# Patient Record
Sex: FEMALE | Race: WHITE | NOT HISPANIC OR LATINO | Employment: OTHER | URBAN - METROPOLITAN AREA
[De-identification: names, ages, dates, MRNs, and addresses within clinical notes are randomized per-mention and may not be internally consistent; named-entity substitution may affect disease eponyms.]

---

## 2017-01-23 ENCOUNTER — OFFICE VISIT (OUTPATIENT)
Dept: URGENT CARE | Facility: CLINIC | Age: 62
End: 2017-01-23
Payer: COMMERCIAL

## 2017-01-23 VITALS
HEART RATE: 72 BPM | OXYGEN SATURATION: 92 % | WEIGHT: 293 LBS | BODY MASS INDEX: 48.82 KG/M2 | TEMPERATURE: 98.1 F | DIASTOLIC BLOOD PRESSURE: 92 MMHG | SYSTOLIC BLOOD PRESSURE: 154 MMHG | HEIGHT: 65 IN | RESPIRATION RATE: 18 BRPM

## 2017-01-23 DIAGNOSIS — J01.40 ACUTE NON-RECURRENT PANSINUSITIS: ICD-10-CM

## 2017-01-23 DIAGNOSIS — M54.50 LUMBOSACRAL PAIN: Primary | ICD-10-CM

## 2017-01-23 LAB
APPEARANCE UR: CLEAR
BILIRUB UR STRIP-MCNC: NORMAL MG/DL
COLOR UR AUTO: YELLOW
GLUCOSE UR STRIP.AUTO-MCNC: NORMAL MG/DL
KETONES UR STRIP.AUTO-MCNC: NORMAL MG/DL
LEUKOCYTE ESTERASE UR QL STRIP.AUTO: NORMAL
NITRITE UR QL STRIP.AUTO: NORMAL
PH UR STRIP.AUTO: 6.5 [PH] (ref 5–8)
PROT UR QL STRIP: NORMAL MG/DL
RBC UR QL AUTO: NORMAL
SP GR UR STRIP.AUTO: 1
UROBILINOGEN UR STRIP-MCNC: NORMAL MG/DL

## 2017-01-23 PROCEDURE — 99204 OFFICE O/P NEW MOD 45 MIN: CPT | Performed by: PHYSICIAN ASSISTANT

## 2017-01-23 PROCEDURE — 81002 URINALYSIS NONAUTO W/O SCOPE: CPT | Performed by: PHYSICIAN ASSISTANT

## 2017-01-23 RX ORDER — CYCLOBENZAPRINE HCL 5 MG
5-10 TABLET ORAL 3 TIMES DAILY PRN
Qty: 30 TAB | Refills: 0 | Status: SHIPPED | OUTPATIENT
Start: 2017-01-23 | End: 2017-03-03

## 2017-01-23 RX ORDER — AZITHROMYCIN 250 MG/1
TABLET, FILM COATED ORAL
Qty: 6 TAB | Refills: 0 | Status: SHIPPED | OUTPATIENT
Start: 2017-01-23 | End: 2017-01-28

## 2017-01-23 RX ORDER — TRAMADOL HYDROCHLORIDE 50 MG/1
50-100 TABLET ORAL EVERY 4 HOURS PRN
Qty: 30 TAB | Refills: 0 | Status: SHIPPED | OUTPATIENT
Start: 2017-01-23 | End: 2017-02-02

## 2017-01-23 NOTE — MR AVS SNAPSHOT
"        Alfonso Posada   2017 12:45 PM   Office Visit   MRN: [id number]    Department:  [facility]   Dept Phone:  [phone]    Description:  Female : 1955   Provider:  Adin Werner PA-C           Allergies as of 2017     Allergen Noted Reactions    Pcn [Penicillins] 2017       Hives on face     Sulfa Drugs 2017       \"some sulfa drugs- leg itchiness\"       You were diagnosed with     Lumbosacral pain   [767763]  -  Primary     Acute non-recurrent pansinusitis   [[id number]]         Vital Signs     Blood Pressure Pulse Temperature Respirations Height Weight    154/92 mmHg 72 36.7 °C (98.1 °F) 18 1.651 m (5' 5\") 135.626 kg (299 lb)    Body Mass Index Oxygen Saturation                49.76 kg/m2 92%          Basic Information     Date Of Birth Sex Race Ethnicity Preferred Language    1955 Female White Non- English      Health Maintenance     Patient has no pending health maintenance at this time      Results     POCT Urinalysis      Component Value Standard Range & Units    POC Color YELLOW Negative    POC Appearance CLEAR Negative    POC Leukocyte Esterase NEG Negative    POC Nitrites NEG Negative    POC Urobiligen NEG Negative (0.2) mg/dL    POC Protein NEG Negative mg/dL    POC Urine PH 6.5 5.0 - 8.0    POC Blood NEG Negative    POC Specific Gravity 1.005 <1.005 - >1.030    POC Ketones NEG Negative mg/dL    POC Biliruben NEG Negative mg/dL    POC Glucose NEG Negative mg/dL                        Current Immunizations     No immunizations on file.      Below and/or attached are the medications your provider expects you to take. Review all of your home medications and newly ordered medications with your provider and/or pharmacist. Follow medication instructions as directed by your provider and/or pharmacist. Please keep your medication list with you and share with your provider. Update the information when medications are discontinued, doses are changed, or " new medications (including over-the-counter products) are added; and carry medication information at all times in the event of emergency situations     Allergies:  PCN - (reactions not documented)     SULFA DRUGS - (reactions not documented)               Medications  Valid as of: January 23, 2017 -  1:31 PM    Generic Name Brand Name Tablet Size Instructions for use    ALPRAZolam   Take  by mouth.        Azithromycin (Tab) ZITHROMAX 250 MG Z-pack: use as directed        Cyclobenzaprine HCl (Tab) FLEXERIL 5 MG Take 1-2 Tabs by mouth 3 times a day as needed.        Levothyroxine Sodium   Take  by mouth.        Losartan Potassium-HCTZ   Take  by mouth.        Metoprolol Tartrate   Take  by mouth.        Montelukast Sodium   Take  by mouth.        TraMADol HCl (Tab) ULTRAM 50 MG Take 1-2 Tabs by mouth every four hours as needed for Moderate Pain or Severe Pain for up to 10 days.        .                 Medicines prescribed today were sent to:     None      Medication refill instructions:       If your prescription bottle indicates you have medication refills left, it is not necessary to call your provider’s office. Please contact your pharmacy and they will refill your medication.    If your prescription bottle indicates you do not have any refills left, you may request refills at any time through one of the following ways: The online [employer] system (except Urgent Care), by calling your provider’s office, or by asking your pharmacy to contact your provider’s office with a refill request. Medication refills are processed only during regular business hours and may not be available until the next business day. Your provider may request additional information or to have a follow-up visit with you prior to refilling your medication.   *Please Note: Medication refills are assigned a new Rx number when refilled electronically. Your pharmacy may indicate that no refills were authorized even though a new prescription for the  same medication is available at the pharmacy. Please request the medicine by name with the pharmacy before contacting your provider for a refill.        Instructions    Lumbosacral Strain  Lumbosacral strain is a strain of any of the parts that make up your lumbosacral vertebrae. Your lumbosacral vertebrae are the bones that make up the lower third of your backbone. Your lumbosacral vertebrae are held together by muscles and tough, fibrous tissue (ligaments).   CAUSES   A sudden blow to your back can cause lumbosacral strain. Also, anything that causes an excessive stretch of the muscles in the low back can cause this strain. This is typically seen when people exert themselves strenuously, fall, lift heavy objects, bend, or crouch repeatedly.  RISK FACTORS  · Physically demanding work.  · Participation in pushing or pulling sports or sports that require a sudden twist of the back (tennis, golf, baseball).  · Weight lifting.  · Excessive lower back curvature.  · Forward-tilted pelvis.  · Weak back or abdominal muscles or both.  · Tight hamstrings.  SIGNS AND SYMPTOMS   Lumbosacral strain may cause pain in the area of your injury or pain that moves (radiates) down your leg.   DIAGNOSIS  Your health care provider can often diagnose lumbosacral strain through a physical exam. In some cases, you may need tests such as X-ray exams.   TREATMENT   Treatment for your lower back injury depends on many factors that your clinician will have to evaluate. However, most treatment will include the use of anti-inflammatory medicines.  HOME CARE INSTRUCTIONS   · Avoid hard physical activities (tennis, racquetball, waterskiing) if you are not in proper physical condition for it. This may aggravate or create problems.  · If you have a back problem, avoid sports requiring sudden body movements. Swimming and walking are generally safer activities.  · Maintain good posture.  · Maintain a healthy weight.  · For acute conditions, you may  put ice on the injured area.  ¨ Put ice in a plastic bag.  ¨ Place a towel between your skin and the bag.  ¨ Leave the ice on for 20 minutes, 2-3 times a day.  · When the low back starts healing, stretching and strengthening exercises may be recommended.  SEEK MEDICAL CARE IF:  · Your back pain is getting worse.  · You experience severe back pain not relieved with medicines.  SEEK IMMEDIATE MEDICAL CARE IF:   · You have numbness, tingling, weakness, or problems with the use of your arms or legs.  · There is a change in bowel or bladder control.  · You have increasing pain in any area of the body, including your belly (abdomen).  · You notice shortness of breath, dizziness, or feel faint.  · You feel sick to your stomach (nauseous), are throwing up (vomiting), or become sweaty.  · You notice discoloration of your toes or legs, or your feet get very cold.  MAKE SURE YOU:   · Understand these instructions.  · Will watch your condition.  · Will get help right away if you are not doing well or get worse.     This information is not intended to replace advice given to you by your health care provider. Make sure you discuss any questions you have with your health care provider.     Document Released: 09/27/2006 Document Revised: 01/08/2016 Document Reviewed: 08/06/2014  [employer] Interactive Patient Education ©2016 [employer]            Ku Access Code: [email or web address]  Expires: 2/22/2017  1:10 PM    Ku  A secure, online tool to manage your health information     [employer] Ku® is a secure, online tool that connects you to your personalized health information from the privacy of your home -- day or night - making it very easy for you to manage your healthcare. Once the activation process is completed, you can even access your medical information using the Ku bo, which is available for free in the Apple Bo store or Google Play store.     Ku provides the following levels of access (as shown  below):   My Chart Features   [facility] Doctor [facility]  Urgent  [facility]  Primary Care  Doctor   Email your healthcare team securely and privately 24/7 X X X    Manage appointments: schedule your next appointment; view details of past/upcoming appointments X      Request prescription refills. X      View recent personal medical records, including lab and immunizations X X X X   View health record, including health history, allergies, medications X X X X   Read reports about your outpatient visits, procedures, consult and ER notes X X X X   See your discharge summary, which is a recap of your hospital and/or ER visit that includes your diagnosis, lab results, and care plan. X X       How to register for RESPACE:  1. Go to  [email or web address].  2. Click on the Sign Up Now box, which takes you to the New Member Sign Up page. You will need to provide the following information:  a. Enter your RESPACE Access Code exactly as it appears at the top of this page. (You will not need to use this code after you’ve completed the sign-up process. If you do not sign up before the expiration date, you must request a new code.)   b. Enter your date of birth.   c. Enter your home email address.   d. Click Submit, and follow the next screen’s instructions.  3. Create a RESPACE ID. This will be your RESPACE login ID and cannot be changed, so think of one that is secure and easy to remember.  4. Create a RESPACE password. You can change your password at any time.  5. Enter your Password Reset Question and Answer. This can be used at a later time if you forget your password.   6. Enter your e-mail address. This allows you to receive e-mail notifications when new information is available in RESPACE.  7. Click Sign Up. You can now view your health information.    For assistance activating your RESPACE account, call [phone]

## 2017-01-23 NOTE — PROGRESS NOTES
Subjective:      PT is a 61 y.o. female who presents with uri and back pain          HPI  Pt states she recently moved from [place], 2 days ago and notes long hours in the car and lifting. Pt concerned for UTI or back pain. PT presents to  clinic today complaining of sore throat, fevers, chills, watery eyes, pressure in ears, cough, fatigue, runny nose. PT denies CP, SOB, NVD, abdominal pain, joint pain. PT states these symptoms began around 2 days ago and that the pt's family has been sick on and off for the last week. PT states the pain is a 7/10 with lumbar pain, aching in nature and worse at night.  Pt has not taken any medications for this condition. The pt's medication list, problem list, and allergies have been evaluated and reviewed during today's visit.      PMH:  Negative per pt.      PSH:  Negative per pt.      Fam Hx:  Father with hx of HTN      Soc HX:  Social History     Social History   • Marital Status:      Spouse Name: N/A   • Number of Children: N/A   • Years of Education: N/A     Occupational History   • Not on file.     Social History Main Topics   • Smoking status: Not on file   • Smokeless tobacco: Not on file   • Alcohol Use: Not on file   • Drug Use: Not on file   • Sexual Activity: Not on file     Other Topics Concern   • Not on file     Social History Narrative   • No narrative on file         Medications:    Current outpatient prescriptions:   •  METOPROLOL TARTRATE PO, Take  by mouth., Disp: , Rfl:   •  ALPRAZolam (XANAX PO), Take  by mouth., Disp: , Rfl:   •  LOSARTAN POTASSIUM-HCTZ PO, Take  by mouth., Disp: , Rfl:   •  Montelukast Sodium (SINGULAIR PO), Take  by mouth., Disp: , Rfl:   •  LEVOTHYROXINE SODIUM PO, Take  by mouth., Disp: , Rfl:   •  tramadol (ULTRAM) 50 MG Tab, Take 1-2 Tabs by mouth every four hours as needed for Moderate Pain or Severe Pain for up to 10 days., Disp: 30 Tab, Rfl: 0  •  cyclobenzaprine (FLEXERIL) 5 MG tablet, Take 1-2 Tabs by mouth 3 times a  "day as needed., Disp: 30 Tab, Rfl: 0  •  azithromycin (ZITHROMAX) 250 MG Tab, Z-pack: use as directed, Disp: 6 Tab, Rfl: 0      Allergies:  Pcn and Sulfa drugs      ROS  Constitutional: Positive for chills and malaise/fatigue.   HENT: Positive for congestion and sore throat. Negative for ear pain.    Eyes: Negative for blurred vision, double vision and photophobia.   Respiratory: Positive for cough and sputum production. Negative for hemoptysis, shortness of breath and wheezing.    Cardiovascular: Negative for chest pain and palpitations.   Gastrointestinal: Negative for nausea, vomiting, abdominal pain, diarrhea and constipation.   Genitourinary: Negative for dysuria and flank pain.   Musculoskeletal: POS for lumbar pain and myalgias.   Skin: Negative for itching and rash.   Neurological: Positive for headaches. Negative for dizziness and tingling.   Endo/Heme/Allergies: Does not bruise/bleed easily.   Psychiatric/Behavioral: Negative for depression. The patient is not nervous/anxious.             Objective:     /92 mmHg  Pulse 72  Temp(Src) 36.7 °C (98.1 °F)  Resp 18  Ht 1.651 m (5' 5\")  Wt 135.626 kg (299 lb)  BMI 49.76 kg/m2  SpO2 92%     Physical Exam   Musculoskeletal:        Lumbar back: She exhibits decreased range of motion, tenderness, pain and spasm. She exhibits no bony tenderness, no swelling, no edema, no deformity, no laceration and normal pulse.        Back:           Constitutional: PT is oriented to person, place, and time. PT appears well-developed and well-nourished. No distress.   HENT:   Head: Normocephalic and atraumatic.   Right Ear: Hearing, tympanic membrane, external ear and ear canal normal.   Left Ear: Hearing, tympanic membrane, external ear and ear canal normal.   Nose: Mucosal edema, rhinorrhea and sinus tenderness present. Right sinus exhibits frontal sinus tenderness. Left sinus exhibits frontal sinus tenderness.   Mouth/Throat: Uvula is midline. Mucous membranes are " pale. Posterior oropharyngeal edema and posterior oropharyngeal erythema present. No oropharyngeal exudate.   Eyes: Conjunctivae normal and EOM are normal. Pupils are equal, round, and reactive to light.   Neck: Normal range of motion. Neck supple. No thyromegaly present.   Cardiovascular: Normal rate, regular rhythm, normal heart sounds and intact distal pulses.  Exam reveals no gallop and no friction rub.    No murmur heard.  Pulmonary/Chest: Effort normal and breath sounds normal. No respiratory distress. PT has no wheezes. PT has no rales. PT exhibits no tenderness.   Abdominal: Soft. Bowel sounds are normal. PT exhibits no distension and no mass. There is no tenderness. There is no rebound and no guarding.   Lymphadenopathy:     PT has no cervical adenopathy.   Neurological: PT is alert and oriented to person, place, and time. PT displays normal reflexes. No cranial nerve deficit. PT exhibits normal muscle tone. Coordination normal.   Skin: Skin is warm and dry. No rash noted. No erythema.   Psychiatric: PT has a normal mood and affect. PT behavior is normal. Judgment and thought content normal.        Assessment/Plan:     1. Lumbosacral pain    - POCT Urinalysis-->WNL  - tramadol (ULTRAM) 50 MG Tab; Take 1-2 Tabs by mouth every four hours as needed for Moderate Pain or Severe Pain for up to 10 days.  Dispense: 30 Tab; Refill: 0  - cyclobenzaprine (FLEXERIL) 5 MG tablet; Take 1-2 Tabs by mouth 3 times a day as needed.  Dispense: 30 Tab; Refill: 0    2. Acute non-recurrent pansinusitis    - azithromycin (ZITHROMAX) 250 MG Tab; Z-pack: use as directed  Dispense: 6 Tab; Refill: 0      [facility] Aware web site evaluation: I have obtained and reviewed patient utilization report from [facility] pharmacy database prior to writing prescription for controlled substance II, III or IV per [place] bill . Based on the report and my clinical assessment the prescription is medically necessary.   NSAIDs for pain 1-5,  Ultram for pain 6-10 or to help get to sleep.  Rest, fluids encouraged.  OTC decongestant for congestion/cough  AVS with medical info given.  Pt was in full understanding and agreement with the plan.  Follow-up as needed if symptoms worsen or fail to improve.

## 2017-01-23 NOTE — PATIENT INSTRUCTIONS
Lumbosacral Strain  Lumbosacral strain is a strain of any of the parts that make up your lumbosacral vertebrae. Your lumbosacral vertebrae are the bones that make up the lower third of your backbone. Your lumbosacral vertebrae are held together by muscles and tough, fibrous tissue (ligaments).   CAUSES   A sudden blow to your back can cause lumbosacral strain. Also, anything that causes an excessive stretch of the muscles in the low back can cause this strain. This is typically seen when people exert themselves strenuously, fall, lift heavy objects, bend, or crouch repeatedly.  RISK FACTORS  · Physically demanding work.  · Participation in pushing or pulling sports or sports that require a sudden twist of the back (tennis, golf, baseball).  · Weight lifting.  · Excessive lower back curvature.  · Forward-tilted pelvis.  · Weak back or abdominal muscles or both.  · Tight hamstrings.  SIGNS AND SYMPTOMS   Lumbosacral strain may cause pain in the area of your injury or pain that moves (radiates) down your leg.   DIAGNOSIS  Your health care provider can often diagnose lumbosacral strain through a physical exam. In some cases, you may need tests such as X-ray exams.   TREATMENT   Treatment for your lower back injury depends on many factors that your clinician will have to evaluate. However, most treatment will include the use of anti-inflammatory medicines.  HOME CARE INSTRUCTIONS   · Avoid hard physical activities (tennis, racquetball, waterskiing) if you are not in proper physical condition for it. This may aggravate or create problems.  · If you have a back problem, avoid sports requiring sudden body movements. Swimming and walking are generally safer activities.  · Maintain good posture.  · Maintain a healthy weight.  · For acute conditions, you may put ice on the injured area.  ¨ Put ice in a plastic bag.  ¨ Place a towel between your skin and the bag.  ¨ Leave the ice on for 20 minutes, 2-3 times a day.  · When the  low back starts healing, stretching and strengthening exercises may be recommended.  SEEK MEDICAL CARE IF:  · Your back pain is getting worse.  · You experience severe back pain not relieved with medicines.  SEEK IMMEDIATE MEDICAL CARE IF:   · You have numbness, tingling, weakness, or problems with the use of your arms or legs.  · There is a change in bowel or bladder control.  · You have increasing pain in any area of the body, including your belly (abdomen).  · You notice shortness of breath, dizziness, or feel faint.  · You feel sick to your stomach (nauseous), are throwing up (vomiting), or become sweaty.  · You notice discoloration of your toes or legs, or your feet get very cold.  MAKE SURE YOU:   · Understand these instructions.  · Will watch your condition.  · Will get help right away if you are not doing well or get worse.     This information is not intended to replace advice given to you by your health care provider. Make sure you discuss any questions you have with your health care provider.     Document Released: 09/27/2006 Document Revised: 01/08/2016 Document Reviewed: 08/06/2014  [employer] Interactive Patient Education ©2016 [employer]

## 2017-02-02 ENCOUNTER — OFFICE VISIT (OUTPATIENT)
Dept: URGENT CARE | Facility: CLINIC | Age: 62
End: 2017-02-02
Payer: COMMERCIAL

## 2017-02-02 VITALS
OXYGEN SATURATION: 95 % | TEMPERATURE: 98.2 F | HEART RATE: 80 BPM | DIASTOLIC BLOOD PRESSURE: 72 MMHG | RESPIRATION RATE: 16 BRPM | SYSTOLIC BLOOD PRESSURE: 132 MMHG

## 2017-02-02 DIAGNOSIS — M54.50 ACUTE BILATERAL LOW BACK PAIN WITHOUT SCIATICA: ICD-10-CM

## 2017-02-02 PROCEDURE — 99214 OFFICE O/P EST MOD 30 MIN: CPT | Performed by: NURSE PRACTITIONER

## 2017-02-02 RX ORDER — KETOROLAC TROMETHAMINE 30 MG/ML
60 INJECTION, SOLUTION INTRAMUSCULAR; INTRAVENOUS ONCE
Status: COMPLETED | OUTPATIENT
Start: 2017-02-02 | End: 2017-02-02

## 2017-02-02 RX ORDER — DEXAMETHASONE SODIUM PHOSPHATE 10 MG/ML
10 INJECTION INTRAMUSCULAR; INTRAVENOUS ONCE
Status: COMPLETED | OUTPATIENT
Start: 2017-02-02 | End: 2017-02-02

## 2017-02-02 RX ORDER — OXYCODONE AND ACETAMINOPHEN 10; 325 MG/1; MG/1
1-2 TABLET ORAL EVERY 4 HOURS PRN
COMMUNITY
End: 2017-02-05

## 2017-02-02 RX ADMIN — DEXAMETHASONE SODIUM PHOSPHATE 10 MG: 10 INJECTION INTRAMUSCULAR; INTRAVENOUS at 14:28

## 2017-02-02 RX ADMIN — KETOROLAC TROMETHAMINE 60 MG: 30 INJECTION, SOLUTION INTRAMUSCULAR; INTRAVENOUS at 14:33

## 2017-02-02 ASSESSMENT — ENCOUNTER SYMPTOMS
NECK PAIN: 0
FALLS: 0
SHORTNESS OF BREATH: 0
ABDOMINAL PAIN: 0
BACK PAIN: 1
DIZZINESS: 0
CONSTIPATION: 0
NAUSEA: 0
WEAKNESS: 0
ORTHOPNEA: 0
DIARRHEA: 0
HEADACHES: 0
COUGH: 0
FLANK PAIN: 0
VOMITING: 0
CHILLS: 0
MYALGIAS: 1
PALPITATIONS: 0
FEVER: 0

## 2017-02-02 NOTE — MR AVS SNAPSHOT
"        Alfonso Posada   2017 1:45 PM   Office Visit   MRN: [id number]    Department:  [facility]   Dept Phone:  [phone]    Description:  Female : 1955   Provider:  RON Chery           Reason for Visit     Back Pain Right side lower back/ middle X 1 week       Allergies as of 2017     Allergen Noted Reactions    Pcn [Penicillins] 2017       Hives on face     Sulfa Drugs 2017       \"some sulfa drugs- leg itchiness\"       You were diagnosed with     Acute bilateral low back pain without sciatica   [[id number]]         Vital Signs     Blood Pressure Pulse Temperature Respirations Oxygen Saturation       132/72 mmHg 80 36.8 °C (98.2 °F) 16 95%       Basic Information     Date Of Birth Sex Race Ethnicity Preferred Language    1955 Female White Non- English      Your appointments     2017  1:00 PM   New Patient with Sim Brownlee PA-C   [facility]    [address]  [address]  [place] [id number] [phone]           Please bring Photo ID, Insurance Cards, All Medication Bottles and copies of any legal documents (such as Living Will, Power of ) If speaking a language besides English please bring an adult . Please arrive 30 minutes prior for check in and registration. You will be receiving a confirmation call a few days before your appointment from our automated call confirmation system.              Health Maintenance        Date Due Completion Dates    IMM DTaP/Tdap/Td Vaccine (1 - Tdap) 1974 ---    PAP SMEAR 1976 ---    MAMMOGRAM 1995 ---    COLONOSCOPY 2005 ---    IMM ZOSTER VACCINE 2015 ---    IMM INFLUENZA (1) 2016 ---            Current Immunizations     No immunizations on file.      Below and/or attached are the medications your provider expects you to take. Review all of your home medications and newly ordered medications with " your provider and/or pharmacist. Follow medication instructions as directed by your provider and/or pharmacist. Please keep your medication list with you and share with your provider. Update the information when medications are discontinued, doses are changed, or new medications (including over-the-counter products) are added; and carry medication information at all times in the event of emergency situations     Allergies:  PCN - (reactions not documented)     SULFA DRUGS - (reactions not documented)               Medications  Valid as of: February 02, 2017 -  2:55 PM    Generic Name Brand Name Tablet Size Instructions for use    ALPRAZolam   Take  by mouth.        Cyclobenzaprine HCl (Tab) FLEXERIL 5 MG Take 1-2 Tabs by mouth 3 times a day as needed.        Levothyroxine Sodium   Take  by mouth.        Losartan Potassium-HCTZ   Take  by mouth.        Metoprolol Tartrate   Take  by mouth.        Montelukast Sodium   Take  by mouth.        Oxycodone-Acetaminophen (Tab) PERCOCET-10  MG Take 1-2 Tabs by mouth every four hours as needed for Severe Pain.        TraMADol HCl (Tab) ULTRAM 50 MG Take 1-2 Tabs by mouth every four hours as needed for Moderate Pain or Severe Pain for up to 10 days.        .                 Medicines prescribed today were sent to:     [facility] [address] [place] [id number] [facility] [place] [id number] [facility] [address]    Phone: [phone] Fax: [phone]    Open 24 Hours?: No      Medication refill instructions:       If your prescription bottle indicates you have medication refills left, it is not necessary to call your provider’s office. Please contact your pharmacy and they will refill your medication.    If your prescription bottle indicates you do not have any refills left, you may request refills at any time through one of the following ways: The online [employer] system (except Urgent Care), by calling your provider’s office,  or by asking your pharmacy to contact your provider’s office with a refill request. Medication refills are processed only during regular business hours and may not be available until the next business day. Your provider may request additional information or to have a follow-up visit with you prior to refilling your medication.   *Please Note: Medication refills are assigned a new Rx number when refilled electronically. Your pharmacy may indicate that no refills were authorized even though a new prescription for the same medication is available at the pharmacy. Please request the medicine by name with the pharmacy before contacting your provider for a refill.           [employer] Access Code: [email or web address]  Expires: 2/22/2017  1:10 PM    [employer]  A secure, online tool to manage your health information     [employer]® is a secure, online tool that connects you to your personalized health information from the privacy of your home -- day or night - making it very easy for you to manage your healthcare. Once the activation process is completed, you can even access your medical information using the [employer] bo, which is available for free in the Apple Bo store or Google Play store.     [employer] provides the following levels of access (as shown below):   My Chart Features   [facility] Doctor [facility]  Urgent  Care [facility]  Primary Care  Doctor   Email your healthcare team securely and privately 24/7 X X X    Manage appointments: schedule your next appointment; view details of past/upcoming appointments X      Request prescription refills. X      View recent personal medical records, including lab and immunizations X X X X   View health record, including health history, allergies, medications X X X X   Read reports about your outpatient visits, procedures, consult and ER notes X X X X   See your discharge summary, which is a recap of your hospital and/or ER visit that includes  your diagnosis, lab results, and care plan. X X       How to register for [email or web address]:  1. Go to  [email or web address].  2. Click on the Sign Up Now box, which takes you to the New Member Sign Up page. You will need to provide the following information:  a. Enter your [employer] Access Code exactly as it appears at the top of this page. (You will not need to use this code after you’ve completed the sign-up process. If you do not sign up before the expiration date, you must request a new code.)   b. Enter your date of birth.   c. Enter your home email address.   d. Click Submit, and follow the next screen’s instructions.  3. Create a [employer] ID. This will be your [employer] login ID and cannot be changed, so think of one that is secure and easy to remember.  4. Create a [employer] password. You can change your password at any time.  5. Enter your Password Reset Question and Answer. This can be used at a later time if you forget your password.   6. Enter your e-mail address. This allows you to receive e-mail notifications when new information is available in [employer].  7. Click Sign Up. You can now view your health information.    For assistance activating your [employer] account, call [phone]

## 2017-02-02 NOTE — PROGRESS NOTES
"Subjective:      Alfonso Posada is a 61 y.o. female who presents with Back Pain            Back Pain  Pertinent negatives include no abdominal pain, chest pain, dysuria, fever, headaches or weakness.   Alfonso is a 61 year old female who is here for back pain x 10 days. Was seen here 1 week ago for same thing. Prescribed tramadol and flexeril. Out of tramadol but still has flexeril but states \"not working\". Has gone to the chiropractor 2 days ago and will go again today. Has appointment with new PCP tomorrow. Moved here from [place] a week ago. Denies problems with urination but states unable to bend over to \"wipe myself\" after going to the bathroom and difficulty with putting on her bra.     PMH:  has no past medical history on file.  MEDS:   Current outpatient prescriptions:   •  oxycodone-acetaminophen (PERCOCET-10)  MG Tab, Take 1-2 Tabs by mouth every four hours as needed for Severe Pain., Disp: , Rfl:   •  METOPROLOL TARTRATE PO, Take  by mouth., Disp: , Rfl:   •  LOSARTAN POTASSIUM-HCTZ PO, Take  by mouth., Disp: , Rfl:   •  Montelukast Sodium (SINGULAIR PO), Take  by mouth., Disp: , Rfl:   •  LEVOTHYROXINE SODIUM PO, Take  by mouth., Disp: , Rfl:   •  cyclobenzaprine (FLEXERIL) 5 MG tablet, Take 1-2 Tabs by mouth 3 times a day as needed., Disp: 30 Tab, Rfl: 0  •  ALPRAZolam (XANAX PO), Take  by mouth., Disp: , Rfl:   •  tramadol (ULTRAM) 50 MG Tab, Take 1-2 Tabs by mouth every four hours as needed for Moderate Pain or Severe Pain for up to 10 days., Disp: 30 Tab, Rfl: 0  ALLERGIES:   Allergies   Allergen Reactions   • Pcn [Penicillins]      Hives on face    • Sulfa Drugs      \"some sulfa drugs- leg itchiness\"      SURGHX: History reviewed. No pertinent past surgical history.  SOCHX:    FH: Family history was reviewed, no pertinent findings to report      Review of Systems   Constitutional: Negative for fever, chills and malaise/fatigue.   Respiratory: Negative for cough and shortness of breath.  "   Cardiovascular: Negative for chest pain, palpitations and orthopnea.   Gastrointestinal: Negative for nausea, vomiting, abdominal pain, diarrhea and constipation.   Genitourinary: Negative for dysuria, urgency, frequency, hematuria and flank pain.   Musculoskeletal: Positive for myalgias and back pain. Negative for falls and neck pain.   Neurological: Negative for dizziness, weakness and headaches.          Objective:     /72 mmHg  Pulse 80  Temp(Src) 36.8 °C (98.2 °F)  Resp 16  SpO2 95%     Physical Exam   Constitutional: She is oriented to person, place, and time. She appears well-developed and well-nourished. No distress.   HENT:   Head: Normocephalic.   Eyes: Conjunctivae and EOM are normal. Pupils are equal, round, and reactive to light.   Neck: Normal range of motion. Neck supple.   Cardiovascular: Normal rate.    Pulmonary/Chest: Effort normal.   Musculoskeletal:        Lumbar back: She exhibits decreased range of motion, tenderness, pain and spasm. She exhibits no bony tenderness, no swelling, no edema and no deformity.   Neurological: She is alert and oriented to person, place, and time.   Skin: Skin is warm and dry. She is not diaphoretic.   Vitals reviewed.  Morbid Obesity            Assessment/Plan:     1. Acute bilateral low back pain without sciatica    - dexamethasone (DECADRON) injection (check route below) 10 mg; 1 mL by Intramuscular route Once.  - ketorolac (TORADOL) injection 60 mg; 2 mL by Intramuscular route Once.    Take Ibuprofen prn for discomfort  May use cool compresses for swelling and warm compresses for muscle stiffness   May perform muscle stretches as tolerated after warm compresses to maintain mobility, avoid abdominal crunches  May continue Flexeril prn when at home only   May apply topical analgesics prn  Perform proper body mechanics with lifting, twisting, bending and reaching. Ask for assistance with heay objects  Monitor for bowel/urination problems,  numbness/tingling in lower extremities, decreased ROM with ambulation difficulty- need re-evaluation

## 2017-02-03 ENCOUNTER — OFFICE VISIT (OUTPATIENT)
Dept: MEDICAL GROUP | Facility: MEDICAL CENTER | Age: 62
End: 2017-02-03
Payer: COMMERCIAL

## 2017-02-03 ENCOUNTER — HOSPITAL ENCOUNTER (OUTPATIENT)
Dept: LAB | Facility: MEDICAL CENTER | Age: 62
End: 2017-02-03
Attending: PHYSICIAN ASSISTANT
Payer: COMMERCIAL

## 2017-02-03 VITALS
SYSTOLIC BLOOD PRESSURE: 126 MMHG | DIASTOLIC BLOOD PRESSURE: 74 MMHG | BODY MASS INDEX: 48.82 KG/M2 | HEIGHT: 65 IN | HEART RATE: 74 BPM | TEMPERATURE: 98.4 F | RESPIRATION RATE: 16 BRPM | OXYGEN SATURATION: 95 % | WEIGHT: 293 LBS

## 2017-02-03 DIAGNOSIS — I10 ESSENTIAL HYPERTENSION: ICD-10-CM

## 2017-02-03 DIAGNOSIS — E89.0 POSTOPERATIVE HYPOTHYROIDISM: ICD-10-CM

## 2017-02-03 DIAGNOSIS — Z86.19 HISTORY OF SHINGLES: ICD-10-CM

## 2017-02-03 DIAGNOSIS — J30.9 ALLERGIC RHINITIS, UNSPECIFIED ALLERGIC RHINITIS TRIGGER, UNSPECIFIED RHINITIS SEASONALITY: ICD-10-CM

## 2017-02-03 DIAGNOSIS — M25.562 BILATERAL CHRONIC KNEE PAIN: ICD-10-CM

## 2017-02-03 DIAGNOSIS — E66.01 MORBID OBESITY WITH BMI OF 45.0-49.9, ADULT (HCC): ICD-10-CM

## 2017-02-03 DIAGNOSIS — F17.200 TOBACCO DEPENDENCE: ICD-10-CM

## 2017-02-03 DIAGNOSIS — F41.9 ANXIETY: ICD-10-CM

## 2017-02-03 DIAGNOSIS — M25.561 BILATERAL CHRONIC KNEE PAIN: ICD-10-CM

## 2017-02-03 DIAGNOSIS — Z76.89 ENCOUNTER TO ESTABLISH CARE: ICD-10-CM

## 2017-02-03 DIAGNOSIS — Z00.00 PREVENTATIVE HEALTH CARE: ICD-10-CM

## 2017-02-03 DIAGNOSIS — G89.29 BILATERAL CHRONIC KNEE PAIN: ICD-10-CM

## 2017-02-03 DIAGNOSIS — M19.90 ARTHRITIS: ICD-10-CM

## 2017-02-03 LAB
ERYTHROCYTE [SEDIMENTATION RATE] IN BLOOD BY WESTERGREN METHOD: 10 MM/HOUR (ref 0–30)
RHEUMATOID FACT SERPL-ACNC: <10 IU/ML (ref 0–14)
T4 FREE SERPL-MCNC: 1.01 NG/DL (ref 0.58–1.64)
TSH SERPL DL<=0.005 MIU/L-ACNC: 0.14 UIU/ML (ref 0.35–5.5)

## 2017-02-03 PROCEDURE — 86431 RHEUMATOID FACTOR QUANT: CPT

## 2017-02-03 PROCEDURE — 84443 ASSAY THYROID STIM HORMONE: CPT

## 2017-02-03 PROCEDURE — 99214 OFFICE O/P EST MOD 30 MIN: CPT | Performed by: PHYSICIAN ASSISTANT

## 2017-02-03 PROCEDURE — 84439 ASSAY OF FREE THYROXINE: CPT

## 2017-02-03 PROCEDURE — 36415 COLL VENOUS BLD VENIPUNCTURE: CPT

## 2017-02-03 PROCEDURE — 85652 RBC SED RATE AUTOMATED: CPT

## 2017-02-03 RX ORDER — OXYCODONE AND ACETAMINOPHEN 10; 325 MG/1; MG/1
1-2 TABLET ORAL EVERY 8 HOURS PRN
Qty: 90 TAB | Refills: 0 | Status: SHIPPED | OUTPATIENT
Start: 2017-02-03 | End: 2017-03-03

## 2017-02-03 RX ORDER — NAPROXEN 500 MG/1
500 TABLET ORAL 2 TIMES DAILY WITH MEALS
Qty: 60 TAB | Refills: 1 | Status: SHIPPED | OUTPATIENT
Start: 2017-02-03 | End: 2017-04-03 | Stop reason: SDUPTHER

## 2017-02-03 ASSESSMENT — PATIENT HEALTH QUESTIONNAIRE - PHQ9: CLINICAL INTERPRETATION OF PHQ2 SCORE: 0

## 2017-02-03 NOTE — ASSESSMENT & PLAN NOTE
This is a 61-year-old female complains of a history of hypothyroidism. She had total a thyroidectomy but the right side of parathyroid was left intact. Since that time she's battled to keep her hypothyroid in check. She's been on several different dosages of levothyroxine. Last time it was stopped completely. At one point it was advised that she have an ultrasound of her thyroid. She recently moved to [place] from [place]. She is accompanied by her  today.

## 2017-02-03 NOTE — ASSESSMENT & PLAN NOTE
History of hypertension. Currently on metoprolol 50 mg and losartan at 25 mg. Blood pressure is well controlled.

## 2017-02-03 NOTE — ASSESSMENT & PLAN NOTE
Last year at the end of the year was diagnosed with shingles around the left hip. The rash has improved but there is still residual neuropathy. She complains of a painless numbness. Was told to wait 3 years to get her shingles vaccination.

## 2017-02-03 NOTE — ASSESSMENT & PLAN NOTE
History of anxiety. Is currently on sertraline and takes Xanax as needed. Symptoms are controlled. Denies any homicidal or suicidal ideations.

## 2017-02-03 NOTE — ASSESSMENT & PLAN NOTE
Has a history of allergic rhinitis. Only medication that is affected is Singulair which she will take as needed.

## 2017-02-03 NOTE — PROGRESS NOTES
Subjective:   Alfonso Posada is a 61 y.o. female here today to establish care and discuss several chronic conditions.    Preventative health care  Hx AUB. Dx with precancerous cells of uterus.  Had uterus removal.      Postoperative hypothyroidism  This is a 61-year-old female complains of a history of hypothyroidism. She had total a thyroidectomy but the right side of parathyroid was left intact. Since that time she's battled to keep her hypothyroid in check. She's been on several different dosages of levothyroxine. Last time it was stopped completely. At one point it was advised that she have an ultrasound of her thyroid. She recently moved to [place] from [place]. She is accompanied by her  today.        Bilateral chronic knee pain  Complains of bilateral chronic pain. She walks with a cane. She has been prescribed in the past nonsteroidals and opioids. She states both the knees are bone-on-bone. Is requesting a referral to see an orthopedic surgeon.    History of shingles  Last year at the end of the year was diagnosed with shingles around the left hip. The rash has improved but there is still residual neuropathy. She complains of a painless numbness. Was told to wait 3 years to get her shingles vaccination.    Anxiety  History of anxiety. Is currently on sertraline and takes Xanax as needed. Symptoms are controlled. Denies any homicidal or suicidal ideations.    Arthritis  History of arthritis. Was referred to rheumatology because she was told that she had the beginnings of rheumatoid arthritis. She has the paperwork that she was referred to rheumatology.    Allergic rhinitis  Has a history of allergic rhinitis. Only medication that is affected is Singulair which she will take as needed.    Essential hypertension  History of hypertension. Currently on metoprolol 50 mg and losartan at 25 mg. Blood pressure is well controlled.       Current medicines (including changes today)  Current Outpatient  "Prescriptions   Medication Sig Dispense Refill   • naproxen (NAPROSYN) 500 MG Tab Take 1 Tab by mouth 2 times a day, with meals. 60 Tab 1   • oxycodone-acetaminophen (PERCOCET-10)  MG Tab Take 1-2 Tabs by mouth every 8 hours as needed for Severe Pain. 90 Tab 0   • oxycodone-acetaminophen (PERCOCET-10)  MG Tab Take 1-2 Tabs by mouth every four hours as needed for Severe Pain.     • METOPROLOL TARTRATE PO Take 50 mg by mouth.     • ALPRAZolam (XANAX PO) Take  by mouth.     • LOSARTAN POTASSIUM-HCTZ PO Take 25 mg by mouth.     • Montelukast Sodium (SINGULAIR PO) Take  by mouth.     • LEVOTHYROXINE SODIUM PO Take  by mouth.     • cyclobenzaprine (FLEXERIL) 5 MG tablet Take 1-2 Tabs by mouth 3 times a day as needed. 30 Tab 0     No current facility-administered medications for this visit.     She  has no past medical history on file.    ROS   No chest pain, no shortness of breath, no abdominal pain and all other systems were reviewed and are negative.       Objective:     Blood pressure 126/74, pulse 74, temperature 36.9 °C (98.4 °F), resp. rate 16, height 1.651 m (5' 5\"), weight 135.626 kg (299 lb), SpO2 95 %. Body mass index is 49.76 kg/(m^2).   Physical Exam:  Constitutional: Alert, no distress.  Skin: Warm, dry, good turgor, no rashes in visible areas.  Eye: Equal, round and reactive, conjunctiva clear, lids normal.  ENMT: Lips without lesions, good dentition, oropharynx clear.  Neck: Trachea midline, no masses.   Lymph: No cervical or supraclavicular lymphadenopathy  Respiratory: Unlabored respiratory effort, lungs clear to auscultation, no wheezes, no ronchi.  Cardiovascular: Normal S1, S2, no murmur, no edema.  Abdomen: Soft, non-tender, no masses.  Psych: Alert and oriented x3, normal affect and mood.        Assessment and Plan:   The following treatment plan was discussed    1. Postoperative hypothyroidism  Unknown status. Ordered thyroid panel.  - THYROID PANEL WITH TSH    2. Anxiety  Chronic " condition stable. Continue sertraline as directed. Will not renew Xanax given her combination with opioids.    3. History of shingles  Resolved with lesions but has some postherpetic paresthesia. Advised to wait until symptoms completely resolved then we'll discuss giving her a prescription for Zostavax.    4. Bilateral chronic knee pain  Chronic condition. Prescribed naproxen and oxycodone as directed. Referred to orthopedics.  - naproxen (NAPROSYN) 500 MG Tab; Take 1 Tab by mouth 2 times a day, with meals.  Dispense: 60 Tab; Refill: 1  - REFERRAL TO ORTHOPEDICS    5. Arthritis  Per patient and previous medical provider records. We'll order rheumatoid arthritis factor and sedimentation rate.  - RHEUMATOID ARTHRITIS FACTOR; Future  - WESTERGREN SED RATE; Future    6. Allergic rhinitis, unspecified allergic rhinitis trigger, unspecified rhinitis seasonality  Chronic condition. Continue similar as directed. Advised order to help should take it daily.    7. Essential hypertension  Chronic condition and stable. Continue blood pressure medication as directed.    8. Morbid obesity with BMI of 45.0-49.9, adult (CMS-HCC)  In the future will advise referral to weight management program.  - Patient identified as having weight management issue.  Appropriate orders and counseling given.    9. Preventative health care  Mammogram.  - MA-SCREEN MAMMO W/CAD-BILAT    10. Encounter to establish care    11. Tobacco dependence  In future will refer to tobacco cessation program if patient agrees.      Followup: No Follow-up on file.    Please note that this dictation was created using voice recognition software. I have made every reasonable attempt to correct obvious errors, but I expect that there are errors of grammar and possibly content that I did not discover before finalizing the note.

## 2017-02-03 NOTE — ASSESSMENT & PLAN NOTE
History of arthritis. Was referred to rheumatology because she was told that she had the beginnings of rheumatoid arthritis. She has the paperwork that she was referred to rheumatology.

## 2017-02-03 NOTE — ASSESSMENT & PLAN NOTE
Complains of bilateral chronic pain. She walks with a cane. She has been prescribed in the past nonsteroidals and opioids. She states both the knees are bone-on-bone. Is requesting a referral to see an orthopedic surgeon.

## 2017-02-05 ENCOUNTER — APPOINTMENT (OUTPATIENT)
Dept: RADIOLOGY | Facility: MEDICAL CENTER | Age: 62
End: 2017-02-05
Attending: EMERGENCY MEDICINE
Payer: COMMERCIAL

## 2017-02-05 ENCOUNTER — HOSPITAL ENCOUNTER (EMERGENCY)
Facility: MEDICAL CENTER | Age: 62
End: 2017-02-05
Attending: EMERGENCY MEDICINE
Payer: COMMERCIAL

## 2017-02-05 VITALS
OXYGEN SATURATION: 91 % | RESPIRATION RATE: 14 BRPM | DIASTOLIC BLOOD PRESSURE: 68 MMHG | HEART RATE: 78 BPM | WEIGHT: 293 LBS | TEMPERATURE: 97.5 F | HEIGHT: 65 IN | BODY MASS INDEX: 48.82 KG/M2 | SYSTOLIC BLOOD PRESSURE: 156 MMHG

## 2017-02-05 DIAGNOSIS — E89.0 POSTOPERATIVE HYPOTHYROIDISM: ICD-10-CM

## 2017-02-05 DIAGNOSIS — E87.6 HYPOKALEMIA: ICD-10-CM

## 2017-02-05 DIAGNOSIS — R79.89 LOW TSH LEVEL: ICD-10-CM

## 2017-02-05 DIAGNOSIS — R00.0 SINUS TACHYCARDIA: ICD-10-CM

## 2017-02-05 DIAGNOSIS — R06.09 DYSPNEA ON EXERTION: ICD-10-CM

## 2017-02-05 LAB
ALBUMIN SERPL BCP-MCNC: 4.2 G/DL (ref 3.2–4.9)
ALBUMIN/GLOB SERPL: 1.3 G/DL
ALP SERPL-CCNC: 106 U/L (ref 30–99)
ALT SERPL-CCNC: 22 U/L (ref 2–50)
ANION GAP SERPL CALC-SCNC: 12 MMOL/L (ref 0–11.9)
AST SERPL-CCNC: 22 U/L (ref 12–45)
BASOPHILS # BLD AUTO: 0.7 % (ref 0–1.8)
BASOPHILS # BLD: 0.09 K/UL (ref 0–0.12)
BILIRUB SERPL-MCNC: 0.7 MG/DL (ref 0.1–1.5)
BUN SERPL-MCNC: 13 MG/DL (ref 8–22)
CALCIUM SERPL-MCNC: 8.7 MG/DL (ref 8.4–10.2)
CHLORIDE SERPL-SCNC: 106 MMOL/L (ref 96–112)
CO2 SERPL-SCNC: 21 MMOL/L (ref 20–33)
CREAT SERPL-MCNC: 0.72 MG/DL (ref 0.5–1.4)
EKG IMPRESSION: NORMAL
EOSINOPHIL # BLD AUTO: 0.43 K/UL (ref 0–0.51)
EOSINOPHIL NFR BLD: 3.5 % (ref 0–6.9)
ERYTHROCYTE [DISTWIDTH] IN BLOOD BY AUTOMATED COUNT: 49 FL (ref 35.9–50)
GFR SERPL CREATININE-BSD FRML MDRD: >60 ML/MIN/1.73 M 2
GLOBULIN SER CALC-MCNC: 3.3 G/DL (ref 1.9–3.5)
GLUCOSE SERPL-MCNC: 101 MG/DL (ref 65–99)
HCT VFR BLD AUTO: 45.7 % (ref 37–47)
HGB BLD-MCNC: 15.2 G/DL (ref 12–16)
IMM GRANULOCYTES # BLD AUTO: 0.04 K/UL (ref 0–0.11)
IMM GRANULOCYTES NFR BLD AUTO: 0.3 % (ref 0–0.9)
LYMPHOCYTES # BLD AUTO: 3.44 K/UL (ref 1–4.8)
LYMPHOCYTES NFR BLD: 27.8 % (ref 22–41)
MCH RBC QN AUTO: 29.8 PG (ref 27–33)
MCHC RBC AUTO-ENTMCNC: 33.3 G/DL (ref 33.6–35)
MCV RBC AUTO: 89.6 FL (ref 81.4–97.8)
MONOCYTES # BLD AUTO: 0.86 K/UL (ref 0–0.85)
MONOCYTES NFR BLD AUTO: 7 % (ref 0–13.4)
NEUTROPHILS # BLD AUTO: 7.51 K/UL (ref 2–7.15)
NEUTROPHILS NFR BLD: 60.7 % (ref 44–72)
NRBC # BLD AUTO: 0 K/UL
NRBC BLD AUTO-RTO: 0 /100 WBC
PLATELET # BLD AUTO: 294 K/UL (ref 164–446)
PMV BLD AUTO: 10.2 FL (ref 9–12.9)
POTASSIUM SERPL-SCNC: 3.4 MMOL/L (ref 3.6–5.5)
PROT SERPL-MCNC: 7.5 G/DL (ref 6–8.2)
RBC # BLD AUTO: 5.1 M/UL (ref 4.2–5.4)
SODIUM SERPL-SCNC: 139 MMOL/L (ref 135–145)
TROPONIN I SERPL-MCNC: 0.02 NG/ML (ref 0–0.04)
WBC # BLD AUTO: 12.4 K/UL (ref 4.8–10.8)

## 2017-02-05 PROCEDURE — 93005 ELECTROCARDIOGRAM TRACING: CPT

## 2017-02-05 PROCEDURE — 99283 EMERGENCY DEPT VISIT LOW MDM: CPT

## 2017-02-05 PROCEDURE — 84484 ASSAY OF TROPONIN QUANT: CPT

## 2017-02-05 PROCEDURE — 36415 COLL VENOUS BLD VENIPUNCTURE: CPT

## 2017-02-05 PROCEDURE — 85025 COMPLETE CBC W/AUTO DIFF WBC: CPT

## 2017-02-05 PROCEDURE — 71020 DX-CHEST-2 VIEWS: CPT

## 2017-02-05 PROCEDURE — 80053 COMPREHEN METABOLIC PANEL: CPT

## 2017-02-05 RX ORDER — AZITHROMYCIN 250 MG/1
250-500 TABLET, FILM COATED ORAL DAILY
Status: SHIPPED | COMMUNITY
Start: 2017-01-23 | End: 2017-03-03

## 2017-02-05 RX ORDER — INDOMETHACIN 25 MG/1
25 CAPSULE ORAL 3 TIMES DAILY
Status: SHIPPED | COMMUNITY
End: 2017-03-03

## 2017-02-05 RX ORDER — POTASSIUM CHLORIDE 600 MG/1
8 TABLET, FILM COATED, EXTENDED RELEASE ORAL DAILY
Status: SHIPPED | COMMUNITY
End: 2017-03-03 | Stop reason: SDUPTHER

## 2017-02-05 RX ORDER — ALPRAZOLAM 0.5 MG/1
0.5 TABLET ORAL 2 TIMES DAILY PRN
Status: SHIPPED | COMMUNITY
End: 2017-03-03 | Stop reason: SDUPTHER

## 2017-02-05 RX ORDER — SERTRALINE HYDROCHLORIDE 100 MG/1
100 TABLET, FILM COATED ORAL DAILY
Status: SHIPPED | COMMUNITY
End: 2017-03-03 | Stop reason: SDUPTHER

## 2017-02-05 RX ORDER — LOSARTAN POTASSIUM 25 MG/1
25 TABLET ORAL DAILY
Status: SHIPPED | COMMUNITY
End: 2017-03-03 | Stop reason: SDUPTHER

## 2017-02-05 RX ORDER — METOPROLOL TARTRATE 50 MG/1
50 TABLET, FILM COATED ORAL 2 TIMES DAILY
Status: SHIPPED | COMMUNITY
End: 2017-03-03 | Stop reason: SDUPTHER

## 2017-02-05 RX ORDER — MONTELUKAST SODIUM 10 MG/1
10 TABLET ORAL DAILY
Status: SHIPPED | COMMUNITY
End: 2017-03-03 | Stop reason: SDUPTHER

## 2017-02-05 RX ORDER — GABAPENTIN 300 MG/1
600 CAPSULE ORAL 2 TIMES DAILY
Status: SHIPPED | COMMUNITY
End: 2017-03-03

## 2017-02-05 RX ORDER — FUROSEMIDE 40 MG/1
40 TABLET ORAL DAILY
Status: SHIPPED | COMMUNITY
End: 2017-03-03 | Stop reason: SDUPTHER

## 2017-02-05 RX ORDER — ONDANSETRON 4 MG/1
4 TABLET, ORALLY DISINTEGRATING ORAL EVERY 6 HOURS PRN
Status: SHIPPED | COMMUNITY
End: 2017-03-03

## 2017-02-05 RX ORDER — OMEPRAZOLE 40 MG/1
40 CAPSULE, DELAYED RELEASE ORAL DAILY
Status: SHIPPED | COMMUNITY
End: 2017-03-03 | Stop reason: SDUPTHER

## 2017-02-05 RX ORDER — ATORVASTATIN CALCIUM 40 MG/1
40 TABLET, FILM COATED ORAL NIGHTLY
Status: SHIPPED | COMMUNITY
End: 2017-05-04 | Stop reason: SDUPTHER

## 2017-02-05 ASSESSMENT — PAIN SCALES - GENERAL: PAINLEVEL_OUTOF10: 0

## 2017-02-05 NOTE — ED NOTES
"Complains of exacerbation of chronic exertional dyspnea.  Reports a Hx of hypothyroidism.  Pt has \"recently moved\" to our region from [place] and lacks a PCP.   "

## 2017-02-05 NOTE — ED AVS SNAPSHOT
2/5/2017          Alfonso Posada  [address]  [place] [address]    Dear Alfonso:    [facility] wants to ensure your discharge home is safe and you or your loved ones have had all your questions answered regarding your care after you leave the hospital.    You may receive a telephone call within two days of your discharge.  This call is to make certain you understand your discharge instructions as well as ensure we provided you with the best care possible during your stay with us.     The call will only last approximately 3-5 minutes and will be done by a nurse.    Once again, we want to ensure your discharge home is safe and that you have a clear understanding of any next steps in your care.  If you have any questions or concerns, please do not hesitate to contact us, we are here for you.  Thank you for choosing [facility] for your healthcare needs.    Sincerely,    Aristeo Huang    [facility]

## 2017-02-05 NOTE — ED NOTES
"Patient reports hx of SVT, reports \"on and off fluttering sensation in chest\", denies chest pain  "

## 2017-02-05 NOTE — ED PROVIDER NOTES
ED Provider Note    CHIEF COMPLAINT  Chief Complaint   Patient presents with   • Shortness of Breath       HPI  Alfonso Posada is a 61 y.o. female who presents to the emergency department after developing rapid heart rate at approximately 145 bpm. Patient notes that since arriving in the department the rate has decreased. Patient's concern and that is a side effect of her difficulty with managing her hypothyroidism. Patient recently decreased from 0.7 5.50. Establish primary care here in the renal area and was told to stop her thyroid. Patient underwent recent thyroid testing. Patient notes that she has a lot of adjustments as she recently [place] [facility] [place]. Concerned about possible affective the environment. Patient also notes that she recently had a stress steroid epidural shot. Does not see a local endocrinologist. Patient notes that while in [place] in February had a complete cardiology workup for her recurrent tachycardia and was told her heart was normal. Patient's been told she had SVT in the past.    Patient notes that she drank a lot of tea and diet Coke prior to the episode. Also notes that she forgot to take her metropolol. This has been taken. She also missed taking potassium recently.    She describes no Tenderness or history of PE. Does not recent weight gain that she believes is possibly secondary to hypothyroidism. Patient notes that another reason she may have had an increased heart rate today she missed her dose of metropolol.    REVIEW OF SYSTEMS  See HPI for further details. All other systems are negative.     PAST MEDICAL HISTORY  Past Medical History   Diagnosis Date   • Asthma        FAMILY HISTORY  No significant family history    SOCIAL HISTORY  Social History     Social History   • Marital Status:      Spouse Name: N/A   • Number of Children: N/A   • Years of Education: N/A     Social History Main Topics   • Smoking status: Light Tobacco Smoker   •  "Smokeless tobacco: Never Used   • Alcohol Use: No   • Drug Use: No   • Sexual Activity:     Partners: Male     Other Topics Concern   • None     Social History Narrative       SURGICAL HISTORY  Past Surgical History   Procedure Laterality Date   • Thyroidectomy total  2005       CURRENT MEDICATIONS  Home Medications     Reviewed by Jos Ibarra (Pharmacy Tech) on 02/05/17 at 1337  Med List Status: Not Addressed    Medication Last Dose Status    ALPRAZolam (XANAX PO) 2/3/2017 Active    cyclobenzaprine (FLEXERIL) 5 MG tablet 2/3/2017 Active    LEVOTHYROXINE SODIUM PO 2/3/2017 Active    LOSARTAN POTASSIUM-HCTZ PO 2/3/2017 Active    METOPROLOL TARTRATE PO 2/3/2017 Active    Montelukast Sodium (SINGULAIR PO) 2/3/2017 Active    naproxen (NAPROSYN) 500 MG Tab  Active    oxycodone-acetaminophen (PERCOCET-10)  MG Tab  Active                 ALLERGIES  Allergies   Allergen Reactions   • Pcn [Penicillins]      Hives on face    • Sulfa Drugs      \"some sulfa drugs- leg itchiness\"        PHYSICAL EXAM  VITAL SIGNS: Pulse 150  Temp(Src) 36.4 °C (97.5 °F)  Resp 20  Ht 1.651 m (5' 5\")  Wt 149 kg (328 lb 7.8 oz)  BMI 54.66 kg/m2  SpO2 94%    Constitutional: Well developed, Well nourished, No acute distress, Non-toxic appearance. Markedly unconditioned. Difficulty with movement about the stretcher  HENT: Normocephalic, Atraumatic, Bilateral external ears normal, Oropharynx moist, no evidence of dehydration, No oral exudates, Nose normal.   Eyes: PERRLA, EOMI, Conjunctiva normal, No discharge.   Neck: Normal range of motion, No tenderness, Supple, No stridor. No masses. No evidence of meningitis or meningismus.   Lymphatic: No lymphadenopathy noted.   Cardiovascular: Normal heart rate, Normal rhythm, No murmurs, No rubs, No gallops.   Thorax & Lungs: Normal breath sounds, No respiratory distress, No wheezing or rhonchi, No chest tenderness.   Abdomen: Bowel sounds normal, Soft, No tenderness, No masses, No " pulsatile masses. No guarding or rebound. No evidence of peritoneal findings.  Skin: Warm, Dry, No erythema, No rash. No exanthem.   Back: No tenderness.   Extremities: Intact distal pulses, No edema, No tenderness, No cyanosis, No clubbing. No clinical evidence of DVT  Musculoskeletal: Good range of motion in all major joints. No major deformities noted.   Neurologic: Alert & oriented x 3, Normal motor function, No focal deficits noted.   Psychiatric: Affect normal, mood normal.                                                     Urologic: No CVA tenderness no evidence of pyelonephritis.                         EKG  EKG Interpretation    Interpreted by me    Rhythm: sinus tachycardia  Rate: 138  Axis: normal  Ectopy: none  Conduction: normal  ST Segments: no acute change  T Waves: no acute change  Q Waves: none    Clinical Impression: no acute changes and normal EKG    RADIOLOGY/PROCEDURES  DX-CHEST-2 VIEWS   Final Result      No acute cardiopulmonary abnormality identified.            COURSE & MEDICAL DECISION MAKING  Pertinent Labs & Imaging studies reviewed. (See chart for details)  Patient's heart rate has decreased. Patient initial showed sinus tachycardia. I reviewed the laboratory recently showed a low TSH normal T4.    Results for orders placed or performed during the hospital encounter of 02/05/17   CBC WITH DIFFERENTIAL   Result Value Ref Range    WBC 12.4 (H) 4.8 - 10.8 K/uL    RBC 5.10 4.20 - 5.40 M/uL    Hemoglobin 15.2 12.0 - 16.0 g/dL    Hematocrit 45.7 37.0 - 47.0 %    MCV 89.6 81.4 - 97.8 fL    MCH 29.8 27.0 - 33.0 pg    MCHC 33.3 (L) 33.6 - 35.0 g/dL    RDW 49.0 35.9 - 50.0 fL    Platelet Count 294 164 - 446 K/uL    MPV 10.2 9.0 - 12.9 fL    Neutrophils-Polys 60.70 44.00 - 72.00 %    Lymphocytes 27.80 22.00 - 41.00 %    Monocytes 7.00 0.00 - 13.40 %    Eosinophils 3.50 0.00 - 6.90 %    Basophils 0.70 0.00 - 1.80 %    Immature Granulocytes 0.30 0.00 - 0.90 %    Nucleated RBC 0.00 /100 WBC     Neutrophils (Absolute) 7.51 (H) 2.00 - 7.15 K/uL    Lymphs (Absolute) 3.44 1.00 - 4.80 K/uL    Monos (Absolute) 0.86 (H) 0.00 - 0.85 K/uL    Eos (Absolute) 0.43 0.00 - 0.51 K/uL    Baso (Absolute) 0.09 0.00 - 0.12 K/uL    Immature Granulocytes (abs) 0.04 0.00 - 0.11 K/uL    NRBC (Absolute) 0.00 K/uL   TROPONIN   Result Value Ref Range    Troponin I 0.02 0.00 - 0.04 ng/mL   EKG (NOW)   Result Value Ref Range    Report       [facility] Emergency Dept.    Test Date:  2017  Pt Name:    CHUY CLAUDIO                Department: [facility]  MRN:        [id number]                      Room:  Gender:     F                            Technician: CRISTOFER  :        [id number]                   Requested By:ER TRIAGE PROTOCOL  Order #:    [id number]                    Reading MD:    Measurements  Intervals                                Axis  Rate:       141                          P:          0  FL:         88                           QRS:        -52  QRSD:       88                           T:          73  QT:         324  QTc:        496    Interpretive Statements  SINUS TACHYCARDIA  LEFT ANTERIOR FASCICULAR BLOCK  BORDERLINE ST DEPRESSION, ANTEROLATERAL LEADS  BORDERLINE PROLONGED QT INTERVAL  No previous ECG available for comparison        At time of discharge patient's heart rate is 78 bpm. Discussed possibility of tachycardia secondary to missing her metropolol and increasing caffeine consumption earlier. Patient discharged with advice on endocrinology referral. Patient's return if return of symptoms.      FINAL IMPRESSION  1. Sinus tachycardia    2. Postoperative hypothyroidism    3. Dyspnea on exertion    4. Chronic dyspnea  5. Obesity   6. Hyperkalemia  7. Missed dosage of metropolol      Electronically signed by: Alejandro Rosado, 2017 2:13 PM

## 2017-02-05 NOTE — ED NOTES
Med rec complete per patient and [facility] [phone]  Allergies reviewed    Patient complete a Zpak in January for Sinus infection    Patient stopped taking her Thyroid medication but decided she needed to take a 1/2 tablet this morning (Levothyroxine 50 mcg)  Doctor just stopped her taking Indomethacin and put her on Naproxen

## 2017-02-05 NOTE — DISCHARGE INSTRUCTIONS
Hypokalemia  Hypokalemia means a low potassium level in the blood. Symptoms may include muscle weakness and cramping, fatigue, abdominal pain, vomiting, constipation, or irregularities of the heartbeat. Sometimes hypokalemia is discovered by your caregiver if you are taking certain medicines for high blood pressure or kidney disease.   Potassium is an electrolyte that helps regulate the amount of fluid in the body. It also stimulates muscle contraction and maintains a stable acid-base balance. If potassium levels go too low or too high, your health may be in danger. You are at risk for developing shock, heart, and lung problems. Hypokalemia can occur if you have excessive diarrhea, vomiting, or sweating. Potassium can be lost through your kidneys in the urine. Certain common medicines can also cause potassium loss, especially water pills (diuretics). The same is possible with cortisone medications or certain types of antibiotics. Low potassium can be dangerous if you are taking certain heart medicines. In diabetes, your potassium may fall after you take insulin, especially if your diabetes had been out of control for a while. In rare cases, potassium may be low because you are not getting enough in your diet.   In adults, a potassium level below 3.5 mEq/L is usually considered low.  Hypokalemia can be treated with potassium supplements taken by mouth and a diet that is high in potassium. Foods with high potassium content are:  · Peas, lentils, lima beans, nuts, and dried fruit.   · Whole grain and bran cereals and breads.   · Fresh fruit and vegetables. Examples include:   · Bananas.   · Cantaloupe.   · Grapefruit.   · Oranges.   · Tomatoes.   · Honeydew melons.   · Potatoes.   · Peaches.   · Orange and tomato juices.   · Meats.   See your caregiver as instructed for a follow-up blood test to be sure your potassium is back to normal.  SEEK MEDICAL CARE IF:   · You have nausea, vomiting, constipation, or abdominal  pain.   · You have palpitations or irregular heartbeats, chest pain or shortness of breath.   · You have muscle cramps or weakness or fatigue.   · You have lethargy.   SEEK IMMEDIATE MEDICAL CARE IF:   · You have paralysis.   · You have confusion or other mental status changes.   Document Released: 12/18/2006 Document Revised: 03/11/2013 Document Reviewed: 04/13/2011  [employer]Care® Patient Information ©2013 [employer].  Hypothyroidism  Hypothyroidism is a disorder of the thyroid. The thyroid is a large gland that is located in the lower front of the neck. The thyroid releases hormones that control how the body works. With hypothyroidism, the thyroid does not make enough of these hormones.  CAUSES  Causes of hypothyroidism may include:  · Viral infections.  · Pregnancy.  · Your own defense system (immune system) attacking your thyroid.  · Certain medicines.  · Birth defects.  · Past radiation treatments to your head or neck.  · Past treatment with radioactive iodine.  · Past surgical removal of part or all of your thyroid.  · Problems with the gland that is located in the center of your brain (pituitary).  SIGNS AND SYMPTOMS  Signs and symptoms of hypothyroidism may include:  · Feeling as though you have no energy (lethargy).  · Inability to tolerate cold.  · Weight gain that is not explained by a change in diet or exercise habits.  · Dry skin.  · Coarse hair.  · Menstrual irregularity.  · Slowing of thought processes.  · Constipation.  · Sadness or depression.  DIAGNOSIS   Your health care provider may diagnose hypothyroidism with blood tests and ultrasound tests.  TREATMENT  Hypothyroidism is treated with medicine that replaces the hormones that your body does not make. After you begin treatment, it may take several weeks for symptoms to go away.  HOME CARE INSTRUCTIONS   · Take medicines only as directed by your health care provider.  · If you start taking any new medicines, tell your health care provider.  · Keep  all follow-up visits as directed by your health care provider. This is important. As your condition improves, your dosage needs may change. You will need to have blood tests regularly so that your health care provider can watch your condition.  SEEK MEDICAL CARE IF:  · Your symptoms do not get better with treatment.  · You are taking thyroid replacement medicine and:  · You sweat excessively.  · You have tremors.  · You feel anxious.  · You lose weight rapidly.  · You cannot tolerate heat.  · You have emotional swings.  · You have diarrhea.  · You feel weak.  SEEK IMMEDIATE MEDICAL CARE IF:   · You develop chest pain.  · You develop an irregular heartbeat.  · You develop a rapid heartbeat.     This information is not intended to replace advice given to you by your health care provider. Make sure you discuss any questions you have with your health care provider.     Document Released: 12/18/2006 Document Revised: 01/08/2016 Document Reviewed: 05/05/2015  [employer] Interactive Patient Education ©2016 [employer]    Shortness of Breath  Shortness of breath means you have trouble breathing. It could also mean that you have a medical problem. You should get immediate medical care for shortness of breath.  CAUSES   · Not enough oxygen in the air such as with high altitudes or a smoke-filled room.  · Certain lung diseases, infections, or problems.  · Heart disease or conditions, such as angina or heart failure.  · Low red blood cells (anemia).  · Poor physical fitness, which can cause shortness of breath when you exercise.  · Chest or back injuries or stiffness.  · Being overweight.  · Smoking.  · Anxiety, which can make you feel like you are not getting enough air.  DIAGNOSIS   Serious medical problems can often be found during your physical exam. Tests may also be done to determine why you are having shortness of breath. Tests may include:  · Chest X-rays.  · Lung function tests.  · Blood tests.  · An electrocardiogram  (ECG).  · An ambulatory electrocardiogram. An ambulatory ECG records your heartbeat patterns over a 24-hour period.  · Exercise testing.  · A transthoracic echocardiogram (TTE). During echocardiography, sound waves are used to evaluate how blood flows through your heart.  · A transesophageal echocardiogram (MONIQUE).  · Imaging scans.  Your health care provider may not be able to find a cause for your shortness of breath after your exam. In this case, it is important to have a follow-up exam with your health care provider as directed.   TREATMENT   Treatment for shortness of breath depends on the cause of your symptoms and can vary greatly.  HOME CARE INSTRUCTIONS   · Do not smoke. Smoking is a common cause of shortness of breath. If you smoke, ask for help to quit.  · Avoid being around chemicals or things that may bother your breathing, such as paint fumes and dust.  · Rest as needed. Slowly resume your usual activities.  · If medicines were prescribed, take them as directed for the full length of time directed. This includes oxygen and any inhaled medicines.  · Keep all follow-up appointments as directed by your health care provider.  SEEK MEDICAL CARE IF:   · Your condition does not improve in the time expected.  · You have a hard time doing your normal activities even with rest.  · You have any new symptoms.  SEEK IMMEDIATE MEDICAL CARE IF:   · Your shortness of breath gets worse.  · You feel light-headed, faint, or develop a cough not controlled with medicines.  · You start coughing up blood.  · You have pain with breathing.  · You have chest pain or pain in your arms, shoulders, or abdomen.  · You have a fever.  · You are unable to walk up stairs or exercise the way you normally do.  MAKE SURE YOU:  · Understand these instructions.  · Will watch your condition.  · Will get help right away if you are not doing well or get worse.     This information is not intended to replace advice given to you by your health  care provider. Make sure you discuss any questions you have with your health care provider.     Document Released: 09/12/2002 Document Revised: 12/23/2014 Document Reviewed: 03/04/2013  [employer] Interactive Patient Education ©2016 [employer]    Nonspecific Tachycardia  Tachycardia is a faster than normal heartbeat (more than 100 beats per minute). In adults, the heart normally beats between 60 and 100 times a minute. A fast heartbeat may be a normal response to exercise or stress. It does not necessarily mean that something is wrong. However, sometimes when your heart beats too fast it may not be able to pump enough blood to the rest of your body. This can result in chest pain, shortness of breath, dizziness, and even fainting. Nonspecific tachycardia means that the specific cause or pattern of your tachycardia is unknown.  CAUSES   Tachycardia may be harmless or it may be due to a more serious underlying cause. Possible causes of tachycardia include:  · Exercise or exertion.  · Fever.  · Pain or injury.  · Infection.  · Loss of body fluids (dehydration).  · Overactive thyroid.  · Lack of red blood cells (anemia).  · Anxiety and stress.  · Alcohol.  · Caffeine.  · Tobacco products.  · Diet pills.  · Illegal drugs.  · Heart disease.  SYMPTOMS  · Rapid or irregular heartbeat (palpitations).  · Suddenly feeling your heart beating (cardiac awareness).  · Dizziness.  · Tiredness (fatigue).  · Shortness of breath.  · Chest pain.  · Nausea.  · Fainting.  DIAGNOSIS   Your caregiver will perform a physical exam and take your medical history. In some cases, a heart specialist (cardiologist) may be consulted. Your caregiver may also order:  · Blood tests.  · Electrocardiography. This test records the electrical activity of your heart.  · A heart monitoring test.  TREATMENT   Treatment will depend on the likely cause of your tachycardia. The goal is to treat the underlying cause of your tachycardia. Treatment methods may  include:  · Replacement of fluids or blood through an intravenous (IV) tube for moderate to severe dehydration or anemia.  · New medicines or changes in your current medicines.  · Diet and lifestyle changes.  · Treatment for certain infections.  · Stress relief or relaxation methods.  HOME CARE INSTRUCTIONS   · Rest.  · Drink enough fluids to keep your urine clear or pale yellow.  · Do not smoke.  · Avoid:  ¨ Caffeine.  ¨ Tobacco.  ¨ Alcohol.  ¨ Chocolate.  ¨ Stimulants such as over-the-counter diet pills or pills that help you stay awake.  ¨ Situations that cause anxiety or stress.  ¨ Illegal drugs such as marijuana, phencyclidine (PCP), and cocaine.  · Only take medicine as directed by your caregiver.  · Keep all follow-up appointments as directed by your caregiver.  SEEK IMMEDIATE MEDICAL CARE IF:   · You have pain in your chest, upper arms, jaw, or neck.  · You become weak, dizzy, or feel faint.  · You have palpitations that will not go away.  · You vomit, have diarrhea, or pass blood in your stool.  · Your skin is cool, pale, and wet.  · You have a fever that will not go away with rest, fluids, and medicine.  MAKE SURE YOU:   · Understand these instructions.  · Will watch your condition.  · Will get help right away if you are not doing well or get worse.     This information is not intended to replace advice given to you by your health care provider. Make sure you discuss any questions you have with your health care provider.     Document Released: 01/25/2006 Document Revised: 03/11/2013 Document Reviewed: 11/27/2012  [employer] Interactive Patient Education ©2016 [employer]

## 2017-02-05 NOTE — ED AVS SNAPSHOT
[facility] Access Code: [email or web address]  Expires: 2/22/2017  1:10 PM    [facility]  A secure, online tool to manage your health information     [employer]’s [facility]® is a secure, online tool that connects you to your personalized health information from the privacy of your home -- day or night - making it very easy for you to manage your healthcare. Once the activation process is completed, you can even access your medical information using the [facility] bo, which is available for free in the Apple Bo store or Google Play store.     [facility] provides the following levels of access (as shown below):   My Chart Features   [facility] Primary Care Doctor [facility]  Specialists [facility]  Urgent  Care Non-[facility]  Primary Care  Doctor   Email your healthcare team securely and privately 24/7 X X X X   Manage appointments: schedule your next appointment; view details of past/upcoming appointments X      Request prescription refills. X      View recent personal medical records, including lab and immunizations X X X X   View health record, including health history, allergies, medications X X X X   Read reports about your outpatient visits, procedures, consult and ER notes X X X X   See your discharge summary, which is a recap of your hospital and/or ER visit that includes your diagnosis, lab results, and care plan. X X       How to register for [facility]:  1. Go to  [email or web address].  2. Click on the Sign Up Now box, which takes you to the New Member Sign Up page. You will need to provide the following information:  a. Enter your [facility] Access Code exactly as it appears at the top of this page. (You will not need to use this code after you’ve completed the sign-up process. If you do not sign up before the expiration date, you must request a new code.)   b. Enter your date of birth.   c. Enter your home email address.   d. Click Submit, and follow the next screen’s instructions.  3. Create a [facility] ID. This will be your [facility]  login ID and cannot be changed, so think of one that is secure and easy to remember.  4. Create a [employer] password. You can change your password at any time.  5. Enter your Password Reset Question and Answer. This can be used at a later time if you forget your password.   6. Enter your e-mail address. This allows you to receive e-mail notifications when new information is available in [employer].  7. Click Sign Up. You can now view your health information.    For assistance activating your [employer] account, call [phone]

## 2017-02-05 NOTE — ED AVS SNAPSHOT
Home Care Instructions                                                                                                                Alfonso Posada   MRN: [id number]    Department:  [facility], Emergency Dept   Date of Visit:  2/5/2017            [facility], Emergency Dept    [address]    [place] [address]    Phone:  [phone]      You were seen by     Alejandro Rosado M.D.      Your Diagnosis Was     Sinus tachycardia     R00.0       Medication Information     Review all of your home medications and newly ordered medications with your primary doctor and/or pharmacist as soon as possible. Follow medication instructions as directed by your doctor and/or pharmacist.     Please keep your complete medication list with you and share with your physician. Update the information when medications are discontinued, doses are changed, or new medications (including over-the-counter products) are added; and carry medication information at all times in the event of emergency situations.               Medication List      ASK your doctor about these medications        Instructions    alprazolam 0.5 MG Tabs   Commonly known as:  XANAX    Take 0.5 mg by mouth 2 times a day as needed for Anxiety.   Dose:  0.5 mg       atorvastatin 40 MG Tabs   Commonly known as:  LIPITOR    Take 40 mg by mouth every evening.   Dose:  40 mg       azithromycin 250 MG Tabs   Commonly known as:  ZITHROMAX    Take 250-500 mg by mouth every day. 5 day course for sinus infection   Dose:  250-500 mg       cyclobenzaprine 5 MG tablet   Commonly known as:  FLEXERIL    Take 1-2 Tabs by mouth 3 times a day as needed.   Dose:  5-10 mg       furosemide 40 MG Tabs   Commonly known as:  LASIX    Take 40 mg by mouth every day.   Dose:  40 mg       gabapentin 300 MG Caps   Commonly known as:  NEURONTIN    Take 600 mg by mouth 2 Times a Day.   Dose:  600 mg       indomethacin 25 MG Caps   Commonly  known as:  INDOCIN    Take 25 mg by mouth 3 times a day.   Dose:  25 mg       losartan 25 MG Tabs   Commonly known as:  COZAAR    Take 25 mg by mouth every day.   Dose:  25 mg       metoprolol 50 MG Tabs   Commonly known as:  LOPRESSOR    Take 50 mg by mouth 2 times a day.   Dose:  50 mg       montelukast 10 MG Tabs   Commonly known as:  SINGULAIR    Take 10 mg by mouth every day.   Dose:  10 mg       naproxen 500 MG Tabs   Commonly known as:  NAPROSYN    Take 1 Tab by mouth 2 times a day, with meals.   Dose:  500 mg       omeprazole 40 MG delayed-release capsule   Commonly known as:  PRILOSEC    Take 40 mg by mouth every day.   Dose:  40 mg       oxycodone-acetaminophen  MG Tabs   Commonly known as:  PERCOCET-10    Take 1-2 Tabs by mouth every 8 hours as needed for Severe Pain.   Dose:  1-2 Tab       potassium chloride 8 MEQ tablet   Commonly known as:  KLOR-CON    Take 8 mEq by mouth every day.   Dose:  8 mEq       sertraline 100 MG Tabs   Commonly known as:  ZOLOFT    Take 100 mg by mouth every day.   Dose:  100 mg       ZOFRAN ODT 4 MG Tbdp   Generic drug:  ondansetron    Take 4 mg by mouth every 6 hours as needed for Nausea/Vomiting.   Dose:  4 mg               Procedures and tests performed during your visit     CBC WITH DIFFERENTIAL    COMP METABOLIC PANEL    DX-CHEST-2 VIEWS    EKG (NOW)    ESTIMATED GFR    OLD EKG    TROPONIN        Discharge Instructions       Hypokalemia  Hypokalemia means a low potassium level in the blood. Symptoms may include muscle weakness and cramping, fatigue, abdominal pain, vomiting, constipation, or irregularities of the heartbeat. Sometimes hypokalemia is discovered by your caregiver if you are taking certain medicines for high blood pressure or kidney disease.   Potassium is an electrolyte that helps regulate the amount of fluid in the body. It also stimulates muscle contraction and maintains a stable acid-base balance. If potassium levels go too low or too high, your  health may be in danger. You are at risk for developing shock, heart, and lung problems. Hypokalemia can occur if you have excessive diarrhea, vomiting, or sweating. Potassium can be lost through your kidneys in the urine. Certain common medicines can also cause potassium loss, especially water pills (diuretics). The same is possible with cortisone medications or certain types of antibiotics. Low potassium can be dangerous if you are taking certain heart medicines. In diabetes, your potassium may fall after you take insulin, especially if your diabetes had been out of control for a while. In rare cases, potassium may be low because you are not getting enough in your diet.   In adults, a potassium level below 3.5 mEq/L is usually considered low.  Hypokalemia can be treated with potassium supplements taken by mouth and a diet that is high in potassium. Foods with high potassium content are:  · Peas, lentils, lima beans, nuts, and dried fruit.   · Whole grain and bran cereals and breads.   · Fresh fruit and vegetables. Examples include:   · Bananas.   · Cantaloupe.   · Grapefruit.   · Oranges.   · Tomatoes.   · Honeydew melons.   · Potatoes.   · Peaches.   · Orange and tomato juices.   · Meats.   See your caregiver as instructed for a follow-up blood test to be sure your potassium is back to normal.  SEEK MEDICAL CARE IF:   · You have nausea, vomiting, constipation, or abdominal pain.   · You have palpitations or irregular heartbeats, chest pain or shortness of breath.   · You have muscle cramps or weakness or fatigue.   · You have lethargy.   SEEK IMMEDIATE MEDICAL CARE IF:   · You have paralysis.   · You have confusion or other mental status changes.   Document Released: 12/18/2006 Document Revised: 03/11/2013 Document Reviewed: 04/13/2011  [employer]® Patient Information ©2013 [employer].  Hypothyroidism  Hypothyroidism is a disorder of the thyroid. The thyroid is a large gland that is located in the lower front of  the neck. The thyroid releases hormones that control how the body works. With hypothyroidism, the thyroid does not make enough of these hormones.  CAUSES  Causes of hypothyroidism may include:  · Viral infections.  · Pregnancy.  · Your own defense system (immune system) attacking your thyroid.  · Certain medicines.  · Birth defects.  · Past radiation treatments to your head or neck.  · Past treatment with radioactive iodine.  · Past surgical removal of part or all of your thyroid.  · Problems with the gland that is located in the center of your brain (pituitary).  SIGNS AND SYMPTOMS  Signs and symptoms of hypothyroidism may include:  · Feeling as though you have no energy (lethargy).  · Inability to tolerate cold.  · Weight gain that is not explained by a change in diet or exercise habits.  · Dry skin.  · Coarse hair.  · Menstrual irregularity.  · Slowing of thought processes.  · Constipation.  · Sadness or depression.  DIAGNOSIS   Your health care provider may diagnose hypothyroidism with blood tests and ultrasound tests.  TREATMENT  Hypothyroidism is treated with medicine that replaces the hormones that your body does not make. After you begin treatment, it may take several weeks for symptoms to go away.  HOME CARE INSTRUCTIONS   · Take medicines only as directed by your health care provider.  · If you start taking any new medicines, tell your health care provider.  · Keep all follow-up visits as directed by your health care provider. This is important. As your condition improves, your dosage needs may change. You will need to have blood tests regularly so that your health care provider can watch your condition.  SEEK MEDICAL CARE IF:  · Your symptoms do not get better with treatment.  · You are taking thyroid replacement medicine and:  · You sweat excessively.  · You have tremors.  · You feel anxious.  · You lose weight rapidly.  · You cannot tolerate heat.  · You have emotional swings.  · You have  diarrhea.  · You feel weak.  SEEK IMMEDIATE MEDICAL CARE IF:   · You develop chest pain.  · You develop an irregular heartbeat.  · You develop a rapid heartbeat.     This information is not intended to replace advice given to you by your health care provider. Make sure you discuss any questions you have with your health care provider.     Document Released: 12/18/2006 Document Revised: 01/08/2016 Document Reviewed: 05/05/2015  [employer] Interactive Patient Education ©2016 [employer].    Shortness of Breath  Shortness of breath means you have trouble breathing. It could also mean that you have a medical problem. You should get immediate medical care for shortness of breath.  CAUSES   · Not enough oxygen in the air such as with high altitudes or a smoke-filled room.  · Certain lung diseases, infections, or problems.  · Heart disease or conditions, such as angina or heart failure.  · Low red blood cells (anemia).  · Poor physical fitness, which can cause shortness of breath when you exercise.  · Chest or back injuries or stiffness.  · Being overweight.  · Smoking.  · Anxiety, which can make you feel like you are not getting enough air.  DIAGNOSIS   Serious medical problems can often be found during your physical exam. Tests may also be done to determine why you are having shortness of breath. Tests may include:  · Chest X-rays.  · Lung function tests.  · Blood tests.  · An electrocardiogram (ECG).  · An ambulatory electrocardiogram. An ambulatory ECG records your heartbeat patterns over a 24-hour period.  · Exercise testing.  · A transthoracic echocardiogram (TTE). During echocardiography, sound waves are used to evaluate how blood flows through your heart.  · A transesophageal echocardiogram (MONIQUE).  · Imaging scans.  Your health care provider may not be able to find a cause for your shortness of breath after your exam. In this case, it is important to have a follow-up exam with your health care provider as directed.    TREATMENT   Treatment for shortness of breath depends on the cause of your symptoms and can vary greatly.  HOME CARE INSTRUCTIONS   · Do not smoke. Smoking is a common cause of shortness of breath. If you smoke, ask for help to quit.  · Avoid being around chemicals or things that may bother your breathing, such as paint fumes and dust.  · Rest as needed. Slowly resume your usual activities.  · If medicines were prescribed, take them as directed for the full length of time directed. This includes oxygen and any inhaled medicines.  · Keep all follow-up appointments as directed by your health care provider.  SEEK MEDICAL CARE IF:   · Your condition does not improve in the time expected.  · You have a hard time doing your normal activities even with rest.  · You have any new symptoms.  SEEK IMMEDIATE MEDICAL CARE IF:   · Your shortness of breath gets worse.  · You feel light-headed, faint, or develop a cough not controlled with medicines.  · You start coughing up blood.  · You have pain with breathing.  · You have chest pain or pain in your arms, shoulders, or abdomen.  · You have a fever.  · You are unable to walk up stairs or exercise the way you normally do.  MAKE SURE YOU:  · Understand these instructions.  · Will watch your condition.  · Will get help right away if you are not doing well or get worse.     This information is not intended to replace advice given to you by your health care provider. Make sure you discuss any questions you have with your health care provider.     Document Released: 09/12/2002 Document Revised: 12/23/2014 Document Reviewed: 03/04/2013  [employer] Interactive Patient Education ©2016 [employer]    Nonspecific Tachycardia  Tachycardia is a faster than normal heartbeat (more than 100 beats per minute). In adults, the heart normally beats between 60 and 100 times a minute. A fast heartbeat may be a normal response to exercise or stress. It does not necessarily mean that something is  wrong. However, sometimes when your heart beats too fast it may not be able to pump enough blood to the rest of your body. This can result in chest pain, shortness of breath, dizziness, and even fainting. Nonspecific tachycardia means that the specific cause or pattern of your tachycardia is unknown.  CAUSES   Tachycardia may be harmless or it may be due to a more serious underlying cause. Possible causes of tachycardia include:  · Exercise or exertion.  · Fever.  · Pain or injury.  · Infection.  · Loss of body fluids (dehydration).  · Overactive thyroid.  · Lack of red blood cells (anemia).  · Anxiety and stress.  · Alcohol.  · Caffeine.  · Tobacco products.  · Diet pills.  · Illegal drugs.  · Heart disease.  SYMPTOMS  · Rapid or irregular heartbeat (palpitations).  · Suddenly feeling your heart beating (cardiac awareness).  · Dizziness.  · Tiredness (fatigue).  · Shortness of breath.  · Chest pain.  · Nausea.  · Fainting.  DIAGNOSIS   Your caregiver will perform a physical exam and take your medical history. In some cases, a heart specialist (cardiologist) may be consulted. Your caregiver may also order:  · Blood tests.  · Electrocardiography. This test records the electrical activity of your heart.  · A heart monitoring test.  TREATMENT   Treatment will depend on the likely cause of your tachycardia. The goal is to treat the underlying cause of your tachycardia. Treatment methods may include:  · Replacement of fluids or blood through an intravenous (IV) tube for moderate to severe dehydration or anemia.  · New medicines or changes in your current medicines.  · Diet and lifestyle changes.  · Treatment for certain infections.  · Stress relief or relaxation methods.  HOME CARE INSTRUCTIONS   · Rest.  · Drink enough fluids to keep your urine clear or pale yellow.  · Do not smoke.  · Avoid:  ¨ Caffeine.  ¨ Tobacco.  ¨ Alcohol.  ¨ Chocolate.  ¨ Stimulants such as over-the-counter diet pills or pills that help you stay  awake.  ¨ Situations that cause anxiety or stress.  ¨ Illegal drugs such as marijuana, phencyclidine (PCP), and cocaine.  · Only take medicine as directed by your caregiver.  · Keep all follow-up appointments as directed by your caregiver.  SEEK IMMEDIATE MEDICAL CARE IF:   · You have pain in your chest, upper arms, jaw, or neck.  · You become weak, dizzy, or feel faint.  · You have palpitations that will not go away.  · You vomit, have diarrhea, or pass blood in your stool.  · Your skin is cool, pale, and wet.  · You have a fever that will not go away with rest, fluids, and medicine.  MAKE SURE YOU:   · Understand these instructions.  · Will watch your condition.  · Will get help right away if you are not doing well or get worse.     This information is not intended to replace advice given to you by your health care provider. Make sure you discuss any questions you have with your health care provider.     Document Released: 01/25/2006 Document Revised: 03/11/2013 Document Reviewed: 11/27/2012  [employer] Interactive Patient Education ©2016 [employer]            Patient Information     Patient Information    Following emergency treatment: all patient requiring follow-up care must return either to a private physician or a clinic if your condition worsens before you are able to obtain further medical attention, please return to the emergency room.     Billing Information    At [facility], we work to make the billing process streamlined for our patients.  Our Representatives are here to answer any questions you may have regarding your hospital bill.  If you have insurance coverage and have supplied your insurance information to us, we will submit a claim to your insurer on your behalf.  Should you have any questions regarding your bill, we can be reached online or by phone as follows:  Online: You are able pay your bills online or live chat with our representatives about any billing questions you may have. We are  here to help Monday - Friday from 8:00am to 7:30pm and 9:00am - 12:00pm on Saturdays.  Please visit [email or web address]  for more information.   Phone:  [phone] or [phone]    Please note that your emergency physician, surgeon, pathologist, radiologist, anesthesiologist, and other specialists are not employed by [facility] and will therefore bill separately for their services.  Please contact them directly for any questions concerning their bills at the numbers below:     Emergency Physician Services:  [phone]  [facility]:  [phone]  [facility]:  [phone]  [facility]:  [phone]    1. Your final bill may vary from the amount quoted upon discharge if all procedures are not complete at that time, or if your doctor has additional procedures of which we are not aware. You will receive an additional bill if you return to the Emergency Department at [facility] for suture removal regardless of the facility of which the sutures were placed.     2. Please arrange for settlement of this account at the emergency registration.    3. All self-pay accounts are due in full at the time of treatment.  If you are unable to meet this obligation then payment is expected within 4-5 days.     4. If you have had radiology studies (CT, X-ray, Ultrasound, MRI), you have received a preliminary result during your emergency department visit. Please contact the radiology department [phone] to receive a copy of your final result. Please discuss the Final result with your primary physician or with the follow up physician provided.     Crisis Hotline:  [place] Crisis Hotline:  3-023-NKHONJO or 1-762.247.1871  [place] Crisis Hotline:    1-200.656.3209 or 303-733-4901         ED Discharge Follow Up Questions    1. In order to provide you with very good care, we would like to follow up with a phone call in the next few days.  May we  have your permission to contact you?     YES /  NO    2. What is the best phone number to call you? (       )_____-__________    3. What is the best time to call you?      Morning  /  Afternoon  /  Evening                   Patient Signature:  ____________________________________________________________    Date:  ____________________________________________________________

## 2017-02-06 ENCOUNTER — HOSPITAL ENCOUNTER (OUTPATIENT)
Dept: LAB | Facility: MEDICAL CENTER | Age: 62
End: 2017-02-06
Attending: PHYSICIAN ASSISTANT
Payer: COMMERCIAL

## 2017-02-06 ENCOUNTER — TELEPHONE (OUTPATIENT)
Dept: MEDICAL GROUP | Facility: MEDICAL CENTER | Age: 62
End: 2017-02-06

## 2017-02-06 DIAGNOSIS — R79.89 LOW TSH LEVEL: ICD-10-CM

## 2017-02-06 LAB — T3 SERPL-MCNC: 92.9 NG/DL (ref 60–181)

## 2017-02-06 PROCEDURE — 36415 COLL VENOUS BLD VENIPUNCTURE: CPT

## 2017-02-06 PROCEDURE — 84480 ASSAY TRIIODOTHYRONINE (T3): CPT

## 2017-02-06 NOTE — TELEPHONE ENCOUNTER
Phone Number Called: [phone] (home)     Message: Notified patient of lab results including not having rheumatoid arthritis. Patient will go to the lab for the T3.    Left Message for patient to call back: no

## 2017-02-06 NOTE — TELEPHONE ENCOUNTER
----- Message from Sim Brownlee PA-C sent at 2/5/2017  5:11 PM PST -----  Please call Alfonso.  Let her know her labs do not show hypothyroidism.  TSH was low so I added a T3 level.  She will need to return to the lab for T3 level to evaluate for hyperthyroidism.  Thank you.    Sim

## 2017-02-07 ENCOUNTER — TELEPHONE (OUTPATIENT)
Dept: MEDICAL GROUP | Age: 62
End: 2017-02-07

## 2017-02-07 DIAGNOSIS — E05.90 SUBCLINICAL HYPERTHYROIDISM: ICD-10-CM

## 2017-02-07 NOTE — TELEPHONE ENCOUNTER
----- Message from Sim Brownlee PA-C sent at 2/7/2017  8:23 AM PST -----  Please contact patient. Notify her that T3 is normal. Because of her low TSH I'm going to refer her to endocrinology.    Sim

## 2017-02-07 NOTE — TELEPHONE ENCOUNTER
Phone Number Called: [phone] (home)     Message: Notified patient of the results and the referral. Patient stated understanding.    Left Message for patient to call back: no

## 2017-02-27 ENCOUNTER — OFFICE VISIT (OUTPATIENT)
Dept: ENDOCRINOLOGY | Facility: MEDICAL CENTER | Age: 62
End: 2017-02-27
Payer: COMMERCIAL

## 2017-02-27 VITALS
BODY MASS INDEX: 48.82 KG/M2 | HEART RATE: 82 BPM | WEIGHT: 293 LBS | DIASTOLIC BLOOD PRESSURE: 72 MMHG | OXYGEN SATURATION: 94 % | HEIGHT: 65 IN | SYSTOLIC BLOOD PRESSURE: 124 MMHG

## 2017-02-27 DIAGNOSIS — E89.0 POSTSURGICAL HYPOTHYROIDISM: ICD-10-CM

## 2017-02-27 PROCEDURE — 99204 OFFICE O/P NEW MOD 45 MIN: CPT | Performed by: INTERNAL MEDICINE

## 2017-02-27 RX ORDER — LEVOTHYROXINE SODIUM 0.03 MG/1
25 TABLET ORAL
Qty: 30 TAB | Refills: 6 | Status: SHIPPED | OUTPATIENT
Start: 2017-02-27 | End: 2017-10-08 | Stop reason: SDUPTHER

## 2017-02-27 RX ORDER — LEVOTHYROXINE SODIUM 0.07 MG/1
75 TABLET ORAL
COMMUNITY
End: 2017-02-27

## 2017-02-27 NOTE — PROGRESS NOTES
New Patient Consult Note  Primary care physician: Sim Brownlee PA-C    Reason for consult: Postsurgical hypothyroidism    HPI:  Alfonso Posada is a 61 y.o. old patient who comes in today for evaluation of postsurgical hypothyroidism. She underwent total thyroidectomy 30 years ago, for multiple thyroid nodules, according to patient pathology was benign. She was started on thyroid hormone replacement at that time. She remains on thyroid hormone replacement ever since then. She did not have any issues with hyperthyroidism up until a year ago. In March 2016 she was found to have undetectable TSH and elevated free T4, at that time she was on 125 µg levothyroxine daily. Dose of levothyroxine was lowered to 100 µg daily, and then progressively over the next 9 months the dose was lowered several times (due to persistently suppressed TSH), 288 µg daily, then 75 µg daily and 50 µg daily, and then she was off levothyroxine for 2 weeks but then her TSH became quite elevated. She had labs in February 2017 when TSH was 0.1, and the proceedings 6 weeks she was taking levothyroxine 25 µg daily. She did have an ultrasound about a year ago, which I do not have a report at this time, but according to patient and there were some nodules that were seen on that ultrasound (she is unclear if these nodules were in the thyroid or not). In the past year she has had several episodes of tachycardia, she was started on metoprolol for this. Also has anxiety. No weight loss.    ROS:  Constitutional: Positive for weight gain  Cardiac: Positive for palpitations  Resp: No shortness of breath  All other systems were reviewed and were negative.    Past Medical History:  Patient Active Problem List    Diagnosis Date Noted   • Morbid obesity with BMI of 45.0-49.9, adult (HCC) 02/03/2017   • Postoperative hypothyroidism 02/03/2017   • Preventative health care 02/03/2017   • Anxiety 02/03/2017   • History of shingles 02/03/2017   • Bilateral  chronic knee pain 02/03/2017   • Arthritis 02/03/2017   • Allergic rhinitis 02/03/2017   • Essential hypertension 02/03/2017   • Tobacco dependence 02/03/2017       Past Surgical History:  Past Surgical History   Procedure Laterality Date   • Thyroidectomy total  2005       Allergies:  Pcn and Sulfa drugs    Social History:  Social History     Social History   • Marital Status:      Spouse Name: N/A   • Number of Children: N/A   • Years of Education: N/A     Occupational History   • Not on file.     Social History Main Topics   • Smoking status: Light Tobacco Smoker   • Smokeless tobacco: Never Used   • Alcohol Use: No   • Drug Use: No   • Sexual Activity:     Partners: Male     Other Topics Concern   • Not on file     Social History Narrative       Family History:  History reviewed. No pertinent family history. No family history of thyroid cancer.    Medications:    Current outpatient prescriptions:   •  levothyroxine (SYNTHROID) 25 MCG Tab, Take 1 Tab by mouth Every morning on an empty stomach., Disp: 30 Tab, Rfl: 6  •  montelukast (SINGULAIR) 10 MG Tab, Take 10 mg by mouth every day., Disp: , Rfl:   •  potassium chloride (KLOR-CON) 8 MEQ tablet, Take 8 mEq by mouth every day., Disp: , Rfl:   •  furosemide (LASIX) 40 MG Tab, Take 40 mg by mouth every day., Disp: , Rfl:   •  atorvastatin (LIPITOR) 40 MG Tab, Take 40 mg by mouth every evening., Disp: , Rfl:   •  sertraline (ZOLOFT) 100 MG Tab, Take 100 mg by mouth every day., Disp: , Rfl:   •  omeprazole (PRILOSEC) 40 MG delayed-release capsule, Take 40 mg by mouth every day., Disp: , Rfl:   •  losartan (COZAAR) 25 MG Tab, Take 25 mg by mouth every day., Disp: , Rfl:   •  gabapentin (NEURONTIN) 300 MG Cap, Take 600 mg by mouth 2 Times a Day., Disp: , Rfl:   •  metoprolol (LOPRESSOR) 50 MG Tab, Take 50 mg by mouth 2 times a day., Disp: , Rfl:   •  alprazolam (XANAX) 0.5 MG Tab, Take 0.5 mg by mouth 2 times a day as needed for Anxiety., Disp: , Rfl:   •   "naproxen (NAPROSYN) 500 MG Tab, Take 1 Tab by mouth 2 times a day, with meals., Disp: 60 Tab, Rfl: 1  •  oxycodone-acetaminophen (PERCOCET-10)  MG Tab, Take 1-2 Tabs by mouth every 8 hours as needed for Severe Pain., Disp: 90 Tab, Rfl: 0  •  ondansetron (ZOFRAN ODT) 4 MG TABLET DISPERSIBLE, Take 4 mg by mouth every 6 hours as needed for Nausea/Vomiting., Disp: , Rfl:   •  indomethacin (INDOCIN) 25 MG Cap, Take 25 mg by mouth 3 times a day., Disp: , Rfl:   •  azithromycin (ZITHROMAX) 250 MG Tab, Take 250-500 mg by mouth every day. 5 day course for sinus infection, Disp: , Rfl:   •  cyclobenzaprine (FLEXERIL) 5 MG tablet, Take 1-2 Tabs by mouth 3 times a day as needed., Disp: 30 Tab, Rfl: 0    Labs:  Results for CHUY CLAUDIO (MRN [id number]) as of 2/27/2017 07:40   Ref. Range 2/3/2017 14:38 2/6/2017 13:49   TSH Latest Ref Range: 0.350-5.500 uIU/mL 0.140 (L)    Free T-4 Latest Ref Range: 0.58-1.64 ng/dL 1.01    T3 Latest Ref Range: 60.0-181.0 ng/dL  92.9     Physical Examination:  Vital signs: /72 mmHg  Pulse 82  Ht 1.651 m (5' 5\")  Wt 149.868 kg (330 lb 6.4 oz)  BMI 54.98 kg/m2  SpO2 94%  General: No apparent distress, cooperative  Eyes: No scleral icterus or discharge  ENMT: Normal on external inspection of nose, lips  Neck: Thyroid tissue palpable on the right side  Resp: Normal effort, clear to auscultation bilaterally   CVS: Regular rate and rhythm, S1 S2 normal, no murmur   Extremities: No edema  Neuro: Alert and oriented  Skin: No rash  Psych: Normal mood and affect    Assessment and Plan:    1. Postsurgical hypothyroidism  · She reports having total thyroidectomy 30 years ago, however I can feel some thyroid tissue on the right side; and she has been hypothyroid for almost entire 2016 on less than weight base dose of levothyroxine, indicating endogenous production of thyroid hormone; she probably has a hot nodule in the right thyroid as the reason for hyperthyroidism; when she discontinued " levothyroxine 4-5 months ago she became hypothyroid indicating that this hot nodule is not making more thyroid hormone than her requirement, so she will need a small dose of levothyroxine to achieve normal thyroid hormone levels  · She was slightly hyperthyroid on levothyroxine 25 mcg daily on labs earlier this month, so we will lower dose slightly to 25 mcg 6 days a week  · Labs every 6 weeks for TSH and free T4  · We will obtain thyroid ultrasound as well  - FREE THYROXINE; Standing  - TSH; Standing  - US-SOFT TISSUES OF HEAD - NECK; Future  - levothyroxine (SYNTHROID) 25 MCG Tab; Take 1 Tab by mouth Every morning on an empty stomach.  Dispense: 30 Tab; Refill: 6    Return in about 6 months (around 8/27/2017).    Thank you for allowing me to participate in the care of this patient.    Marilia Kang M.D.  02/27/2017    CC:   Sim Brownlee PA-C    This note was created using voice recognition software (Dragon). The accuracy of the dictation is limited by the abilities of the software. I have reviewed the note prior to signing, however some errors in grammar and context are still possible. If you have any questions related to this note please do not hesitate to contact our office.

## 2017-03-03 ENCOUNTER — OFFICE VISIT (OUTPATIENT)
Dept: MEDICAL GROUP | Facility: MEDICAL CENTER | Age: 62
End: 2017-03-03
Payer: COMMERCIAL

## 2017-03-03 VITALS
HEART RATE: 70 BPM | HEIGHT: 65 IN | WEIGHT: 293 LBS | SYSTOLIC BLOOD PRESSURE: 124 MMHG | DIASTOLIC BLOOD PRESSURE: 76 MMHG | OXYGEN SATURATION: 96 % | RESPIRATION RATE: 16 BRPM | TEMPERATURE: 98.5 F | BODY MASS INDEX: 48.82 KG/M2

## 2017-03-03 DIAGNOSIS — M25.561 BILATERAL CHRONIC KNEE PAIN: ICD-10-CM

## 2017-03-03 DIAGNOSIS — E66.01 MORBID OBESITY WITH BMI OF 45.0-49.9, ADULT (HCC): ICD-10-CM

## 2017-03-03 DIAGNOSIS — J30.9 ALLERGIC RHINITIS, UNSPECIFIED ALLERGIC RHINITIS TRIGGER, UNSPECIFIED RHINITIS SEASONALITY: ICD-10-CM

## 2017-03-03 DIAGNOSIS — F41.9 ANXIETY: ICD-10-CM

## 2017-03-03 DIAGNOSIS — I10 ESSENTIAL HYPERTENSION: ICD-10-CM

## 2017-03-03 DIAGNOSIS — G89.29 BILATERAL CHRONIC KNEE PAIN: ICD-10-CM

## 2017-03-03 DIAGNOSIS — E89.0 POSTOPERATIVE HYPOTHYROIDISM: ICD-10-CM

## 2017-03-03 DIAGNOSIS — M25.562 BILATERAL CHRONIC KNEE PAIN: ICD-10-CM

## 2017-03-03 DIAGNOSIS — K21.9 GASTROESOPHAGEAL REFLUX DISEASE WITHOUT ESOPHAGITIS: ICD-10-CM

## 2017-03-03 DIAGNOSIS — Z00.00 PREVENTATIVE HEALTH CARE: ICD-10-CM

## 2017-03-03 PROCEDURE — 99214 OFFICE O/P EST MOD 30 MIN: CPT | Performed by: PHYSICIAN ASSISTANT

## 2017-03-03 RX ORDER — METOPROLOL TARTRATE 50 MG/1
50 TABLET, FILM COATED ORAL 2 TIMES DAILY
Qty: 180 TAB | Refills: 1 | Status: SHIPPED | OUTPATIENT
Start: 2017-03-03 | End: 2017-08-17 | Stop reason: SDUPTHER

## 2017-03-03 RX ORDER — POTASSIUM CHLORIDE 600 MG/1
8 TABLET, FILM COATED, EXTENDED RELEASE ORAL DAILY
Qty: 90 TAB | Refills: 1 | Status: SHIPPED | OUTPATIENT
Start: 2017-03-03 | End: 2017-09-01 | Stop reason: SDUPTHER

## 2017-03-03 RX ORDER — TRAMADOL HYDROCHLORIDE 50 MG/1
50 TABLET ORAL EVERY 8 HOURS PRN
Qty: 90 TAB | Refills: 0 | Status: SHIPPED | OUTPATIENT
Start: 2017-03-03 | End: 2017-04-03 | Stop reason: SDUPTHER

## 2017-03-03 RX ORDER — OMEPRAZOLE 40 MG/1
40 CAPSULE, DELAYED RELEASE ORAL DAILY
Qty: 90 CAP | Refills: 1 | Status: SHIPPED | OUTPATIENT
Start: 2017-03-03 | End: 2017-10-05 | Stop reason: SDUPTHER

## 2017-03-03 RX ORDER — ALPRAZOLAM 0.5 MG/1
0.5 TABLET ORAL 2 TIMES DAILY PRN
Qty: 60 TAB | Refills: 0 | Status: SHIPPED | OUTPATIENT
Start: 2017-03-03 | End: 2017-05-04 | Stop reason: SDUPTHER

## 2017-03-03 RX ORDER — FUROSEMIDE 40 MG/1
40 TABLET ORAL DAILY
Qty: 90 TAB | Refills: 1 | Status: SHIPPED | OUTPATIENT
Start: 2017-03-03 | End: 2017-08-17 | Stop reason: SDUPTHER

## 2017-03-03 RX ORDER — SERTRALINE HYDROCHLORIDE 100 MG/1
100 TABLET, FILM COATED ORAL DAILY
Qty: 90 TAB | Refills: 1 | Status: SHIPPED | OUTPATIENT
Start: 2017-03-03 | End: 2017-08-25

## 2017-03-03 RX ORDER — LOSARTAN POTASSIUM 25 MG/1
25 TABLET ORAL DAILY
Qty: 90 TAB | Refills: 1 | Status: SHIPPED | OUTPATIENT
Start: 2017-03-03 | End: 2017-07-21 | Stop reason: SDUPTHER

## 2017-03-03 RX ORDER — MONTELUKAST SODIUM 10 MG/1
10 TABLET ORAL DAILY
Qty: 90 TAB | Refills: 1 | Status: SHIPPED | OUTPATIENT
Start: 2017-03-03 | End: 2017-10-08 | Stop reason: SDUPTHER

## 2017-03-03 ASSESSMENT — PAIN SCALES - GENERAL: PAINLEVEL: 5=MODERATE PAIN

## 2017-03-03 NOTE — ASSESSMENT & PLAN NOTE
This is a 61-year-old female returns today to discuss several chronic conditions. She was seen recently by endocrinology. She was prescribed Synthroid 25 µg. She has an ultrasound perform and other labs to do. She was pleased with her office visit with Dr. Pankaj Osuna.

## 2017-03-03 NOTE — ASSESSMENT & PLAN NOTE
Chronic history of hypertension. Requests refill of metoprolol and losartan. Also requesting Lasix and potassium chloride. Endocrinology believes that her fluid retention is secondary to her thyroid.

## 2017-03-03 NOTE — ASSESSMENT & PLAN NOTE
She recently saw [facility] and likes the PA of Dr. Cotton. She has a follow-up appointment with orthopedics in a month and a half. Is trying to lose weight for surgery. States x-rays were performed. Requesting refill of pain management medication. Knowing the risks associated with taking opioids with benzodiazepine she is willing to take tramadol instead.

## 2017-03-03 NOTE — ASSESSMENT & PLAN NOTE
Chronic history of anxiety which is well controlled. Requesting refill of sertraline 100 mg. She also is requesting a refill of Xanax. Takes it intermittently. Her last refill was done in October in [place].

## 2017-03-03 NOTE — PROGRESS NOTES
Subjective:   Alfonso Posada is a 61 y.o. female here today for chronic knee pain and anxiety.    Postoperative hypothyroidism  This is a 61-year-old female returns today to discuss several chronic conditions. She was seen recently by endocrinology. She was prescribed Synthroid 25 µg. She has an ultrasound perform and other labs to do. She was pleased with her office visit with Dr. Pankaj Osuna.    Morbid obesity with BMI of 45.0-49.9, adult ([facility])  She is starting Metafast soon. She wants to notify me. She is losing weight in order to have knee surgery performed.    Bilateral chronic knee pain  She recently saw [facility] and likes the PA of Dr. Cotton. She has a follow-up appointment with orthopedics in a month and a half. Is trying to lose weight for surgery. States x-rays were performed. Requesting refill of pain management medication. Knowing the risks associated with taking opioids with benzodiazepine she is willing to take tramadol instead.    Anxiety  Chronic history of anxiety which is well controlled. Requesting refill of sertraline 100 mg. She also is requesting a refill of Xanax. Takes it intermittently. Her last refill was done in October in [place].    Allergic rhinitis  Chronic history of allergies. Takes Singulair daily with good results. Requesting a refill.    Essential hypertension  Chronic history of hypertension. Requests refill of metoprolol and losartan. Also requesting Lasix and potassium chloride. Endocrinology believes that her fluid retention is secondary to her thyroid.    Gastroesophageal reflux disease without esophagitis  Chronic history of GERD symptoms. Takes omeprazole daily. Symptoms are controlled.    [facility] health care  Has not had a mammogram in the past year.       Current medicines (including changes today)  Current Outpatient Prescriptions   Medication Sig Dispense Refill   • alprazolam (XANAX) 0.5 MG Tab Take 1 Tab by mouth 2 times a day as needed for  "Anxiety for up to 30 days. 60 Tab 0   • tramadol (ULTRAM) 50 MG Tab Take 1 Tab by mouth every 8 hours as needed. 90 Tab 0   • sertraline (ZOLOFT) 100 MG Tab Take 1 Tab by mouth every day. 90 Tab 1   • potassium chloride (KLOR-CON) 8 MEQ tablet Take 1 Tab by mouth every day. 90 Tab 1   • omeprazole (PRILOSEC) 40 MG delayed-release capsule Take 1 Cap by mouth every day. 90 Cap 1   • metoprolol (LOPRESSOR) 50 MG Tab Take 1 Tab by mouth 2 times a day. 180 Tab 1   • furosemide (LASIX) 40 MG Tab Take 1 Tab by mouth every day. 90 Tab 1   • montelukast (SINGULAIR) 10 MG Tab Take 1 Tab by mouth every day. 90 Tab 1   • losartan (COZAAR) 25 MG Tab Take 1 Tab by mouth every day. 90 Tab 1   • levothyroxine (SYNTHROID) 25 MCG Tab Take 1 Tab by mouth Every morning on an empty stomach. 30 Tab 6   • atorvastatin (LIPITOR) 40 MG Tab Take 40 mg by mouth every evening.     • naproxen (NAPROSYN) 500 MG Tab Take 1 Tab by mouth 2 times a day, with meals. 60 Tab 1     No current facility-administered medications for this visit.     She  has a past medical history of Asthma and Thyroid disease.    ROS   No chest pain, no shortness of breath, no abdominal pain and all other systems were reviewed and are negative.       Objective:     Blood pressure 124/76, pulse 70, temperature 36.9 °C (98.5 °F), resp. rate 16, height 1.651 m (5' 5\"), weight 135.172 kg (298 lb), SpO2 96 %. Body mass index is 49.59 kg/(m^2).   Physical Exam:  Constitutional: Alert, no distress, morbidly obese, walking with a cane.  Skin: Warm, dry, good turgor, no rashes in visible areas.  Eye: Equal, round and reactive, conjunctiva clear, lids normal.  ENMT: Lips without lesions, good dentition, oropharynx clear.  Neck: Trachea midline, no masses.   Lymph: No cervical or supraclavicular lymphadenopathy  Respiratory: Unlabored respiratory effort, lungs clear to auscultation, no wheezes, no ronchi.  Cardiovascular: Normal S1, S2, no murmur, no edema.  Psych: Alert and oriented " x3, normal affect and mood.        Assessment and Plan:   The following treatment plan was discussed    1. Postoperative hypothyroidism  Recently seen by endocrinology. Follow-up with labs and imaging. Continue Synthroid 25 µg as directed.    2. Gastroesophageal reflux disease without esophagitis  Chronic condition and stable. Prescribed omeprazole half all prior to the same meal daily.    3. Essential hypertension  Chronic condition and stable. Prescribed losartan and metoprolol.    4. Allergic rhinitis, unspecified allergic rhinitis trigger, unspecified rhinitis seasonality  Chronic condition and stable. Prescribed Singulair 10 mg daily.    5. Morbid obesity with BMI of 45.0-49.9, adult (HCC)  May start over-the-counter weight loss supplement. She was advised to follow up in one month to check weight and possible symptoms.    6. Anxiety  Chronic condition and stable. Prescribed sertraline 100 mg daily. Renewed Xanax as directed.    7. Bilateral chronic knee pain  Chronic condition. Continue follow-up with orthopedics. Prescribed tramadol and stopped Percocet use.    8. Preventative health care  Obtain mammogram screening. In follow-up we'll discuss FIT testing.  - MA-SCREEN MAMMO W/CAD-BILAT      Followup: Return in about 4 weeks (around 3/31/2017), or if symptoms worsen or fail to improve.    Please note that this dictation was created using voice recognition software. I have made every reasonable attempt to correct obvious errors, but I expect that there are errors of grammar and possibly content that I did not discover before finalizing the note.

## 2017-03-03 NOTE — ASSESSMENT & PLAN NOTE
She is starting Metafast soon. She wants to notify me. She is losing weight in order to have knee surgery performed.

## 2017-03-04 ENCOUNTER — HOSPITAL ENCOUNTER (EMERGENCY)
Facility: MEDICAL CENTER | Age: 62
End: 2017-03-04
Attending: EMERGENCY MEDICINE
Payer: COMMERCIAL

## 2017-03-04 ENCOUNTER — APPOINTMENT (OUTPATIENT)
Dept: RADIOLOGY | Facility: MEDICAL CENTER | Age: 62
End: 2017-03-04
Attending: EMERGENCY MEDICINE
Payer: COMMERCIAL

## 2017-03-04 VITALS
BODY MASS INDEX: 48.82 KG/M2 | WEIGHT: 293 LBS | SYSTOLIC BLOOD PRESSURE: 193 MMHG | RESPIRATION RATE: 16 BRPM | DIASTOLIC BLOOD PRESSURE: 73 MMHG | TEMPERATURE: 98.1 F | OXYGEN SATURATION: 96 % | HEIGHT: 65 IN | HEART RATE: 61 BPM

## 2017-03-04 DIAGNOSIS — S86.911A STRAIN OF RIGHT KNEE, INITIAL ENCOUNTER: ICD-10-CM

## 2017-03-04 PROCEDURE — 99284 EMERGENCY DEPT VISIT MOD MDM: CPT

## 2017-03-04 PROCEDURE — 700111 HCHG RX REV CODE 636 W/ 250 OVERRIDE (IP): Performed by: EMERGENCY MEDICINE

## 2017-03-04 PROCEDURE — 73562 X-RAY EXAM OF KNEE 3: CPT | Mod: RT

## 2017-03-04 PROCEDURE — 96372 THER/PROPH/DIAG INJ SC/IM: CPT

## 2017-03-04 RX ORDER — KETOROLAC TROMETHAMINE 30 MG/ML
30 INJECTION, SOLUTION INTRAMUSCULAR; INTRAVENOUS ONCE
Status: COMPLETED | OUTPATIENT
Start: 2017-03-04 | End: 2017-03-04

## 2017-03-04 RX ORDER — GABAPENTIN 400 MG/1
600 CAPSULE ORAL 2 TIMES DAILY
Status: SHIPPED | COMMUNITY
End: 2017-06-02 | Stop reason: SDUPTHER

## 2017-03-04 RX ORDER — OXYCODONE AND ACETAMINOPHEN 10; 325 MG/1; MG/1
1-2 TABLET ORAL EVERY 6 HOURS PRN
Status: SHIPPED | COMMUNITY
End: 2017-03-13 | Stop reason: SDUPTHER

## 2017-03-04 RX ADMIN — KETOROLAC TROMETHAMINE 30 MG: 30 INJECTION, SOLUTION INTRAMUSCULAR; INTRAVENOUS at 20:03

## 2017-03-04 ASSESSMENT — PAIN SCALES - GENERAL
PAINLEVEL_OUTOF10: 8
PAINLEVEL_OUTOF10: 8

## 2017-03-04 NOTE — ED AVS SNAPSHOT
Home Care Instructions                                                                                                                Alfonso Posada   MRN: [id number]    Department:  [facility], Emergency Dept   Date of Visit:  3/4/2017            [facility], Emergency Dept    [address] [place] [address]    Phone:  [phone]      You were seen by     Martín Heath M.D.      Your Diagnosis Was     Strain of right knee, initial encounter     S86.039G       These are the medications you received during your hospitalization from 03/04/2017 1931 to 03/04/2017 2118     Date/Time Order Dose Route Action    03/04/2017 2003 ketorolac (TORADOL) injection 30 mg 30 mg Intramuscular Given      Follow-up Information     1. Follow up with your orthopedic surgeon as scheduled Monday.      Medication Information     Review all of your home medications and newly ordered medications with your primary doctor and/or pharmacist as soon as possible. Follow medication instructions as directed by your doctor and/or pharmacist.     Please keep your complete medication list with you and share with your physician. Update the information when medications are discontinued, doses are changed, or new medications (including over-the-counter products) are added; and carry medication information at all times in the event of emergency situations.               Medication List      ASK your doctor about these medications        Instructions    alprazolam 0.5 MG Tabs   Commonly known as:  XANAX    Take 1 Tab by mouth 2 times a day as needed for Anxiety for up to 30 days.   Dose:  0.5 mg       atorvastatin 40 MG Tabs   Commonly known as:  LIPITOR    Take 40 mg by mouth every evening.   Dose:  40 mg       furosemide 40 MG Tabs   Commonly known as:  LASIX    Take 1 Tab by mouth every day.   Dose:  40 mg       gabapentin 400 MG Caps   Commonly known as:  NEURONTIN    Take 600 mg by  mouth 2 times a day.   Dose:  600 mg       levothyroxine 25 MCG Tabs   Commonly known as:  SYNTHROID    Take 1 Tab by mouth Every morning on an empty stomach.   Dose:  25 mcg       losartan 25 MG Tabs   Commonly known as:  COZAAR    Take 1 Tab by mouth every day.   Dose:  25 mg       metoprolol 50 MG Tabs   Commonly known as:  LOPRESSOR    Take 1 Tab by mouth 2 times a day.   Dose:  50 mg       montelukast 10 MG Tabs   Commonly known as:  SINGULAIR    Take 1 Tab by mouth every day.   Dose:  10 mg       naproxen 500 MG Tabs   Commonly known as:  NAPROSYN    Take 1 Tab by mouth 2 times a day, with meals.   Dose:  500 mg       omeprazole 40 MG delayed-release capsule   Commonly known as:  PRILOSEC    Take 1 Cap by mouth every day.   Dose:  40 mg       oxycodone-acetaminophen  MG Tabs   Commonly known as:  PERCOCET-10    Take 1-2 Tabs by mouth every 6 hours as needed for Severe Pain.   Dose:  1-2 Tab       potassium chloride 8 MEQ tablet   Commonly known as:  KLOR-CON    Take 1 Tab by mouth every day.   Dose:  8 mEq       sertraline 100 MG Tabs   Commonly known as:  ZOLOFT    Take 1 Tab by mouth every day.   Dose:  100 mg       tramadol 50 MG Tabs   Commonly known as:  ULTRAM    Take 1 Tab by mouth every 8 hours as needed.   Dose:  50 mg               Procedures and tests performed during your visit     CRUTCHES    DX-KNEE 3 VIEWS RIGHT        Discharge Instructions       Knee Sprain  Your exam shows you have a sprained knee. Symptoms include pain, swelling, and difficulty bearing weight on the injured side. The knee joint is supported by 4 major ligaments and 2 cartilages. Injury to any of these can make the knee unstable.  TREATMENT   Incomplete tears of the knee ligaments often heal without surgery if they are given proper rest and support. However, surgery may be needed with more severe knee sprains, because this can lead to long-term disability. Serious knee injuries are often evaluated and treated with  surgery that uses a thin, lighted tube (arthroscope). This allows for a more accurate diagnosis, better treatment, and reduced disability.  HOME CARE INSTRUCTIONS   · Follow any weight-bearing instructions from your caregiver. Do not bear any weight if you were told not to.   · Knee splints and compression bandages will reduce motion, pain, and swelling. Do not remove your knee splint or bandage until your caregiver approves.   · Rest the injured leg as much as possible.   · Raise (elevate) the injured leg above the level of the heart to reduce swelling.   · Put ice on the injured area.   · Put ice in a plastic bag.   · Place a towel between your skin and the bag.   · Leave the ice on for 15-20 minutes at a time, every 1 to 2 hours while awake.   · Medicine to reduce inflammation and pain is often helpful.   · Follow up with your caregiver as directed.   SEEK MEDICAL CARE IF:   · Pain gets worse. This may be a sign of a broken (fractured) bone.   · Your injury is not improving after 1 week of treatment. Persistent pain and swelling, reduced motion, and locking or giving way of the knee means you need to see an orthopedic specialist.   Document Released: 01/25/2006 Document Revised: 03/11/2013 Document Reviewed: 05/20/2011  Exit[employer]® Patient Information ©2013 [employer]          Patient Information     Patient Information    Following emergency treatment: all patient requiring follow-up care must return either to a private physician or a clinic if your condition worsens before you are able to obtain further medical attention, please return to the emergency room.     Billing Information    At [facility], we work to make the billing process streamlined for our patients.  Our Representatives are here to answer any questions you may have regarding your hospital bill.  If you have insurance coverage and have supplied your insurance information to us, we will submit a claim to your insurer on your behalf.  Should  you have any questions regarding your bill, we can be reached online or by phone as follows:  Online: You are able pay your bills online or live chat with our representatives about any billing questions you may have. We are here to help Monday - Friday from 8:00am to 7:30pm and 9:00am - 12:00pm on Saturdays.  Please visit [email or web address]  for more information.   Phone:  [phone] or [phone]    Please note that your emergency physician, surgeon, pathologist, radiologist, anesthesiologist, and other specialists are not employed by [facility] and will therefore bill separately for their services.  Please contact them directly for any questions concerning their bills at the numbers below:     Emergency Physician Services:  [phone]  [facility]:  [phone]  [facility]:  [phone]  [facility]:  [phone]    1. Your final bill may vary from the amount quoted upon discharge if all procedures are not complete at that time, or if your doctor has additional procedures of which we are not aware. You will receive an additional bill if you return to the Emergency Department at [facility] for suture removal regardless of the facility of which the sutures were placed.     2. Please arrange for settlement of this account at the emergency registration.    3. All self-pay accounts are due in full at the time of treatment.  If you are unable to meet this obligation then payment is expected within 4-5 days.     4. If you have had radiology studies (CT, X-ray, Ultrasound, MRI), you have received a preliminary result during your emergency department visit. Please contact the radiology department [phone] to receive a copy of your final result. Please discuss the Final result with your primary physician or with the follow up physician provided.     Crisis Hotline:  National Crisis Hotline:  4-833-KBZGAOS or  1-288.610.4658  [place] Crisis Hotline:    1-671.834.7091 or 272-147-0443         ED Discharge Follow Up Questions    1. In order to provide you with very good care, we would like to follow up with a phone call in the next few days.  May we have your permission to contact you?     YES /  NO    2. What is the best phone number to call you? (       )_____-__________    3. What is the best time to call you?      Morning  /  Afternoon  /  Evening                   Patient Signature:  ____________________________________________________________    Date:  ____________________________________________________________      Your appointments     Apr 03, 2017 11:00 AM   Established Patient with Sim Brownlee PA-C   [facility]    [address] [facility]  [address]  [facility] [id number] [phone]           You will be receiving a confirmation call a few days before your appointment from our automated call confirmation system.            Aug 29, 2017 11:20 AM   Established Patient with Marilia Kang M.D.   [facility]    [address] [facility], [address]  [place] [address] [phone]           You will be receiving a confirmation call a few days before your appointment from our automated call confirmation system.

## 2017-03-04 NOTE — ED AVS SNAPSHOT
SANDOW Access Code: [email or web address]  Expires: 3/19/2017 10:11 AM    SANDOW  A secure, online tool to manage your health information     [employer]’s SANDOW® is a secure, online tool that connects you to your personalized health information from the privacy of your home -- day or night - making it very easy for you to manage your healthcare. Once the activation process is completed, you can even access your medical information using the SANDOW bo, which is available for free in the Apple Bo store or Google Play store.     SANDOW provides the following levels of access (as shown below):   My Chart Features   [facility] Primary Care Doctor [facility]  Specialists [facility]  Urgent  Care Non-[facility]  Primary Care  Doctor   Email your healthcare team securely and privately 24/7 X X X X   Manage appointments: schedule your next appointment; view details of past/upcoming appointments X      Request prescription refills. X      View recent personal medical records, including lab and immunizations X X X X   View health record, including health history, allergies, medications X X X X   Read reports about your outpatient visits, procedures, consult and ER notes X X X X   See your discharge summary, which is a recap of your hospital and/or ER visit that includes your diagnosis, lab results, and care plan. X X       How to register for SANDOW:  1. Go to  [email or web address].  2. Click on the Sign Up Now box, which takes you to the New Member Sign Up page. You will need to provide the following information:  a. Enter your SANDOW Access Code exactly as it appears at the top of this page. (You will not need to use this code after you’ve completed the sign-up process. If you do not sign up before the expiration date, you must request a new code.)   b. Enter your date of birth.   c. Enter your home email address.   d. Click Submit, and follow the next screen’s instructions.  3. Create a SANDOW ID. This will be your SANDOW  login ID and cannot be changed, so think of one that is secure and easy to remember.  4. Create a [employer] password. You can change your password at any time.  5. Enter your Password Reset Question and Answer. This can be used at a later time if you forget your password.   6. Enter your e-mail address. This allows you to receive e-mail notifications when new information is available in [employer].  7. Click Sign Up. You can now view your health information.    For assistance activating your [employer] account, call [phone]

## 2017-03-04 NOTE — ED AVS SNAPSHOT
3/4/2017          Alfonso Posada  [address]  [place] [address]    Dear Alfonso:    [facility] wants to ensure your discharge home is safe and you or your loved ones have had all your questions answered regarding your care after you leave the hospital.    You may receive a telephone call within two days of your discharge.  This call is to make certain you understand your discharge instructions as well as ensure we provided you with the best care possible during your stay with us.     The call will only last approximately 3-5 minutes and will be done by a nurse.    Once again, we want to ensure your discharge home is safe and that you have a clear understanding of any next steps in your care.  If you have any questions or concerns, please do not hesitate to contact us, we are here for you.  Thank you for choosing [facility] for your healthcare needs.    Sincerely,    Aristeo Huang    [facility]

## 2017-03-05 NOTE — ED NOTES
"Pt BIB [facility] with knee pain that began this afternoon. Pt has chronic right knee pain and states \"I need surgery.\" Pt did not fall or have any trauma. Pt states the pain is while weight bearing, she states \"I am unable to ambulate, the knee locks up.\"   "

## 2017-03-05 NOTE — ED NOTES
"Chief Complaint   Patient presents with   • Knee Pain     Right      /73 mmHg  Pulse 78  Temp(Src) 36.6 °C (97.9 °F)  Ht 1.651 m (5' 5\")  Wt 142.883 kg (315 lb)  BMI 52.42 kg/m2  SpO2 93%    "

## 2017-03-05 NOTE — ED PROVIDER NOTES
"ED Provider Note    CHIEF COMPLAINT  Chief Complaint   Patient presents with   • Knee Pain     Right        HPI  Alfonso Posada is a 61 y.o. female who presents by ambulance for evaluation of right knee pain, she has a history of morbid obesity and chronic knee pain that she attributes to arthritis, she's also been having hip pain for which she is under the care with a new orthopedic surgeon. She states that she is been changing her gait secondary to the hip pain and hyperextended her knee. No weakness or numbness. She has no other acute injury. The patient was seen by an orthopedic surgeon for her chronic knee pain but states he was \"mean.\"    REVIEW OF SYSTEMS  Negative for fever, weakness, numbness.    PAST MEDICAL HISTORY   has a past medical history of Asthma and Thyroid disease.    SOCIAL HISTORY  Social History     Social History Main Topics   • Smoking status: Light Tobacco Smoker     Start date: 03/04/2016   • Smokeless tobacco: Never Used   • Alcohol Use: No   • Drug Use: No   • Sexual Activity:     Partners: Male       SURGICAL HISTORY   has past surgical history that includes thyroidectomy total (2005).    CURRENT MEDICATIONS  I personally reviewed the medication list in the charting documentation.     ALLERGIES  Allergies   Allergen Reactions   • Latex Swelling   • Pcn [Penicillins]      Hives on face    • Sulfa Drugs      \"some sulfa drugs- leg itchiness\"        PHYSICAL EXAM  VITAL SIGNS: /73 mmHg  Pulse 61  Temp(Src) 36.6 °C (97.9 °F)  Ht 1.651 m (5' 5\")  Wt 142.883 kg (315 lb)  BMI 52.42 kg/m2  SpO2 96%  Constitutional: Alert in no apparent distress. Morbidly obese  HENT: No signs of trauma.   Eyes: Conjunctiva normal, Non-icteric.   Chest: Normal nonlabored respirations  Skin: No erythema, No rash.   Musculoskeletal: Right knee is difficult to examine secondary to habitus obvious deformity. Diffuse tenderness even to extreme light palpation. Non-erythematous. Neurovascular intact " distally with normal dorsalis pedis pulse and sensation.  Neurologic: Alert, No focal deficits noted.   Psychiatric: Affect normal, Judgment normal.    DIAGNOSTIC STUDIES / PROCEDURES    RADIOLOGY  DX-KNEE 3 VIEWS RIGHT   Final Result      1.  No acute right knee fracture or dislocation.      2.  Marked tricompartmental degenerative change.            COURSE & MEDICAL DECISION MAKING  Pertinent Labs & Imaging studies reviewed. (See chart for details)    Encounter Summary: This is a 61 y.o. female with exacerbation of chronic right knee pain, mild injury, comes in by ambulance. Neurovascularly intact on exam. Will obtain an x-ray, medicate with an injection of Toradol. She has an appointment in 2 days with a new orthopedic surgeon, will attempt to have her ambulate with crutches if the x-rays unremarkable and she will follow up with orthopedic surgeon. The patient is clearly unhappy regarding her chronic and worsening pain. She requests a prescription for a wheelchair.      DISPOSITION: Discharge Home      FINAL IMPRESSION  1. Strain of right knee, initial encounter        This dictation was created using voice recognition software. The accuracy of the dictation is limited to the abilities of the software. I expect there may be some errors of grammar and possibly content. The nursing notes were reviewed and certain aspects of this information were incorporated into this note.    Electronically signed by: Martín Heath, 3/4/2017 7:53 PM

## 2017-03-05 NOTE — ED NOTES
Discharge information provided. Pt verbalized understanding of discharge instructions to follow up with PCP and Ortho and to return to ER if condition worsens. Pt expressed the awareness of not driving or operating heavy machinery, has ride home with . Pt wheeled out of ED by ED tech, no additional questions or concerns. Able to use crutches on d/c.

## 2017-03-05 NOTE — ED NOTES
Pt assessed, EP at BS. Call light within reach, no additional needs at this time. Will cont to monitor.

## 2017-03-13 ENCOUNTER — OFFICE VISIT (OUTPATIENT)
Dept: MEDICAL GROUP | Facility: MEDICAL CENTER | Age: 62
End: 2017-03-13
Payer: COMMERCIAL

## 2017-03-13 VITALS
WEIGHT: 293 LBS | BODY MASS INDEX: 48.82 KG/M2 | HEART RATE: 71 BPM | TEMPERATURE: 97.6 F | SYSTOLIC BLOOD PRESSURE: 146 MMHG | OXYGEN SATURATION: 95 % | HEIGHT: 65 IN | RESPIRATION RATE: 16 BRPM | DIASTOLIC BLOOD PRESSURE: 72 MMHG

## 2017-03-13 DIAGNOSIS — M25.562 BILATERAL CHRONIC KNEE PAIN: ICD-10-CM

## 2017-03-13 DIAGNOSIS — G89.29 BILATERAL CHRONIC KNEE PAIN: ICD-10-CM

## 2017-03-13 DIAGNOSIS — M25.561 BILATERAL CHRONIC KNEE PAIN: ICD-10-CM

## 2017-03-13 PROCEDURE — 99213 OFFICE O/P EST LOW 20 MIN: CPT | Performed by: PHYSICIAN ASSISTANT

## 2017-03-13 RX ORDER — OXYCODONE AND ACETAMINOPHEN 10; 325 MG/1; MG/1
1 TABLET ORAL EVERY 8 HOURS PRN
Qty: 60 TAB | Refills: 0 | Status: SHIPPED | OUTPATIENT
Start: 2017-03-13 | End: 2017-04-03 | Stop reason: SDUPTHER

## 2017-03-13 ASSESSMENT — PAIN SCALES - GENERAL: PAINLEVEL: 10=SEVERE PAIN

## 2017-03-13 NOTE — ASSESSMENT & PLAN NOTE
This is a 61-year-old female who returns today because of her chronic history of bilateral knee pain. She states that tramadol is not effective. Requesting Percocet. Did follow up at the [facility]. She had bilateral knee injections. Was told to lose weight. Advised if symptoms did not improve that she was a candidate for knee replacement. She was seen also at the ED and was offered a wheelchair. She is currently walking with a cane.

## 2017-03-13 NOTE — PROGRESS NOTES
Subjective:   Alfonso Posada is a 61 y.o. female here today for follow-up of chronic history of bilateral knee pain.    Bilateral chronic knee pain  This is a 61-year-old female who returns today because of her chronic history of bilateral knee pain. She states that tramadol is not effective. Requesting Percocet. Did follow up at the [facility]. She had bilateral knee injections. Was told to lose weight. Advised if symptoms did not improve that she was a candidate for knee replacement. She was seen also at the ED and was offered a wheelchair. She is currently walking with a cane.       Current medicines (including changes today)  Current Outpatient Prescriptions   Medication Sig Dispense Refill   • oxycodone-acetaminophen (PERCOCET-10)  MG Tab Take 1 Tab by mouth every 8 hours as needed for Severe Pain for up to 30 days. 60 Tab 0   • gabapentin (NEURONTIN) 400 MG Cap Take 600 mg by mouth 2 times a day.     • alprazolam (XANAX) 0.5 MG Tab Take 1 Tab by mouth 2 times a day as needed for Anxiety for up to 30 days. 60 Tab 0   • tramadol (ULTRAM) 50 MG Tab Take 1 Tab by mouth every 8 hours as needed. 90 Tab 0   • sertraline (ZOLOFT) 100 MG Tab Take 1 Tab by mouth every day. 90 Tab 1   • potassium chloride (KLOR-CON) 8 MEQ tablet Take 1 Tab by mouth every day. 90 Tab 1   • omeprazole (PRILOSEC) 40 MG delayed-release capsule Take 1 Cap by mouth every day. 90 Cap 1   • metoprolol (LOPRESSOR) 50 MG Tab Take 1 Tab by mouth 2 times a day. 180 Tab 1   • furosemide (LASIX) 40 MG Tab Take 1 Tab by mouth every day. 90 Tab 1   • montelukast (SINGULAIR) 10 MG Tab Take 1 Tab by mouth every day. 90 Tab 1   • losartan (COZAAR) 25 MG Tab Take 1 Tab by mouth every day. 90 Tab 1   • levothyroxine (SYNTHROID) 25 MCG Tab Take 1 Tab by mouth Every morning on an empty stomach. 30 Tab 6   • atorvastatin (LIPITOR) 40 MG Tab Take 40 mg by mouth every evening.     • naproxen (NAPROSYN) 500 MG Tab Take 1 Tab by mouth 2 times a day, with meals.  "60 Tab 1     No current facility-administered medications for this visit.     She  has a past medical history of Asthma and Thyroid disease.    ROS   No chest pain, no shortness of breath, no abdominal pain and all other systems were reviewed and are negative.       Objective:     Blood pressure 146/72, pulse 71, temperature 36.4 °C (97.6 °F), resp. rate 16, height 1.651 m (5' 5\"), weight 146.058 kg (322 lb), SpO2 95 %, not currently breastfeeding. Body mass index is 53.58 kg/(m^2).   Physical Exam:  Constitutional: Alert, no distress, obese.  Skin: Warm, dry, good turgor, no rashes in visible areas.  Eye: Equal, round and reactive, conjunctiva clear, lids normal.  ENMT: Lips without lesions, good dentition, oropharynx clear.  Neck: Trachea midline, no masses.   Respiratory: Unlabored respiratory effort, lungs appear clear, no wheezes, no ronchi.  Cardiovascular: Regular rate and rhythm.  Musculoskeletal: bilateral knees symmetric.   Psych: Alert and oriented x3, normal affect and mood.        Assessment and Plan:   The following treatment plan was discussed    1. Bilateral chronic knee pain  Secondary to arthritis. Follow up with orthopedics as directed. Prescribed Percocet 10 mg one tablet twice a day as needed for severe pain. Discussed with begin the use of benzodiazepines with opioids. She takes benzos very sparingly. We will monitor closely. Eventually I expect to take her off of Percocet as I expect with weight loss and follow-up with orthopedics knee problems will improve.    Followup: Return if symptoms worsen or fail to improve.    Please note that this dictation was created using voice recognition software. I have made every reasonable attempt to correct obvious errors, but I expect that there are errors of grammar and possibly content that I did not discover before finalizing the note.             "

## 2017-03-21 ENCOUNTER — HOSPITAL ENCOUNTER (EMERGENCY)
Facility: MEDICAL CENTER | Age: 62
End: 2017-03-21
Attending: EMERGENCY MEDICINE
Payer: COMMERCIAL

## 2017-03-21 VITALS
HEART RATE: 79 BPM | OXYGEN SATURATION: 95 % | DIASTOLIC BLOOD PRESSURE: 73 MMHG | TEMPERATURE: 98.1 F | SYSTOLIC BLOOD PRESSURE: 135 MMHG | RESPIRATION RATE: 18 BRPM

## 2017-03-21 DIAGNOSIS — I47.10 SVT (SUPRAVENTRICULAR TACHYCARDIA): ICD-10-CM

## 2017-03-21 DIAGNOSIS — E87.6 HYPOKALEMIA: ICD-10-CM

## 2017-03-21 DIAGNOSIS — E86.0 DEHYDRATION: ICD-10-CM

## 2017-03-21 LAB
ALBUMIN SERPL BCP-MCNC: 4.3 G/DL (ref 3.2–4.9)
ALBUMIN/GLOB SERPL: 1.5 G/DL
ALP SERPL-CCNC: 105 U/L (ref 30–99)
ALT SERPL-CCNC: 22 U/L (ref 2–50)
ANION GAP SERPL CALC-SCNC: 12 MMOL/L (ref 0–11.9)
APTT PPP: 29.9 SEC (ref 24.7–36)
AST SERPL-CCNC: 27 U/L (ref 12–45)
BASOPHILS # BLD AUTO: 0.6 % (ref 0–1.8)
BASOPHILS # BLD: 0.08 K/UL (ref 0–0.12)
BILIRUB SERPL-MCNC: 0.5 MG/DL (ref 0.1–1.5)
BUN SERPL-MCNC: 14 MG/DL (ref 8–22)
CALCIUM SERPL-MCNC: 8.8 MG/DL (ref 8.4–10.2)
CHLORIDE SERPL-SCNC: 105 MMOL/L (ref 96–112)
CO2 SERPL-SCNC: 24 MMOL/L (ref 20–33)
CREAT SERPL-MCNC: 0.64 MG/DL (ref 0.5–1.4)
EOSINOPHIL # BLD AUTO: 0.37 K/UL (ref 0–0.51)
EOSINOPHIL NFR BLD: 3 % (ref 0–6.9)
ERYTHROCYTE [DISTWIDTH] IN BLOOD BY AUTOMATED COUNT: 46 FL (ref 35.9–50)
GFR SERPL CREATININE-BSD FRML MDRD: >60 ML/MIN/1.73 M 2
GLOBULIN SER CALC-MCNC: 2.8 G/DL (ref 1.9–3.5)
GLUCOSE SERPL-MCNC: 106 MG/DL (ref 65–99)
HCT VFR BLD AUTO: 44.8 % (ref 37–47)
HGB BLD-MCNC: 14.8 G/DL (ref 12–16)
IMM GRANULOCYTES # BLD AUTO: 0.04 K/UL (ref 0–0.11)
IMM GRANULOCYTES NFR BLD AUTO: 0.3 % (ref 0–0.9)
INR PPP: 1.03 (ref 0.87–1.13)
LYMPHOCYTES # BLD AUTO: 4.36 K/UL (ref 1–4.8)
LYMPHOCYTES NFR BLD: 35.2 % (ref 22–41)
MAGNESIUM SERPL-MCNC: 2.1 MG/DL (ref 1.5–2.5)
MCH RBC QN AUTO: 29.6 PG (ref 27–33)
MCHC RBC AUTO-ENTMCNC: 33 G/DL (ref 33.6–35)
MCV RBC AUTO: 89.6 FL (ref 81.4–97.8)
MONOCYTES # BLD AUTO: 0.93 K/UL (ref 0–0.85)
MONOCYTES NFR BLD AUTO: 7.5 % (ref 0–13.4)
NEUTROPHILS # BLD AUTO: 6.62 K/UL (ref 2–7.15)
NEUTROPHILS NFR BLD: 53.4 % (ref 44–72)
NRBC # BLD AUTO: 0 K/UL
NRBC BLD AUTO-RTO: 0 /100 WBC
PLATELET # BLD AUTO: 280 K/UL (ref 164–446)
PMV BLD AUTO: 10.4 FL (ref 9–12.9)
POTASSIUM SERPL-SCNC: 3.4 MMOL/L (ref 3.6–5.5)
PROT SERPL-MCNC: 7.1 G/DL (ref 6–8.2)
PROTHROMBIN TIME: 13.3 SEC (ref 12–14.6)
RBC # BLD AUTO: 5 M/UL (ref 4.2–5.4)
SODIUM SERPL-SCNC: 141 MMOL/L (ref 135–145)
TROPONIN I SERPL-MCNC: <0.02 NG/ML (ref 0–0.04)
TSH SERPL DL<=0.005 MIU/L-ACNC: 1.9 UIU/ML (ref 0.35–5.5)
WBC # BLD AUTO: 12.4 K/UL (ref 4.8–10.8)

## 2017-03-21 PROCEDURE — 94760 N-INVAS EAR/PLS OXIMETRY 1: CPT

## 2017-03-21 PROCEDURE — 84484 ASSAY OF TROPONIN QUANT: CPT

## 2017-03-21 PROCEDURE — 85025 COMPLETE CBC W/AUTO DIFF WBC: CPT

## 2017-03-21 PROCEDURE — 80053 COMPREHEN METABOLIC PANEL: CPT

## 2017-03-21 PROCEDURE — 700102 HCHG RX REV CODE 250 W/ 637 OVERRIDE(OP): Performed by: EMERGENCY MEDICINE

## 2017-03-21 PROCEDURE — 700111 HCHG RX REV CODE 636 W/ 250 OVERRIDE (IP): Performed by: EMERGENCY MEDICINE

## 2017-03-21 PROCEDURE — 700105 HCHG RX REV CODE 258: Performed by: EMERGENCY MEDICINE

## 2017-03-21 PROCEDURE — 700111 HCHG RX REV CODE 636 W/ 250 OVERRIDE (IP)

## 2017-03-21 PROCEDURE — 96361 HYDRATE IV INFUSION ADD-ON: CPT

## 2017-03-21 PROCEDURE — 83735 ASSAY OF MAGNESIUM: CPT

## 2017-03-21 PROCEDURE — A9270 NON-COVERED ITEM OR SERVICE: HCPCS | Performed by: EMERGENCY MEDICINE

## 2017-03-21 PROCEDURE — 93005 ELECTROCARDIOGRAM TRACING: CPT | Performed by: EMERGENCY MEDICINE

## 2017-03-21 PROCEDURE — 36415 COLL VENOUS BLD VENIPUNCTURE: CPT

## 2017-03-21 PROCEDURE — 85730 THROMBOPLASTIN TIME PARTIAL: CPT

## 2017-03-21 PROCEDURE — 700101 HCHG RX REV CODE 250

## 2017-03-21 PROCEDURE — 85610 PROTHROMBIN TIME: CPT

## 2017-03-21 PROCEDURE — 96374 THER/PROPH/DIAG INJ IV PUSH: CPT

## 2017-03-21 PROCEDURE — 84443 ASSAY THYROID STIM HORMONE: CPT

## 2017-03-21 PROCEDURE — 96375 TX/PRO/DX INJ NEW DRUG ADDON: CPT

## 2017-03-21 PROCEDURE — 99285 EMERGENCY DEPT VISIT HI MDM: CPT

## 2017-03-21 RX ORDER — LORAZEPAM 2 MG/ML
1 INJECTION INTRAMUSCULAR ONCE
Status: COMPLETED | OUTPATIENT
Start: 2017-03-21 | End: 2017-03-21

## 2017-03-21 RX ORDER — ADENOSINE 3 MG/ML
INJECTION, SOLUTION INTRAVENOUS
Status: COMPLETED
Start: 2017-03-21 | End: 2017-03-21

## 2017-03-21 RX ORDER — ADENOSINE 3 MG/ML
12 INJECTION, SOLUTION INTRAVENOUS ONCE
Status: COMPLETED | OUTPATIENT
Start: 2017-03-21 | End: 2017-03-21

## 2017-03-21 RX ORDER — SODIUM CHLORIDE 9 MG/ML
1000 INJECTION, SOLUTION INTRAVENOUS ONCE
Status: COMPLETED | OUTPATIENT
Start: 2017-03-21 | End: 2017-03-21

## 2017-03-21 RX ORDER — ADENOSINE 3 MG/ML
6 INJECTION, SOLUTION INTRAVENOUS ONCE
Status: COMPLETED | OUTPATIENT
Start: 2017-03-21 | End: 2017-03-21

## 2017-03-21 RX ORDER — POTASSIUM CHLORIDE 20 MEQ/1
20 TABLET, EXTENDED RELEASE ORAL ONCE
Status: COMPLETED | OUTPATIENT
Start: 2017-03-21 | End: 2017-03-21

## 2017-03-21 RX ADMIN — METOPROLOL TARTRATE 5 MG: 1 INJECTION, SOLUTION INTRAVENOUS at 05:22

## 2017-03-21 RX ADMIN — POTASSIUM CHLORIDE 20 MEQ: 1500 TABLET, EXTENDED RELEASE ORAL at 05:51

## 2017-03-21 RX ADMIN — ADENOSINE 6 MG: 3 INJECTION, SOLUTION INTRAVENOUS at 05:13

## 2017-03-21 RX ADMIN — SODIUM CHLORIDE 1000 ML: 9 INJECTION, SOLUTION INTRAVENOUS at 05:10

## 2017-03-21 RX ADMIN — LORAZEPAM 1 MG: 2 INJECTION INTRAMUSCULAR; INTRAVENOUS at 05:07

## 2017-03-21 RX ADMIN — ADENOSINE 12 MG: 3 INJECTION, SOLUTION INTRAVENOUS at 05:15

## 2017-03-21 NOTE — ED AVS SNAPSHOT
Phone2Action Access Code: [email or web address]  Expires: 4/17/2017 10:18 AM    Phone2Action  A secure, online tool to manage your health information     [employer]’s Phone2Action® is a secure, online tool that connects you to your personalized health information from the privacy of your home -- day or night - making it very easy for you to manage your healthcare. Once the activation process is completed, you can even access your medical information using the Phone2Action bo, which is available for free in the Apple Bo store or Google Play store.     Phone2Action provides the following levels of access (as shown below):   My Chart Features   [facility] Primary Care Doctor [facility]  Specialists [facility]  Urgent  Care Non-[facility]  Primary Care  Doctor   Email your healthcare team securely and privately 24/7 X X X X   Manage appointments: schedule your next appointment; view details of past/upcoming appointments X      Request prescription refills. X      View recent personal medical records, including lab and immunizations X X X X   View health record, including health history, allergies, medications X X X X   Read reports about your outpatient visits, procedures, consult and ER notes X X X X   See your discharge summary, which is a recap of your hospital and/or ER visit that includes your diagnosis, lab results, and care plan. X X       How to register for Phone2Action:  1. Go to  [email or web address].  2. Click on the Sign Up Now box, which takes you to the New Member Sign Up page. You will need to provide the following information:  a. Enter your Phone2Action Access Code exactly as it appears at the top of this page. (You will not need to use this code after you’ve completed the sign-up process. If you do not sign up before the expiration date, you must request a new code.)   b. Enter your date of birth.   c. Enter your home email address.   d. Click Submit, and follow the next screen’s instructions.  3. Create a Phone2Action ID. This will be your Phone2Action  login ID and cannot be changed, so think of one that is secure and easy to remember.  4. Create a [employer] password. You can change your password at any time.  5. Enter your Password Reset Question and Answer. This can be used at a later time if you forget your password.   6. Enter your e-mail address. This allows you to receive e-mail notifications when new information is available in [employer].  7. Click Sign Up. You can now view your health information.    For assistance activating your [employer] account, call [phone]

## 2017-03-21 NOTE — ED AVS SNAPSHOT
3/21/2017          Alfonso Posada  [address]  [place] [address]    Dear Alfonso:    [facility] wants to ensure your discharge home is safe and you or your loved ones have had all your questions answered regarding your care after you leave the hospital.    You may receive a telephone call within two days of your discharge.  This call is to make certain you understand your discharge instructions as well as ensure we provided you with the best care possible during your stay with us.     The call will only last approximately 3-5 minutes and will be done by a nurse.    Once again, we want to ensure your discharge home is safe and that you have a clear understanding of any next steps in your care.  If you have any questions or concerns, please do not hesitate to contact us, we are here for you.  Thank you for choosing [facility] for your healthcare needs.    Sincerely,    Aristeo Huang    [facility]

## 2017-03-21 NOTE — ED PROVIDER NOTES
ED Provider Note    Addendum:  The patient's TSH is normal and she still remains out of SVT. Her heart rate is 107. She has no chest pain and is comfortable. At this time, we will discharge her home to follow up with cardiology this week for it. She recheck. I encouraged her to continue taking her metoprolol and avoid caffeine and stimulants. The patient understands her diagnosis and reasons to come back to the ER for further problems.

## 2017-03-21 NOTE — ED NOTES
"Pt states getting diaphoretic and then \"a surge\" and \"pounding\" in heart, was awake when this began  "

## 2017-03-21 NOTE — ED NOTES
Pt placed on defib pads and zoll, adenosine given with 2 RNs, ERP, and ER tech at bedside. Pt tolerated well and back to SR after 2nd dose of adenosine.

## 2017-03-21 NOTE — ED AVS SNAPSHOT
Home Care Instructions                                                                                                                Alfonso Posada   MRN: [id number]    Department:  [facility], Emergency Dept   Date of Visit:  3/21/2017            [facility], Emergency Dept    [address]    [facility] [address]    Phone:  [phone]      You were seen by     1. Jammie Carrion M.D.    2. Diane Bourne M.D.      Your Diagnosis Was     SVT (supraventricular tachycardia) (CMS-HCC)     I47.1       These are the medications you received during your hospitalization from 03/21/2017 0447 to 03/21/2017 0614     Date/Time Order Dose Route Action    03/21/2017 0510 NS infusion 1,000 mL 1,000 mL Intravenous New Bag    03/21/2017 0507 lorazepam (ATIVAN) injection 1 mg 1 mg Intravenous Given    03/21/2017 0513 adenosine (ADENOCARD) injection 12 mg 6 mg Intravenous Given    03/21/2017 0515 adenosine (ADENOCARD) injection 6 mg 12 mg Intravenous Given    03/21/2017 0522 metoprolol (LOPRESSOR) injection 5 mg 5 mg Intravenous Given    03/21/2017 0551 potassium chloride SA (Kdur) tablet 20 mEq 20 mEq Oral Given      Follow-up Information     1. Follow up with Sim Brownlee PA-C. Schedule an appointment as soon as possible for a visit in 3 days.    Specialty:  Family Medicine    Contact information    [address]  [address]  [facility] [address] [phone]          2. Follow up with Zeus Martin M.D.. Schedule an appointment as soon as possible for a visit in 2 weeks.    Specialty:  Cardiology    Contact information    [address]  [address]  [facility] [address] [phone]        Medication Information     Review all of your home medications and newly ordered medications with your primary doctor and/or pharmacist as soon as possible. Follow medication instructions as directed by your doctor and/or pharmacist.     Please keep your complete medication list  with you and share with your physician. Update the information when medications are discontinued, doses are changed, or new medications (including over-the-counter products) are added; and carry medication information at all times in the event of emergency situations.               Medication List      ASK your doctor about these medications        Instructions    Morning Afternoon Evening Bedtime    alprazolam 0.5 MG Tabs   Commonly known as:  XANAX        Take 1 Tab by mouth 2 times a day as needed for Anxiety for up to 30 days.   Dose:  0.5 mg                        atorvastatin 40 MG Tabs   Commonly known as:  LIPITOR        Take 40 mg by mouth every evening.   Dose:  40 mg                        furosemide 40 MG Tabs   Commonly known as:  LASIX        Take 1 Tab by mouth every day.   Dose:  40 mg                        gabapentin 400 MG Caps   Commonly known as:  NEURONTIN        Take 600 mg by mouth 2 times a day.   Dose:  600 mg                        levothyroxine 25 MCG Tabs   Commonly known as:  SYNTHROID        Take 1 Tab by mouth Every morning on an empty stomach.   Dose:  25 mcg                        losartan 25 MG Tabs   Commonly known as:  COZAAR        Take 1 Tab by mouth every day.   Dose:  25 mg                        metoprolol 50 MG Tabs   Commonly known as:  LOPRESSOR        Take 1 Tab by mouth 2 times a day.   Dose:  50 mg                        montelukast 10 MG Tabs   Commonly known as:  SINGULAIR        Take 1 Tab by mouth every day.   Dose:  10 mg                        naproxen 500 MG Tabs   Commonly known as:  NAPROSYN        Take 1 Tab by mouth 2 times a day, with meals.   Dose:  500 mg                        omeprazole 40 MG delayed-release capsule   Commonly known as:  PRILOSEC        Take 1 Cap by mouth every day.   Dose:  40 mg                        oxycodone-acetaminophen  MG Tabs   Commonly known as:  PERCOCET-10        Take 1 Tab by mouth every 8 hours as needed for Severe  Pain for up to 30 days.   Dose:  1 Tab                        potassium chloride 8 MEQ tablet   Commonly known as:  KLOR-CON        Take 1 Tab by mouth every day.   Dose:  8 mEq                        sertraline 100 MG Tabs   Commonly known as:  ZOLOFT        Take 1 Tab by mouth every day.   Dose:  100 mg                        tramadol 50 MG Tabs   Commonly known as:  ULTRAM        Take 1 Tab by mouth every 8 hours as needed.   Dose:  50 mg                                Procedures and tests performed during your visit     Procedure/Test Number of Times Performed    APTT 1    CBC WITH DIFFERENTIAL 1    COMP METABOLIC PANEL 1    Cardiac Monitoring 1    EKG (NOW) 3    ESTIMATED GFR 1    IV Saline Lock 1    MAGNESIUM 1    OLD EKG 1    Oxygen 1    PROTHROMBIN TIME 1    Pulse Ox 1    SALINE LOCK 1    TROPONIN 1    TSH 1            Patient Information     Patient Information    Following emergency treatment: all patient requiring follow-up care must return either to a private physician or a clinic if your condition worsens before you are able to obtain further medical attention, please return to the emergency room.     Billing Information    At [facility], we work to make the billing process streamlined for our patients.  Our Representatives are here to answer any questions you may have regarding your hospital bill.  If you have insurance coverage and have supplied your insurance information to us, we will submit a claim to your insurer on your behalf.  Should you have any questions regarding your bill, we can be reached online or by phone as follows:  Online: You are able pay your bills online or live chat with our representatives about any billing questions you may have. We are here to help Monday - Friday from 8:00am to 7:30pm and 9:00am - 12:00pm on Saturdays.  Please visit [email or web address]  for more information.   Phone:  [phone] or [phone]    Please note that your  emergency physician, surgeon, pathologist, radiologist, anesthesiologist, and other specialists are not employed by [facility] and will therefore bill separately for their services.  Please contact them directly for any questions concerning their bills at the numbers below:     Emergency Physician Services:  [phone]  [facility]:  [phone]  [facility]:  [phone]  [facility]:  [phone]    1. Your final bill may vary from the amount quoted upon discharge if all procedures are not complete at that time, or if your doctor has additional procedures of which we are not aware. You will receive an additional bill if you return to the Emergency Department at [facility] for suture removal regardless of the facility of which the sutures were placed.     2. Please arrange for settlement of this account at the emergency registration.    3. All self-pay accounts are due in full at the time of treatment.  If you are unable to meet this obligation then payment is expected within 4-5 days.     4. If you have had radiology studies (CT, X-ray, Ultrasound, MRI), you have received a preliminary result during your emergency department visit. Please contact the radiology department [phone] to receive a copy of your final result. Please discuss the Final result with your primary physician or with the follow up physician provided.     Crisis Hotline:  [place] Crisis Hotline:  9-415-JGQQGOD or 1-245.617.9267  [place] Crisis Hotline:    1-349.597.6201 or 550-188-7960         ED Discharge Follow Up Questions    1. In order to provide you with very good care, we would like to follow up with a phone call in the next few days.  May we have your permission to contact you?     YES /  NO    2. What is the best phone number to call you? (       )_____-__________    3. What is the best time to call you?      Morning  /  Afternoon  /  Evening                   Patient Signature:   ____________________________________________________________    Date:  ____________________________________________________________      Your appointments     Apr 03, 2017 11:00 AM   Established Patient with Sim Brownlee PA-C   [facility]    [address]  [address]  [facility] [address] [phone]           You will be receiving a confirmation call a few days before your appointment from our automated call confirmation system.            Aug 29, 2017 11:20 AM   Established Patient with Marilia Kang M.D.   [facility]    [address]  [facility] [address] [phone]           You will be receiving a confirmation call a few days before your appointment from our automated call confirmation system.

## 2017-03-21 NOTE — ED NOTES
Chief Complaint   Patient presents with   • Rapid Heart Beat     Started 45 min ago, pt states on medofast diet       /91 mmHg  Pulse 160  Temp(Src) 36.7 °C (98.1 °F)  Resp 18  SpO2 96%

## 2017-03-21 NOTE — ED PROVIDER NOTES
"ED Provider Note    ED Provider Note    Scribed for Jammie Carrion MD by Jammie Carrion. 3/21/2017, 5:06 AM.    Primary care provider: Sim Brownlee PA-C  Means of arrival: Private  History obtained from: Patient  History limited by: None    CHIEF COMPLAINT  Chief Complaint   Patient presents with   • Rapid Heart Beat     Started 45 min ago, pt states on medofast diet       HPI  Alfonso Posada is a 61 y.o. female who presents to the Emergency Department for evaluation of palpitations. Patient relates she was somewhat \"late\" on her metoprolol this evening, also endorses using aspirin and drinking Powerade but no significant caffeine or energy drink use. Describes sensation of pounding and palpitations syndrome chest with associated warmth. She has no chest pain, no dyspnea, but does endorse anxiety. Patient relates this began about 45 minutes prior to arrival, she had similar episodes diagnose SVT in the past. Patient seen in this facility previously for similar, however patient self converted at that time. She also complains of dry mouth and is quite anxious    REVIEW OF SYSTEMS  Pertinent positives include flushed sensation, palpitations, lightheaded sensation. Pertinent negatives include no chest pain, no dyspnea, no fever, no vomiting.  All other systems reviewed and negative.    PAST MEDICAL HISTORY   has a past medical history of Asthma and Thyroid disease.    SURGICAL HISTORY   has past surgical history that includes thyroidectomy total (2005).    SOCIAL HISTORY  Social History   Substance Use Topics   • Smoking status: Light Tobacco Smoker     Start date: 03/04/2016   • Smokeless tobacco: Never Used   • Alcohol Use: No      History   Drug Use No       FAMILY HISTORY  No family history on file. family history of heart disease in grandfather    CURRENT MEDICATIONS  Home Medications     **Home medications have not yet been reviewed for this encounter**          ALLERGIES  Allergies " "  Allergen Reactions   • Latex Swelling   • Pcn [Penicillins]      Hives on face    • Sulfa Drugs      \"some sulfa drugs- leg itchiness\"        PHYSICAL EXAM  VITAL SIGNS: /73 mmHg  Pulse 79  Temp(Src) 36.7 °C (98.1 °F)  Resp 18  SpO2 95%    General: Alert, mild acute distress  Skin: Warm, dry, normal for ethnicity  Head: Normocephalic, atraumatic  Neck: Trachea midline, no tenderness  Eye: PERRL, normal conjunctiva  ENMT: Oral mucosa moist, no pharyngeal erythema or exudate  Cardiovascular: S1, S2, impressively tachycardic, otherwise, No murmur, Normal peripheral perfusion  Respiratory: Lungs CTA, respirations are non-labored, breath sounds are equal  Gastrointestinal: Soft, nontender, non distended  Musculoskeletal: No swelling, no deformity  Neurological: Alert and oriented to person, place, time, and situation  Lymphatics: No lymphadenopathy  Psychiatric: Cooperative, anxious mood & affect      DIAGNOSTIC STUDIES/PROCEDURES    LABS  Results for orders placed or performed during the hospital encounter of 03/21/17   CBC WITH DIFFERENTIAL   Result Value Ref Range    WBC 12.4 (H) 4.8 - 10.8 K/uL    RBC 5.00 4.20 - 5.40 M/uL    Hemoglobin 14.8 12.0 - 16.0 g/dL    Hematocrit 44.8 37.0 - 47.0 %    MCV 89.6 81.4 - 97.8 fL    MCH 29.6 27.0 - 33.0 pg    MCHC 33.0 (L) 33.6 - 35.0 g/dL    RDW 46.0 35.9 - 50.0 fL    Platelet Count 280 164 - 446 K/uL    MPV 10.4 9.0 - 12.9 fL    Neutrophils-Polys 53.40 44.00 - 72.00 %    Lymphocytes 35.20 22.00 - 41.00 %    Monocytes 7.50 0.00 - 13.40 %    Eosinophils 3.00 0.00 - 6.90 %    Basophils 0.60 0.00 - 1.80 %    Immature Granulocytes 0.30 0.00 - 0.90 %    Nucleated RBC 0.00 /100 WBC    Neutrophils (Absolute) 6.62 2.00 - 7.15 K/uL    Lymphs (Absolute) 4.36 1.00 - 4.80 K/uL    Monos (Absolute) 0.93 (H) 0.00 - 0.85 K/uL    Eos (Absolute) 0.37 0.00 - 0.51 K/uL    Baso (Absolute) 0.08 0.00 - 0.12 K/uL    Immature Granulocytes (abs) 0.04 0.00 - 0.11 K/uL    NRBC (Absolute) 0.00 " K/uL   COMP METABOLIC PANEL   Result Value Ref Range    Sodium 141 135 - 145 mmol/L    Potassium 3.4 (L) 3.6 - 5.5 mmol/L    Chloride 105 96 - 112 mmol/L    Co2 24 20 - 33 mmol/L    Anion Gap 12.0 (H) 0.0 - 11.9    Glucose 106 (H) 65 - 99 mg/dL    Bun 14 8 - 22 mg/dL    Creatinine 0.64 0.50 - 1.40 mg/dL    Calcium 8.8 8.4 - 10.2 mg/dL    AST(SGOT) 27 12 - 45 U/L    ALT(SGPT) 22 2 - 50 U/L    Alkaline Phosphatase 105 (H) 30 - 99 U/L    Total Bilirubin 0.5 0.1 - 1.5 mg/dL    Albumin 4.3 3.2 - 4.9 g/dL    Total Protein 7.1 6.0 - 8.2 g/dL    Globulin 2.8 1.9 - 3.5 g/dL    A-G Ratio 1.5 g/dL   TROPONIN   Result Value Ref Range    Troponin I <0.02 0.00 - 0.04 ng/mL   PROTHROMBIN TIME   Result Value Ref Range    PT 13.3 12.0 - 14.6 sec    INR 1.03 0.87 - 1.13   APTT   Result Value Ref Range    APTT 29.9 24.7 - 36.0 sec   MAGNESIUM   Result Value Ref Range    Magnesium 2.1 1.5 - 2.5 mg/dL   ESTIMATED GFR   Result Value Ref Range    GFR If African American >60 >60 mL/min/1.73 m 2    GFR If Non African American >60 >60 mL/min/1.73 m 2   EKG (NOW)   Result Value Ref Range    Report       [facility] Emergency Dept.    Test Date:  2017  Pt Name:    CHUY CLAUDIO                Department: [facility]  MRN:        [id number]                      [facility]   Gender:     F                            Technician: JHONATAN  :        1955                   Requested By:NAA VILLAREAL  Order #:    [id number]                    Jeremy MD:    Measurements  Intervals                                Axis  Rate:       160                          P:          0  RI:                                      QRS:        -25  QRSD:       121                          T:          32  QT:         304  QTc:        496    Interpretive Statements  Wide-QRS tachycardia  Nonspecific intraventricular conduction delay  Compared to ECG 2017 12:59:31  Intraventricular conduction delay now present  Sinus tachycardia  no longer present  Left anterior fascicular block no longer present  ST (T wave) deviation no longer present     EKG (NOW)   Result Value Ref Range    Report       [facility] Emergency Dept.    Test Date:  2017  Pt Name:    CHUY CLAUDIO                Department: [facility]  MRN:        [id number]                      Room:       -ROOM 7  Gender:     F                            Technician: JHONATAN  :        1955                   Requested By:NAA VILLAREAL  Order #:    [id number]                    Reading MD:    Measurements  Intervals                                Axis  Rate:       76                           P:          52  OH:         184                          QRS:        -33  QRSD:       98                           T:          27  QT:         416  QTc:        468    Interpretive Statements  SINUS RHYTHM  LEFT AXIS DEVIATION  LEFT VENTRICULAR HYPERTROPHY  Compared to ECG 2017 04:50:46  Left-axis deviation now present  Left ventricular hypertrophy now present  Intraventricular conduction delay no longer present       All labs reviewed by me, mild hypokalemia, otherwise unremarkable.    EKG  12 Lead EKG obtained at 0451 and interpreted by me to show:  Rhythm: Supraventricular tachycardia  Rate: 160  Axis: Left  Intervals: Normal  Q Waves: Normal  No diagnostic ST segment elevation    Clinical Impression: SVT, otherwise unremarkable  Compared to no change from previous EKG in February      EKG (repeat)  12 Lead EKG obtained at 0522 and interpreted by me to show:  Rhythm: Supraventricular tachycardia  Rate: 76  Axis: Left  Intervals: Normal  Q Waves: Normal  No diagnostic ST segment elevation    Clinical Impression: Normal sinus rhythm.  Compared to EKG preprocedure, resolution of dysrhythmia      COURSE & MEDICAL DECISION MAKING  Pertinent Labs & Imaging studies reviewed. (See chart for details)    5:06 AM - Patient seen and examined at bedside. Patient will be  "treated with IV fluid resuscitation, Ativan, adenosine. Ordered cardiac workup an EKG as well as cardiac monitoring to evaluate her symptoms. The differential diagnoses include but are not limited to: SVT, sinus tachycardia, ventricular tachycardia    Cardioversion Procedure Note    Indication: supraventricular tachycardia    Consent: The patient provided verbal consent for this procedure.    Pre-Medication: lorazepam (Ativan) intravenously    Procedure: The patient was placed in the supine position and the chest area was exposed. The cardioversion pads were applied in the standard manner and configuration.    Attempt #1: Patient treated with chemical cardioversion, 6 mg of IV adenosine. Post rhythm remained supraventricular tachycardia, rate improved 150.    Attempt #2: 12 mg of IV adenosine for 2nd attempt chemical cardioversion, this results in sinus tachycardia with rate of 105.      The patient tolerated the procedure well.    Complications: None        0517: After repeat dose of Cardizem, successful cardioversion is obtained, heart rate is sinus tachycardia 105 bpm.      0537: Patient reassessed, feeling well, repeat EKG confirms sinus rhythm.    0551: Patient updated with unremarkable workup other than mild hypokalemia, will order oral potassium supplementation.    Patient Vitals for the past 24 hrs:   BP Temp Pulse Resp SpO2   03/21/17 0522 135/73 mmHg - 79 - -   03/21/17 0520 - - 85 - 95 %   03/21/17 0517 - - 77 - 98 %   03/21/17 0513 - - (!) 158 - 98 %   03/21/17 0455 118/91 mmHg 36.7 °C (98.1 °F) (!) 160 18 96 %       Decision Making:  This is a 61 y.o. year old female who presents with palpitations. History of SVT in the past. She does admit using Afrin but no significant caffeine use this evening, no energy drinks, no sympathomimetic drugs, but does relate she was \"late\" by about 2-3 hours on her metoprolol. EKG is nonischemic, impressively tachycardic on exam and suspicion of SVT as confirmed by EKG. " This resolves with 2nd attempt chemical cardioversion. No evidence of thyroid storm, no evidence of sepsis electrolytes are unremarkable other than mild hypokalemia, corrected in the ED. Patient treated with appropriate fluid resuscitation, plan is for discharge home with follow-up to cardiology.     The patient will return for new or worsening symptoms and is stable at the time of discharge.    Patient has had high blood pressure while in the emergency department, felt likely secondary to medical condition. Counseled patient to monitor blood pressure at home and follow up with primary care physician.     DISPOSITION:  Patient will be discharged home in stable condition.    FOLLOW UP:  Sim Brownlee PA-C  [address]  [address]  [place] [address]  [phone]    Schedule an appointment as soon as possible for a visit in 3 days      Zeus Martin M.D.  [address]  [address]  [place] [address]  [phone]    Schedule an appointment as soon as possible for a visit in 2 weeks        OUTPATIENT MEDICATIONS:  New Prescriptions    No medications on file           FINAL IMPRESSION  1. SVT (supraventricular tachycardia) (CMS-HCC)    2. Hypokalemia    3. Dehydration          Jammie WHEELER (Brooke), am scribing for, and in the presence of, Jammie Carrion MD.    Electronically signed by: Jammie Carrion (Brooke), 3/21/2017    Jammie WHEELER MD personally performed the services described in this documentation, as scribed by Jammie Carrion in my presence, and it is both accurate and complete    The note accurately reflects work and decisions made by me.  Jammie Carrion  3/21/2017  5:40 AM

## 2017-04-03 ENCOUNTER — OFFICE VISIT (OUTPATIENT)
Dept: MEDICAL GROUP | Facility: MEDICAL CENTER | Age: 62
End: 2017-04-03
Payer: COMMERCIAL

## 2017-04-03 ENCOUNTER — HOSPITAL ENCOUNTER (OUTPATIENT)
Dept: LAB | Facility: MEDICAL CENTER | Age: 62
End: 2017-04-03
Attending: INTERNAL MEDICINE
Payer: COMMERCIAL

## 2017-04-03 VITALS
TEMPERATURE: 98 F | HEART RATE: 62 BPM | DIASTOLIC BLOOD PRESSURE: 86 MMHG | SYSTOLIC BLOOD PRESSURE: 126 MMHG | BODY MASS INDEX: 48.82 KG/M2 | OXYGEN SATURATION: 95 % | RESPIRATION RATE: 16 BRPM | HEIGHT: 65 IN | WEIGHT: 293 LBS

## 2017-04-03 DIAGNOSIS — E89.0 POSTOPERATIVE HYPOTHYROIDISM: ICD-10-CM

## 2017-04-03 DIAGNOSIS — M25.562 BILATERAL CHRONIC KNEE PAIN: ICD-10-CM

## 2017-04-03 DIAGNOSIS — M25.561 BILATERAL CHRONIC KNEE PAIN: ICD-10-CM

## 2017-04-03 DIAGNOSIS — E66.01 MORBID OBESITY WITH BMI OF 45.0-49.9, ADULT (HCC): ICD-10-CM

## 2017-04-03 DIAGNOSIS — R11.0 NAUSEA: ICD-10-CM

## 2017-04-03 DIAGNOSIS — E89.0 POSTSURGICAL HYPOTHYROIDISM: ICD-10-CM

## 2017-04-03 DIAGNOSIS — G89.29 BILATERAL CHRONIC KNEE PAIN: ICD-10-CM

## 2017-04-03 LAB
T4 FREE SERPL-MCNC: 1.03 NG/DL (ref 0.58–1.64)
TSH SERPL DL<=0.005 MIU/L-ACNC: 1.25 UIU/ML (ref 0.35–5.5)

## 2017-04-03 PROCEDURE — 36415 COLL VENOUS BLD VENIPUNCTURE: CPT

## 2017-04-03 PROCEDURE — 84443 ASSAY THYROID STIM HORMONE: CPT

## 2017-04-03 PROCEDURE — 99214 OFFICE O/P EST MOD 30 MIN: CPT | Performed by: PHYSICIAN ASSISTANT

## 2017-04-03 PROCEDURE — 84439 ASSAY OF FREE THYROXINE: CPT

## 2017-04-03 RX ORDER — TRAMADOL HYDROCHLORIDE 50 MG/1
50 TABLET ORAL EVERY 8 HOURS PRN
Qty: 90 TAB | Refills: 0 | Status: SHIPPED | OUTPATIENT
Start: 2017-04-03 | End: 2017-05-04 | Stop reason: SDUPTHER

## 2017-04-03 RX ORDER — OXYCODONE AND ACETAMINOPHEN 10; 325 MG/1; MG/1
1 TABLET ORAL 2 TIMES DAILY PRN
Qty: 60 TAB | Refills: 0 | Status: SHIPPED | OUTPATIENT
Start: 2017-04-13 | End: 2017-05-04 | Stop reason: SDUPTHER

## 2017-04-03 RX ORDER — ONDANSETRON 4 MG/1
4 TABLET, ORALLY DISINTEGRATING ORAL EVERY 8 HOURS PRN
Qty: 10 TAB | Refills: 1 | Status: SHIPPED | OUTPATIENT
Start: 2017-04-03 | End: 2017-05-04 | Stop reason: SDUPTHER

## 2017-04-03 RX ORDER — NAPROXEN 500 MG/1
500 TABLET ORAL 2 TIMES DAILY WITH MEALS
Qty: 60 TAB | Refills: 2 | Status: SHIPPED | OUTPATIENT
Start: 2017-04-03 | End: 2017-08-25

## 2017-04-03 NOTE — ASSESSMENT & PLAN NOTE
Is doing well with eating healthier. He has gone from BMI of 53.58-51.75. Therefore she has lost approximately 11 pounds since the 13th of last month.

## 2017-04-03 NOTE — ASSESSMENT & PLAN NOTE
This is a 61-year-old female returns today to get refills of her medication for chronic pain of both her knees. She has an appointment to discuss surgery options in June with her orthopedics. Then May 13th her orthopedist will be performing an injection on her left knee. She states that she's been trying to lose weight. She was told that this would help her cause after surgery. She currently is on Percocets 10 mg twice a day. She also will take tramadol every 8 hours. She is requiring a refill.

## 2017-04-03 NOTE — PROGRESS NOTES
Subjective:   Alfonso Posada is a 61 y.o. female here today for follow-up for medication discuss history of nauseousness.    Bilateral chronic knee pain  This is a 61-year-old female returns today to get refills of her medication for chronic pain of both her knees. She has an appointment to discuss surgery options in June with her orthopedics. Then May 13th her orthopedist will be performing an injection on her left knee. She states that she's been trying to lose weight. She was told that this would help her cause after surgery. She currently is on Percocets 10 mg twice a day. She also will take tramadol every 8 hours. She is requiring a refill.    Nausea  She complains of intermittent nausea. She is not certain if this because of medications such as Percocet. Occasionally she will use dispersible Zofran 4 mg with good results of the nausea. She is requesting a refill.    Postoperative hypothyroidism  She soon will be obtaining labs to recheck her thyroid through her endocrinologist. She continues to take Synthroid 25 µg daily.    Morbid obesity with BMI of 45.0-49.9, adult ([facility])  Is doing well with eating healthier. He has gone from BMI of 53.58-51.75. Therefore she has lost approximately 11 pounds since the 13th of last month.       Current medicines (including changes today)  Current Outpatient Prescriptions   Medication Sig Dispense Refill   • [START ON 4/13/2017] oxycodone-acetaminophen (PERCOCET-10)  MG Tab Take 1 Tab by mouth 2 times a day as needed for Severe Pain for up to 30 days. 60 Tab 0   • tramadol (ULTRAM) 50 MG Tab Take 1 Tab by mouth every 8 hours as needed. 90 Tab 0   • ondansetron (ZOFRAN ODT) 4 MG TABLET DISPERSIBLE Take 1 Tab by mouth every 8 hours as needed for Nausea/Vomiting. 10 Tab 1   • naproxen (NAPROSYN) 500 MG Tab Take 1 Tab by mouth 2 times a day, with meals. 60 Tab 2   • gabapentin (NEURONTIN) 400 MG Cap Take 600 mg by mouth 2 times a day.     • sertraline (ZOLOFT) 100 MG Tab  "Take 1 Tab by mouth every day. 90 Tab 1   • potassium chloride (KLOR-CON) 8 MEQ tablet Take 1 Tab by mouth every day. 90 Tab 1   • omeprazole (PRILOSEC) 40 MG delayed-release capsule Take 1 Cap by mouth every day. 90 Cap 1   • metoprolol (LOPRESSOR) 50 MG Tab Take 1 Tab by mouth 2 times a day. 180 Tab 1   • furosemide (LASIX) 40 MG Tab Take 1 Tab by mouth every day. 90 Tab 1   • montelukast (SINGULAIR) 10 MG Tab Take 1 Tab by mouth every day. 90 Tab 1   • losartan (COZAAR) 25 MG Tab Take 1 Tab by mouth every day. 90 Tab 1   • levothyroxine (SYNTHROID) 25 MCG Tab Take 1 Tab by mouth Every morning on an empty stomach. 30 Tab 6   • atorvastatin (LIPITOR) 40 MG Tab Take 40 mg by mouth every evening.       No current facility-administered medications for this visit.     She  has a past medical history of Asthma and Thyroid disease.    ROS   No chest pain, no shortness of breath, no abdominal pain and all other systems were reviewed and are negative.       Objective:     Blood pressure 126/86, pulse 62, temperature 36.7 °C (98 °F), resp. rate 16, height 1.651 m (5' 5\"), weight 141.073 kg (311 lb 0.2 oz), SpO2 95 %, not currently breastfeeding. Body mass index is 51.75 kg/(m^2).   Physical Exam:  Constitutional: Alert, no distress.  Skin: Warm, dry, good turgor, no rashes in visible areas.  Eye: Equal, round and reactive, conjunctiva clear, lids normal.  ENMT: Lips without lesions, good dentition, oropharynx clear.  Neck: Trachea midline, no masses.   Lymph: No cervical or supraclavicular lymphadenopathy  Respiratory: Unlabored respiratory effort, lungs clear to auscultation, no wheezes, no ronchi.  Cardiovascular: Normal S1, S2, no murmur, no edema.  Abdomen: Soft, non-tender, no masses.  Psych: Alert and oriented x3, normal affect and mood.        Assessment and Plan:   The following treatment plan was discussed    1. Bilateral chronic knee pain  Chronic condition. Follow-up with orthopedics as planned. Continue to lose " weight which would be beneficial for possible surgery as well as the overall improvement of her knee pain. Prescribed Percocet 10 mg. Also prescribed tramadol 50 mg one tablet 3 times daily.  is up-to-date. Also prescribed naproxen 500 mg twice a day.  - oxycodone-acetaminophen (PERCOCET-10)  MG Tab; Take 1 Tab by mouth 2 times a day as needed for Severe Pain for up to 30 days.  Dispense: 60 Tab; Refill: 0  - tramadol (ULTRAM) 50 MG Tab; Take 1 Tab by mouth every 8 hours as needed.  Dispense: 90 Tab; Refill: 0  - naproxen (NAPROSYN) 500 MG Tab; Take 1 Tab by mouth 2 times a day, with meals.  Dispense: 60 Tab; Refill: 2    2. Nausea  Chronic condition of unknown etiology. Prescribed Zofran 4 mg dispersible tablets. Follow-up we'll discuss possible causes.  - ondansetron (ZOFRAN ODT) 4 MG TABLET DISPERSIBLE; Take 1 Tab by mouth every 8 hours as needed for Nausea/Vomiting.  Dispense: 10 Tab; Refill: 1    3. Postoperative hypothyroidism  Chronic condition stable. Follow-up with labs to check her thyroid. Follow-up with endocrinology.    4. Morbid obesity with BMI of 45.0-49.9, adult (HCC)  Great improvemen over past few weeks t. Urged to continue to succeed in weight loss. I'm excited about the potential of her to lose enough weight to be effective on her health.      Followup: Return if symptoms worsen or fail to improve.    Please note that this dictation was created using voice recognition software. I have made every reasonable attempt to correct obvious errors, but I expect that there are errors of grammar and possibly content that I did not discover before finalizing the note.

## 2017-04-03 NOTE — ASSESSMENT & PLAN NOTE
She complains of intermittent nausea. She is not certain if this because of medications such as Percocet. Occasionally she will use dispersible Zofran 4 mg with good results of the nausea. She is requesting a refill.

## 2017-04-03 NOTE — MR AVS SNAPSHOT
"        Alfonso Posada   4/3/2017 11:00 AM   Office Visit   MRN: [id number]    Department:  [place] [address]   Dept Phone:  [phone]    Description:  Female : 1955   Provider:  Sim Brownlee PA-C           Reason for Visit     Medication Refill Pain Medication    Hospital Follow-up for Tachycardia      Allergies as of 4/3/2017     Allergen Noted Reactions    Latex 2017   Swelling    Pcn [Penicillins] 2017       Hives on face     Sulfa Drugs 2017       \"some sulfa drugs- leg itchiness\"       You were diagnosed with     Bilateral chronic knee pain   [282510]       Nausea   [735115]         Vital Signs     Blood Pressure Pulse Temperature Respirations Height Weight    126/86 mmHg 62 36.7 °C (98 °F) 16 1.651 m (5' 5\") 141.073 kg (311 lb 0.2 oz)    Body Mass Index Oxygen Saturation Breastfeeding? Smoking Status          51.75 kg/m2 95% No Light Tobacco Smoker        Basic Information     Date Of Birth Sex Race Ethnicity Preferred Language    1955 Female White Non- English      Your appointments     May 04, 2017 10:20 AM   Established Patient with Sim Brownlee PA-C   [facility])    [address]  [address]  [facility] [address] [phone]           You will be receiving a confirmation call a few days before your appointment from our automated call confirmation system.            Aug 29, 2017 11:20 AM   Established Patient with Marilia Kang M.D.   [facility] ([facility]    [address]  [facility] [address] [phone]           You will be receiving a confirmation call a few days before your appointment from our automated call confirmation system.              Problem List              ICD-10-CM Priority Class Noted - Resolved    Morbid obesity with BMI of 45.0-49.9, adult (HCC) E66.01, Z68.42   2/3/2017 - Present    Postoperative hypothyroidism E89.0   " 2/3/2017 - Present    Preventative health care Z00.00   2/3/2017 - Present    Anxiety F41.9   2/3/2017 - Present    History of shingles Z86.19   2/3/2017 - Present    Bilateral chronic knee pain M25.561, M25.562, G89.29   2/3/2017 - Present    Arthritis M19.90   2/3/2017 - Present    Allergic rhinitis J30.9   2/3/2017 - Present    Essential hypertension I10   2/3/2017 - Present    Tobacco dependence F17.200   2/3/2017 - Present    Gastroesophageal reflux disease without esophagitis K21.9   3/3/2017 - Present    Nausea R11.0   4/3/2017 - Present      Health Maintenance        Date Due Completion Dates    IMM DTaP/Tdap/Td Vaccine (1 - Tdap) 11/11/1974 ---    IMM PNEUMOCOCCAL 19-64 (ADULT) MEDIUM RISK SERIES (1 of 1 - PPSV23) 11/11/1974 ---    PAP SMEAR 11/11/1976 ---    MAMMOGRAM 11/11/1995 ---    COLONOSCOPY 11/11/2005 ---    IMM ZOSTER VACCINE 11/11/2015 ---            Current Immunizations     No immunizations on file.      Below and/or attached are the medications your provider expects you to take. Review all of your home medications and newly ordered medications with your provider and/or pharmacist. Follow medication instructions as directed by your provider and/or pharmacist. Please keep your medication list with you and share with your provider. Update the information when medications are discontinued, doses are changed, or new medications (including over-the-counter products) are added; and carry medication information at all times in the event of emergency situations     Allergies:  LATEX - Swelling     PCN - (reactions not documented)     SULFA DRUGS - (reactions not documented)               Medications  Valid as of: April 03, 2017 - 11:29 AM    Generic Name Brand Name Tablet Size Instructions for use    Atorvastatin Calcium (Tab) LIPITOR 40 MG Take 40 mg by mouth every evening.        Furosemide (Tab) LASIX 40 MG Take 1 Tab by mouth every day.        Gabapentin (Cap) NEURONTIN 400 MG Take 600 mg by mouth 2  times a day.        Levothyroxine Sodium (Tab) SYNTHROID 25 MCG Take 1 Tab by mouth Every morning on an empty stomach.        Losartan Potassium (Tab) COZAAR 25 MG Take 1 Tab by mouth every day.        Metoprolol Tartrate (Tab) LOPRESSOR 50 MG Take 1 Tab by mouth 2 times a day.        Montelukast Sodium (Tab) SINGULAIR 10 MG Take 1 Tab by mouth every day.        Naproxen (Tab) NAPROSYN 500 MG Take 1 Tab by mouth 2 times a day, with meals.        Omeprazole (CAPSULE DELAYED RELEASE) PRILOSEC 40 MG Take 1 Cap by mouth every day.        Ondansetron (TABLET DISPERSIBLE) ZOFRAN ODT 4 MG Take 1 Tab by mouth every 8 hours as needed for Nausea/Vomiting.        Oxycodone-Acetaminophen (Tab) PERCOCET-10  MG Take 1 Tab by mouth 2 times a day as needed for Severe Pain for up to 30 days.        Potassium Chloride (Tab CR) KLOR-CON 8 MEQ Take 1 Tab by mouth every day.        Sertraline HCl (Tab) ZOLOFT 100 MG Take 1 Tab by mouth every day.        TraMADol HCl (Tab) ULTRAM 50 MG Take 1 Tab by mouth every 8 hours as needed.        .                 Medicines prescribed today were sent to:     [facility] [address] [place] [address] [id number] [place] [address] & [address] [address]    Phone: [phone] Fax: [phone]    Open 24 Hours?: No      Medication refill instructions:       If your prescription bottle indicates you have medication refills left, it is not necessary to call your provider’s office. Please contact your pharmacy and they will refill your medication.    If your prescription bottle indicates you do not have any refills left, you may request refills at any time through one of the following ways: The online [employer] system (except Urgent Care), by calling your provider’s office, or by asking your pharmacy to contact your provider’s office with a refill request. Medication refills are processed only during regular business hours and may not be available until  the next business day. Your provider may request additional information or to have a follow-up visit with you prior to refilling your medication.   *Please Note: Medication refills are assigned a new Rx number when refilled electronically. Your pharmacy may indicate that no refills were authorized even though a new prescription for the same medication is available at the pharmacy. Please request the medicine by name with the pharmacy before contacting your provider for a refill.           [employer] Mobile Access Code: [email or web address]  Expires: 4/17/2017 10:18 AM    Insitu Mobile  A secure, online tool to manage your health information     [employer]’s Insitu Mobile® is a secure, online tool that connects you to your personalized health information from the privacy of your home -- day or night - making it very easy for you to manage your healthcare. Once the activation process is completed, you can even access your medical information using the Insitu Mobile bo, which is available for free in the Apple Bo store or Google Play store.     In[employer] Mobile provides the following levels of access (as shown below):   My Chart Features   [facility] Doctor [facility]  Specialists [facility]  Urgent  Care Non-[facility]  Primary Care  Doctor   Email your healthcare team securely and privately 24/7 X X X    Manage appointments: schedule your next appointment; view details of past/upcoming appointments X      Request prescription refills. X      View recent personal medical records, including lab and immunizations X X X X   View health record, including health history, allergies, medications X X X X   Read reports about your outpatient visits, procedures, consult and ER notes X X X X   See your discharge summary, which is a recap of your hospital and/or ER visit that includes your diagnosis, lab results, and care plan. X X       How to register for Ins[employer] Mobile:  1. Go to  [email or web address].  2. Click on the Sign Up Now box, which takes you to the New  Member Sign Up page. You will need to provide the following information:  a. Enter your [employer] Access Code exactly as it appears at the top of this page. (You will not need to use this code after you’ve completed the sign-up process. If you do not sign up before the expiration date, you must request a new code.)   b. Enter your date of birth.   c. Enter your home email address.   d. Click Submit, and follow the next screen’s instructions.  3. Create a [employer] ID. This will be your [employer] login ID and cannot be changed, so think of one that is secure and easy to remember.  4. Create a [employer] password. You can change your password at any time.  5. Enter your Password Reset Question and Answer. This can be used at a later time if you forget your password.   6. Enter your e-mail address. This allows you to receive e-mail notifications when new information is available in [employer].  7. Click Sign Up. You can now view your health information.    For assistance activating your Point[employer] account, call [phone]        Quit Tobacco Information     Do you want to quit using tobacco?    Quitting tobacco decreases risks of cancer, heart and lung disease, increases life expectancy, improves sense of taste and smell, and increases spending money, among other benefits.    If you are thinking about quitting, we can help.  • [facility] Quit Tobacco Program: [phone]  o Program occurs weekly for four weeks and includes pharmacist consultation on products to support quitting smoking or chewing tobacco. A provider referral is needed for pharmacist consultation.  • Tobacco Users Help Hotline: 0-769-QUIT-NOW (449-2103) or https://[place].quitlogix.org/  o Free, confidential telephone and online coaching for [place] residents. Sessions are designed on a schedule that is convenient for you. Eligible clients receive free nicotine replacement therapy.  • Nationally: www.smokefree.gov  o Information and professional assistance to support  both immediate and long-term needs as you become, and remain, a non-smoker. Smokefree.gov allows you to choose the help that best fits your needs.

## 2017-04-03 NOTE — ASSESSMENT & PLAN NOTE
She soon will be obtaining labs to recheck her thyroid through her endocrinologist. She continues to take Synthroid 25 µg daily.

## 2017-04-11 ENCOUNTER — TELEPHONE (OUTPATIENT)
Dept: ENDOCRINOLOGY | Facility: MEDICAL CENTER | Age: 62
End: 2017-04-11

## 2017-04-11 DIAGNOSIS — E03.9 ACQUIRED HYPOTHYROIDISM: ICD-10-CM

## 2017-04-11 NOTE — TELEPHONE ENCOUNTER
Please advise patient that labs on April 3, 2017 showed perfectly normal thyroid hormone levels, I would recommend to continue on levofloxacin 25 µg daily. Repeat labs in 4 weeks for TSH and free T4.    Results for SHANON CHUY RAMOS (MRN [id number]) as of 4/11/2017 14:20   Ref. Range 4/3/2017 12:12   TSH Latest Ref Range: 0.350-5.500 uIU/mL 1.250   Free T-4 Latest Ref Range: 0.58-1.64 ng/dL 1.03

## 2017-04-11 NOTE — TELEPHONE ENCOUNTER
Pt called and she mentioned that she still very tired and sleepy.    Pt was on Levothyroxine 25 mcg  ( 1 tab once a day)    Pt had a current lab results and it was in a normal range.    Pt was wondering if she need to continue same dose because she don't want that her levels continue dropping.    Pls Advise    Thank you  Gloria

## 2017-04-18 LAB
EKG IMPRESSION: NORMAL
EKG IMPRESSION: NORMAL

## 2017-05-04 ENCOUNTER — OFFICE VISIT (OUTPATIENT)
Dept: MEDICAL GROUP | Facility: MEDICAL CENTER | Age: 62
End: 2017-05-04
Payer: COMMERCIAL

## 2017-05-04 ENCOUNTER — HOSPITAL ENCOUNTER (OUTPATIENT)
Dept: LAB | Facility: MEDICAL CENTER | Age: 62
End: 2017-05-04
Attending: INTERNAL MEDICINE
Payer: COMMERCIAL

## 2017-05-04 VITALS
TEMPERATURE: 98.2 F | RESPIRATION RATE: 16 BRPM | OXYGEN SATURATION: 94 % | SYSTOLIC BLOOD PRESSURE: 128 MMHG | BODY MASS INDEX: 48.82 KG/M2 | HEIGHT: 65 IN | HEART RATE: 68 BPM | DIASTOLIC BLOOD PRESSURE: 86 MMHG | WEIGHT: 293 LBS

## 2017-05-04 DIAGNOSIS — J06.9 ACUTE URI: ICD-10-CM

## 2017-05-04 DIAGNOSIS — F41.9 ANXIETY: ICD-10-CM

## 2017-05-04 DIAGNOSIS — M25.562 BILATERAL CHRONIC KNEE PAIN: ICD-10-CM

## 2017-05-04 DIAGNOSIS — R11.0 NAUSEA: ICD-10-CM

## 2017-05-04 DIAGNOSIS — G89.29 BILATERAL CHRONIC KNEE PAIN: ICD-10-CM

## 2017-05-04 DIAGNOSIS — M25.561 BILATERAL CHRONIC KNEE PAIN: ICD-10-CM

## 2017-05-04 DIAGNOSIS — Z00.00 PREVENTATIVE HEALTH CARE: ICD-10-CM

## 2017-05-04 DIAGNOSIS — E03.9 ACQUIRED HYPOTHYROIDISM: ICD-10-CM

## 2017-05-04 LAB
T4 FREE SERPL-MCNC: 0.91 NG/DL (ref 0.53–1.43)
TSH SERPL DL<=0.005 MIU/L-ACNC: 0.85 UIU/ML (ref 0.3–3.7)

## 2017-05-04 PROCEDURE — 36415 COLL VENOUS BLD VENIPUNCTURE: CPT

## 2017-05-04 PROCEDURE — 84443 ASSAY THYROID STIM HORMONE: CPT

## 2017-05-04 PROCEDURE — 99214 OFFICE O/P EST MOD 30 MIN: CPT | Performed by: PHYSICIAN ASSISTANT

## 2017-05-04 PROCEDURE — 84439 ASSAY OF FREE THYROXINE: CPT

## 2017-05-04 RX ORDER — TRAMADOL HYDROCHLORIDE 50 MG/1
50 TABLET ORAL EVERY 8 HOURS PRN
Qty: 90 TAB | Refills: 0 | Status: SHIPPED | OUTPATIENT
Start: 2017-05-04 | End: 2017-06-02 | Stop reason: SDUPTHER

## 2017-05-04 RX ORDER — OXYCODONE AND ACETAMINOPHEN 10; 325 MG/1; MG/1
1 TABLET ORAL 2 TIMES DAILY PRN
Qty: 60 TAB | Refills: 0 | Status: SHIPPED | OUTPATIENT
Start: 2017-05-13 | End: 2017-06-02 | Stop reason: SDUPTHER

## 2017-05-04 RX ORDER — ATORVASTATIN CALCIUM 40 MG/1
TABLET, FILM COATED ORAL
Qty: 90 TAB | Refills: 0 | Status: SHIPPED | OUTPATIENT
Start: 2017-05-04 | End: 2017-08-17 | Stop reason: SDUPTHER

## 2017-05-04 RX ORDER — ALPRAZOLAM 0.5 MG/1
0.5 TABLET ORAL 2 TIMES DAILY PRN
Qty: 60 TAB | Refills: 0 | Status: SHIPPED | OUTPATIENT
Start: 2017-05-04 | End: 2017-06-02 | Stop reason: SDUPTHER

## 2017-05-04 RX ORDER — ONDANSETRON 4 MG/1
4 TABLET, ORALLY DISINTEGRATING ORAL EVERY 8 HOURS PRN
Qty: 10 TAB | Refills: 2 | Status: SHIPPED | OUTPATIENT
Start: 2017-05-04 | End: 2017-06-02 | Stop reason: SDUPTHER

## 2017-05-04 NOTE — ASSESSMENT & PLAN NOTE
She has an appointment with [facility] on July 1. She is concerned about the continuation of pain as well as swelling. She is trying her best to lose weight but then difficult because of exercise. She actually lost 4 pounds since her last visit a month ago. She is requesting a refill of tramadol and Percocet 10 mg.

## 2017-05-04 NOTE — PROGRESS NOTES
Subjective:   Alfonso Posada is a 61 y.o. female here today for medication refill and discuss possible sinus infection for greater than one week.    Acute URI  This is a 61-year-old female who is here today to get medication refill but also discussed for the past week or longer she believes she may have a sinus infection. Complains of some right eye discharge. Redness. So she nasal congestion and drainage. Some chest congestion. Subjective fevers last week. Denies any current shortness of breath or chest pain. Using an over-the-counter nasal spray and has some burning of her nose. Taking Zyrtec daily.    Bilateral chronic knee pain  She has an appointment with [facility] on July 1. She is concerned about the continuation of pain as well as swelling. She is trying her best to lose weight but then difficult because of exercise. She actually lost 4 pounds since her last visit a month ago. She is requesting a refill of tramadol and Percocet 10 mg.    Anxiety  Also requesting a refill of alprazolam. She takes the medication as needed. Last prescription was written 2 months ago.    Nausea  Also requesting a refill of antinausea medication because her postherpetic neuralgia accepted from time to time causing nausea.       Current medicines (including changes today)  Current Outpatient Prescriptions   Medication Sig Dispense Refill   • atorvastatin (LIPITOR) 40 MG Tab TAKE 1 TABLET(40 MG) BY MOUTH DAILY AT BEDTIME 90 Tab 0   • ondansetron (ZOFRAN ODT) 4 MG TABLET DISPERSIBLE Take 1 Tab by mouth every 8 hours as needed for Nausea/Vomiting. 10 Tab 2   • [START ON 5/13/2017] oxycodone-acetaminophen (PERCOCET-10)  MG Tab Take 1 Tab by mouth 2 times a day as needed for Severe Pain for up to 30 days. 60 Tab 0   • tramadol (ULTRAM) 50 MG Tab Take 1 Tab by mouth every 8 hours as needed. 90 Tab 0   • alprazolam (XANAX) 0.5 MG Tab Take 1 Tab by mouth 2 times a day as needed for Anxiety for up to 60 days. 60 Tab 0   •  "naproxen (NAPROSYN) 500 MG Tab Take 1 Tab by mouth 2 times a day, with meals. 60 Tab 2   • gabapentin (NEURONTIN) 400 MG Cap Take 600 mg by mouth 2 times a day.     • sertraline (ZOLOFT) 100 MG Tab Take 1 Tab by mouth every day. 90 Tab 1   • potassium chloride (KLOR-CON) 8 MEQ tablet Take 1 Tab by mouth every day. 90 Tab 1   • omeprazole (PRILOSEC) 40 MG delayed-release capsule Take 1 Cap by mouth every day. 90 Cap 1   • metoprolol (LOPRESSOR) 50 MG Tab Take 1 Tab by mouth 2 times a day. 180 Tab 1   • furosemide (LASIX) 40 MG Tab Take 1 Tab by mouth every day. 90 Tab 1   • montelukast (SINGULAIR) 10 MG Tab Take 1 Tab by mouth every day. 90 Tab 1   • losartan (COZAAR) 25 MG Tab Take 1 Tab by mouth every day. 90 Tab 1   • levothyroxine (SYNTHROID) 25 MCG Tab Take 1 Tab by mouth Every morning on an empty stomach. 30 Tab 6     No current facility-administered medications for this visit.     She  has a past medical history of Asthma and Thyroid disease.    ROS   No chest pain, no shortness of breath, no abdominal pain and all other systems were reviewed and are negative.       Objective:     Blood pressure 128/86, pulse 68, temperature 36.8 °C (98.2 °F), resp. rate 16, height 1.651 m (5' 5\"), weight 139.254 kg (307 lb), SpO2 94 %. Body mass index is 51.09 kg/(m^2).   Physical Exam:  Constitutional: Alert, no distress.  Skin: Warm, dry, good turgor, no rashes in visible areas.  Eye: Equal, round and reactive, conjunctiva clear, lids normal.  ENMT: Lips without lesions, good dentition, oropharynx clear. Nasal passages appear clear.  Neck: Trachea midline, no masses.   Lymph: No cervical or supraclavicular lymphadenopathy  Respiratory: Unlabored respiratory effort, lungs clear to auscultation, no wheezes, no ronchi.  Cardiovascular: Normal S1, S2, no murmur, no edema.  Abdomen: Soft, non-tender, no masses.  Psych: Alert and oriented x3, normal affect and mood.        Assessment and Plan:   The following treatment plan was " discussed    1. Acute URI  Acute, new onset condition. Likely viral not bacterial process. Advised continue over-the-counter medications as directed. We will limit use of saline wash. Push fluids. Follow up with any worsening symptoms such as fevers.    2. Bilateral chronic knee pain  Follow-up with orthopedics. Provided prescription for Percocet 10 and tramadol to use as directed. Discussed with black box warning regarding alprazolam use. She is currently up-to-date on . Morphine milliequivalents is at 35.  - oxycodone-acetaminophen (PERCOCET-10)  MG Tab; Take 1 Tab by mouth 2 times a day as needed for Severe Pain for up to 30 days.  Dispense: 60 Tab; Refill: 0  - tramadol (ULTRAM) 50 MG Tab; Take 1 Tab by mouth every 8 hours as needed.  Dispense: 90 Tab; Refill: 0    3. Anxiety  Chronic condition and stable. Provided renewal of Xanax 0.5 mg as needed.    4. Nausea  Status post shingles. Provided refill of Alexa Terence 4 mg dispersed tablets as needed.  - ondansetron (ZOFRAN ODT) 4 MG TABLET DISPERSIBLE; Take 1 Tab by mouth every 8 hours as needed for Nausea/Vomiting.  Dispense: 10 Tab; Refill: 2    5. Preventative health care  Provided another copy of the mammogram order requisition.  - MA-SCREEN MAMMO W/CAD-BILAT      Followup: Return if symptoms worsen or fail to improve.    Please note that this dictation was created using voice recognition software. I have made every reasonable attempt to correct obvious errors, but I expect that there are errors of grammar and possibly content that I did not discover before finalizing the note.

## 2017-05-04 NOTE — ASSESSMENT & PLAN NOTE
This is a 61-year-old female who is here today to get medication refill but also discussed for the past week or longer she believes she may have a sinus infection. Complains of some right eye discharge. Redness. So she nasal congestion and drainage. Some chest congestion. Subjective fevers last week. Denies any current shortness of breath or chest pain. Using an over-the-counter nasal spray and has some burning of her nose. Taking Zyrtec daily.

## 2017-05-04 NOTE — TELEPHONE ENCOUNTER
Was the patient seen in the last year in this department? Yes     Does patient have an active prescription for medications requested? No     Received Request Via: Pharmacy     Last Visit: 4/7/17    Last Labs: 4/3/17

## 2017-05-04 NOTE — ASSESSMENT & PLAN NOTE
Also requesting a refill of alprazolam. She takes the medication as needed. Last prescription was written 2 months ago.

## 2017-05-04 NOTE — ASSESSMENT & PLAN NOTE
Also requesting a refill of antinausea medication because her postherpetic neuralgia accepted from time to time causing nausea.

## 2017-05-05 ENCOUNTER — TELEPHONE (OUTPATIENT)
Dept: ENDOCRINOLOGY | Facility: MEDICAL CENTER | Age: 62
End: 2017-05-05

## 2017-05-05 NOTE — TELEPHONE ENCOUNTER
Please inform patient that thyroid hormone levels are completely normal. Labs in March, April and May 2017 showed completely normal TSH. I would recommend to continue on the current regimen of levothyroxine, no changes to medications.    Results for CHUY CLAUDIO (MRN [id number]) as of 5/5/2017 07:44   Ref. Range 3/21/2017 04:55 4/3/2017 12:12 5/4/2017 11:04   TSH Latest Ref Range: 0.300-3.700 uIU/mL 1.900 1.250 0.850   Free T-4 Latest Ref Range: 0.53-1.43 ng/dL  1.03 0.91

## 2017-05-05 NOTE — TELEPHONE ENCOUNTER
Pt states she feels sleepy, more tired, hair is falling off, ankles are more swollen, getting harder to lose weight. Hardly eats anything. Pt states she will be having surgery for knee replacement but she first needs to lose weight. Please advise

## 2017-05-08 NOTE — TELEPHONE ENCOUNTER
Please inform patient of thyroid hormone levels are already towards the higher side, if TSH goes below 0.3 that would be hyperthyroidism, her TSH is 0.8. Normal range for TSH is 0.3-3.7. TSH higher than 3.7 is hypothyroidism. Thyroid hormone levels are optimal based on these labs. If she would like to discuss further I would be happy to see her in the clinic to discuss.

## 2017-05-19 ENCOUNTER — OFFICE VISIT (OUTPATIENT)
Dept: MEDICAL GROUP | Facility: MEDICAL CENTER | Age: 62
End: 2017-05-19
Payer: COMMERCIAL

## 2017-05-19 VITALS
HEIGHT: 65 IN | SYSTOLIC BLOOD PRESSURE: 116 MMHG | TEMPERATURE: 98.4 F | WEIGHT: 293 LBS | HEART RATE: 74 BPM | RESPIRATION RATE: 18 BRPM | OXYGEN SATURATION: 97 % | DIASTOLIC BLOOD PRESSURE: 78 MMHG | BODY MASS INDEX: 48.82 KG/M2

## 2017-05-19 DIAGNOSIS — L85.3 DRY SKIN: ICD-10-CM

## 2017-05-19 DIAGNOSIS — J01.00 ACUTE NON-RECURRENT MAXILLARY SINUSITIS: ICD-10-CM

## 2017-05-19 PROBLEM — J01.90 ACUTE NON-RECURRENT SINUSITIS: Status: ACTIVE | Noted: 2017-05-19

## 2017-05-19 PROBLEM — J06.9 ACUTE URI: Status: RESOLVED | Noted: 2017-05-04 | Resolved: 2017-05-19

## 2017-05-19 PROCEDURE — 99214 OFFICE O/P EST MOD 30 MIN: CPT | Performed by: PHYSICIAN ASSISTANT

## 2017-05-19 RX ORDER — AZITHROMYCIN 250 MG/1
TABLET, FILM COATED ORAL
Qty: 6 TAB | Refills: 0 | Status: SHIPPED | OUTPATIENT
Start: 2017-05-19 | End: 2017-06-02

## 2017-05-19 NOTE — ASSESSMENT & PLAN NOTE
This is a 61-year-old female comes in today complaining of continued sinus pressure and pain around the left axilla area. She complains also of left nostril issues. Remains of a cough and possible bronchitis. Denies any fevers. I saw her a little over a week ago with cold symptoms. She is unable to take prednisone because of side effects.

## 2017-05-19 NOTE — ASSESSMENT & PLAN NOTE
She complains of a recent history of developing blisters on her feet. She states that there are clear blisters that then pop and scab over. Denies any pain. Since moving from [place] she's had significant issues with dryness of her feet. Associated itching.

## 2017-05-19 NOTE — PROGRESS NOTES
Subjective:   Alfonso Posada is a 61 y.o. female here today for acute sinusitis for 10 days.    Acute non-recurrent sinusitis  This is a 61-year-old female comes in today complaining of continued sinus pressure and pain around the left axilla area. She complains also of left nostril issues. Remains of a cough and possible bronchitis. Denies any fevers. I saw her a little over a week ago with cold symptoms. She is unable to take prednisone because of side effects.    Dry skin  She complains of a recent history of developing blisters on her feet. She states that there are clear blisters that then pop and scab over. Denies any pain. Since moving from [place] she's had significant issues with dryness of her feet. Associated itching.       Current medicines (including changes today)  Current Outpatient Prescriptions   Medication Sig Dispense Refill   • azithromycin (ZITHROMAX) 250 MG Tab Take two tablets day one then one day 2-5. 6 Tab 0   • atorvastatin (LIPITOR) 40 MG Tab TAKE 1 TABLET(40 MG) BY MOUTH DAILY AT BEDTIME 90 Tab 0   • ondansetron (ZOFRAN ODT) 4 MG TABLET DISPERSIBLE Take 1 Tab by mouth every 8 hours as needed for Nausea/Vomiting. 10 Tab 2   • oxycodone-acetaminophen (PERCOCET-10)  MG Tab Take 1 Tab by mouth 2 times a day as needed for Severe Pain for up to 30 days. 60 Tab 0   • tramadol (ULTRAM) 50 MG Tab Take 1 Tab by mouth every 8 hours as needed. 90 Tab 0   • alprazolam (XANAX) 0.5 MG Tab Take 1 Tab by mouth 2 times a day as needed for Anxiety for up to 60 days. 60 Tab 0   • naproxen (NAPROSYN) 500 MG Tab Take 1 Tab by mouth 2 times a day, with meals. 60 Tab 2   • gabapentin (NEURONTIN) 400 MG Cap Take 600 mg by mouth 2 times a day.     • sertraline (ZOLOFT) 100 MG Tab Take 1 Tab by mouth every day. 90 Tab 1   • potassium chloride (KLOR-CON) 8 MEQ tablet Take 1 Tab by mouth every day. 90 Tab 1   • omeprazole (PRILOSEC) 40 MG delayed-release capsule Take 1 Cap by mouth every day. 90 Cap 1   •  "metoprolol (LOPRESSOR) 50 MG Tab Take 1 Tab by mouth 2 times a day. 180 Tab 1   • furosemide (LASIX) 40 MG Tab Take 1 Tab by mouth every day. 90 Tab 1   • montelukast (SINGULAIR) 10 MG Tab Take 1 Tab by mouth every day. 90 Tab 1   • losartan (COZAAR) 25 MG Tab Take 1 Tab by mouth every day. 90 Tab 1   • levothyroxine (SYNTHROID) 25 MCG Tab Take 1 Tab by mouth Every morning on an empty stomach. 30 Tab 6     No current facility-administered medications for this visit.     She  has a past medical history of Asthma and Thyroid disease.    ROS   No chest pain, no shortness of breath, no abdominal pain and all other systems were reviewed and are negative.       Objective:     Blood pressure 116/78, pulse 74, temperature 36.9 °C (98.4 °F), resp. rate 18, height 1.651 m (5' 5\"), weight 139.254 kg (307 lb), SpO2 97 %. Body mass index is 51.09 kg/(m^2).   Physical Exam:  Constitutional: Alert, no distress.  Skin: Warm, dry, good turgor, no rashes in visible areas. There are some scabbed lesions on the medial aspect of her car aspect of the left foot. It is also significant dryness.  Eye: Equal, round and reactive, conjunctiva clear, lids normal.  ENMT: Lips without lesions, good dentition, oropharynx clear. Nasal passages appear clear. Appears to be some possible scabbing along the turbinates on the left side. No sinus tenderness to palpation.  Neck: Trachea midline, no masses.   Lymph: No cervical or supraclavicular lymphadenopathy  Respiratory: Unlabored respiratory effort, lungs clear to auscultation, no wheezes, no ronchi.  Cardiovascular: Normal S1, S2, no murmur, no edema.  Abdomen: Soft, non-tender, no masses.  Psych: Alert and oriented x3, normal affect and mood.        Assessment and Plan:   The following treatment plan was discussed    1. Acute non-recurrent maxillary sinusitis  Acute, new onset condition. Discussed with likely viral process. Patient requesting antibiotic. We'll provide prescriptions for " azithromycin. Advised patient's for a bacterial infection viral. Advised to push fluids. Clear nose regularly. Expect symptoms to improve on their own. Follow up with any worsening symptoms such as fevers.  - azithromycin (ZITHROMAX) 250 MG Tab; Take two tablets day one then one day 2-5.  Dispense: 6 Tab; Refill: 0    2. Dry skin, bilateral feet  Acute, new onset condition. Discussed with applying Eucerin or Cetaphil liberally. Blisters may be secondary to dryness. I will see her in a couple weeks and follow-up.      Followup: Return if symptoms worsen or fail to improve.    Please note that this dictation was created using voice recognition software. I have made every reasonable attempt to correct obvious errors, but I expect that there are errors of grammar and possibly content that I did not discover before finalizing the note.

## 2017-06-02 ENCOUNTER — OFFICE VISIT (OUTPATIENT)
Dept: MEDICAL GROUP | Facility: MEDICAL CENTER | Age: 62
End: 2017-06-02
Payer: COMMERCIAL

## 2017-06-02 VITALS
OXYGEN SATURATION: 95 % | RESPIRATION RATE: 18 BRPM | TEMPERATURE: 98.4 F | HEIGHT: 65 IN | DIASTOLIC BLOOD PRESSURE: 70 MMHG | SYSTOLIC BLOOD PRESSURE: 140 MMHG | BODY MASS INDEX: 48.82 KG/M2 | HEART RATE: 67 BPM | WEIGHT: 293 LBS

## 2017-06-02 DIAGNOSIS — M19.90 ARTHRITIS: ICD-10-CM

## 2017-06-02 DIAGNOSIS — G89.29 CHRONIC PAIN IN RIGHT SHOULDER: ICD-10-CM

## 2017-06-02 DIAGNOSIS — M25.511 CHRONIC PAIN IN RIGHT SHOULDER: ICD-10-CM

## 2017-06-02 DIAGNOSIS — M25.562 BILATERAL CHRONIC KNEE PAIN: ICD-10-CM

## 2017-06-02 DIAGNOSIS — G89.29 BILATERAL CHRONIC KNEE PAIN: ICD-10-CM

## 2017-06-02 DIAGNOSIS — Z00.00 PREVENTATIVE HEALTH CARE: ICD-10-CM

## 2017-06-02 DIAGNOSIS — R11.0 NAUSEA: ICD-10-CM

## 2017-06-02 DIAGNOSIS — I10 ESSENTIAL HYPERTENSION: ICD-10-CM

## 2017-06-02 DIAGNOSIS — M25.561 BILATERAL CHRONIC KNEE PAIN: ICD-10-CM

## 2017-06-02 DIAGNOSIS — E89.0 POSTOPERATIVE HYPOTHYROIDISM: ICD-10-CM

## 2017-06-02 DIAGNOSIS — F41.9 ANXIETY: ICD-10-CM

## 2017-06-02 DIAGNOSIS — B02.29 POSTHERPETIC NEURALGIA: ICD-10-CM

## 2017-06-02 PROBLEM — M75.51 CHRONIC BURSITIS OF RIGHT SHOULDER: Status: ACTIVE | Noted: 2017-06-02

## 2017-06-02 PROCEDURE — 99214 OFFICE O/P EST MOD 30 MIN: CPT | Performed by: PHYSICIAN ASSISTANT

## 2017-06-02 RX ORDER — ALPRAZOLAM 0.5 MG/1
0.5 TABLET ORAL 2 TIMES DAILY PRN
Qty: 60 TAB | Refills: 0 | Status: SHIPPED | OUTPATIENT
Start: 2017-06-04 | End: 2017-08-03 | Stop reason: SDUPTHER

## 2017-06-02 RX ORDER — GABAPENTIN 400 MG/1
400 CAPSULE ORAL 3 TIMES DAILY
Qty: 90 CAP | Refills: 1 | Status: SHIPPED | OUTPATIENT
Start: 2017-06-02 | End: 2017-11-21 | Stop reason: SDUPTHER

## 2017-06-02 RX ORDER — ONDANSETRON 4 MG/1
4 TABLET, ORALLY DISINTEGRATING ORAL EVERY 8 HOURS PRN
Qty: 10 TAB | Refills: 2 | Status: SHIPPED | OUTPATIENT
Start: 2017-06-02 | End: 2017-07-21 | Stop reason: SDUPTHER

## 2017-06-02 RX ORDER — OXYCODONE AND ACETAMINOPHEN 10; 325 MG/1; MG/1
1 TABLET ORAL 2 TIMES DAILY PRN
Qty: 60 TAB | Refills: 0 | Status: SHIPPED | OUTPATIENT
Start: 2017-06-04 | End: 2017-07-03 | Stop reason: SDUPTHER

## 2017-06-02 RX ORDER — TRAMADOL HYDROCHLORIDE 50 MG/1
50 TABLET ORAL EVERY 8 HOURS PRN
Qty: 90 TAB | Refills: 0 | Status: SHIPPED | OUTPATIENT
Start: 2017-06-04 | End: 2017-07-03 | Stop reason: SDUPTHER

## 2017-06-02 NOTE — ASSESSMENT & PLAN NOTE
This is a 61-year-old female is here today to discuss her knee pain. She continues to follow with orthopedics. They will not perform any injections in her knees until July. She's been taking Percocet and tramadol as directed. Pain is still at times very severe. She has lost another 6 pounds and hopes to have eventually right knee surgery.

## 2017-06-02 NOTE — PROGRESS NOTES
Subjective:   Alfonso Posada is a 61 y.o. female here today for follow-up for several chronic medical conditions as well as a new one chronic pain of the of right shoulder.    Bilateral chronic knee pain  This is a 61-year-old female is here today to discuss her knee pain. She continues to follow with orthopedics. They will not perform any injections in her knees until July. She's been taking Percocet and tramadol as directed. Pain is still at times very severe. She has lost another 6 pounds and hopes to have eventually right knee surgery.    Arthritis  She complains of a history of arthritis. Pain is worse in the morning. Associated joint swelling. Her orthopedist this past month put on Celebrex. The first day of Celebrex she had side effects to stop the medication. She continues to take naproxen as directed.    Anxiety  Over the past few weeks anxiety as gotten worse. She's been taking more Xanax more often. There is an issue with a family member and his health. It's her brother. She continues taking sertraline 100 mg daily. Requesting refill of Xanax.    Postherpetic neuralgia  Continues with postherpetic neuralgia of the right hip. Takes gabapentin once or twice a day. Was on it before 3 times a day and it was more effective. Without the medication the pain is severe.    Nausea  Complains of intermittent nausea secondary to her pain of the hip and possibly medications. Requests refill Zofran.    Postoperative hypothyroidism  Thyroid is being followed by Dr. Kang. Serial TSH values are within normal limits. She continues taking levothyroxine daily. Complains of hair loss.    Essential hypertension  Taking her blood pressure medications as directed. Today her blood pressure was checked with a wrist cuff.    Chronic pain in right shoulder  Complains of a chronic history of right shoulder pain. She is unable to have full range of motion with her shoulder. There is a constant pain down the deltoid process.  Denies any injury.    [facility] health care  Requesting a referral to see a gynecologist in [place]. Has not yet performed her mammogram.         Current medicines (including changes today)  Current Outpatient Prescriptions   Medication Sig Dispense Refill   • [START ON 6/4/2017] oxycodone-acetaminophen (PERCOCET-10)  MG Tab Take 1 Tab by mouth 2 times a day as needed for Severe Pain for up to 30 days. 60 Tab 0   • [START ON 6/4/2017] alprazolam (XANAX) 0.5 MG Tab Take 1 Tab by mouth 2 times a day as needed for Anxiety for up to 60 days. 60 Tab 0   • [START ON 6/4/2017] tramadol (ULTRAM) 50 MG Tab Take 1 Tab by mouth every 8 hours as needed for up to 30 days. 90 Tab 0   • ondansetron (ZOFRAN ODT) 4 MG TABLET DISPERSIBLE Take 1 Tab by mouth every 8 hours as needed for Nausea/Vomiting. 10 Tab 2   • gabapentin (NEURONTIN) 400 MG Cap Take 1 Cap by mouth 3 times a day. 90 Cap 1   • atorvastatin (LIPITOR) 40 MG Tab TAKE 1 TABLET(40 MG) BY MOUTH DAILY AT BEDTIME 90 Tab 0   • naproxen (NAPROSYN) 500 MG Tab Take 1 Tab by mouth 2 times a day, with meals. 60 Tab 2   • sertraline (ZOLOFT) 100 MG Tab Take 1 Tab by mouth every day. 90 Tab 1   • potassium chloride (KLOR-CON) 8 MEQ tablet Take 1 Tab by mouth every day. 90 Tab 1   • omeprazole (PRILOSEC) 40 MG delayed-release capsule Take 1 Cap by mouth every day. 90 Cap 1   • metoprolol (LOPRESSOR) 50 MG Tab Take 1 Tab by mouth 2 times a day. 180 Tab 1   • furosemide (LASIX) 40 MG Tab Take 1 Tab by mouth every day. 90 Tab 1   • montelukast (SINGULAIR) 10 MG Tab Take 1 Tab by mouth every day. 90 Tab 1   • levothyroxine (SYNTHROID) 25 MCG Tab Take 1 Tab by mouth Every morning on an empty stomach. 30 Tab 6   • losartan (COZAAR) 25 MG Tab Take 1 Tab by mouth every day. 90 Tab 1     No current facility-administered medications for this visit.     She  has a past medical history of Asthma and Thyroid disease.    ROS   No chest pain, no shortness of breath, no  "abdominal pain and all other systems were reviewed and are negative.       Objective:     Blood pressure 140/70, pulse 67, temperature 36.9 °C (98.4 °F), resp. rate 18, height 1.651 m (5' 5\"), weight 136.896 kg (301 lb 12.8 oz), SpO2 95 %. Body mass index is 50.22 kg/(m^2).   Physical Exam:  Constitutional: Alert, no distress.  Skin: Warm, dry, good turgor, no rashes in visible areas.  Eye: Equal, round and reactive, conjunctiva clear, lids normal.  ENMT: Lips without lesions, good dentition, oropharynx clear.  Neck: Trachea midline, no masses.   Lymph: No cervical or supraclavicular lymphadenopathy  Respiratory: Unlabored respiratory effort, lungs clear to auscultation, no wheezes, no ronchi.  Cardiovascular: Normal S1, S2, no murmur, no edema.  Abdomen: Soft, non-tender, no masses.  Psych: Alert and oriented x3, normal affect and mood.        Assessment and Plan:   The following treatment plan was discussed    1. Bilateral chronic knee pain  Chronic condition. Continue follow-up with orthopedics. Renewed oxycodone and tramadol. Advised I would not increase the doses and if she needs increase medication she must see a pain management specialist. Also discussed with concern over black box warning concerning taking benzos and opioids together.  - oxycodone-acetaminophen (PERCOCET-10)  MG Tab; Take 1 Tab by mouth 2 times a day as needed for Severe Pain for up to 30 days.  Dispense: 60 Tab; Refill: 0  - tramadol (ULTRAM) 50 MG Tab; Take 1 Tab by mouth every 8 hours as needed for up to 30 days.  Dispense: 90 Tab; Refill: 0    2. Arthritis  Chronic condition. Advised to start Celebrex. Stop naproxen. See if this helps with her symptoms.    3. Anxiety  Chronic condition with recent exacerbation. Continue sertraline 100 mg daily. Renewed her prescription for alprazolam. In future if she is obtaining a thyroid anxiety medication such as benzos will increase the dose of the SSRI. Discussed with black box warning taking " Xanax and opioids together.  - alprazolam (XANAX) 0.5 MG Tab; Take 1 Tab by mouth 2 times a day as needed for Anxiety for up to 60 days.  Dispense: 60 Tab; Refill: 0    4. Nausea  Chronic condition likely secondary to multiple medications versus pain. Renewed Zofran.  - ondansetron (ZOFRAN ODT) 4 MG TABLET DISPERSIBLE; Take 1 Tab by mouth every 8 hours as needed for Nausea/Vomiting.  Dispense: 10 Tab; Refill: 2    5. Chronic pain in right shoulder  New condition of chronic nature. Refer to orthopedics for further evaluation.  - REFERRAL TO ORTHOPEDICS    6. Essential hypertension  Chronic condition. Continue blood pressure medication as directed. Will monitor BP in the future.    7. Postherpetic neuralgia  Chronic condition. Stable. Renewed gabapentin 400 mg 3 times a day.  - gabapentin (NEURONTIN) 400 MG Cap; Take 1 Cap by mouth 3 times a day.  Dispense: 90 Cap; Refill: 1    8. Postoperative hypothyroidism  Chronic condition. Followed by endocrinology. Continue levothyroxine.    9. Preventative health care  Referred to OB/GYN to establish care. Urged again to get mammogram.  - REFERRAL TO OB/GYN      Followup: Return if symptoms worsen or fail to improve.    Please note that this dictation was created using voice recognition software. I have made every reasonable attempt to correct obvious errors, but I expect that there are errors of grammar and possibly content that I did not discover before finalizing the note.

## 2017-06-02 NOTE — ASSESSMENT & PLAN NOTE
Taking her blood pressure medications as directed. Today her blood pressure was checked with a wrist cuff.

## 2017-06-02 NOTE — ASSESSMENT & PLAN NOTE
Complains of a chronic history of right shoulder pain. She is unable to have full range of motion with her shoulder. There is a constant pain down the deltoid process. Denies any injury.

## 2017-06-02 NOTE — MR AVS SNAPSHOT
"        Alfonso Posada   2017 10:40 AM   Office Visit   MRN: [id number]    Department:  [place] [address]   Dept Phone:  [phone]    Description:  Female : 1955   Provider:  Sim Brownlee PA-C           Reason for Visit     Follow-Up knee pain/ pain med refill    Referral Needed GYN/OB for yearly axam       Allergies as of 2017     Allergen Noted Reactions    Latex 2017   Swelling    Pcn [Penicillins] 2017       Hives on face     Sulfa Drugs 2017       \"some sulfa drugs- leg itchiness\"       You were diagnosed with     Bilateral chronic knee pain   [802645]       Preventative health care   [749040]       Chronic pain in right shoulder   [445864]       Arthritis   [092600]       Anxiety   [660099]       Nausea   [921752]       Essential hypertension   [0101443]       Postherpetic neuralgia   [905868]         Vital Signs     Blood Pressure Pulse Temperature Respirations Height Weight    140/70 mmHg 67 36.9 °C (98.4 °F) 18 1.651 m (5' 5\") 136.896 kg (301 lb 12.8 oz)    Body Mass Index Oxygen Saturation Smoking Status             50.22 kg/m2 95% Light Tobacco Smoker         Basic Information     Date Of Birth Sex Race Ethnicity Preferred Language    1955 Female White Non- English      Your appointments     2017 11:00 AM   Established Patient with Sim Brownlee PA-C   [facility]    [address]  [address]  [facility] [address] [phone]           You will be receiving a confirmation call a few days before your appointment from our automated call confirmation system.            Aug 29, 2017 11:20 AM   Established Patient with Marilia Kang M.D.   [facility] ([facility])    [address]  [facility] [address] [phone]           You will be receiving a confirmation call a few days before your appointment from our automated call confirmation " system.              Problem List              ICD-10-CM Priority Class Noted - Resolved    Morbid obesity with BMI of 45.0-49.9, adult (Formerly [facility]) E66.01, Z68.42   2/3/2017 - Present    Postoperative hypothyroidism E89.0   2/3/2017 - Present    Preventative health care Z00.00   2/3/2017 - Present    Anxiety F41.9   2/3/2017 - Present    Postherpetic neuralgia B02.29   2/3/2017 - Present    Bilateral chronic knee pain M25.561, M25.562, G89.29   2/3/2017 - Present    Arthritis M19.90   2/3/2017 - Present    Allergic rhinitis J30.9   2/3/2017 - Present    Essential hypertension I10   2/3/2017 - Present    Tobacco dependence F17.200   2/3/2017 - Present    Gastroesophageal reflux disease without esophagitis K21.9   3/3/2017 - Present    Nausea R11.0   4/3/2017 - Present    Acute non-recurrent sinusitis J01.90   5/19/2017 - Present    Dry skin L85.3   5/19/2017 - Present    Chronic pain in right shoulder M25.511, G89.29   6/2/2017 - Present      Health Maintenance        Date Due Completion Dates    IMM DTaP/Tdap/Td Vaccine (1 - Tdap) 11/11/1974 ---    IMM PNEUMOCOCCAL 19-64 (ADULT) MEDIUM RISK SERIES (1 of 1 - PPSV23) 11/11/1974 ---    PAP SMEAR 11/11/1976 ---    MAMMOGRAM 11/11/1995 ---    COLONOSCOPY 11/11/2005 ---    IMM ZOSTER VACCINE 11/11/2015 ---            Current Immunizations     No immunizations on file.      Below and/or attached are the medications your provider expects you to take. Review all of your home medications and newly ordered medications with your provider and/or pharmacist. Follow medication instructions as directed by your provider and/or pharmacist. Please keep your medication list with you and share with your provider. Update the information when medications are discontinued, doses are changed, or new medications (including over-the-counter products) are added; and carry medication information at all times in the event of emergency situations     Allergies:  LATEX - Swelling     PCN - (reactions  not documented)     SULFA DRUGS - (reactions not documented)               Medications  Valid as of: June 02, 2017 - 11:22 AM    Generic Name Brand Name Tablet Size Instructions for use    ALPRAZolam (Tab) XANAX 0.5 MG Take 1 Tab by mouth 2 times a day as needed for Anxiety for up to 60 days.        Atorvastatin Calcium (Tab) LIPITOR 40 MG TAKE 1 TABLET(40 MG) BY MOUTH DAILY AT BEDTIME        Furosemide (Tab) LASIX 40 MG Take 1 Tab by mouth every day.        Gabapentin (Cap) NEURONTIN 400 MG Take 1 Cap by mouth 3 times a day.        Levothyroxine Sodium (Tab) SYNTHROID 25 MCG Take 1 Tab by mouth Every morning on an empty stomach.        Losartan Potassium (Tab) COZAAR 25 MG Take 1 Tab by mouth every day.        Metoprolol Tartrate (Tab) LOPRESSOR 50 MG Take 1 Tab by mouth 2 times a day.        Montelukast Sodium (Tab) SINGULAIR 10 MG Take 1 Tab by mouth every day.        Naproxen (Tab) NAPROSYN 500 MG Take 1 Tab by mouth 2 times a day, with meals.        Omeprazole (CAPSULE DELAYED RELEASE) PRILOSEC 40 MG Take 1 Cap by mouth every day.        Ondansetron (TABLET DISPERSIBLE) ZOFRAN ODT 4 MG Take 1 Tab by mouth every 8 hours as needed for Nausea/Vomiting.        Oxycodone-Acetaminophen (Tab) PERCOCET-10  MG Take 1 Tab by mouth 2 times a day as needed for Severe Pain for up to 30 days.        Potassium Chloride (Tab CR) KLOR-CON 8 MEQ Take 1 Tab by mouth every day.        Sertraline HCl (Tab) ZOLOFT 100 MG Take 1 Tab by mouth every day.        TraMADol HCl (Tab) ULTRAM 50 MG Take 1 Tab by mouth every 8 hours as needed for up to 30 days.        .                 Medicines prescribed today were sent to:     [id number] - [place], [place] - [address] AT [facility]    [address] [address]    Phone: [phone] Fax: [phone]    Open 24 Hours?: Cindy    [id number] - [place] - [address]    [address] [place] [address]    Phone:  [phone] Fax: [phone]    Open 24 Hours?: No      Medication refill instructions:       If your prescription bottle indicates you have medication refills left, it is not necessary to call your provider’s office. Please contact your pharmacy and they will refill your medication.    If your prescription bottle indicates you do not have any refills left, you may request refills at any time through one of the following ways: The online [employer] system (except Urgent Care), by calling your provider’s office, or by asking your pharmacy to contact your provider’s office with a refill request. Medication refills are processed only during regular business hours and may not be available until the next business day. Your provider may request additional information or to have a follow-up visit with you prior to refilling your medication.   *Please Note: Medication refills are assigned a new Rx number when refilled electronically. Your pharmacy may indicate that no refills were authorized even though a new prescription for the same medication is available at the pharmacy. Please request the medicine by name with the pharmacy before contacting your provider for a refill.        Referral     A referral request has been sent to our patient care coordination department. Please allow 3-5 business days for us to process this request and contact you either by phone or mail. If you do not hear from us by the 5th business day, please call us at [phone].           [employer] Access Code: [email or web address]  Expires: 6/14/2017 12:51 PM    [employer]  A secure, online tool to manage your health information     [email or web address]’s [employer]® is a secure, online tool that connects you to your personalized health information from the privacy of your home -- day or night - making it very easy for you to manage your healthcare. Once the activation process is completed, you can even access your medical information using the [employer] merced, which is  available for free in the Apple Bo store or Google Play store.     [employer]rans provides the following levels of access (as shown below):   My Chart Features   [facility] Doctor [facility]  Specialists [facility]  Urgent  Care Non-[facility]  Primary Care  Doctor   Email your healthcare team securely and privately 24/7 X X X    Manage appointments: schedule your next appointment; view details of past/upcoming appointments X      Request prescription refills. X      View recent personal medical records, including lab and immunizations X X X X   View health record, including health history, allergies, medications X X X X   Read reports about your outpatient visits, procedures, consult and ER notes X X X X   See your discharge summary, which is a recap of your hospital and/or ER visit that includes your diagnosis, lab results, and care plan. X X       How to register for [employer]utrans:  1. Go to  [email or web address].  2. Click on the Sign Up Now box, which takes you to the New Member Sign Up page. You will need to provide the following information:  a. Enter your [employer] Access Code exactly as it appears at the top of this page. (You will not need to use this code after you’ve completed the sign-up process. If you do not sign up before the expiration date, you must request a new code.)   b. Enter your date of birth.   c. Enter your home email address.   d. Click Submit, and follow the next screen’s instructions.  3. Create a orangutrans ID. This will be your or[employer]rans login ID and cannot be changed, so think of one that is secure and easy to remember.  4. Create a [employer]rans password. You can change your password at any time.  5. Enter your Password Reset Question and Answer. This can be used at a later time if you forget your password.   6. Enter your e-mail address. This allows you to receive e-mail notifications when new information is available in orangutrans.  7. Click Sign Up. You can now view your health information.    For  assistance activating your [employer] account, call [phone]        Quit Tobacco Information     Do you want to quit using tobacco?    Quitting tobacco decreases risks of cancer, heart and lung disease, increases life expectancy, improves sense of taste and smell, and increases spending money, among other benefits.    If you are thinking about quitting, we can help.  • [facility] Quit Tobacco Program: 688.363.3496  o Program occurs weekly for four weeks and includes pharmacist consultation on products to support quitting smoking or chewing tobacco. A provider referral is needed for pharmacist consultation.  • Tobacco Users Help Hotline: 7-800QUIT-NOW (569-9888) or https://[place].quitlogix.org/  o Free, confidential telephone and online coaching for [place] residents. Sessions are designed on a schedule that is convenient for you. Eligible clients receive free nicotine replacement therapy.  • Nationally: www.smokefree.gov  o Information and professional assistance to support both immediate and long-term needs as you become, and remain, a non-smoker. Smokefree.gov allows you to choose the help that best fits your needs.

## 2017-06-02 NOTE — ASSESSMENT & PLAN NOTE
Thyroid is being followed by Dr. Kang. Serial TSH values are within normal limits. She continues taking levothyroxine daily. Complains of hair loss.

## 2017-06-02 NOTE — ASSESSMENT & PLAN NOTE
Requesting a referral to see a gynecologist in [place] or close to [place]. Has not yet performed her mammogram.

## 2017-06-02 NOTE — ASSESSMENT & PLAN NOTE
Complains of intermittent nausea secondary to her pain of the hip and possibly medications. Requests refill Zofran.

## 2017-06-02 NOTE — ASSESSMENT & PLAN NOTE
She complains of a history of arthritis. Pain is worse in the morning. Associated joint swelling. Her orthopedist this past month put on Celebrex. The first day of Celebrex she had side effects to stop the medication. She continues to take naproxen as directed.

## 2017-06-02 NOTE — ASSESSMENT & PLAN NOTE
Continues with postherpetic neuralgia of the right hip. Takes gabapentin once or twice a day. Was on it before 3 times a day and it was more effective. Without the medication the pain is severe.

## 2017-06-02 NOTE — ASSESSMENT & PLAN NOTE
Over the past few weeks anxiety as gotten worse. She's been taking more Xanax more often. There is an issue with a family member and his health. It's her brother. She continues taking sertraline 100 mg daily. Requesting refill of Xanax.

## 2017-06-05 ENCOUNTER — APPOINTMENT (OUTPATIENT)
Dept: MEDICAL GROUP | Facility: MEDICAL CENTER | Age: 62
End: 2017-06-05
Payer: COMMERCIAL

## 2017-07-03 ENCOUNTER — HOSPITAL ENCOUNTER (OUTPATIENT)
Dept: LAB | Facility: MEDICAL CENTER | Age: 62
End: 2017-07-03
Attending: INTERNAL MEDICINE
Payer: COMMERCIAL

## 2017-07-03 ENCOUNTER — OFFICE VISIT (OUTPATIENT)
Dept: MEDICAL GROUP | Facility: MEDICAL CENTER | Age: 62
End: 2017-07-03
Payer: COMMERCIAL

## 2017-07-03 VITALS
RESPIRATION RATE: 16 BRPM | SYSTOLIC BLOOD PRESSURE: 132 MMHG | DIASTOLIC BLOOD PRESSURE: 70 MMHG | TEMPERATURE: 97.9 F | HEART RATE: 70 BPM | BODY MASS INDEX: 48.82 KG/M2 | HEIGHT: 65 IN | WEIGHT: 293 LBS | OXYGEN SATURATION: 96 %

## 2017-07-03 DIAGNOSIS — M25.562 BILATERAL CHRONIC KNEE PAIN: ICD-10-CM

## 2017-07-03 DIAGNOSIS — G89.29 CHRONIC PAIN IN RIGHT SHOULDER: ICD-10-CM

## 2017-07-03 DIAGNOSIS — M25.561 BILATERAL CHRONIC KNEE PAIN: ICD-10-CM

## 2017-07-03 DIAGNOSIS — G89.29 BILATERAL CHRONIC KNEE PAIN: ICD-10-CM

## 2017-07-03 DIAGNOSIS — M25.511 CHRONIC PAIN IN RIGHT SHOULDER: ICD-10-CM

## 2017-07-03 DIAGNOSIS — Z00.00 PREVENTATIVE HEALTH CARE: ICD-10-CM

## 2017-07-03 DIAGNOSIS — E66.01 MORBID OBESITY WITH BMI OF 45.0-49.9, ADULT (HCC): ICD-10-CM

## 2017-07-03 DIAGNOSIS — E89.0 POSTSURGICAL HYPOTHYROIDISM: ICD-10-CM

## 2017-07-03 DIAGNOSIS — L85.3 DRY SKIN: ICD-10-CM

## 2017-07-03 LAB
T4 FREE SERPL-MCNC: 1.2 NG/DL (ref 0.53–1.43)
TSH SERPL DL<=0.005 MIU/L-ACNC: 0.28 UIU/ML (ref 0.3–3.7)

## 2017-07-03 PROCEDURE — 84443 ASSAY THYROID STIM HORMONE: CPT

## 2017-07-03 PROCEDURE — 84439 ASSAY OF FREE THYROXINE: CPT

## 2017-07-03 PROCEDURE — 36415 COLL VENOUS BLD VENIPUNCTURE: CPT

## 2017-07-03 PROCEDURE — 99214 OFFICE O/P EST MOD 30 MIN: CPT | Performed by: PHYSICIAN ASSISTANT

## 2017-07-03 RX ORDER — OXYCODONE AND ACETAMINOPHEN 10; 325 MG/1; MG/1
1 TABLET ORAL 2 TIMES DAILY PRN
Qty: 60 TAB | Refills: 0 | Status: SHIPPED | OUTPATIENT
Start: 2017-07-03 | End: 2017-08-03 | Stop reason: SDUPTHER

## 2017-07-03 RX ORDER — TRIAMCINOLONE ACETONIDE 1 MG/G
1 CREAM TOPICAL 2 TIMES DAILY
Qty: 1 TUBE | Refills: 3 | Status: SHIPPED | OUTPATIENT
Start: 2017-07-03 | End: 2017-10-26 | Stop reason: SDUPTHER

## 2017-07-03 RX ORDER — TRAMADOL HYDROCHLORIDE 50 MG/1
50 TABLET ORAL EVERY 8 HOURS PRN
Qty: 90 TAB | Refills: 0 | Status: SHIPPED | OUTPATIENT
Start: 2017-07-03 | End: 2017-08-03 | Stop reason: SDUPTHER

## 2017-07-03 RX ORDER — MELOXICAM 7.5 MG/1
7.5 TABLET ORAL DAILY
Qty: 30 TAB | Refills: 0 | Status: SHIPPED | OUTPATIENT
Start: 2017-07-03 | End: 2017-07-03 | Stop reason: SDUPTHER

## 2017-07-03 RX ORDER — MELOXICAM 7.5 MG/1
7.5 TABLET ORAL DAILY
Qty: 30 TAB | Refills: 0 | Status: SHIPPED | OUTPATIENT
Start: 2017-07-03 | End: 2017-08-25 | Stop reason: SDUPTHER

## 2017-07-03 ASSESSMENT — PAIN SCALES - GENERAL: PAINLEVEL: 7=MODERATE-SEVERE PAIN

## 2017-07-03 NOTE — PROGRESS NOTES
Subjective:   Alfonso Posada is a 61 y.o. female here today for med refill.    Chronic pain in right shoulder  This is a 61-year-old female who comes in today to get her medications renewed. Her right shoulder has improved after seeing orthopedist. She had an injection in the shoulder. Range of motion is improved. She does have slight pain on the biceps aspect.    Bilateral chronic knee pain  Has continued to lose weight. She complains of continued knee pain. Bilaterally She tried Celebrex but the medication caused side effects. She wants to be placed on indomethacin. Naproxen is somewhat helpful.    Dry skin  Had an area of a [place] on the left foot that she thought was ringworm. She treated it with triamcinolone. It improved. Crusting renewal of the medication.    Morbid obesity with BMI of 45.0-49.9, adult ([facility])  Currently improved to a body mass index of 49.26.    [facility]  Has not yet had her mammogram. I ordered it initially in May.       Current medicines (including changes today)  Current Outpatient Prescriptions   Medication Sig Dispense Refill   • oxycodone-acetaminophen (PERCOCET-10)  MG Tab Take 1 Tab by mouth 2 times a day as needed for Severe Pain for up to 30 days. 60 Tab 0   • triamcinolone acetonide (KENALOG) 0.1 % Cream Apply 1 Application to affected area(s) 2 times a day. 1 Tube 3   • tramadol (ULTRAM) 50 MG Tab Take 1 Tab by mouth every 8 hours as needed for up to 30 days. 90 Tab 0   • meloxicam (MOBIC) 7.5 MG Tab Take 1 Tab by mouth every day. 30 Tab 0   • alprazolam (XANAX) 0.5 MG Tab Take 1 Tab by mouth 2 times a day as needed for Anxiety for up to 60 days. 60 Tab 0   • ondansetron (ZOFRAN ODT) 4 MG TABLET DISPERSIBLE Take 1 Tab by mouth every 8 hours as needed for Nausea/Vomiting. 10 Tab 2   • gabapentin (NEURONTIN) 400 MG Cap Take 1 Cap by mouth 3 times a day. 90 Cap 1   • atorvastatin (LIPITOR) 40 MG Tab TAKE 1 TABLET(40 MG) BY MOUTH DAILY AT BEDTIME 90 Tab 0  "  • naproxen (NAPROSYN) 500 MG Tab Take 1 Tab by mouth 2 times a day, with meals. 60 Tab 2   • sertraline (ZOLOFT) 100 MG Tab Take 1 Tab by mouth every day. 90 Tab 1   • potassium chloride (KLOR-CON) 8 MEQ tablet Take 1 Tab by mouth every day. 90 Tab 1   • omeprazole (PRILOSEC) 40 MG delayed-release capsule Take 1 Cap by mouth every day. 90 Cap 1   • metoprolol (LOPRESSOR) 50 MG Tab Take 1 Tab by mouth 2 times a day. 180 Tab 1   • furosemide (LASIX) 40 MG Tab Take 1 Tab by mouth every day. 90 Tab 1   • montelukast (SINGULAIR) 10 MG Tab Take 1 Tab by mouth every day. 90 Tab 1   • losartan (COZAAR) 25 MG Tab Take 1 Tab by mouth every day. 90 Tab 1   • levothyroxine (SYNTHROID) 25 MCG Tab Take 1 Tab by mouth Every morning on an empty stomach. 30 Tab 6     No current facility-administered medications for this visit.     She  has a past medical history of Asthma and Thyroid disease.    ROS   No chest pain, no shortness of breath, no abdominal pain and all other systems were reviewed and are negative.       Objective:     Pulse 70, temperature 36.6 °C (97.9 °F), resp. rate 16, height 1.651 m (5' 5\"), weight 134.265 kg (296 lb), SpO2 96 %. Body mass index is 49.26 kg/(m^2).   Physical Exam:  Constitutional: Alert, no distress.  Skin: Warm, dry, good turgor, no rashes in visible areas. Feet appear significantly with dry skin.  Eye: Equal, round and reactive, conjunctiva clear, lids normal.  ENMT: Lips without lesions, good dentition, oropharynx clear.  Neck: Trachea midline, no masses.   Lymph: No cervical or supraclavicular lymphadenopathy  Respiratory: Unlabored respiratory effort, lungs appear clear, no wheezes.  Cardiovascular: Regular rate and rhythm, no edema.  Psych: Alert and oriented x3, normal affect and mood.        Assessment and Plan:   The following treatment plan was discussed    1. Chronic pain in right shoulder  Chronic condition and improved status post injection. Follow-up with orthopedics.    2. " Bilateral chronic knee pain  Chronic condition. Continue lose weight. Renewed Percocet and tramadol. Provided mode back as a trial. Advised not to take naproxen. Continue GERD medication daily.  - oxycodone-acetaminophen (PERCOCET-10)  MG Tab; Take 1 Tab by mouth 2 times a day as needed for Severe Pain for up to 30 days.  Dispense: 60 Tab; Refill: 0  - tramadol (ULTRAM) 50 MG Tab; Take 1 Tab by mouth every 8 hours as needed for up to 30 days.  Dispense: 90 Tab; Refill: 0  - meloxicam (MOBIC) 7.5 MG Tab; Take 1 Tab by mouth every day.  Dispense: 30 Tab; Refill: 0    3. Dry skin  Chronic condition with recent issue appearing not to be a fungal infection. Provided prescription for triamcinolone 0.1% cream. Apply as directed.    4. Morbid obesity with BMI of 45.0-49.9, adult ([facility])  Chronic condition. BMI below 50. Changed BMI status. Continue to work at it.    5. Preventative health care  Referred to gynecology for follow-up status post her hysterectomy. Also provided handout on mammogram information. Contact them for an appointment.  - REFERRAL TO GYNECOLOGY      Followup: Return in about 1 month (around 8/3/2017), or if symptoms worsen or fail to improve.    Please note that this dictation was created using voice recognition software. I have made every reasonable attempt to correct obvious errors, but I expect that there are errors of grammar and possibly content that I did not discover before finalizing the note.

## 2017-07-03 NOTE — ASSESSMENT & PLAN NOTE
This is a 61-year-old female who comes in today to get her medications renewed. Her right shoulder has improved after seeing orthopedist. She had an injection in the shoulder. Range of motion is improved. She does have slight pain on the biceps aspect.

## 2017-07-03 NOTE — ASSESSMENT & PLAN NOTE
Had an area of a [place] on the left foot that she thought was ringworm. She treated it with triamcinolone. It improved. Crusting renewal of the medication.

## 2017-07-03 NOTE — ASSESSMENT & PLAN NOTE
Has continued to lose weight. She complains of continued knee pain. Bilaterally She tried Celebrex but the medication caused side effects. She wants to be placed on indomethacin. Naproxen is somewhat helpful.

## 2017-07-07 ENCOUNTER — TELEPHONE (OUTPATIENT)
Dept: ENDOCRINOLOGY | Facility: MEDICAL CENTER | Age: 62
End: 2017-07-07

## 2017-07-07 NOTE — TELEPHONE ENCOUNTER
Called Pt and Lm to call us back    Thyroid hormone levels are slightly on the high side, I would recommend to repeat thyroid hormone levels just before next appointment.    Thank you  Gloria

## 2017-07-23 RX ORDER — ONDANSETRON 4 MG/1
TABLET, ORALLY DISINTEGRATING ORAL
Qty: 10 TAB | Refills: 0 | Status: SHIPPED | OUTPATIENT
Start: 2017-07-23 | End: 2017-08-01 | Stop reason: SDUPTHER

## 2017-07-23 RX ORDER — LOSARTAN POTASSIUM 25 MG/1
TABLET ORAL
Qty: 90 TAB | Refills: 0 | Status: SHIPPED | OUTPATIENT
Start: 2017-07-23 | End: 2017-11-04 | Stop reason: SDUPTHER

## 2017-08-01 RX ORDER — ONDANSETRON 4 MG/1
TABLET, ORALLY DISINTEGRATING ORAL
Qty: 10 TAB | Refills: 0 | Status: SHIPPED | OUTPATIENT
Start: 2017-08-01 | End: 2017-08-16 | Stop reason: SDUPTHER

## 2017-08-03 ENCOUNTER — HOSPITAL ENCOUNTER (OUTPATIENT)
Dept: LAB | Facility: MEDICAL CENTER | Age: 62
End: 2017-08-03
Attending: INTERNAL MEDICINE
Payer: COMMERCIAL

## 2017-08-03 ENCOUNTER — OFFICE VISIT (OUTPATIENT)
Dept: MEDICAL GROUP | Facility: MEDICAL CENTER | Age: 62
End: 2017-08-03
Payer: COMMERCIAL

## 2017-08-03 VITALS
OXYGEN SATURATION: 95 % | TEMPERATURE: 97.4 F | HEART RATE: 70 BPM | HEIGHT: 65 IN | WEIGHT: 293 LBS | BODY MASS INDEX: 48.82 KG/M2 | DIASTOLIC BLOOD PRESSURE: 78 MMHG | RESPIRATION RATE: 16 BRPM | SYSTOLIC BLOOD PRESSURE: 118 MMHG

## 2017-08-03 DIAGNOSIS — F41.9 ANXIETY: ICD-10-CM

## 2017-08-03 DIAGNOSIS — G89.29 BILATERAL CHRONIC KNEE PAIN: ICD-10-CM

## 2017-08-03 DIAGNOSIS — E89.0 POSTSURGICAL HYPOTHYROIDISM: ICD-10-CM

## 2017-08-03 DIAGNOSIS — M25.562 BILATERAL CHRONIC KNEE PAIN: ICD-10-CM

## 2017-08-03 DIAGNOSIS — M25.561 BILATERAL CHRONIC KNEE PAIN: ICD-10-CM

## 2017-08-03 DIAGNOSIS — E66.01 MORBID OBESITY WITH BMI OF 50.0-59.9, ADULT (HCC): ICD-10-CM

## 2017-08-03 DIAGNOSIS — J01.01 ACUTE RECURRENT MAXILLARY SINUSITIS: ICD-10-CM

## 2017-08-03 DIAGNOSIS — B02.29 POSTHERPETIC NEURALGIA: ICD-10-CM

## 2017-08-03 PROBLEM — J01.90 ACUTE NON-RECURRENT SINUSITIS: Status: RESOLVED | Noted: 2017-05-19 | Resolved: 2017-08-03

## 2017-08-03 LAB
T4 FREE SERPL-MCNC: 0.88 NG/DL (ref 0.53–1.43)
TSH SERPL DL<=0.005 MIU/L-ACNC: 0.89 UIU/ML (ref 0.3–3.7)

## 2017-08-03 PROCEDURE — 84443 ASSAY THYROID STIM HORMONE: CPT

## 2017-08-03 PROCEDURE — 36415 COLL VENOUS BLD VENIPUNCTURE: CPT

## 2017-08-03 PROCEDURE — 84439 ASSAY OF FREE THYROXINE: CPT

## 2017-08-03 PROCEDURE — 99214 OFFICE O/P EST MOD 30 MIN: CPT | Performed by: PHYSICIAN ASSISTANT

## 2017-08-03 RX ORDER — TRAMADOL HYDROCHLORIDE 50 MG/1
50 TABLET ORAL EVERY 8 HOURS PRN
Qty: 90 TAB | Refills: 0 | Status: SHIPPED | OUTPATIENT
Start: 2017-08-03 | End: 2017-10-26 | Stop reason: SDUPTHER

## 2017-08-03 RX ORDER — OXYCODONE AND ACETAMINOPHEN 10; 325 MG/1; MG/1
1 TABLET ORAL 2 TIMES DAILY PRN
Qty: 60 TAB | Refills: 0 | Status: SHIPPED | OUTPATIENT
Start: 2017-08-03 | End: 2017-08-25 | Stop reason: SDUPTHER

## 2017-08-03 RX ORDER — ALPRAZOLAM 0.5 MG/1
0.5 TABLET ORAL 2 TIMES DAILY PRN
Qty: 60 TAB | Refills: 0 | Status: SHIPPED | OUTPATIENT
Start: 2017-08-03 | End: 2017-09-29 | Stop reason: SDUPTHER

## 2017-08-03 RX ORDER — OXYCODONE AND ACETAMINOPHEN 10; 325 MG/1; MG/1
1 TABLET ORAL 2 TIMES DAILY PRN
Qty: 60 TAB | Refills: 0 | Status: SHIPPED | OUTPATIENT
Start: 2017-08-03 | End: 2017-08-03 | Stop reason: SDUPTHER

## 2017-08-03 RX ORDER — AZITHROMYCIN 250 MG/1
TABLET, FILM COATED ORAL
Qty: 6 TAB | Refills: 0 | Status: SHIPPED | OUTPATIENT
Start: 2017-08-03 | End: 2017-08-25

## 2017-08-03 NOTE — ASSESSMENT & PLAN NOTE
This is a 61-year-old female who is here today to follow-up with the status of her chronic bilateral knee pain. Orthopedics injected glucose steroid injections into both of her knees. She states she has better range of motion with her knees. She is actually able to bend her right knee. She is requesting refills of tramadol and Percocet.

## 2017-08-03 NOTE — ASSESSMENT & PLAN NOTE
She complains of a 3 day history of burning nasal passages especially on the right side. Associated nasal congestion and drainage. Denies fevers. She is taking over-the-counter allergy medication. She stopped any nasal saline spray from previously. She denies any fevers. She is going to a wedding around [place] in a few days.

## 2017-08-03 NOTE — PROGRESS NOTES
Subjective:   Alfonso Posada is a 61 y.o. female here today for follow-up on her chronic knee pain.    Bilateral chronic knee pain  This is a 61-year-old female who is here today to follow-up with the status of her chronic bilateral knee pain. Orthopedics injected glucose steroid injections into both of her knees. She states she has better range of motion with her knees. She is actually able to bend her right knee. She is requesting refills of tramadol and Percocet.    Acute recurrent maxillary sinusitis  She complains of a 3 day history of burning nasal passages especially on the right side. Associated nasal congestion and drainage. Denies fevers. She is taking over-the-counter allergy medication. She stopped any nasal saline spray from previously. She denies any fevers. She is going to a wedding around [place] in a few days.    Morbid obesity with BMI of 50.0-59.9, adult (CMS-HCC)  According to our records she has gained 8 pounds since her last visit. She believes the increase is secondary to her steroid knee injections.    Postherpetic neuralgia  She complains of continued intermittent pain of her right abdomen and hip area secondary to postherpetic neuralgia. Sometimes the numbness and burning sensation will cause nausea. She's currently gabapentin.    Anxiety  Requesting refill of her anxiety medication.         Current medicines (including changes today)  Current Outpatient Prescriptions   Medication Sig Dispense Refill   • oxycodone-acetaminophen (PERCOCET-10)  MG Tab Take 1 Tab by mouth 2 times a day as needed for Severe Pain for up to 30 days. 60 Tab 0   • alprazolam (XANAX) 0.5 MG Tab Take 1 Tab by mouth 2 times a day as needed for Anxiety for up to 60 days. 60 Tab 0   • tramadol (ULTRAM) 50 MG Tab Take 1 Tab by mouth every 8 hours as needed for up to 30 days. 90 Tab 0   • azithromycin (ZITHROMAX) 250 MG Tab Take two tablets day one then one tablet day 2-5. 6 Tab 0   • ondansetron (ZOFRAN ODT)  "4 MG TABLET DISPERSIBLE DISSOLVE ONE TABLET BY MOUTH EVERY 8 HOURS AS NEEDED FOR NAUSEA AND VOMITING 10 Tab 0   • losartan (COZAAR) 25 MG Tab TAKE 1 TABLET BY MOUTH EVERY DAY 90 Tab 0   • triamcinolone acetonide (KENALOG) 0.1 % Cream Apply 1 Application to affected area(s) 2 times a day. 1 Tube 3   • meloxicam (MOBIC) 7.5 MG Tab Take 1 Tab by mouth every day. 30 Tab 0   • gabapentin (NEURONTIN) 400 MG Cap Take 1 Cap by mouth 3 times a day. 90 Cap 1   • atorvastatin (LIPITOR) 40 MG Tab TAKE 1 TABLET(40 MG) BY MOUTH DAILY AT BEDTIME 90 Tab 0   • naproxen (NAPROSYN) 500 MG Tab Take 1 Tab by mouth 2 times a day, with meals. 60 Tab 2   • sertraline (ZOLOFT) 100 MG Tab Take 1 Tab by mouth every day. 90 Tab 1   • potassium chloride (KLOR-CON) 8 MEQ tablet Take 1 Tab by mouth every day. 90 Tab 1   • omeprazole (PRILOSEC) 40 MG delayed-release capsule Take 1 Cap by mouth every day. 90 Cap 1   • metoprolol (LOPRESSOR) 50 MG Tab Take 1 Tab by mouth 2 times a day. 180 Tab 1   • furosemide (LASIX) 40 MG Tab Take 1 Tab by mouth every day. 90 Tab 1   • montelukast (SINGULAIR) 10 MG Tab Take 1 Tab by mouth every day. 90 Tab 1   • levothyroxine (SYNTHROID) 25 MCG Tab Take 1 Tab by mouth Every morning on an empty stomach. 30 Tab 6     No current facility-administered medications for this visit.     She  has a past medical history of Asthma and Thyroid disease.    ROS   No chest pain, no shortness of breath, no abdominal pain and all other systems were reviewed and are negative.       Objective:     Blood pressure 118/78, pulse 70, temperature 36.3 °C (97.4 °F), resp. rate 16, height 1.651 m (5' 5\"), weight 138.075 kg (304 lb 6.4 oz), SpO2 95 %. Body mass index is 50.65 kg/(m^2).   Physical Exam:  Constitutional: Alert, no distress.  Skin: Warm, dry, good turgor, no rashes in visible areas.  Eye: Equal, round and reactive, conjunctiva clear, lids normal.  ENMT: Lips without lesions, good dentition, oropharynx clear. TMs bilaterally " are clear. Nasal passages significant erythematous turbinates on the right side.  Neck: Trachea midline, no masses.   Lymph: No cervical or supraclavicular lymphadenopathy  Respiratory: Unlabored respiratory effort, lungs appear clear, no wheezes.  Cardiovascular: Regular rate and rhythm.  Abdomen: Soft, non-tender, no masses.  Psych: Alert and oriented x3, normal affect and mood.        Assessment and Plan:   The following treatment plan was discussed    1. Bilateral chronic knee pain  Chronic condition. Injections of the knees appear to be working with some success. Provided prescription of tramadol and Percocet as directed. Follow-up with ortho.  - oxycodone-acetaminophen (PERCOCET-10)  MG Tab; Take 1 Tab by mouth 2 times a day as needed for Severe Pain for up to 30 days.  Dispense: 60 Tab; Refill: 0  - tramadol (ULTRAM) 50 MG Tab; Take 1 Tab by mouth every 8 hours as needed for up to 30 days.  Dispense: 90 Tab; Refill: 0    2. Acute recurrent maxillary sinusitis  Acute, new onset condition. Discussed with likely viral process. Given the significant of erythema in her nose will provide azithromycin as directed.  - azithromycin (ZITHROMAX) 250 MG Tab; Take two tablets day one then one tablet day 2-5.  Dispense: 6 Tab; Refill: 0    3. Morbid obesity with BMI of 50.0-59.9, adult (CMS-HCC)  Recent weight gain increased above 54 BMI. We'll see as the steroid results out of her system how she will lose the weight.    4. Postherpetic neuralgia  Chronic condition. Continue gabapentin. Continue pain management medication as directed. In the future may refer to neurology but at this point will wait.    5. Anxiety  Chronic condition is stable. Renewed alprazolam as directed.  - alprazolam (XANAX) 0.5 MG Tab; Take 1 Tab by mouth 2 times a day as needed for Anxiety for up to 60 days.  Dispense: 60 Tab; Refill: 0      Followup: Return in about 4 weeks (around 8/31/2017), or if symptoms worsen or fail to  improve.    Please note that this dictation was created using voice recognition software. I have made every reasonable attempt to correct obvious errors, but I expect that there are errors of grammar and possibly content that I did not discover before finalizing the note.

## 2017-08-03 NOTE — ASSESSMENT & PLAN NOTE
She complains of continued intermittent pain of her right abdomen and hip area secondary to postherpetic neuralgia. Sometimes the numbness and burning sensation will cause nausea. She's currently gabapentin.

## 2017-08-03 NOTE — ASSESSMENT & PLAN NOTE
According to our records she has gained 8 pounds since her last visit. She believes the increase is secondary to her steroid knee injections.

## 2017-08-04 ENCOUNTER — TELEPHONE (OUTPATIENT)
Dept: ENDOCRINOLOGY | Facility: MEDICAL CENTER | Age: 62
End: 2017-08-04

## 2017-08-04 NOTE — TELEPHONE ENCOUNTER
Please inform patient of thyroid hormone levels are completely normal. No change to medications. I ordered thyroid ultrasound when I saw her in the clinic, I do not see the ultrasound results, has she completed the thyroid ultrasound?      Results for CHUY CLAUDIO (MRN [id number]) as of 8/4/2017 12:57   Ref. Range 7/3/2017 12:22 8/3/2017 15:06   TSH Latest Ref Range: 0.300-3.700 uIU/mL 0.280 (L) 0.890   Free T-4 Latest Ref Range: 0.53-1.43 ng/dL 1.20 0.88

## 2017-08-16 RX ORDER — ONDANSETRON 4 MG/1
TABLET, ORALLY DISINTEGRATING ORAL
Qty: 10 TAB | Refills: 0 | Status: SHIPPED | OUTPATIENT
Start: 2017-08-16 | End: 2017-08-25 | Stop reason: SDUPTHER

## 2017-08-16 NOTE — TELEPHONE ENCOUNTER
Was the patient seen in the last year in this department? Yes     Does patient have an active prescription for medications requested? Yes     Received Request Via: Pharmacy     Last office visit: 08/03/2017  Last labs: 08/03/2017

## 2017-08-17 RX ORDER — FUROSEMIDE 40 MG/1
40 TABLET ORAL DAILY
Qty: 90 TAB | Refills: 0 | Status: SHIPPED | OUTPATIENT
Start: 2017-08-17 | End: 2017-11-04 | Stop reason: SDUPTHER

## 2017-08-17 RX ORDER — ATORVASTATIN CALCIUM 40 MG/1
40 TABLET, FILM COATED ORAL DAILY
Qty: 90 TAB | Refills: 0 | Status: SHIPPED | OUTPATIENT
Start: 2017-08-17 | End: 2017-11-04 | Stop reason: SDUPTHER

## 2017-08-17 RX ORDER — METOPROLOL TARTRATE 50 MG/1
50 TABLET, FILM COATED ORAL 2 TIMES DAILY
Qty: 180 TAB | Refills: 0 | Status: SHIPPED | OUTPATIENT
Start: 2017-08-17 | End: 2017-11-14 | Stop reason: SDUPTHER

## 2017-08-25 ENCOUNTER — OFFICE VISIT (OUTPATIENT)
Dept: MEDICAL GROUP | Facility: MEDICAL CENTER | Age: 62
End: 2017-08-25
Payer: COMMERCIAL

## 2017-08-25 VITALS
SYSTOLIC BLOOD PRESSURE: 118 MMHG | RESPIRATION RATE: 16 BRPM | HEART RATE: 64 BPM | WEIGHT: 293 LBS | HEIGHT: 65 IN | OXYGEN SATURATION: 96 % | DIASTOLIC BLOOD PRESSURE: 76 MMHG | TEMPERATURE: 97.3 F | BODY MASS INDEX: 48.82 KG/M2

## 2017-08-25 DIAGNOSIS — F41.9 ANXIETY: ICD-10-CM

## 2017-08-25 DIAGNOSIS — M25.562 BILATERAL CHRONIC KNEE PAIN: ICD-10-CM

## 2017-08-25 DIAGNOSIS — M25.561 BILATERAL CHRONIC KNEE PAIN: ICD-10-CM

## 2017-08-25 DIAGNOSIS — G89.29 BILATERAL CHRONIC KNEE PAIN: ICD-10-CM

## 2017-08-25 DIAGNOSIS — B02.29 POSTHERPETIC NEURALGIA: ICD-10-CM

## 2017-08-25 PROCEDURE — 99214 OFFICE O/P EST MOD 30 MIN: CPT | Performed by: PHYSICIAN ASSISTANT

## 2017-08-25 RX ORDER — OXYCODONE AND ACETAMINOPHEN 10; 325 MG/1; MG/1
1 TABLET ORAL 2 TIMES DAILY PRN
Qty: 60 TAB | Refills: 0 | Status: SHIPPED | OUTPATIENT
Start: 2017-09-01 | End: 2017-09-29 | Stop reason: SDUPTHER

## 2017-08-25 RX ORDER — ONDANSETRON 4 MG/1
4 TABLET, ORALLY DISINTEGRATING ORAL EVERY 8 HOURS PRN
Qty: 30 TAB | Refills: 0 | Status: SHIPPED | OUTPATIENT
Start: 2017-08-25 | End: 2017-09-29 | Stop reason: SDUPTHER

## 2017-08-25 RX ORDER — MELOXICAM 7.5 MG/1
7.5 TABLET ORAL DAILY
Qty: 30 TAB | Refills: 0 | Status: SHIPPED | OUTPATIENT
Start: 2017-08-25 | End: 2017-09-29 | Stop reason: SDUPTHER

## 2017-08-25 RX ORDER — SERTRALINE HYDROCHLORIDE 100 MG/1
100 TABLET, FILM COATED ORAL DAILY
Qty: 90 TAB | Refills: 3 | Status: SHIPPED | OUTPATIENT
Start: 2017-08-25 | End: 2017-11-27 | Stop reason: SDUPTHER

## 2017-08-25 NOTE — ASSESSMENT & PLAN NOTE
Complains of continued pain on the left abdomen flank secondary to postherpetic neurology. With the pain she may have nauseous this. She is requesting a larger supply of Zofran. May take the medication every few days.

## 2017-08-25 NOTE — ASSESSMENT & PLAN NOTE
Chronic condition. Stable. Taking Zoloft 100 mg daily. Try not to take her Xanax daily. Requiring no refill today.

## 2017-08-25 NOTE — ASSESSMENT & PLAN NOTE
This is a 61-year-old female who is requesting refills of her medication. Requesting refill of Percocet. Her  will be having neck surgery she wants it to be filled earlier. She complains of continued pain in both her knees. Injections from the steroids have worn off. She is happy that she has lost some weight. Will continue to try to lose more weight.

## 2017-08-25 NOTE — MR AVS SNAPSHOT
"        Alfonso Posada   2017 1:20 PM   Office Visit   MRN: [id number]    Department:  [place] [address]   Dept Phone:  [phone]    Description:  Female : 1955   Provider:  Sim Brownlee PA-C           Reason for Visit     Medication Refill Pain Medications-for September      Allergies as of 2017     Allergen Noted Reactions    Latex 2017   Swelling    Pcn [Penicillins] 2017       Hives on face     Sulfa Drugs 2017       \"some sulfa drugs- leg itchiness\"       You were diagnosed with     Bilateral chronic knee pain   [684472]       Postherpetic neuralgia   [893585]       Anxiety   [036275]         Vital Signs     Blood Pressure Pulse Temperature Respirations Height Weight    118/76 mmHg 64 36.3 °C (97.3 °F) 16 1.651 m (5' 5\") 136.986 kg (302 lb)    Body Mass Index Oxygen Saturation Smoking Status             50.26 kg/m2 96% Light Tobacco Smoker         Basic Information     Date Of Birth Sex Race Ethnicity Preferred Language    1955 Female White Non- English      Your appointments     Sep 18, 2017 12:00 PM   Established Patient with Marilia Kang M.D.   [facility]    [address], [address]  [facility] [address] [phone]           You will be receiving a confirmation call a few days before your appointment from our automated call confirmation system.            Sep 29, 2017  1:00 PM   Established Patient with Sim Brownlee PA-C   [facility]    [address]  [address]  [facility] [address] [phone]           You will be receiving a confirmation call a few days before your appointment from our automated call confirmation system.              Problem List              ICD-10-CM Priority Class Noted - Resolved    Postoperative hypothyroidism E89.0   2/3/2017 - Present    Preventative health care Z00.00   2/3/2017 - Present    Anxiety F41.9   " 2/3/2017 - Present    Postherpetic neuralgia B02.29   2/3/2017 - Present    Bilateral chronic knee pain M25.561, M25.562, G89.29   2/3/2017 - Present    Arthritis M19.90   2/3/2017 - Present    Allergic rhinitis J30.9   2/3/2017 - Present    Essential hypertension I10   2/3/2017 - Present    Tobacco dependence F17.200   2/3/2017 - Present    Gastroesophageal reflux disease without esophagitis K21.9   3/3/2017 - Present    Nausea R11.0   4/3/2017 - Present    Dry skin L85.3   5/19/2017 - Present    Chronic pain in right shoulder M25.511, G89.29   6/2/2017 - Present    Morbid obesity with BMI of 50.0-59.9, adult (CMS-HCC) E66.01, Z68.43   7/3/2017 - Present    Acute recurrent maxillary sinusitis J01.01   8/3/2017 - Present      Health Maintenance        Date Due Completion Dates    IMM DTaP/Tdap/Td Vaccine (1 - Tdap) 11/11/1974 ---    IMM PNEUMOCOCCAL 19-64 (ADULT) MEDIUM RISK SERIES (1 of 1 - PPSV23) 11/11/1974 ---    PAP SMEAR 11/11/1976 ---    MAMMOGRAM 11/11/1995 ---    COLONOSCOPY 11/11/2005 ---    IMM ZOSTER VACCINE 11/11/2015 ---    IMM INFLUENZA (1) 9/1/2017 ---            Current Immunizations     No immunizations on file.      Below and/or attached are the medications your provider expects you to take. Review all of your home medications and newly ordered medications with your provider and/or pharmacist. Follow medication instructions as directed by your provider and/or pharmacist. Please keep your medication list with you and share with your provider. Update the information when medications are discontinued, doses are changed, or new medications (including over-the-counter products) are added; and carry medication information at all times in the event of emergency situations     Allergies:  LATEX - Swelling     PCN - (reactions not documented)     SULFA DRUGS - (reactions not documented)               Medications  Valid as of: August 25, 2017 -  1:44 PM    Generic Name Brand Name Tablet Size Instructions  for use    ALPRAZolam (Tab) XANAX 0.5 MG Take 1 Tab by mouth 2 times a day as needed for Anxiety for up to 60 days.        Atorvastatin Calcium (Tab) LIPITOR 40 MG Take 1 Tab by mouth every day.        Furosemide (Tab) LASIX 40 MG Take 1 Tab by mouth every day.        Gabapentin (Cap) NEURONTIN 400 MG Take 1 Cap by mouth 3 times a day.        Levothyroxine Sodium (Tab) SYNTHROID 25 MCG Take 1 Tab by mouth Every morning on an empty stomach.        Losartan Potassium (Tab) COZAAR 25 MG TAKE 1 TABLET BY MOUTH EVERY DAY        Meloxicam (Tab) MOBIC 7.5 MG Take 1 Tab by mouth every day.        Metoprolol Tartrate (Tab) LOPRESSOR 50 MG Take 1 Tab by mouth 2 times a day.        Montelukast Sodium (Tab) SINGULAIR 10 MG Take 1 Tab by mouth every day.        Omeprazole (CAPSULE DELAYED RELEASE) PRILOSEC 40 MG Take 1 Cap by mouth every day.        Ondansetron (TABLET DISPERSIBLE) ZOFRAN ODT 4 MG Take 1 Tab by mouth every 8 hours as needed for Nausea/Vomiting. AS NEEDED FOR NAUSEA AND VOMITING        Oxycodone-Acetaminophen (Tab) PERCOCET-10  MG Take 1 Tab by mouth 2 times a day as needed for Severe Pain for up to 30 days.        Potassium Chloride (Tab CR) KLOR-CON 8 MEQ Take 1 Tab by mouth every day.        Sertraline HCl (Tab) ZOLOFT 100 MG Take 1 Tab by mouth every day.        TraMADol HCl (Tab) ULTRAM 50 MG Take 1 Tab by mouth every 8 hours as needed for up to 30 days.        Triamcinolone Acetonide (Cream) KENALOG 0.1 % Apply 1 Application to affected area(s) 2 times a day.        .                 Medicines prescribed today were sent to:     [id number] - [place], [place] - [address]    [address] [place] [address]    Phone: [phone] Fax: [phone]    Open 24 Hours?: No      Medication refill instructions:       If your prescription bottle indicates you have medication refills left, it is not necessary to call your provider’s office. Please contact your pharmacy and they will refill your  medication.    If your prescription bottle indicates you do not have any refills left, you may request refills at any time through one of the following ways: The online [employer] system (except Urgent Care), by calling your provider’s office, or by asking your pharmacy to contact your provider’s office with a refill request. Medication refills are processed only during regular business hours and may not be available until the next business day. Your provider may request additional information or to have a follow-up visit with you prior to refilling your medication.   *Please Note: Medication refills are assigned a new Rx number when refilled electronically. Your pharmacy may indicate that no refills were authorized even though a new prescription for the same medication is available at the pharmacy. Please request the medicine by name with the pharmacy before contacting your provider for a refill.           [employer] Access Code: [email or web address]  Expires: 9/9/2017  4:16 AM    [employer]  A secure, online tool to manage your health information     American Board of Addiction Medicine (ABAM)’s [employer]® is a secure, online tool that connects you to your personalized health information from the privacy of your home -- day or night - making it very easy for you to manage your healthcare. Once the activation process is completed, you can even access your medical information using the [employer] bo, which is available for free in the Apple Bo store or Google Play store.     [employer] provides the following levels of access (as shown below):   My Chart Features   [facility] Primary [facility] Doctor [facility]  Specialists [facility]  Urgent  Care Non-[facility]  Primary Care  Doctor   Email your healthcare team securely and privately 24/7 X X X    Manage appointments: schedule your next appointment; view details of past/upcoming appointments X      Request prescription refills. X      View recent personal medical records, including lab and immunizations X X X X   View health record,  including health history, allergies, medications X X X X   Read reports about your outpatient visits, procedures, consult and ER notes X X X X   See your discharge summary, which is a recap of your hospital and/or ER visit that includes your diagnosis, lab results, and care plan. X X       How to register for [employer]:  1. Go to  [email or web address].  2. Click on the Sign Up Now box, which takes you to the New Member Sign Up page. You will need to provide the following information:  a. Enter your [employer] Access Code exactly as it appears at the top of this page. (You will not need to use this code after you’ve completed the sign-up process. If you do not sign up before the expiration date, you must request a new code.)   b. Enter your date of birth.   c. Enter your home email address.   d. Click Submit, and follow the next screen’s instructions.  3. Create a [employer] ID. This will be your [employer] login ID and cannot be changed, so think of one that is secure and easy to remember.  4. Create a [employer] password. You can change your password at any time.  5. Enter your Password Reset Question and Answer. This can be used at a later time if you forget your password.   6. Enter your e-mail address. This allows you to receive e-mail notifications when new information is available in [employer].  7. Click Sign Up. You can now view your health information.    For assistance activating your [employer] account, call [phone]        Quit Tobacco Information     Do you want to quit using tobacco?    Quitting tobacco decreases risks of cancer, heart and lung disease, increases life expectancy, improves sense of taste and smell, and increases spending money, among other benefits.    If you are thinking about quitting, we can help.  • [facility] Quit Tobacco Program: 630.141.9559  o Program occurs weekly for four weeks and includes pharmacist consultation on products to support quitting smoking or chewing tobacco. A provider  referral is needed for pharmacist consultation.  • Tobacco Users Help Hotline: 5-529-QUIT-NOW (914-6163) or https://[place].quitlogix.org/  o Free, confidential telephone and online coaching for [place] residents. Sessions are designed on a schedule that is convenient for you. Eligible clients receive free nicotine replacement therapy.  • Nationally: www.smokefree.gov  o Information and professional assistance to support both immediate and long-term needs as you become, and remain, a non-smoker. Smokefree.gov allows you to choose the help that best fits your needs.

## 2017-08-25 NOTE — PROGRESS NOTES
Subjective:   Alfonso Posada is a 61 y.o. female here today forMedication refill for chronic knee pain and also to discuss couple other chronic medical conditions.    Bilateral chronic knee pain  This is a 61-year-old female who is requesting refills of her medication. Requesting refill of Percocet. Her  will be having neck surgery she wants it to be filled earlier. She complains of continued pain in both her knees. Injections from the steroids have worn off. She is happy that she has lost some weight. Will continue to try to lose more weight.    Postherpetic neuralgia  Complains of continued pain on the left abdomen flank secondary to postherpetic neurology. With the pain she may have nauseous this. She is requesting a larger supply of Zofran. May take the medication every few days.    Anxiety  Chronic condition. Stable. Taking Zoloft 100 mg daily. Try not to take her Xanax daily. Requiring no refill today.       Current medicines (including changes today)  Current Outpatient Prescriptions   Medication Sig Dispense Refill   • [START ON 9/1/2017] oxycodone-acetaminophen (PERCOCET-10)  MG Tab Take 1 Tab by mouth 2 times a day as needed for Severe Pain for up to 30 days. 60 Tab 0   • ondansetron (ZOFRAN ODT) 4 MG TABLET DISPERSIBLE Take 1 Tab by mouth every 8 hours as needed for Nausea/Vomiting. AS NEEDED FOR NAUSEA AND VOMITING 30 Tab 0   • meloxicam (MOBIC) 7.5 MG Tab Take 1 Tab by mouth every day. 30 Tab 0   • sertraline (ZOLOFT) 100 MG Tab Take 1 Tab by mouth every day. 90 Tab 3   • atorvastatin (LIPITOR) 40 MG Tab Take 1 Tab by mouth every day. 90 Tab 0   • furosemide (LASIX) 40 MG Tab Take 1 Tab by mouth every day. 90 Tab 0   • metoprolol (LOPRESSOR) 50 MG Tab Take 1 Tab by mouth 2 times a day. 180 Tab 0   • alprazolam (XANAX) 0.5 MG Tab Take 1 Tab by mouth 2 times a day as needed for Anxiety for up to 60 days. 60 Tab 0   • tramadol (ULTRAM) 50 MG Tab Take 1 Tab by mouth every 8 hours as needed  "for up to 30 days. 90 Tab 0   • losartan (COZAAR) 25 MG Tab TAKE 1 TABLET BY MOUTH EVERY DAY 90 Tab 0   • triamcinolone acetonide (KENALOG) 0.1 % Cream Apply 1 Application to affected area(s) 2 times a day. 1 Tube 3   • gabapentin (NEURONTIN) 400 MG Cap Take 1 Cap by mouth 3 times a day. 90 Cap 1   • potassium chloride (KLOR-CON) 8 MEQ tablet Take 1 Tab by mouth every day. 90 Tab 1   • omeprazole (PRILOSEC) 40 MG delayed-release capsule Take 1 Cap by mouth every day. 90 Cap 1   • montelukast (SINGULAIR) 10 MG Tab Take 1 Tab by mouth every day. 90 Tab 1   • levothyroxine (SYNTHROID) 25 MCG Tab Take 1 Tab by mouth Every morning on an empty stomach. 30 Tab 6     No current facility-administered medications for this visit.     She  has a past medical history of Asthma and Thyroid disease.    ROS   No chest pain, no shortness of breath, no abdominal pain and all other systems were reviewed and are negative.       Objective:     Blood pressure 118/76, pulse 64, temperature 36.3 °C (97.3 °F), resp. rate 16, height 1.651 m (5' 5\"), weight 136.986 kg (302 lb), SpO2 96 %. Body mass index is 50.26 kg/(m^2).   Physical Exam:  Constitutional: Alert, no distress.  Skin: Warm, dry, good turgor, no rashes in visible areas.  Eye: Equal, round and reactive, conjunctiva clear, lids normal.  ENMT: Lips without lesions, good dentition, oropharynx clear.  Neck: Trachea midline, no masses.   Lymph: No cervical or supraclavicular lymphadenopathy  Respiratory: Unlabored respiratory effort, lungs appear clear, no wheezes.  Cardiovascular:Regular rate and rhythm, no edema.   Psych: Alert and oriented x3, normal affect and mood.        Assessment and Plan:   The following treatment plan was discussed    1. Bilateral chronic knee pain  Chronic condition. Stable. Continue Ortho follow-up as needed. Provided prescription for Percocet 10 mg as directed. Provided no tramadol today. Also provided meloxicam daily.  - oxycodone-acetaminophen " (PERCOCET-10)  MG Tab; Take 1 Tab by mouth 2 times a day as needed for Severe Pain for up to 30 days.  Dispense: 60 Tab; Refill: 0  - meloxicam (MOBIC) 7.5 MG Tab; Take 1 Tab by mouth every day.  Dispense: 30 Tab; Refill: 0    2. Postherpetic neuralgia  Chronic condition. Stable. Provided prescription for Zofran 4 mg dispersible tablets as directed.  - ondansetron (ZOFRAN ODT) 4 MG TABLET DISPERSIBLE; Take 1 Tab by mouth every 8 hours as needed for Nausea/Vomiting. AS NEEDED FOR NAUSEA AND VOMITING  Dispense: 30 Tab; Refill: 0    3. Anxiety  Chronic condition. Renewed Zoloft 100 mg daily. Continue Xanax as directed. No refill provided today.  - sertraline (ZOLOFT) 100 MG Tab; Take 1 Tab by mouth every day.  Dispense: 90 Tab; Refill: 3      Followup: No Follow-up on file.    Please note that this dictation was created using voice recognition software. I have made every reasonable attempt to correct obvious errors, but I expect that there are errors of grammar and possibly content that I did not discover before finalizing the note.

## 2017-09-18 ENCOUNTER — APPOINTMENT (OUTPATIENT)
Dept: ENDOCRINOLOGY | Facility: MEDICAL CENTER | Age: 62
End: 2017-09-18
Payer: COMMERCIAL

## 2017-09-29 ENCOUNTER — OFFICE VISIT (OUTPATIENT)
Dept: MEDICAL GROUP | Facility: MEDICAL CENTER | Age: 62
End: 2017-09-29
Payer: COMMERCIAL

## 2017-09-29 ENCOUNTER — HOSPITAL ENCOUNTER (OUTPATIENT)
Dept: LAB | Facility: MEDICAL CENTER | Age: 62
End: 2017-09-29
Attending: INTERNAL MEDICINE
Payer: COMMERCIAL

## 2017-09-29 VITALS
WEIGHT: 293 LBS | SYSTOLIC BLOOD PRESSURE: 122 MMHG | BODY MASS INDEX: 48.82 KG/M2 | HEART RATE: 70 BPM | RESPIRATION RATE: 18 BRPM | OXYGEN SATURATION: 93 % | HEIGHT: 65 IN | DIASTOLIC BLOOD PRESSURE: 70 MMHG | TEMPERATURE: 97.2 F

## 2017-09-29 DIAGNOSIS — E89.0 POSTSURGICAL HYPOTHYROIDISM: ICD-10-CM

## 2017-09-29 DIAGNOSIS — R35.0 INCREASED URINARY FREQUENCY: ICD-10-CM

## 2017-09-29 DIAGNOSIS — M25.562 BILATERAL CHRONIC KNEE PAIN: ICD-10-CM

## 2017-09-29 DIAGNOSIS — B02.29 POSTHERPETIC NEURALGIA: ICD-10-CM

## 2017-09-29 DIAGNOSIS — F41.9 ANXIETY: ICD-10-CM

## 2017-09-29 DIAGNOSIS — M25.561 BILATERAL CHRONIC KNEE PAIN: ICD-10-CM

## 2017-09-29 DIAGNOSIS — R39.15 URINARY URGENCY: ICD-10-CM

## 2017-09-29 DIAGNOSIS — G89.29 BILATERAL CHRONIC KNEE PAIN: ICD-10-CM

## 2017-09-29 LAB
APPEARANCE UR: CLEAR
BILIRUB UR STRIP-MCNC: NORMAL MG/DL
COLOR UR AUTO: YELLOW
GLUCOSE UR STRIP.AUTO-MCNC: NORMAL MG/DL
KETONES UR STRIP.AUTO-MCNC: NORMAL MG/DL
LEUKOCYTE ESTERASE UR QL STRIP.AUTO: NORMAL
NITRITE UR QL STRIP.AUTO: NORMAL
PH UR STRIP.AUTO: 5 [PH] (ref 5–8)
PROT UR QL STRIP: NORMAL MG/DL
RBC UR QL AUTO: NORMAL
SP GR UR STRIP.AUTO: 1.01
T4 FREE SERPL-MCNC: 0.98 NG/DL (ref 0.53–1.43)
TSH SERPL DL<=0.005 MIU/L-ACNC: 0.5 UIU/ML (ref 0.3–3.7)
UROBILINOGEN UR STRIP-MCNC: NORMAL MG/DL

## 2017-09-29 PROCEDURE — 81002 URINALYSIS NONAUTO W/O SCOPE: CPT | Performed by: PHYSICIAN ASSISTANT

## 2017-09-29 PROCEDURE — 84443 ASSAY THYROID STIM HORMONE: CPT

## 2017-09-29 PROCEDURE — 84439 ASSAY OF FREE THYROXINE: CPT

## 2017-09-29 PROCEDURE — 99214 OFFICE O/P EST MOD 30 MIN: CPT | Performed by: PHYSICIAN ASSISTANT

## 2017-09-29 PROCEDURE — 36415 COLL VENOUS BLD VENIPUNCTURE: CPT

## 2017-09-29 RX ORDER — ONDANSETRON 4 MG/1
4 TABLET, ORALLY DISINTEGRATING ORAL EVERY 8 HOURS PRN
Qty: 30 TAB | Refills: 0 | Status: SHIPPED | OUTPATIENT
Start: 2017-09-29 | End: 2017-10-26 | Stop reason: SDUPTHER

## 2017-09-29 RX ORDER — ALPRAZOLAM 0.5 MG/1
0.5 TABLET ORAL 2 TIMES DAILY PRN
Qty: 60 TAB | Refills: 0 | Status: SHIPPED | OUTPATIENT
Start: 2017-09-29 | End: 2017-11-27 | Stop reason: SDUPTHER

## 2017-09-29 RX ORDER — MELOXICAM 7.5 MG/1
7.5 TABLET ORAL 2 TIMES DAILY
Qty: 60 TAB | Refills: 2 | Status: SHIPPED | OUTPATIENT
Start: 2017-09-29 | End: 2018-04-17 | Stop reason: SDUPTHER

## 2017-09-29 RX ORDER — OXYCODONE AND ACETAMINOPHEN 10; 325 MG/1; MG/1
1 TABLET ORAL 2 TIMES DAILY PRN
Qty: 60 TAB | Refills: 0 | Status: SHIPPED | OUTPATIENT
Start: 2017-09-29 | End: 2017-10-26 | Stop reason: SDUPTHER

## 2017-09-29 NOTE — ASSESSMENT & PLAN NOTE
She is complaining of slightly more fatigued and possibly depression. Her  recently had neck surgery. He is recovering. She must perform all work around the house. She leaves Zoloft is effective. Is due for prescription of Xanax. Last prescription was over 2 months ago.

## 2017-09-29 NOTE — ASSESSMENT & PLAN NOTE
As mentioned above she will be seen orthopedics next week. Hopes to have some answers then possibly may need another injection. Question refill of Percocet. Is not requesting tramadol today. Also wonders if increasing meloxicam may be helpful. She believes it is effective.

## 2017-09-29 NOTE — ASSESSMENT & PLAN NOTE
Requesting refill of Zofran for her nausea secondary to pain. She is also wondering if the nausea may be associated with her medications.

## 2017-09-29 NOTE — PROGRESS NOTES
Subjective:   Alfonso Posada is a 61 y.o. female here today for possible urinary tract infection refills of medications.    Increased urinary frequency  This is a 61-year-old female who is here today for medication refill. Also complains of some increased urinary frequency over the past few days. Associated pain of her back. She sees orthopedic surgeon next week for her knees and is concerned that she may be having a urinary tract infection. Denies any burning urination. Denies any fevers and significant nauseous or vomiting. States that her urine has an abnormal smell.    Anxiety  She is complaining of slightly more fatigued and possibly depression. Her  recently had neck surgery. He is recovering. She must perform all work around the house. She leaves Zoloft is effective. Is due for prescription of Xanax. Last prescription was over 2 months ago.    Bilateral chronic knee pain  As mentioned above she will be seen orthopedics next week. Hopes to have some answers then possibly may need another injection. Question refill of Percocet. Is not requesting tramadol today. Also wonders if increasing meloxicam may be helpful. She believes it is effective.    Postherpetic neuralgia  Requesting refill of Zofran for her nausea secondary to pain. She is also wondering if the nausea may be associated with her medications.       Current medicines (including changes today)  Current Outpatient Prescriptions   Medication Sig Dispense Refill   • meloxicam (MOBIC) 7.5 MG Tab Take 1 Tab by mouth 2 Times a Day. 60 Tab 2   • oxycodone-acetaminophen (PERCOCET-10)  MG Tab Take 1 Tab by mouth 2 times a day as needed for Severe Pain for up to 30 days. 60 Tab 0   • alprazolam (XANAX) 0.5 MG Tab Take 1 Tab by mouth 2 times a day as needed for Anxiety for up to 60 days. 60 Tab 0   • sertraline (ZOLOFT) 50 MG Tab Take 1 Tab by mouth every day. Add to 100 mg tablet totaling 150 mg daily of Zoloft. 90 Tab 1   • ondansetron (ZOFRAN  "ODT) 4 MG TABLET DISPERSIBLE Take 1 Tab by mouth every 8 hours as needed for Nausea/Vomiting. AS NEEDED FOR NAUSEA AND VOMITING 30 Tab 0   • potassium chloride (KLOR-CON) 8 MEQ tablet TAKE ONE TABLET BY MOUTH DAILY 90 Tab 0   • sertraline (ZOLOFT) 100 MG Tab Take 1 Tab by mouth every day. 90 Tab 3   • atorvastatin (LIPITOR) 40 MG Tab Take 1 Tab by mouth every day. 90 Tab 0   • furosemide (LASIX) 40 MG Tab Take 1 Tab by mouth every day. 90 Tab 0   • metoprolol (LOPRESSOR) 50 MG Tab Take 1 Tab by mouth 2 times a day. 180 Tab 0   • losartan (COZAAR) 25 MG Tab TAKE 1 TABLET BY MOUTH EVERY DAY 90 Tab 0   • triamcinolone acetonide (KENALOG) 0.1 % Cream Apply 1 Application to affected area(s) 2 times a day. 1 Tube 3   • gabapentin (NEURONTIN) 400 MG Cap Take 1 Cap by mouth 3 times a day. 90 Cap 1   • omeprazole (PRILOSEC) 40 MG delayed-release capsule Take 1 Cap by mouth every day. 90 Cap 1   • montelukast (SINGULAIR) 10 MG Tab Take 1 Tab by mouth every day. 90 Tab 1   • levothyroxine (SYNTHROID) 25 MCG Tab Take 1 Tab by mouth Every morning on an empty stomach. 30 Tab 6     No current facility-administered medications for this visit.      She  has a past medical history of Asthma and Thyroid disease.    ROS   No chest pain, no shortness of breath, no abdominal pain and all other systems were reviewed and are negative.       Objective:     Blood pressure 122/70, pulse 70, temperature 36.2 °C (97.2 °F), resp. rate 18, height 1.651 m (5' 5\"), weight (!) 138.8 kg (306 lb), SpO2 93 %. Body mass index is 50.92 kg/m².   Physical Exam:  Constitutional: Alert, no distress.  Skin: Warm, dry, good turgor, no rashes in visible areas.  Eye: Equal, round and reactive, conjunctiva clear, lids normal.  ENMT: Lips without lesions, good dentition, oropharynx clear.  Neck: Trachea midline, no masses.   Lymph: No cervical or supraclavicular lymphadenopathy  Respiratory: Unlabored respiratory effort, lungs appear clear, no " wheezes.  Cardiovascular:Regular rate and rhythm, no edema.  Musculoskeletal: Gait is impaired. Favoring right knee.  Psych: Alert and oriented x3, normal affect and mood.    Urinalysis unremarkable.    Assessment and Plan:   The following treatment plan was discussed    1. Increased urinary frequency  New-onset condition. Urinalysis was completely unremarkable. Advised to push fluids. Follow up any worsening symptoms such as fevers or dysuria. Symptoms not secondary to a UTI.  - POCT Urinalysis    2. Bilateral chronic knee pain  Chronic condition. Follow-up with orthopedics. Increase Mobic to 7.5 mg twice a day. Also continue PPI use. Prescribed Percocet 10 mg as directed.  - meloxicam (MOBIC) 7.5 MG Tab; Take 1 Tab by mouth 2 Times a Day.  Dispense: 60 Tab; Refill: 2  - oxycodone-acetaminophen (PERCOCET-10)  MG Tab; Take 1 Tab by mouth 2 times a day as needed for Severe Pain for up to 30 days.  Dispense: 60 Tab; Refill: 0    3. Anxiety  Chronic condition. Renewed Xanax as directed. Increase Zoloft to 150 mg daily.  - alprazolam (XANAX) 0.5 MG Tab; Take 1 Tab by mouth 2 times a day as needed for Anxiety for up to 60 days.  Dispense: 60 Tab; Refill: 0  - sertraline (ZOLOFT) 50 MG Tab; Take 1 Tab by mouth every day. Add to 100 mg tablet totaling 150 mg daily of Zoloft.  Dispense: 90 Tab; Refill: 1    4. Postherpetic neuralgia  Chronic condition. Renew Zofran as directed.  - ondansetron (ZOFRAN ODT) 4 MG TABLET DISPERSIBLE; Take 1 Tab by mouth every 8 hours as needed for Nausea/Vomiting. AS NEEDED FOR NAUSEA AND VOMITING  Dispense: 30 Tab; Refill: 0      Followup: No Follow-up on file.    Please note that this dictation was created using voice recognition software. I have made every reasonable attempt to correct obvious errors, but I expect that there are errors of grammar and possibly content that I did not discover before finalizing the note.

## 2017-09-29 NOTE — ASSESSMENT & PLAN NOTE
This is a 61-year-old female who is here today for medication refill. Also complains of some increased urinary frequency over the past few days. Associated pain of her back. She sees orthopedic surgeon next week for her knees and is concerned that she may be having a urinary tract infection. Denies any burning urination. Denies any fevers and significant nauseous or vomiting. States that her urine has an abnormal smell.

## 2017-10-08 DIAGNOSIS — E89.0 POSTSURGICAL HYPOTHYROIDISM: ICD-10-CM

## 2017-10-09 RX ORDER — LEVOTHYROXINE SODIUM 0.03 MG/1
TABLET ORAL
Qty: 30 TAB | Refills: 1 | Status: SHIPPED | OUTPATIENT
Start: 2017-10-09 | End: 2017-12-20 | Stop reason: SDUPTHER

## 2017-10-26 ENCOUNTER — OFFICE VISIT (OUTPATIENT)
Dept: MEDICAL GROUP | Facility: MEDICAL CENTER | Age: 62
End: 2017-10-26
Payer: COMMERCIAL

## 2017-10-26 VITALS
WEIGHT: 293 LBS | SYSTOLIC BLOOD PRESSURE: 118 MMHG | RESPIRATION RATE: 18 BRPM | TEMPERATURE: 97.8 F | BODY MASS INDEX: 48.82 KG/M2 | OXYGEN SATURATION: 95 % | DIASTOLIC BLOOD PRESSURE: 74 MMHG | HEART RATE: 74 BPM | HEIGHT: 65 IN

## 2017-10-26 DIAGNOSIS — R21 FACIAL RASH: ICD-10-CM

## 2017-10-26 DIAGNOSIS — E89.0 POSTOPERATIVE HYPOTHYROIDISM: ICD-10-CM

## 2017-10-26 DIAGNOSIS — M25.561 BILATERAL CHRONIC KNEE PAIN: ICD-10-CM

## 2017-10-26 DIAGNOSIS — M79.604 RIGHT LEG PAIN: ICD-10-CM

## 2017-10-26 DIAGNOSIS — M25.562 BILATERAL CHRONIC KNEE PAIN: ICD-10-CM

## 2017-10-26 DIAGNOSIS — G89.29 BILATERAL CHRONIC KNEE PAIN: ICD-10-CM

## 2017-10-26 DIAGNOSIS — B02.29 POSTHERPETIC NEURALGIA: ICD-10-CM

## 2017-10-26 DIAGNOSIS — E66.01 MORBID OBESITY WITH BMI OF 50.0-59.9, ADULT (HCC): ICD-10-CM

## 2017-10-26 PROBLEM — R35.0 INCREASED URINARY FREQUENCY: Status: RESOLVED | Noted: 2017-09-29 | Resolved: 2017-10-26

## 2017-10-26 PROCEDURE — 99214 OFFICE O/P EST MOD 30 MIN: CPT | Performed by: PHYSICIAN ASSISTANT

## 2017-10-26 RX ORDER — TRIAMCINOLONE ACETONIDE 1 MG/G
1 CREAM TOPICAL 2 TIMES DAILY
Qty: 1 TUBE | Refills: 3 | Status: SHIPPED | OUTPATIENT
Start: 2017-10-26 | End: 2018-02-20

## 2017-10-26 RX ORDER — TRAMADOL HYDROCHLORIDE 50 MG/1
50 TABLET ORAL EVERY 8 HOURS PRN
Qty: 90 TAB | Refills: 0 | Status: SHIPPED | OUTPATIENT
Start: 2017-10-26 | End: 2017-11-28 | Stop reason: SDUPTHER

## 2017-10-26 RX ORDER — ONDANSETRON 4 MG/1
4 TABLET, ORALLY DISINTEGRATING ORAL EVERY 8 HOURS PRN
Qty: 30 TAB | Refills: 2 | Status: SHIPPED | OUTPATIENT
Start: 2017-10-26 | End: 2017-11-27 | Stop reason: SDUPTHER

## 2017-10-26 RX ORDER — OXYCODONE AND ACETAMINOPHEN 10; 325 MG/1; MG/1
1 TABLET ORAL 2 TIMES DAILY PRN
Qty: 60 TAB | Refills: 0 | Status: SHIPPED | OUTPATIENT
Start: 2017-10-31 | End: 2017-11-27 | Stop reason: SDUPTHER

## 2017-10-26 NOTE — ASSESSMENT & PLAN NOTE
Has been seen by endocrinology. Her thyroid levels have been normal for a while. She recently had them done in September. TSH value was 0.5. She is currently on levothyroxine 25 µg daily.

## 2017-10-26 NOTE — ASSESSMENT & PLAN NOTE
Greater than one year ago she fell and hurt her right lower leg. She developed a large hematoma in that lower leg and continues to have pain around that area. States at times when she twisted her leg a certain way it hurts. Is wondering when that bump on the bottom of her leg will diminish and go away.

## 2017-10-26 NOTE — PROGRESS NOTES
Subjective:   Alfonso Posada is a 61 y.o. female here today for facial rash for 2 weeks, right leg pain and medication refill.    Facial rash  This is a 61-year-old female comes in a 4 couple issues. First she has a rash on the upper right cheek for about 2 weeks. States that it itches. Denies any pain. No discharge from the lesions. Has been using triamcinolone cream without much change. Wonders if it is a fungal infection.    Right leg pain  Greater than one year ago she fell and hurt her right lower leg. She developed a large hematoma in that lower leg and continues to have pain around that area. States at times when she twisted her leg a certain way it hurts. Is wondering when that bump on the bottom of her leg will diminish and go away.    Bilateral chronic knee pain  Recently had injections into her right knee. Knee is slightly improved today. Requesting refill of her pain management medications.    Postoperative hypothyroidism  Has been seen by endocrinology. Her thyroid levels have been normal for a while. She recently had them done in September. TSH value was 0.5. She is currently on levothyroxine 25 µg daily.       Current medicines (including changes today)  Current Outpatient Prescriptions   Medication Sig Dispense Refill   • [START ON 10/31/2017] oxycodone-acetaminophen (PERCOCET-10)  MG Tab Take 1 Tab by mouth 2 times a day as needed for Severe Pain for up to 30 days. 60 Tab 0   • ondansetron (ZOFRAN ODT) 4 MG TABLET DISPERSIBLE Take 1 Tab by mouth every 8 hours as needed. AS NEEDED FOR NAUSEA AND VOMITING 30 Tab 2   • triamcinolone acetonide (KENALOG) 0.1 % Cream Apply 1 Application to affected area(s) 2 times a day. 1 Tube 3   • tramadol (ULTRAM) 50 MG Tab Take 1 Tab by mouth every 8 hours as needed for up to 30 days. 90 Tab 0   • levothyroxine (SYNTHROID) 25 MCG Tab TAKE ONE TABLET BY MOUTH EVERY MORNING ON AN EMPTY STOMACH 30 Tab 1   • montelukast (SINGULAIR) 10 MG Tab TAKE ONE TABLET BY  "MOUTH DAILY 90 Tab 2   • omeprazole (PRILOSEC) 40 MG delayed-release capsule TAKE ONE CAPSULE BY MOUTH DAILY 90 Cap 2   • meloxicam (MOBIC) 7.5 MG Tab Take 1 Tab by mouth 2 Times a Day. 60 Tab 2   • alprazolam (XANAX) 0.5 MG Tab Take 1 Tab by mouth 2 times a day as needed for Anxiety for up to 60 days. 60 Tab 0   • sertraline (ZOLOFT) 50 MG Tab Take 1 Tab by mouth every day. Add to 100 mg tablet totaling 150 mg daily of Zoloft. 90 Tab 1   • potassium chloride (KLOR-CON) 8 MEQ tablet TAKE ONE TABLET BY MOUTH DAILY 90 Tab 0   • sertraline (ZOLOFT) 100 MG Tab Take 1 Tab by mouth every day. 90 Tab 3   • atorvastatin (LIPITOR) 40 MG Tab Take 1 Tab by mouth every day. 90 Tab 0   • furosemide (LASIX) 40 MG Tab Take 1 Tab by mouth every day. 90 Tab 0   • metoprolol (LOPRESSOR) 50 MG Tab Take 1 Tab by mouth 2 times a day. 180 Tab 0   • losartan (COZAAR) 25 MG Tab TAKE 1 TABLET BY MOUTH EVERY DAY 90 Tab 0   • gabapentin (NEURONTIN) 400 MG Cap Take 1 Cap by mouth 3 times a day. 90 Cap 1     No current facility-administered medications for this visit.      She  has a past medical history of Asthma and Thyroid disease.    ROS   No chest pain, no shortness of breath, no abdominal pain and all other systems were reviewed and are negative.       Objective:     Blood pressure 118/74, pulse 74, temperature 36.6 °C (97.8 °F), resp. rate 18, height 1.651 m (5' 5\"), weight (!) 137.9 kg (304 lb), SpO2 95 %. Body mass index is 50.59 kg/m².  Physical Exam:  Constitutional: Alert, no distress.  Skin: Warm, dry, good turgor,Upper right cheek with raised slightly erythematous lesions. Makeup was removed. Difficult to discern any fungal etiology..  Eye: Equal, round and reactive, conjunctiva clear, lids normal.  ENMT: Lips without lesions, good dentition, oropharynx clear.  Neck: Trachea midline, no masses.   Respiratory: Unlabored respiratory effort, lungs appear clear, no wheezes.  Cardiovascular: Normal S1, S2, no murmur, no " edema.  Musculoskeletal: Right lower leg with significant swelling and induration along the lateral aspect. Tenderness surrounding the area. No significant erythema.  Psych: Alert and oriented x3, normal affect and mood.        Assessment and Plan:   The following treatment plan was discussed    1. Facial rash  Acute, new onset condition. Unlikely ringworm given that the steroid medication hasn't worsened the rash. May continue triamcinolone use. May try over-the-counter Lotrimin as needed. We'll monitor.  - triamcinolone acetonide (KENALOG) 0.1 % Cream; Apply 1 Application to affected area(s) 2 times a day.  Dispense: 1 Tube; Refill: 3    2. Right leg pain  Newly noted problem but a chronic condition. X-ray of her right tibia and fibula was ordered.  - DX-TIBIA AND FIBULA RIGHT; Future    3. Bilateral chronic knee pain  Chronic condition. Stable. Continue follow-up with orthopedics.  reviewed. Provided prescription for Percocet 10 mg take as directed. Tramadol also provided. Last prescription given for that was early August.  - oxycodone-acetaminophen (PERCOCET-10)  MG Tab; Take 1 Tab by mouth 2 times a day as needed for Severe Pain for up to 30 days.  Dispense: 60 Tab; Refill: 0  - tramadol (ULTRAM) 50 MG Tab; Take 1 Tab by mouth every 8 hours as needed for up to 30 days.  Dispense: 90 Tab; Refill: 0    4. Postoperative hypothyroidism  Chronic condition. Controlled. Continue following up with endocrinology as needed. Continue levothyroxine as directed.    5. Morbid obesity with BMI of 50.0-59.9, adult (CMS-HCC)  We'll continue to monitor.  - Patient identified as having weight management issue.  Appropriate orders and counseling given.      Followup: Return if symptoms worsen or fail to improve.    Please note that this dictation was created using voice recognition software. I have made every reasonable attempt to correct obvious errors, but I expect that there are errors of grammar and possibly content  that I did not discover before finalizing the note.

## 2017-10-26 NOTE — ASSESSMENT & PLAN NOTE
This is a 61-year-old female comes in a 4 couple issues. First she has a rash on the upper right cheek for about 2 weeks. States that it itches. Denies any pain. No discharge from the lesions. Has been using triamcinolone cream without much change. Wonders if it is a fungal infection.

## 2017-10-26 NOTE — ASSESSMENT & PLAN NOTE
Recently had injections into her right knee. Knee is slightly improved today. Requesting refill of her pain management medications.

## 2017-11-06 RX ORDER — LOSARTAN POTASSIUM 25 MG/1
TABLET ORAL
Qty: 90 TAB | Refills: 0 | Status: SHIPPED | OUTPATIENT
Start: 2017-11-06 | End: 2018-02-04 | Stop reason: SDUPTHER

## 2017-11-06 RX ORDER — FUROSEMIDE 40 MG/1
TABLET ORAL
Qty: 90 TAB | Refills: 0 | Status: SHIPPED | OUTPATIENT
Start: 2017-11-06 | End: 2018-02-04 | Stop reason: SDUPTHER

## 2017-11-06 RX ORDER — ATORVASTATIN CALCIUM 40 MG/1
TABLET, FILM COATED ORAL
Qty: 90 TAB | Refills: 0 | Status: SHIPPED | OUTPATIENT
Start: 2017-11-06 | End: 2018-04-17 | Stop reason: SDUPTHER

## 2017-11-06 NOTE — TELEPHONE ENCOUNTER
Was the patient seen in the last year in this department? Yes     Does patient have an active prescription for medications requested? No     Received Request Via: Pharmacy     Last Visit: 10/26/17    Last Labs: 9/29/17

## 2017-11-21 DIAGNOSIS — B02.29 POSTHERPETIC NEURALGIA: ICD-10-CM

## 2017-11-21 RX ORDER — GABAPENTIN 400 MG/1
CAPSULE ORAL
Qty: 90 CAP | Refills: 0 | Status: SHIPPED | OUTPATIENT
Start: 2017-11-21 | End: 2017-11-27 | Stop reason: SDUPTHER

## 2017-11-27 ENCOUNTER — OFFICE VISIT (OUTPATIENT)
Dept: MEDICAL GROUP | Facility: MEDICAL CENTER | Age: 62
End: 2017-11-27
Payer: COMMERCIAL

## 2017-11-27 ENCOUNTER — APPOINTMENT (OUTPATIENT)
Dept: LAB | Facility: MEDICAL CENTER | Age: 62
End: 2017-11-27
Payer: COMMERCIAL

## 2017-11-27 ENCOUNTER — HOSPITAL ENCOUNTER (OUTPATIENT)
Dept: RADIOLOGY | Facility: MEDICAL CENTER | Age: 62
End: 2017-11-27
Attending: PHYSICIAN ASSISTANT
Payer: COMMERCIAL

## 2017-11-27 ENCOUNTER — HOSPITAL ENCOUNTER (OUTPATIENT)
Dept: LAB | Facility: MEDICAL CENTER | Age: 62
End: 2017-11-27
Attending: INTERNAL MEDICINE
Payer: COMMERCIAL

## 2017-11-27 VITALS
TEMPERATURE: 98.6 F | SYSTOLIC BLOOD PRESSURE: 127 MMHG | HEIGHT: 65 IN | DIASTOLIC BLOOD PRESSURE: 80 MMHG | OXYGEN SATURATION: 94 % | HEART RATE: 70 BPM | WEIGHT: 293 LBS | BODY MASS INDEX: 48.82 KG/M2

## 2017-11-27 DIAGNOSIS — S70.02XA: ICD-10-CM

## 2017-11-27 DIAGNOSIS — F41.9 ANXIETY: ICD-10-CM

## 2017-11-27 DIAGNOSIS — G89.29 BILATERAL CHRONIC KNEE PAIN: ICD-10-CM

## 2017-11-27 DIAGNOSIS — M79.604 RIGHT LEG PAIN: ICD-10-CM

## 2017-11-27 DIAGNOSIS — M25.561 BILATERAL CHRONIC KNEE PAIN: ICD-10-CM

## 2017-11-27 DIAGNOSIS — M25.562 BILATERAL CHRONIC KNEE PAIN: ICD-10-CM

## 2017-11-27 DIAGNOSIS — B02.29 POSTHERPETIC NEURALGIA: ICD-10-CM
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
## 2017-11-27 DIAGNOSIS — R21 FACIAL RASH: ICD-10-CM

## 2017-11-27 LAB
T4 FREE SERPL-MCNC: 0.98 NG/DL (ref 0.53–1.43)
TSH SERPL DL<=0.005 MIU/L-ACNC: 0.18 UIU/ML (ref 0.3–3.7)

## 2017-11-27 PROCEDURE — 36415 COLL VENOUS BLD VENIPUNCTURE: CPT

## 2017-11-27 PROCEDURE — 99214 OFFICE O/P EST MOD 30 MIN: CPT | Performed by: PHYSICIAN ASSISTANT

## 2017-11-27 PROCEDURE — 84439 ASSAY OF FREE THYROXINE: CPT

## 2017-11-27 PROCEDURE — 84443 ASSAY THYROID STIM HORMONE: CPT

## 2017-11-27 RX ORDER — METRONIDAZOLE 7.5 MG/G
1 GEL TOPICAL 2 TIMES DAILY
Qty: 60 G | Refills: 0 | Status: SHIPPED | OUTPATIENT
Start: 2017-11-27 | End: 2018-01-19

## 2017-11-27 RX ORDER — SERTRALINE HYDROCHLORIDE 100 MG/1
100 TABLET, FILM COATED ORAL DAILY
Qty: 90 TAB | Refills: 3 | Status: SHIPPED | OUTPATIENT
Start: 2017-11-27 | End: 2018-11-26 | Stop reason: SDUPTHER

## 2017-11-27 RX ORDER — OXYCODONE AND ACETAMINOPHEN 10; 325 MG/1; MG/1
1 TABLET ORAL 2 TIMES DAILY PRN
Qty: 60 TAB | Refills: 0 | Status: SHIPPED | OUTPATIENT
Start: 2017-11-29 | End: 2017-12-22 | Stop reason: SDUPTHER

## 2017-11-27 RX ORDER — ALPRAZOLAM 0.5 MG/1
0.5 TABLET ORAL 2 TIMES DAILY PRN
Qty: 60 TAB | Refills: 0 | Status: SHIPPED | OUTPATIENT
Start: 2017-11-27 | End: 2018-01-19 | Stop reason: SDUPTHER

## 2017-11-27 RX ORDER — GABAPENTIN 400 MG/1
400 CAPSULE ORAL 3 TIMES DAILY
Qty: 90 CAP | Refills: 3 | Status: SHIPPED | OUTPATIENT
Start: 2017-11-27 | End: 2018-01-19 | Stop reason: SDUPTHER

## 2017-11-27 RX ORDER — ONDANSETRON 4 MG/1
4 TABLET, ORALLY DISINTEGRATING ORAL EVERY 8 HOURS PRN
Qty: 30 TAB | Refills: 2 | Status: SHIPPED | OUTPATIENT
Start: 2017-11-27 | End: 2018-01-19 | Stop reason: SDUPTHER

## 2017-11-27 NOTE — ASSESSMENT & PLAN NOTE
The rash on her face that has been there for greater than one month has not improved. Steroid cream as made it more dry but not cleared it up. She complains of a burning sensation around the cheek. The linear aspect has grown down the cheek. Symptoms are worse when she eats certain foods but is not specific as to what food.

## 2017-11-27 NOTE — ASSESSMENT & PLAN NOTE
Also requesting refill of Percocet. Takes one tablet twice a day as needed. No change with her chronic knee pain. No improvement. Weight is stable.

## 2017-11-27 NOTE — PROGRESS NOTES
Subjective:   Alfonso Posada is a 62 y.o. female here today for follow-up on facial rash and other concerns.    Facial rash  The rash on her face that has been there for greater than one month has not improved. Steroid cream as made it more dry but not cleared it up. She complains of a burning sensation around the cheek. The linear aspect has grown down the cheek. Symptoms are worse when she eats certain foods but is not specific as to what food.    Fall  She fell 3 days ago at home. Got up from a seated position and landed on her left hip. Complains of some bruising. Symptoms are not causing too much more discomfort the majority has with her bilateral knees. She denies hitting her head. No loss of consciousness.    Anxiety  Requesting refill of Xanax. She takes it intermittently. Last prescription was provided over a month ago.    Bilateral chronic knee pain  Also requesting refill of Percocet. Takes one tablet twice a day as needed. No change with her chronic knee pain. No improvement. Weight is stable.       Current medicines (including changes today)  Current Outpatient Prescriptions   Medication Sig Dispense Refill   • sertraline (ZOLOFT) 100 MG Tab Take 1 Tab by mouth every day. 90 Tab 3   • gabapentin (NEURONTIN) 400 MG Cap Take 1 Cap by mouth 3 times a day. 90 Cap 3   • metronidazole (METROGEL) 0.75 % gel Apply 1 Application to affected area(s) 2 times a day. 60 g 0   • [START ON 11/29/2017] oxycodone-acetaminophen (PERCOCET-10)  MG Tab Take 1 Tab by mouth 2 times a day as needed for Severe Pain for up to 30 days. Please fill early. 60 Tab 0   • alprazolam (XANAX) 0.5 MG Tab Take 1 Tab by mouth 2 times a day as needed for Anxiety for up to 60 days. 60 Tab 0   • ondansetron (ZOFRAN ODT) 4 MG TABLET DISPERSIBLE Take 1 Tab by mouth every 8 hours as needed. AS NEEDED FOR NAUSEA AND VOMITING 30 Tab 2   • potassium chloride (KLOR-CON) 8 MEQ tablet TAKE ONE TABLET BY MOUTH DAILY 90 Tab 1   • furosemide  "(LASIX) 40 MG Tab TAKE ONE TABLET BY MOUTH DAILY 90 Tab 0   • atorvastatin (LIPITOR) 40 MG Tab TAKE ONE TABLET BY MOUTH DAILY 90 Tab 0   • levothyroxine (SYNTHROID) 25 MCG Tab TAKE ONE TABLET BY MOUTH EVERY MORNING ON AN EMPTY STOMACH 30 Tab 1   • montelukast (SINGULAIR) 10 MG Tab TAKE ONE TABLET BY MOUTH DAILY 90 Tab 2   • omeprazole (PRILOSEC) 40 MG delayed-release capsule TAKE ONE CAPSULE BY MOUTH DAILY 90 Cap 2   • meloxicam (MOBIC) 7.5 MG Tab Take 1 Tab by mouth 2 Times a Day. 60 Tab 2   • metoprolol (LOPRESSOR) 50 MG Tab TAKE ONE TABLET BY MOUTH TWICE A  Tab 1   • losartan (COZAAR) 25 MG Tab TAKE ONE TABLET BY MOUTH DAILY 90 Tab 0   • triamcinolone acetonide (KENALOG) 0.1 % Cream Apply 1 Application to affected area(s) 2 times a day. 1 Tube 3     No current facility-administered medications for this visit.      She  has a past medical history of Asthma and Thyroid disease.    ROS   No chest pain, no shortness of breath, no abdominal pain and all other systems were reviewed and are negative.       Objective:     Blood pressure 127/80, height 1.651 m (5' 5\"), weight (!) 138.6 kg (305 lb 9.6 oz), not currently breastfeeding. Body mass index is 50.85 kg/m².   Physical Exam:  Constitutional: Alert, no distress.  Skin: Warm, dry, good turgor, significant ecchymosis noted of the left hip. Facial rash appears dry with as noted previously a linear marking that goes down across the eye but now wraps around the cheek.  Eye: Equal, round and reactive, conjunctiva clear, lids normal.  ENMT: Lips without lesions, good dentition, oropharynx clear.  Neck: Trachea midline, no masses.   Lymph: No cervical or supraclavicular lymphadenopathy  Respiratory: Unlabored respiratory effort, lungs clear to auscultation, no wheezes, no ronchi.  Cardiovascular: Normal S1, S2, no murmur, no edema.  Abdomen: Soft, non-tender, no masses.  Psych: Alert and oriented x3, normal affect and mood.        Assessment and Plan:   The following " treatment plan was discussed    1. Facial rash  New-onset condition. Since steroid cream was ineffective will try metronidazole for suggested rosacea. Advised use as directed. Follow-up in December to reach evaluate.  - metronidazole (METROGEL) 0.75 % gel; Apply 1 Application to affected area(s) 2 times a day.  Dispense: 60 g; Refill: 0    2. Fall, initial encounter  New-onset condition. Currently she is stable.    3. Traumatic ecchymosis of hip, left, initial encounter  Acute, new-onset condition. Expect symptoms to resolve in time.    4. Anxiety  Chronic condition. Stable. Renewed Zoloft and Xanax as directed.  - sertraline (ZOLOFT) 100 MG Tab; Take 1 Tab by mouth every day.  Dispense: 90 Tab; Refill: 3  - alprazolam (XANAX) 0.5 MG Tab; Take 1 Tab by mouth 2 times a day as needed for Anxiety for up to 60 days.  Dispense: 60 Tab; Refill: 0    5. Postherpetic neuralgia  Chronic condition stable. Renewed Neurontin and Zofran as directed.  - gabapentin (NEURONTIN) 400 MG Cap; Take 1 Cap by mouth 3 times a day.  Dispense: 90 Cap; Refill: 3  - ondansetron (ZOFRAN ODT) 4 MG TABLET DISPERSIBLE; Take 1 Tab by mouth every 8 hours as needed. AS NEEDED FOR NAUSEA AND VOMITING  Dispense: 30 Tab; Refill: 2    6. Bilateral chronic knee pain  Chronic condition and stable. Renewed Percocet as directed.  evaluated.  - oxycodone-acetaminophen (PERCOCET-10)  MG Tab; Take 1 Tab by mouth 2 times a day as needed for Severe Pain for up to 30 days. Please fill early.  Dispense: 60 Tab; Refill: 0      Followup: No Follow-up on file.    Please note that this dictation was created using voice recognition software. I have made every reasonable attempt to correct obvious errors, but I expect that there are errors of grammar and possibly content that I did not discover before finalizing the note.

## 2017-11-27 NOTE — ASSESSMENT & PLAN NOTE
She fell 3 days ago at home. Got up from a seated position and landed on her left hip. Complains of some bruising. Symptoms are not causing too much more discomfort the majority has with her bilateral knees. She denies hitting her head. No loss of consciousness.

## 2017-11-27 NOTE — ASSESSMENT & PLAN NOTE
Requesting refill of Xanax. She takes it intermittently. Last prescription was provided over a month ago.

## 2017-11-28 DIAGNOSIS — M25.561 BILATERAL CHRONIC KNEE PAIN: ICD-10-CM

## 2017-11-28 DIAGNOSIS — M25.562 BILATERAL CHRONIC KNEE PAIN: ICD-10-CM

## 2017-11-28 DIAGNOSIS — G89.29 BILATERAL CHRONIC KNEE PAIN: ICD-10-CM

## 2017-11-28 RX ORDER — TRAMADOL HYDROCHLORIDE 50 MG/1
50 TABLET ORAL EVERY 8 HOURS PRN
Qty: 90 TAB | Refills: 0 | Status: SHIPPED
Start: 2017-11-28 | End: 2017-12-22 | Stop reason: SDUPTHER

## 2017-12-20 DIAGNOSIS — E89.0 POSTSURGICAL HYPOTHYROIDISM: ICD-10-CM

## 2017-12-20 RX ORDER — ATORVASTATIN CALCIUM 40 MG/1
TABLET, FILM COATED ORAL
Qty: 90 TAB | Refills: 1 | Status: SHIPPED | OUTPATIENT
Start: 2017-12-20 | End: 2017-12-22

## 2017-12-20 RX ORDER — LEVOTHYROXINE SODIUM 0.03 MG/1
TABLET ORAL
Qty: 30 TAB | Refills: 3 | Status: SHIPPED | OUTPATIENT
Start: 2017-12-20 | End: 2018-05-31 | Stop reason: SDUPTHER

## 2017-12-22 ENCOUNTER — OFFICE VISIT (OUTPATIENT)
Dept: MEDICAL GROUP | Facility: MEDICAL CENTER | Age: 62
End: 2017-12-22
Payer: COMMERCIAL

## 2017-12-22 VITALS
DIASTOLIC BLOOD PRESSURE: 78 MMHG | OXYGEN SATURATION: 95 % | HEART RATE: 60 BPM | BODY MASS INDEX: 48.82 KG/M2 | WEIGHT: 293 LBS | HEIGHT: 65 IN | SYSTOLIC BLOOD PRESSURE: 116 MMHG | TEMPERATURE: 97 F | RESPIRATION RATE: 16 BRPM

## 2017-12-22 DIAGNOSIS — J01.01 ACUTE RECURRENT MAXILLARY SINUSITIS: ICD-10-CM

## 2017-12-22 DIAGNOSIS — M25.561 BILATERAL CHRONIC KNEE PAIN: ICD-10-CM

## 2017-12-22 DIAGNOSIS — B02.29 POSTHERPETIC NEURALGIA: ICD-10-CM

## 2017-12-22 DIAGNOSIS — G89.29 BILATERAL CHRONIC KNEE PAIN: ICD-10-CM

## 2017-12-22 DIAGNOSIS — M25.562 BILATERAL CHRONIC KNEE PAIN: ICD-10-CM

## 2017-12-22 DIAGNOSIS — F41.9 ANXIETY: ICD-10-CM

## 2017-12-22 DIAGNOSIS — R11.0 NAUSEA: ICD-10-CM

## 2017-12-22 PROBLEM — S70.02XA: Status: RESOLVED | Noted: 2017-11-27 | Resolved: 2017-12-22

## 2017-12-22 PROBLEM — R21 FACIAL RASH: Status: RESOLVED | Noted: 2017-10-26 | Resolved: 2017-12-22
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
## 2017-12-22 PROCEDURE — 99214 OFFICE O/P EST MOD 30 MIN: CPT | Performed by: PHYSICIAN ASSISTANT

## 2017-12-22 RX ORDER — FLUTICASONE PROPIONATE 50 MCG
1 SPRAY, SUSPENSION (ML) NASAL 2 TIMES DAILY
Qty: 16 G | Refills: 1 | Status: SHIPPED | OUTPATIENT
Start: 2017-12-22 | End: 2018-05-17

## 2017-12-22 RX ORDER — TRAMADOL HYDROCHLORIDE 50 MG/1
50 TABLET ORAL EVERY 8 HOURS PRN
Qty: 90 TAB | Refills: 0 | Status: SHIPPED | OUTPATIENT
Start: 2017-12-22 | End: 2018-02-20 | Stop reason: SDUPTHER

## 2017-12-22 RX ORDER — ONDANSETRON 4 MG/1
4 TABLET, FILM COATED ORAL EVERY 4 HOURS PRN
Qty: 30 TAB | Refills: 3 | Status: SHIPPED | OUTPATIENT
Start: 2017-12-22 | End: 2018-01-19

## 2017-12-22 RX ORDER — OXYCODONE AND ACETAMINOPHEN 10; 325 MG/1; MG/1
1 TABLET ORAL 2 TIMES DAILY PRN
Qty: 60 TAB | Refills: 0 | Status: SHIPPED | OUTPATIENT
Start: 2017-12-24 | End: 2018-01-19 | Stop reason: SDUPTHER

## 2017-12-22 NOTE — ASSESSMENT & PLAN NOTE
Has been under a fair amount of stress recently. Has concerns about her . There are some financial concerns as well. She has been taking Zoloft but it appears that she is taking 50 mg a day or 100 mg a day. She states when she takes the medication does make her feel better. In the past I provided her  150 mg daily.

## 2017-12-22 NOTE — ASSESSMENT & PLAN NOTE
Requesting refill of Zofran. Nausea which got worse after she had shingles continues. She states that if she doesn't eat she'll have nauseous. Denies any vomiting. No worsening reflux symptoms.

## 2017-12-22 NOTE — PROGRESS NOTES
Subjective:   Alfonso Posada is a 62 y.o. female here today for medication renewal and to check her sinuses.    Acute recurrent maxillary sinusitis  This is a 62-year-old female who is here today for medication refill. She is also concerned because recently she's been having issues with her sinuses. Complains of sinus congestion and drainage.-colored mucus noted. Denies any fevers or significant headaches.    Bilateral chronic knee pain  Requesting refills of oxycodone and tramadol. She will see her orthopedic doctor soon. She states there is a fair amount of disability concerning walking.    Postherpetic neuralgia  Requesting refill of Zofran. Nausea which got worse after she had shingles continues. She states that if she doesn't eat she'll have nauseous. Denies any vomiting. No worsening reflux symptoms.    Anxiety  Has been under a fair amount of stress recently. Has concerns about her . There are some financial concerns as well. She has been taking Zoloft but it appears that she is taking 50 mg a day or 100 mg a day. She states when she takes the medication does make her feel better. In the past I provided her  150 mg daily.       Current medicines (including changes today)  Current Outpatient Prescriptions   Medication Sig Dispense Refill   • [START ON 12/24/2017] oxycodone-acetaminophen (PERCOCET-10)  MG Tab Take 1 Tab by mouth 2 times a day as needed for Severe Pain for up to 30 days. Please fill early. Thank you. 60 Tab 0   • tramadol (ULTRAM) 50 MG Tab Take 1 Tab by mouth every 8 hours as needed for up to 30 days. 90 Tab 0   • ondansetron (ZOFRAN) 4 MG Tab tablet Take 1 Tab by mouth every four hours as needed for Nausea/Vomiting. 30 Tab 3   • fluticasone (FLONASE) 50 MCG/ACT nasal spray Spray 1 Spray in nose 2 times a day. 16 g 1   • sertraline (ZOLOFT) 50 MG Tab Take 1 Tab by mouth every day. Add to 100 mg tablet totaling 150 mg daily of Zoloft. 90 Tab 1   • levothyroxine (SYNTHROID) 25  "MCG Tab TAKE ONE TABLET BY MOUTH EVERY MORNING ON AN  EMPTY STOMACH 30 Tab 3   • sertraline (ZOLOFT) 100 MG Tab Take 1 Tab by mouth every day. 90 Tab 3   • gabapentin (NEURONTIN) 400 MG Cap Take 1 Cap by mouth 3 times a day. 90 Cap 3   • metronidazole (METROGEL) 0.75 % gel Apply 1 Application to affected area(s) 2 times a day. 60 g 0   • alprazolam (XANAX) 0.5 MG Tab Take 1 Tab by mouth 2 times a day as needed for Anxiety for up to 60 days. 60 Tab 0   • ondansetron (ZOFRAN ODT) 4 MG TABLET DISPERSIBLE Take 1 Tab by mouth every 8 hours as needed. AS NEEDED FOR NAUSEA AND VOMITING 30 Tab 2   • metoprolol (LOPRESSOR) 50 MG Tab TAKE ONE TABLET BY MOUTH TWICE A  Tab 1   • potassium chloride (KLOR-CON) 8 MEQ tablet TAKE ONE TABLET BY MOUTH DAILY 90 Tab 1   • furosemide (LASIX) 40 MG Tab TAKE ONE TABLET BY MOUTH DAILY 90 Tab 0   • atorvastatin (LIPITOR) 40 MG Tab TAKE ONE TABLET BY MOUTH DAILY 90 Tab 0   • losartan (COZAAR) 25 MG Tab TAKE ONE TABLET BY MOUTH DAILY 90 Tab 0   • triamcinolone acetonide (KENALOG) 0.1 % Cream Apply 1 Application to affected area(s) 2 times a day. 1 Tube 3   • montelukast (SINGULAIR) 10 MG Tab TAKE ONE TABLET BY MOUTH DAILY 90 Tab 2   • omeprazole (PRILOSEC) 40 MG delayed-release capsule TAKE ONE CAPSULE BY MOUTH DAILY 90 Cap 2   • meloxicam (MOBIC) 7.5 MG Tab Take 1 Tab by mouth 2 Times a Day. 60 Tab 2     No current facility-administered medications for this visit.      She  has a past medical history of Asthma and Thyroid disease.    ROS   No chest pain, no shortness of breath, no abdominal pain and all other systems were reviewed and are negative.       Objective:     Blood pressure 116/78, pulse 60, temperature 36.1 °C (97 °F), resp. rate 16, height 1.651 m (5' 5\"), weight (!) 139.3 kg (307 lb), SpO2 95 %. Body mass index is 51.09 kg/m².   Physical Exam:  Constitutional: Alert, no distress.  Skin: Warm, dry, good turgor, no rashes in visible areas.  Eye: Equal, round and reactive, " conjunctiva clear, lids normal.  ENMT: Lips without lesions, good dentition, oropharynx clear.TMs bilaterally are clear. Nasal passages are slightly congested with dry as well as wet yellow mucus. No sinus tenderness to palpation.  Neck: Trachea midline, no masses.   Lymph: No cervical or supraclavicular lymphadenopathy  Respiratory: Unlabored respiratory effort, lungs appear clear, no wheezes.  Cardiovascular: Normal S1, S2, no murmur, no edema.  Abdomen: Soft, non-tender, no masses.  Psych: Alert and oriented x3, normal affect and mood.        Assessment and Plan:   The following treatment plan was discussed    1. Acute recurrent maxillary sinusitis  Acute, new onset condition. Discussed with not a bacterial infection. We'll try a trial of Flonase to use as directed. She does appear to have chronic rhinitis. Push fluids.  - fluticasone (FLONASE) 50 MCG/ACT nasal spray; Spray 1 Spray in nose 2 times a day.  Dispense: 16 g; Refill: 1    2. Bilateral chronic knee pain  Chronic condition.  reviewed. Renewed oxycodone and tramadol.  - oxycodone-acetaminophen (PERCOCET-10)  MG Tab; Take 1 Tab by mouth 2 times a day as needed for Severe Pain for up to 30 days. Please fill early. Thank you.  Dispense: 60 Tab; Refill: 0  - tramadol (ULTRAM) 50 MG Tab; Take 1 Tab by mouth every 8 hours as needed for up to 30 days.  Dispense: 90 Tab; Refill: 0    3. Anxiety and depression  Chronic condition. Recent exacerbation. Urged to take 150 mg by taking 100 mg tablet and a 50 mg tablet the same time every day.  - sertraline (ZOLOFT) 50 MG Tab; Take 1 Tab by mouth every day. Add to 100 mg tablet totaling 150 mg daily of Zoloft.  Dispense: 90 Tab; Refill: 1    4. Postherpetic neuralgia  Chronic condition. Renewed Zofran 4 mg non-dispersible tablets. Advised should be as effective as long as she is not vomiting while taking the medication. Medication may take a little longer to become effective.  - ondansetron (ZOFRAN) 4 MG Tab  tablet; Take 1 Tab by mouth every four hours as needed for Nausea/Vomiting.  Dispense: 30 Tab; Refill: 3    5. Nausea  Chronic condition. Advised to eat balanced meals. Eat 5 snacks a day possibly. Continue with reflux medication. Provide Zofran as directed.*  - ondansetron (ZOFRAN) 4 MG Tab tablet; Take 1 Tab by mouth every four hours as needed for Nausea/Vomiting.  Dispense: 30 Tab; Refill: 3      Followup: Return in about 4 weeks (around 1/19/2018), or if symptoms worsen or fail to improve.    Please note that this dictation was created using voice recognition software. I have made every reasonable attempt to correct obvious errors, but I expect that there are errors of grammar and possibly content that I did not discover before finalizing the note.

## 2017-12-22 NOTE — ASSESSMENT & PLAN NOTE
This is a 62-year-old female who is here today for medication refill. She is also concerned because recently she's been having issues with her sinuses. Complains of sinus congestion and drainage.-colored mucus noted. Denies any fevers or significant headaches.

## 2017-12-22 NOTE — ASSESSMENT & PLAN NOTE
Requesting refills of oxycodone and tramadol. She will see her orthopedic doctor soon. She states there is a fair amount of disability concerning walking.

## 2018-01-19 ENCOUNTER — OFFICE VISIT (OUTPATIENT)
Dept: MEDICAL GROUP | Facility: MEDICAL CENTER | Age: 63
End: 2018-01-19
Payer: COMMERCIAL

## 2018-01-19 VITALS
BODY MASS INDEX: 48.82 KG/M2 | TEMPERATURE: 98.9 F | OXYGEN SATURATION: 93 % | DIASTOLIC BLOOD PRESSURE: 72 MMHG | WEIGHT: 293 LBS | HEIGHT: 65 IN | HEART RATE: 67 BPM | SYSTOLIC BLOOD PRESSURE: 128 MMHG

## 2018-01-19 DIAGNOSIS — M25.562 BILATERAL CHRONIC KNEE PAIN: ICD-10-CM

## 2018-01-19 DIAGNOSIS — B02.29 POSTHERPETIC NEURALGIA: ICD-10-CM

## 2018-01-19 DIAGNOSIS — J45.20 MILD INTERMITTENT ASTHMA WITHOUT COMPLICATION: ICD-10-CM

## 2018-01-19 DIAGNOSIS — Z23 NEED FOR 23-POLYVALENT PNEUMOCOCCAL POLYSACCHARIDE VACCINE: ICD-10-CM

## 2018-01-19 DIAGNOSIS — F17.200 TOBACCO DEPENDENCE: ICD-10-CM

## 2018-01-19 DIAGNOSIS — F41.9 ANXIETY: ICD-10-CM

## 2018-01-19 DIAGNOSIS — M25.561 BILATERAL CHRONIC KNEE PAIN: ICD-10-CM

## 2018-01-19 DIAGNOSIS — Z79.891 CHRONIC USE OF OPIATE FOR THERAPEUTIC PURPOSE: ICD-10-CM

## 2018-01-19 DIAGNOSIS — R11.0 NAUSEA: ICD-10-CM

## 2018-01-19 DIAGNOSIS — J30.9 CHRONIC ALLERGIC RHINITIS, UNSPECIFIED SEASONALITY, UNSPECIFIED TRIGGER: ICD-10-CM

## 2018-01-19 DIAGNOSIS — F51.01 PRIMARY INSOMNIA: ICD-10-CM

## 2018-01-19 DIAGNOSIS — G89.29 BILATERAL CHRONIC KNEE PAIN: ICD-10-CM

## 2018-01-19 PROCEDURE — 99214 OFFICE O/P EST MOD 30 MIN: CPT | Performed by: PHYSICIAN ASSISTANT

## 2018-01-19 RX ORDER — ONDANSETRON 4 MG/1
4 TABLET, ORALLY DISINTEGRATING ORAL EVERY 8 HOURS PRN
Qty: 30 TAB | Refills: 2 | Status: SHIPPED | OUTPATIENT
Start: 2018-01-19 | End: 2018-02-20

## 2018-01-19 RX ORDER — GABAPENTIN 300 MG/1
300 CAPSULE ORAL 2 TIMES DAILY
Qty: 60 CAP | Refills: 3 | Status: SHIPPED | OUTPATIENT
Start: 2018-01-19 | End: 2018-02-20 | Stop reason: SDUPTHER

## 2018-01-19 RX ORDER — ALPRAZOLAM 0.5 MG/1
0.5 TABLET ORAL 2 TIMES DAILY PRN
Qty: 60 TAB | Refills: 0 | Status: SHIPPED | OUTPATIENT
Start: 2018-01-19 | End: 2018-04-17 | Stop reason: SDUPTHER

## 2018-01-19 RX ORDER — MONTELUKAST SODIUM 10 MG/1
10 TABLET ORAL DAILY
Qty: 90 TAB | Refills: 3 | Status: ON HOLD | OUTPATIENT
Start: 2018-01-19 | End: 2018-10-21 | Stop reason: CLARIF

## 2018-01-19 RX ORDER — OXYCODONE AND ACETAMINOPHEN 10; 325 MG/1; MG/1
1 TABLET ORAL 2 TIMES DAILY PRN
Qty: 60 TAB | Refills: 0 | Status: SHIPPED | OUTPATIENT
Start: 2018-01-24 | End: 2018-02-20 | Stop reason: SDUPTHER

## 2018-01-19 ASSESSMENT — PATIENT HEALTH QUESTIONNAIRE - PHQ9
5. POOR APPETITE OR OVEREATING: 0 - NOT AT ALL
CLINICAL INTERPRETATION OF PHQ2 SCORE: 4

## 2018-01-19 NOTE — PROGRESS NOTES
Subjective:   Alfonso Posada is a 62 y.o. female here today for medication renewal.    Postherpetic neuralgia  This is a 62-year-old female who is here today to discuss medication refill. She states taking Neurontin 400 mg 3 times a day is causing her to have side effects. She wants to cut back on the dose. She denies any significant pain on the left lower aspect of her torso. There is more of a numbness. Sometimes she will have pain when she touches the area.    Bilateral chronic knee pain  She is requesting today a refill of her opioid. She also requesting a refill today of Xanax. Past Xanax refill was done in October. She doesn't take these medications daily. She is soon to have an appointment with orthopedics for her knees. She still is in significant pain. She's been trying to lose weight as well but it has been difficult.    Primary insomnia  She has an appointment in the near future with gynecology check labs. She has hot flashes at times. Has difficulty with sleeping. Associated depression.    Tobacco dependence  She is trying to cut back on smoking. She has a history of having a pneumococcal performed about 4 years ago.    Anxiety  For over 2 weeks she took 150 mg of Zoloft. States the medication was not effective. Wants to go back down to 100 mg.       Current medicines (including changes today)  Current Outpatient Prescriptions   Medication Sig Dispense Refill   • gabapentin (NEURONTIN) 300 MG Cap Take 1 Cap by mouth 2 times a day. 60 Cap 3   • alprazolam (XANAX) 0.5 MG Tab Take 1 Tab by mouth 2 times a day as needed for Anxiety for up to 60 days. 60 Tab 0   • montelukast (SINGULAIR) 10 MG Tab Take 1 Tab by mouth every day. TAKE ONE TABLET BY MOUTH DAILY 90 Tab 3   • [START ON 1/24/2018] oxycodone-acetaminophen (PERCOCET-10)  MG Tab Take 1 Tab by mouth 2 times a day as needed for Severe Pain for up to 30 days. 60 Tab 0   • ondansetron (ZOFRAN ODT) 4 MG TABLET DISPERSIBLE Take 1 Tab by mouth  "every 8 hours as needed. AS NEEDED FOR NAUSEA AND VOMITING 30 Tab 2   • levothyroxine (SYNTHROID) 25 MCG Tab TAKE ONE TABLET BY MOUTH EVERY MORNING ON AN  EMPTY STOMACH 30 Tab 3   • sertraline (ZOLOFT) 100 MG Tab Take 1 Tab by mouth every day. 90 Tab 3   • metoprolol (LOPRESSOR) 50 MG Tab TAKE ONE TABLET BY MOUTH TWICE A  Tab 1   • potassium chloride (KLOR-CON) 8 MEQ tablet TAKE ONE TABLET BY MOUTH DAILY 90 Tab 1   • furosemide (LASIX) 40 MG Tab TAKE ONE TABLET BY MOUTH DAILY 90 Tab 0   • atorvastatin (LIPITOR) 40 MG Tab TAKE ONE TABLET BY MOUTH DAILY 90 Tab 0   • losartan (COZAAR) 25 MG Tab TAKE ONE TABLET BY MOUTH DAILY 90 Tab 0   • triamcinolone acetonide (KENALOG) 0.1 % Cream Apply 1 Application to affected area(s) 2 times a day. 1 Tube 3   • omeprazole (PRILOSEC) 40 MG delayed-release capsule TAKE ONE CAPSULE BY MOUTH DAILY 90 Cap 2   • meloxicam (MOBIC) 7.5 MG Tab Take 1 Tab by mouth 2 Times a Day. 60 Tab 2   • tramadol (ULTRAM) 50 MG Tab Take 1 Tab by mouth every 8 hours as needed for up to 30 days. 90 Tab 0   • fluticasone (FLONASE) 50 MCG/ACT nasal spray Spray 1 Spray in nose 2 times a day. 16 g 1     No current facility-administered medications for this visit.      She  has a past medical history of Asthma and Thyroid disease.    ROS   No chest pain, no shortness of breath, no abdominal pain and all other systems were reviewed and are negative.    Past medical history, social history and family history were updated and reviewed.       Objective:     Blood pressure 128/72, pulse 67, temperature 37.2 °C (98.9 °F), height 1.651 m (5' 5\"), weight (!) 141 kg (310 lb 13.6 oz), SpO2 93 %. Body mass index is 51.73 kg/m².   Physical Exam:  Constitutional: Alert, no distress.  Skin: Warm, dry, good turgor, no rashes in visible areas.  Eye: Equal, round and reactive, conjunctiva clear, lids normal.  ENMT: Lips without lesions, good dentition, oropharynx clear.  Neck: Trachea midline, no masses.   Lymph: No " cervical or supraclavicular lymphadenopathy  Respiratory: Unlabored respiratory effort, lungs clear to auscultation, no wheezes, no ronchi.  Cardiovascular: Normal S1, S2, no murmur, no edema.  Abdomen: Soft, non-tender, no masses.  Psych: Alert and oriented x3, normal affect and mood.        Assessment and Plan:   The following treatment plan was discussed    1. Postherpetic neuralgia  Chronic condition. Decrease Neurontin to 300 mg twice a day. Since symptoms are specifically neuropathy with pain and numbness we may discharge medication in the future. Also provide Zofran for her chronic history of nauseousness caused by the postherpetic neuralgia.  - gabapentin (NEURONTIN) 300 MG Cap; Take 1 Cap by mouth 2 times a day.  Dispense: 60 Cap; Refill: 3  - ondansetron (ZOFRAN ODT) 4 MG TABLET DISPERSIBLE; Take 1 Tab by mouth every 8 hours as needed. AS NEEDED FOR NAUSEA AND VOMITING  Dispense: 30 Tab; Refill: 2    2. Primary insomnia  Acute, new onset condition. Follow-up with gynecology to check hormone panel. No medication provided.     3. Anxiety  Chronic condition. Appears stable today. Continue sertraline 100 mg daily. Renewed Xanax.  reviewed. Discussed black box warning taking opioids and benzos together.  - alprazolam (XANAX) 0.5 MG Tab; Take 1 Tab by mouth 2 times a day as needed for Anxiety for up to 60 days.  Dispense: 60 Tab; Refill: 0    4. Bilateral chronic knee pain  Chronic condition. Stable. Follow up with orthopedics. Signed consent for opioid prescription and controlled substance agreement. Percocet renewed.  reviewed.  - oxycodone-acetaminophen (PERCOCET-10)  MG Tab; Take 1 Tab by mouth 2 times a day as needed for Severe Pain for up to 30 days.  Dispense: 60 Tab; Refill: 0  - CONSENT FOR OPIATE PRESCRIPTION  - Controlled Substance Treatment Agreement    5. Tobacco dependence  Will continue to monitor. Advised she will not need a pneumococcal 23 vaccination until she is 65.    6. Chronic  use of opiate for therapeutic purpose  Agreement signed today.  - Controlled Substance Treatment Agreement    7. Nausea  Chronic condition and stable. Renewed Zofran ODT.  - ondansetron (ZOFRAN ODT) 4 MG TABLET DISPERSIBLE; Take 1 Tab by mouth every 8 hours as needed. AS NEEDED FOR NAUSEA AND VOMITING  Dispense: 30 Tab; Refill: 2      Followup: No Follow-up on file.    Please note that this dictation was created using voice recognition software. I have made every reasonable attempt to correct obvious errors, but I expect that there are errors of grammar and possibly content that I did not discover before finalizing the note.

## 2018-01-19 NOTE — ASSESSMENT & PLAN NOTE
This is a 62-year-old female who is here today to discuss medication refill. She states taking Neurontin 400 mg 3 times a day is causing her to have side effects. She wants to cut back on the dose. She denies any significant pain on the left lower aspect of her torso. There is more of a numbness. Sometimes she will have pain when she touches the area.

## 2018-01-19 NOTE — ASSESSMENT & PLAN NOTE
She is requesting today a refill of her opioid. She also requesting a refill today of Xanax. Past Xanax refill was done in October. She doesn't take these medications daily. She is soon to have an appointment with orthopedics for her knees. She still is in significant pain. She's been trying to lose weight as well but it has been difficult.

## 2018-01-19 NOTE — ASSESSMENT & PLAN NOTE
She is trying to cut back on smoking. She has a history of having a pneumococcal performed about 4 years ago.

## 2018-01-19 NOTE — ASSESSMENT & PLAN NOTE
She has an appointment in the near future with gynecology check labs. She has hot flashes at times. Has difficulty with sleeping. Associated depression.

## 2018-01-19 NOTE — ASSESSMENT & PLAN NOTE
For over 2 weeks she took 150 mg of Zoloft. States the medication was not effective. Wants to go back down to 100 mg.

## 2018-02-20 ENCOUNTER — OFFICE VISIT (OUTPATIENT)
Dept: MEDICAL GROUP | Facility: MEDICAL CENTER | Age: 63
End: 2018-02-20
Payer: COMMERCIAL

## 2018-02-20 VITALS
TEMPERATURE: 98.3 F | RESPIRATION RATE: 16 BRPM | OXYGEN SATURATION: 97 % | DIASTOLIC BLOOD PRESSURE: 86 MMHG | SYSTOLIC BLOOD PRESSURE: 124 MMHG | BODY MASS INDEX: 48.82 KG/M2 | HEART RATE: 78 BPM | WEIGHT: 293 LBS | HEIGHT: 65 IN

## 2018-02-20 DIAGNOSIS — J01.01 ACUTE RECURRENT MAXILLARY SINUSITIS: ICD-10-CM

## 2018-02-20 DIAGNOSIS — Z12.31 VISIT FOR SCREENING MAMMOGRAM: ICD-10-CM

## 2018-02-20 DIAGNOSIS — G89.29 BILATERAL CHRONIC KNEE PAIN: ICD-10-CM

## 2018-02-20 DIAGNOSIS — F41.9 ANXIETY: ICD-10-CM

## 2018-02-20 DIAGNOSIS — M25.562 BILATERAL CHRONIC KNEE PAIN: ICD-10-CM

## 2018-02-20 DIAGNOSIS — R21 FACIAL RASH: ICD-10-CM

## 2018-02-20 DIAGNOSIS — M25.561 BILATERAL CHRONIC KNEE PAIN: ICD-10-CM

## 2018-02-20 DIAGNOSIS — B02.29 POSTHERPETIC NEURALGIA: ICD-10-CM

## 2018-02-20 PROBLEM — Z00.00 PREVENTATIVE HEALTH CARE: Status: RESOLVED | Noted: 2017-02-03 | Resolved: 2018-02-20

## 2018-02-20 PROCEDURE — 99214 OFFICE O/P EST MOD 30 MIN: CPT | Performed by: PHYSICIAN ASSISTANT

## 2018-02-20 RX ORDER — OXYCODONE AND ACETAMINOPHEN 10; 325 MG/1; MG/1
1 TABLET ORAL 2 TIMES DAILY PRN
Qty: 60 TAB | Refills: 0 | Status: SHIPPED | OUTPATIENT
Start: 2018-02-22 | End: 2018-03-20 | Stop reason: SDUPTHER

## 2018-02-20 RX ORDER — AZITHROMYCIN 250 MG/1
TABLET, FILM COATED ORAL
Qty: 6 TAB | Refills: 0 | Status: SHIPPED | OUTPATIENT
Start: 2018-02-20 | End: 2018-03-20

## 2018-02-20 RX ORDER — TRAMADOL HYDROCHLORIDE 50 MG/1
50 TABLET ORAL EVERY 8 HOURS PRN
Qty: 90 TAB | Refills: 0 | Status: SHIPPED | OUTPATIENT
Start: 2018-02-20 | End: 2018-03-20 | Stop reason: SDUPTHER

## 2018-02-20 RX ORDER — GABAPENTIN 100 MG/1
100 CAPSULE ORAL 2 TIMES DAILY
Qty: 60 CAP | Refills: 3 | Status: SHIPPED | OUTPATIENT
Start: 2018-02-20 | End: 2018-03-20

## 2018-02-20 NOTE — ASSESSMENT & PLAN NOTE
Last time the facial rash had cleared up but then it returned and she saw her gynecologist who thought it may be a fungal infection. The past we did try fungal medication but it was not effective. She states that the fungal medication she was given has made the rash disappear. Currently denies any itching or issues.

## 2018-02-20 NOTE — ASSESSMENT & PLAN NOTE
This is a 62-year-old female complains of a four-day history of sinus pain and pressure. Associated nasal congestion and drainage and coughing. Subjective fevers. Has issues with some chest tightness but denies any chest pain or shortness of breath. She used Flonase for a bit but that medication did not rule any effectiveness with her acute or chronic sinusitis picture. Is requesting antibiotic today.

## 2018-02-20 NOTE — ASSESSMENT & PLAN NOTE
She is also requesting refill of oxycodone and tramadol. Medication helps with her mobility. Pain at times is severe 10 out of 10. She is also taking meloxicam daily.

## 2018-02-20 NOTE — PROGRESS NOTES
Subjective:   Alfonso Posada is a 62 y.o. female here today for facial rash, postherpetic neuralgia and chronic knee pain bilaterally.    Anxiety  Currently taking 100 mg daily not 150 mg. Improved issues with hot flashes.    Acute recurrent maxillary sinusitis  This is a 62-year-old female complains of a four-day history of sinus pain and pressure. Associated nasal congestion and drainage and coughing. Subjective fevers. Has issues with some chest tightness but denies any chest pain or shortness of breath. She used Flonase for a bit but that medication did not rule any effectiveness with her acute or chronic sinusitis picture. Is requesting antibiotic today.    Bilateral chronic knee pain  She is also requesting refill of oxycodone and tramadol. Medication helps with her mobility. Pain at times is severe 10 out of 10. She is also taking meloxicam daily.    Facial rash  Last time the facial rash had cleared up but then it returned and she saw her gynecologist who thought it may be a fungal infection. The past we did try fungal medication but it was not effective. She states that the fungal medication she was given has made the rash disappear. Currently denies any itching or issues.    Postherpetic neuralgia  She has cut down the dose of Neurontin. She is currently taking 300 mg daily. Please medication may have caused headache and other side effects. Wants to go back down to a lower dose. It appears that the numbness along the side of her abdomen area in the left side has improved.       Current medicines (including changes today)  Current Outpatient Prescriptions   Medication Sig Dispense Refill   • gabapentin (NEURONTIN) 100 MG Cap Take 1 Cap by mouth 2 times a day. 60 Cap 3   • azithromycin (ZITHROMAX) 250 MG Tab Take two tablets day one then one tablet day 2-5. 6 Tab 0   • [START ON 2/22/2018] oxyCODONE-acetaminophen (PERCOCET-10)  MG Tab Take 1 Tab by mouth 2 times a day as needed for Severe Pain for  "up to 30 days. 60 Tab 0   • tramadol (ULTRAM) 50 MG Tab Take 1 Tab by mouth every 8 hours as needed for up to 30 days. 90 Tab 0   • losartan (COZAAR) 25 MG Tab TAKE ONE TABLET BY MOUTH DAILY 90 Tab 1   • furosemide (LASIX) 40 MG Tab TAKE ONE TABLET BY MOUTH DAILY 90 Tab 1   • alprazolam (XANAX) 0.5 MG Tab Take 1 Tab by mouth 2 times a day as needed for Anxiety for up to 60 days. 60 Tab 0   • montelukast (SINGULAIR) 10 MG Tab Take 1 Tab by mouth every day. TAKE ONE TABLET BY MOUTH DAILY 90 Tab 3   • fluticasone (FLONASE) 50 MCG/ACT nasal spray Spray 1 Spray in nose 2 times a day. 16 g 1   • levothyroxine (SYNTHROID) 25 MCG Tab TAKE ONE TABLET BY MOUTH EVERY MORNING ON AN  EMPTY STOMACH 30 Tab 3   • sertraline (ZOLOFT) 100 MG Tab Take 1 Tab by mouth every day. 90 Tab 3   • metoprolol (LOPRESSOR) 50 MG Tab TAKE ONE TABLET BY MOUTH TWICE A  Tab 1   • potassium chloride (KLOR-CON) 8 MEQ tablet TAKE ONE TABLET BY MOUTH DAILY 90 Tab 1   • atorvastatin (LIPITOR) 40 MG Tab TAKE ONE TABLET BY MOUTH DAILY 90 Tab 0   • omeprazole (PRILOSEC) 40 MG delayed-release capsule TAKE ONE CAPSULE BY MOUTH DAILY 90 Cap 2   • meloxicam (MOBIC) 7.5 MG Tab Take 1 Tab by mouth 2 Times a Day. 60 Tab 2     No current facility-administered medications for this visit.      She  has a past medical history of Asthma and Thyroid disease.    Social History and Family History were reviewed and updated.    ROS   No chest pain, no shortness of breath, no abdominal pain and all other systems were reviewed and are negative.       Objective:     Blood pressure 124/86, pulse 78, temperature 36.8 °C (98.3 °F), resp. rate 16, height 1.651 m (5' 5\"), weight (!) 143.8 kg (317 lb), SpO2 97 %. Body mass index is 52.75 kg/m².   Physical Exam:  Constitutional: Alert, no distress.  Skin: Warm, dry, good turgor, no rashes in visible areas.  Eye: Equal, round and reactive, conjunctiva clear, lids normal.  ENMT: Lips without lesions, good dentition, oropharynx " clear.  Neck: Trachea midline, no masses.   Lymph: No cervical or supraclavicular lymphadenopathy  Respiratory: Unlabored respiratory effort, lungs appear clear, no wheezes.  Cardiovascular: Normal S1, S2, no murmur, no edema.  Abdomen: Soft, non-tender, no masses.  Psych: Alert and oriented x3, normal affect and mood.        Assessment and Plan:   The following treatment plan was discussed    1. Acute recurrent maxillary sinusitis  Acute, new onset condition. Given Zithromax as directed. Push fluids.  - azithromycin (ZITHROMAX) 250 MG Tab; Take two tablets day one then one tablet day 2-5.  Dispense: 6 Tab; Refill: 0    2. Bilateral chronic knee pain  Chronic condition. Stable.  reviewed. Oxycodone and tramadol renewed.  - oxyCODONE-acetaminophen (PERCOCET-10)  MG Tab; Take 1 Tab by mouth 2 times a day as needed for Severe Pain for up to 30 days.  Dispense: 60 Tab; Refill: 0  - tramadol (ULTRAM) 50 MG Tab; Take 1 Tab by mouth every 8 hours as needed for up to 30 days.  Dispense: 90 Tab; Refill: 0    3. Facial rash  Chronic condition. Improved. Will monitor for reoccurrence of fungal infection. Use cream as directed.     4. Anxiety  Chronic condition.  Stable.  Continue 100 mg of Zoloft daily.      5. Postherpetic neuralgia  Chronic condition. Improved. Prescribed gabapentin 100 mg twice daily.  - gabapentin (NEURONTIN) 100 MG Cap; Take 1 Cap by mouth 2 times a day.  Dispense: 60 Cap; Refill: 3    6. Visit for screening mammogram  Ordered mammogram.  - MA-SCREEN MAMMO W/CAD-BILAT; Future      Followup: Return in about 4 weeks (around 3/20/2018), or if symptoms worsen or fail to improve.    Please note that this dictation was created using voice recognition software. I have made every reasonable attempt to correct obvious errors, but I expect that there are errors of grammar and possibly content that I did not discover before finalizing the note.

## 2018-02-20 NOTE — ASSESSMENT & PLAN NOTE
She has cut down the dose of Neurontin. She is currently taking 300 mg daily. Please medication may have caused headache and other side effects. Wants to go back down to a lower dose. It appears that the numbness along the side of her abdomen area in the left side has improved.

## 2018-03-16 ENCOUNTER — APPOINTMENT (OUTPATIENT)
Dept: MEDICAL GROUP | Facility: MEDICAL CENTER | Age: 63
End: 2018-03-16
Payer: COMMERCIAL

## 2018-03-20 ENCOUNTER — OFFICE VISIT (OUTPATIENT)
Dept: MEDICAL GROUP | Facility: MEDICAL CENTER | Age: 63
End: 2018-03-20
Payer: COMMERCIAL

## 2018-03-20 VITALS
OXYGEN SATURATION: 95 % | BODY MASS INDEX: 48.82 KG/M2 | SYSTOLIC BLOOD PRESSURE: 132 MMHG | DIASTOLIC BLOOD PRESSURE: 80 MMHG | WEIGHT: 293 LBS | TEMPERATURE: 99.9 F | HEART RATE: 63 BPM | RESPIRATION RATE: 12 BRPM | HEIGHT: 65 IN

## 2018-03-20 DIAGNOSIS — F41.9 ANXIETY: ICD-10-CM

## 2018-03-20 DIAGNOSIS — M25.511 CHRONIC PAIN IN RIGHT SHOULDER: ICD-10-CM

## 2018-03-20 DIAGNOSIS — B02.29 POSTHERPETIC NEURALGIA: ICD-10-CM

## 2018-03-20 DIAGNOSIS — M25.561 BILATERAL CHRONIC KNEE PAIN: ICD-10-CM

## 2018-03-20 DIAGNOSIS — G89.29 BILATERAL CHRONIC KNEE PAIN: ICD-10-CM

## 2018-03-20 DIAGNOSIS — G89.29 CHRONIC PAIN IN RIGHT SHOULDER: ICD-10-CM

## 2018-03-20 DIAGNOSIS — M25.562 BILATERAL CHRONIC KNEE PAIN: ICD-10-CM

## 2018-03-20 PROCEDURE — 99214 OFFICE O/P EST MOD 30 MIN: CPT | Performed by: PHYSICIAN ASSISTANT

## 2018-03-20 RX ORDER — OXYCODONE AND ACETAMINOPHEN 10; 325 MG/1; MG/1
1 TABLET ORAL 2 TIMES DAILY PRN
Qty: 60 TAB | Refills: 0 | Status: SHIPPED | OUTPATIENT
Start: 2018-03-20 | End: 2018-04-17 | Stop reason: SDUPTHER

## 2018-03-20 RX ORDER — TRAMADOL HYDROCHLORIDE 50 MG/1
50 TABLET ORAL EVERY 8 HOURS PRN
Qty: 90 TAB | Refills: 0 | Status: SHIPPED | OUTPATIENT
Start: 2018-03-20 | End: 2018-04-17 | Stop reason: SDUPTHER

## 2018-03-20 NOTE — ASSESSMENT & PLAN NOTE
She recently discontinued Neurontin. It has been 2 weeks. She feels better off the medication. She would like to further look at her medications to see if there is anything that could be discontinued. She doesn't feel as active as she has in the past. She does have hypothyroidism but recent labs showed good control.

## 2018-03-20 NOTE — PROGRESS NOTES
Subjective:   Alfonso Posada is a 62 y.o. female here today for chronic bilateral knee pain and anxiety.    Bilateral chronic knee pain  This is a 62-year-old female who is here today to discuss her chronic bilateral knee pain. Also has other chronic pain with her right shoulder and other joints of her body. Pain is stable. Right knee has improved slightly with her pain. Range of motion has been better over the past few weeks. She is taking Percocet 10 mg twice a day. Medication does control her pain. Also is on tramadol 50 mg every 8 hours. She is due for both prescriptions to be renewed.    Postherpetic neuralgia  She recently discontinued Neurontin. It has been 2 weeks. She feels better off the medication. She would like to further look at her medications to see if there is anything that could be discontinued. She doesn't feel as active as she has in the past. She does have hypothyroidism but recent labs showed good control.    Anxiety  Her anxiety is stable. She is now concerned because she has a Bartholin's cyst. She will be following with gynecology in a week. There is some discomfort in the vaginal area. She's had this previously but not as large. She will take Xanax as needed. Last prescription was dated on 26 January.       Current medicines (including changes today)  Current Outpatient Prescriptions   Medication Sig Dispense Refill   • oxyCODONE-acetaminophen (PERCOCET-10)  MG Tab Take 1 Tab by mouth 2 times a day as needed for Severe Pain for up to 30 days. 60 Tab 0   • tramadol (ULTRAM) 50 MG Tab Take 1 Tab by mouth every 8 hours as needed for up to 30 days. 90 Tab 0   • losartan (COZAAR) 25 MG Tab TAKE ONE TABLET BY MOUTH DAILY 90 Tab 1   • furosemide (LASIX) 40 MG Tab TAKE ONE TABLET BY MOUTH DAILY 90 Tab 1   • alprazolam (XANAX) 0.5 MG Tab Take 1 Tab by mouth 2 times a day as needed for Anxiety for up to 60 days. 60 Tab 0   • montelukast (SINGULAIR) 10 MG Tab Take 1 Tab by mouth every day.  "TAKE ONE TABLET BY MOUTH DAILY 90 Tab 3   • fluticasone (FLONASE) 50 MCG/ACT nasal spray Spray 1 Spray in nose 2 times a day. 16 g 1   • levothyroxine (SYNTHROID) 25 MCG Tab TAKE ONE TABLET BY MOUTH EVERY MORNING ON AN  EMPTY STOMACH 30 Tab 3   • sertraline (ZOLOFT) 100 MG Tab Take 1 Tab by mouth every day. 90 Tab 3   • metoprolol (LOPRESSOR) 50 MG Tab TAKE ONE TABLET BY MOUTH TWICE A  Tab 1   • potassium chloride (KLOR-CON) 8 MEQ tablet TAKE ONE TABLET BY MOUTH DAILY 90 Tab 1   • atorvastatin (LIPITOR) 40 MG Tab TAKE ONE TABLET BY MOUTH DAILY 90 Tab 0   • omeprazole (PRILOSEC) 40 MG delayed-release capsule TAKE ONE CAPSULE BY MOUTH DAILY 90 Cap 2   • meloxicam (MOBIC) 7.5 MG Tab Take 1 Tab by mouth 2 Times a Day. 60 Tab 2     No current facility-administered medications for this visit.      She  has a past medical history of Asthma and Thyroid disease.    Social History and Family History were reviewed and updated.    ROS   No chest pain, no shortness of breath, no abdominal pain and all other systems were reviewed and are negative.       Objective:     Blood pressure 132/80, pulse 63, temperature 37.7 °C (99.9 °F), resp. rate 12, height 1.651 m (5' 5\"), weight (!) 143.2 kg (315 lb 11.2 oz), last menstrual period 03/20/2016, SpO2 95 %, not currently breastfeeding. Body mass index is 52.54 kg/m².   Physical Exam:  Constitutional: Alert, no distress. Walking with cane.   Skin: Warm, dry, good turgor, no rashes in visible areas.  Eye: Equal, round and reactive, conjunctiva clear, lids normal.  ENMT: Lips without lesions, good dentition, oropharynx clear.  Neck: Trachea midline, no masses.   Lymph: No cervical or supraclavicular lymphadenopathy  Respiratory: Unlabored respiratory effort, lungs appear clear, no wheezes.  Cardiovascular: Normal S1, S2, no murmur, no edema.  Psych: Alert and oriented x3, normal affect and mood.        Assessment and Plan:   The following treatment plan was discussed    1. Bilateral " chronic knee pain  Chronic condition. Stable.  reviewed. Prescribed oxycodone as directed. Also given tramadol 50 mg 3 times a day if needed. Follow-up in one month.  - oxyCODONE-acetaminophen (PERCOCET-10)  MG Tab; Take 1 Tab by mouth 2 times a day as needed for Severe Pain for up to 30 days.  Dispense: 60 Tab; Refill: 0  - tramadol (ULTRAM) 50 MG Tab; Take 1 Tab by mouth every 8 hours as needed for up to 30 days.  Dispense: 90 Tab; Refill: 0    2. Postherpetic neuralgia  Chronic condition. Stable. She does appear to be doing well off her Neurontin. Hopefully this medication can be discontinued completely. We'll monitor condition.    3. Anxiety  Chronic condition with recent exacerbation given pending appointment with gynecology given a possible Bartholin's cyst. Takes Xanax sparingly. No refill prior provided today. She has 20 tablets or so left. She understands the black box warning with benzos and narcotics.      Followup: No Follow-up on file.    Please note that this dictation was created using voice recognition software. I have made every reasonable attempt to correct obvious errors, but I expect that there are errors of grammar and possibly content that I did not discover before finalizing the note.

## 2018-03-20 NOTE — ASSESSMENT & PLAN NOTE
Her anxiety is stable. She is now concerned because she has a Bartholin's cyst. She will be following with gynecology in a week. There is some discomfort in the vaginal area. She's had this previously but not as large. She will take Xanax as needed. Last prescription was dated on 26 January.

## 2018-03-20 NOTE — ASSESSMENT & PLAN NOTE
This is a 62-year-old female who is here today to discuss her chronic bilateral knee pain. Also has other chronic pain with her right shoulder and other joints of her body. Pain is stable. Right knee has improved slightly with her pain. Range of motion has been better over the past few weeks. She is taking Percocet 10 mg twice a day. Medication does control her pain. Also is on tramadol 50 mg every 8 hours. She is due for both prescriptions to be renewed.

## 2018-04-17 ENCOUNTER — OFFICE VISIT (OUTPATIENT)
Dept: MEDICAL GROUP | Facility: MEDICAL CENTER | Age: 63
End: 2018-04-17
Payer: COMMERCIAL

## 2018-04-17 VITALS
OXYGEN SATURATION: 94 % | HEART RATE: 54 BPM | DIASTOLIC BLOOD PRESSURE: 78 MMHG | SYSTOLIC BLOOD PRESSURE: 128 MMHG | TEMPERATURE: 97.7 F | BODY MASS INDEX: 48.82 KG/M2 | WEIGHT: 293 LBS | HEIGHT: 65 IN

## 2018-04-17 DIAGNOSIS — N75.0 BARTHOLIN CYST: ICD-10-CM

## 2018-04-17 DIAGNOSIS — I10 ESSENTIAL HYPERTENSION: ICD-10-CM

## 2018-04-17 DIAGNOSIS — F41.9 ANXIETY: ICD-10-CM

## 2018-04-17 DIAGNOSIS — M25.511 CHRONIC PAIN IN RIGHT SHOULDER: ICD-10-CM

## 2018-04-17 DIAGNOSIS — G89.29 CHRONIC PAIN IN RIGHT SHOULDER: ICD-10-CM

## 2018-04-17 DIAGNOSIS — E78.5 HYPERLIPIDEMIA, UNSPECIFIED HYPERLIPIDEMIA TYPE: ICD-10-CM

## 2018-04-17 DIAGNOSIS — M25.562 BILATERAL CHRONIC KNEE PAIN: ICD-10-CM

## 2018-04-17 DIAGNOSIS — E66.01 MORBID OBESITY WITH BMI OF 50.0-59.9, ADULT (HCC): ICD-10-CM

## 2018-04-17 DIAGNOSIS — G89.29 BILATERAL CHRONIC KNEE PAIN: ICD-10-CM

## 2018-04-17 DIAGNOSIS — M25.561 BILATERAL CHRONIC KNEE PAIN: ICD-10-CM

## 2018-04-17 DIAGNOSIS — K21.9 GASTROESOPHAGEAL REFLUX DISEASE WITHOUT ESOPHAGITIS: ICD-10-CM

## 2018-04-17 PROCEDURE — 99214 OFFICE O/P EST MOD 30 MIN: CPT | Performed by: PHYSICIAN ASSISTANT

## 2018-04-17 RX ORDER — ATORVASTATIN CALCIUM 40 MG/1
40 TABLET, FILM COATED ORAL DAILY
Qty: 90 TAB | Refills: 3 | Status: SHIPPED | OUTPATIENT
Start: 2018-04-17 | End: 2018-12-01

## 2018-04-17 RX ORDER — METOPROLOL TARTRATE 50 MG/1
TABLET, FILM COATED ORAL
Qty: 180 TAB | Refills: 3 | Status: ON HOLD | OUTPATIENT
Start: 2018-04-17 | End: 2018-10-21 | Stop reason: CLARIF

## 2018-04-17 RX ORDER — MELOXICAM 7.5 MG/1
7.5 TABLET ORAL 2 TIMES DAILY
Qty: 60 TAB | Refills: 2 | Status: SHIPPED | OUTPATIENT
Start: 2018-04-17 | End: 2018-04-17 | Stop reason: SDUPTHER

## 2018-04-17 RX ORDER — OMEPRAZOLE 40 MG/1
40 CAPSULE, DELAYED RELEASE ORAL DAILY
Qty: 90 CAP | Refills: 3 | Status: SHIPPED | OUTPATIENT
Start: 2018-04-17 | End: 2018-05-17 | Stop reason: SDUPTHER

## 2018-04-17 RX ORDER — MELOXICAM 7.5 MG/1
7.5 TABLET ORAL 2 TIMES DAILY
Qty: 60 TAB | Refills: 0 | Status: SHIPPED | OUTPATIENT
Start: 2018-04-17 | End: 2018-10-01

## 2018-04-17 RX ORDER — ALPRAZOLAM 0.5 MG/1
0.5 TABLET ORAL 2 TIMES DAILY PRN
Qty: 60 TAB | Refills: 0 | Status: SHIPPED | OUTPATIENT
Start: 2018-04-17 | End: 2018-06-15 | Stop reason: SDUPTHER

## 2018-04-17 RX ORDER — TRAMADOL HYDROCHLORIDE 50 MG/1
50 TABLET ORAL EVERY 8 HOURS PRN
Qty: 90 TAB | Refills: 0 | Status: SHIPPED | OUTPATIENT
Start: 2018-04-17 | End: 2018-05-17

## 2018-04-17 RX ORDER — OXYCODONE AND ACETAMINOPHEN 10; 325 MG/1; MG/1
1 TABLET ORAL 2 TIMES DAILY PRN
Qty: 60 TAB | Refills: 0 | Status: SHIPPED | OUTPATIENT
Start: 2018-04-17 | End: 2018-05-17 | Stop reason: SDUPTHER

## 2018-04-17 NOTE — ASSESSMENT & PLAN NOTE
This is a 62-year-old female who is here today to discuss a few issues. She saw gynecology and they drained her Bartholin's cyst. She is doing better and denies any pain. Was told maybe she needs to have a catheter placed but currently she is doing well.

## 2018-04-17 NOTE — ASSESSMENT & PLAN NOTE
She states that she is about out of her reflux medication. Medication controls her chronic reflux symptoms. Takes Prilosec 40 mg daily.

## 2018-04-17 NOTE — ASSESSMENT & PLAN NOTE
She is requesting renewal of her medication. Unfortunately her knee pain has worsen over the past few weeks. Unfortunately she is done with both her oxycodone and tramadol.

## 2018-04-17 NOTE — ASSESSMENT & PLAN NOTE
His chronic anxiety and insomnia. Takes Xanax as needed. Last prescription was written in February. She is requesting a refill.

## 2018-04-17 NOTE — PROGRESS NOTES
Subjective:   Alfonso Posada is a 62 y.o. female here today for morbid obesity and chronic knee pain.    Bartholin cyst  This is a 62-year-old female who is here today to discuss a few issues. She saw gynecology and they drained her Bartholin's cyst. She is doing better and denies any pain. Was told maybe she needs to have a catheter placed but currently she is doing well.    Morbid obesity with BMI of 50.0-59.9, adult (CMS-[facility])  She would like to be seen by [facility] to have a sleeve. She believes her weight is up a concern with her chronic knee pain and other joint pain. Has been unable to lose weight consistently through diet and is unable to exercise because of her limited mobility.    Bilateral chronic knee pain  She is requesting renewal of her medication. Unfortunately her knee pain has worsen over the past few weeks. Unfortunately she is done with both her oxycodone and tramadol.    Hyperlipidemia  Has chronic hyperlipidemia. Takes Lipitor daily. No recent lab ordered.    Anxiety  His chronic anxiety and insomnia. Takes Xanax as needed. Last prescription was written in February. She is requesting a refill.    Gastroesophageal reflux disease without esophagitis  She states that she is about out of her reflux medication. Medication controls her chronic reflux symptoms. Takes Prilosec 40 mg daily.       Current medicines (including changes today)  Current Outpatient Prescriptions   Medication Sig Dispense Refill   • omeprazole (PRILOSEC) 40 MG delayed-release capsule Take 1 Cap by mouth every day. 90 Cap 3   • oxyCODONE-acetaminophen (PERCOCET-10)  MG Tab Take 1 Tab by mouth 2 times a day as needed for Severe Pain for up to 30 days. 60 Tab 0   • tramadol (ULTRAM) 50 MG Tab Take 1 Tab by mouth every 8 hours as needed for up to 30 days. 90 Tab 0   • ALPRAZolam (XANAX) 0.5 MG Tab Take 1 Tab by mouth 2 times a day as needed for Anxiety for up to 60 days. 60 Tab 0   • atorvastatin (LIPITOR) 40 MG  "Tab Take 1 Tab by mouth every day. TAKE ONE TABLET BY MOUTH DAILY 90 Tab 3   • metoprolol (LOPRESSOR) 50 MG Tab TAKE ONE TABLET BY MOUTH TWICE A  Tab 3   • meloxicam (MOBIC) 7.5 MG Tab Take 1 Tab by mouth 2 Times a Day. 60 Tab 2   • losartan (COZAAR) 25 MG Tab TAKE ONE TABLET BY MOUTH DAILY 90 Tab 1   • furosemide (LASIX) 40 MG Tab TAKE ONE TABLET BY MOUTH DAILY 90 Tab 1   • montelukast (SINGULAIR) 10 MG Tab Take 1 Tab by mouth every day. TAKE ONE TABLET BY MOUTH DAILY 90 Tab 3   • fluticasone (FLONASE) 50 MCG/ACT nasal spray Spray 1 Spray in nose 2 times a day. 16 g 1   • levothyroxine (SYNTHROID) 25 MCG Tab TAKE ONE TABLET BY MOUTH EVERY MORNING ON AN  EMPTY STOMACH 30 Tab 3   • sertraline (ZOLOFT) 100 MG Tab Take 1 Tab by mouth every day. 90 Tab 3   • potassium chloride (KLOR-CON) 8 MEQ tablet TAKE ONE TABLET BY MOUTH DAILY 90 Tab 1     No current facility-administered medications for this visit.      She  has a past medical history of Asthma and Thyroid disease.    Social History and Family History were reviewed and updated.    ROS   No chest pain, no shortness of breath, no abdominal pain and all other systems were reviewed and are negative.       Objective:     Blood pressure 128/78, pulse (!) 54, temperature 36.5 °C (97.7 °F), height 1.651 m (5' 5\"), weight (!) 143.3 kg (316 lb), last menstrual period 03/20/2016, SpO2 94 %. Body mass index is 52.59 kg/m².   Physical Exam:  Constitutional: Alert, no distress.  Skin: Slight erythematous rash noted on the right side of her nasal bridge.   Eye: Equal, round and reactive, conjunctiva clear, lids normal.  ENMT: Lips without lesions, good dentition, oropharynx clear.  Neck: Trachea midline, no masses.   Lymph: No cervical or supraclavicular lymphadenopathy  Respiratory: Unlabored respiratory effort, lungs clear to auscultation, no wheezes, no ronchi.  Cardiovascular: Normal S1, S2, no murmur, no edema.  Psych: Alert and oriented x3, normal affect and " mood.        Assessment and Plan:   The following treatment plan was discussed    1. Bartholin cyst  Acute new-onset condition. Improved after drained by gynecology. Follow up with gynecology as needed.    2. Morbid obesity with BMI of 50.0-59.9, adult (CMS-HCC)  Chronic condition. Referred to bariatric surgery and [facility].  - REFERRAL TO BARIATRIC SURGERY    3. Bilateral chronic knee pain  Chronic condition. Unstable.  reviewed. Controlled substance agreement on profile. Renewed oxycodone and Percocet. Advised not to share medication. Check BMP for renal function.  - oxyCODONE-acetaminophen (PERCOCET-10)  MG Tab; Take 1 Tab by mouth 2 times a day as needed for Severe Pain for up to 30 days.  Dispense: 60 Tab; Refill: 0  - tramadol (ULTRAM) 50 MG Tab; Take 1 Tab by mouth every 8 hours as needed for up to 30 days.  Dispense: 90 Tab; Refill: 0  - meloxicam (MOBIC) 7.5 MG Tab; Take 1 Tab by mouth 2 Times a Day.  Dispense: 60 Tab; Refill: 0    4. Anxiety  Chronic condition. Stable.  reviewed. Discussed the black box warning regarding Xanax and narcotics. Last prescription provided in February. Prescribe Xanax 0.5 mg take one tablet twice a day as needed.  - ALPRAZolam (XANAX) 0.5 MG Tab; Take 1 Tab by mouth 2 times a day as needed for Anxiety for up to 60 days.  Dispense: 60 Tab; Refill: 0    5. Gastroesophageal reflux disease without esophagitis  Chronic condition. Stable. Renewed Prilosec 40 mg daily.  - omeprazole (PRILOSEC) 40 MG delayed-release capsule; Take 1 Cap by mouth every day.  Dispense: 90 Cap; Refill: 3    6. Hyperlipidemia, unspecified hyperlipidemia type  [place] condition. Status unknown. Renewed Lipitor 40 mg. Lipid profile ordered.  - atorvastatin (LIPITOR) 40 MG Tab; Take 1 Tab by mouth every day. TAKE ONE TABLET BY MOUTH DAILY  Dispense: 90 Tab; Refill: 3  - LIPID PROFILE; Future    7. Essential hypertension  Chronic condition. Controlled. Renewed metoprolol and ordered a  CMP.  - metoprolol (LOPRESSOR) 50 MG Tab; TAKE ONE TABLET BY MOUTH TWICE A DAY  Dispense: 180 Tab; Refill: 3  - COMP METABOLIC PANEL; Future      Followup: Return in about 4 weeks (around 5/15/2018).    Please note that this dictation was created using voice recognition software. I have made every reasonable attempt to correct obvious errors, but I expect that there are errors of grammar and possibly content that I did not discover before finalizing the note.

## 2018-04-17 NOTE — ASSESSMENT & PLAN NOTE
She would like to be seen by [facility] to have a sleeve. She believes her weight is up a concern with her chronic knee pain and other joint pain. Has been unable to lose weight consistently through diet and is unable to exercise because of her limited mobility.

## 2018-04-26 ENCOUNTER — HOSPITAL ENCOUNTER (OUTPATIENT)
Dept: LAB | Facility: MEDICAL CENTER | Age: 63
End: 2018-04-26
Attending: PHYSICIAN ASSISTANT
Payer: COMMERCIAL

## 2018-04-26 ENCOUNTER — HOSPITAL ENCOUNTER (OUTPATIENT)
Dept: LAB | Facility: MEDICAL CENTER | Age: 63
End: 2018-04-26
Attending: INTERNAL MEDICINE
Payer: COMMERCIAL

## 2018-04-26 DIAGNOSIS — E78.5 HYPERLIPIDEMIA, UNSPECIFIED HYPERLIPIDEMIA TYPE: ICD-10-CM

## 2018-04-26 DIAGNOSIS — I10 ESSENTIAL HYPERTENSION: ICD-10-CM

## 2018-04-26 DIAGNOSIS — E89.0 POSTOPERATIVE HYPOTHYROIDISM: ICD-10-CM

## 2018-04-26 LAB
ALBUMIN SERPL BCP-MCNC: 3.7 G/DL (ref 3.2–4.9)
ALBUMIN/GLOB SERPL: 1.1 G/DL
ALP SERPL-CCNC: 91 U/L (ref 30–99)
ALT SERPL-CCNC: 17 U/L (ref 2–50)
ANION GAP SERPL CALC-SCNC: 8 MMOL/L (ref 0–11.9)
AST SERPL-CCNC: 17 U/L (ref 12–45)
BILIRUB SERPL-MCNC: 0.5 MG/DL (ref 0.1–1.5)
BUN SERPL-MCNC: 13 MG/DL (ref 8–22)
CALCIUM SERPL-MCNC: 8.6 MG/DL (ref 8.5–10.5)
CHLORIDE SERPL-SCNC: 106 MMOL/L (ref 96–112)
CHOLEST SERPL-MCNC: 209 MG/DL (ref 100–199)
CO2 SERPL-SCNC: 26 MMOL/L (ref 20–33)
CREAT SERPL-MCNC: 0.54 MG/DL (ref 0.5–1.4)
GLOBULIN SER CALC-MCNC: 3.3 G/DL (ref 1.9–3.5)
GLUCOSE SERPL-MCNC: 91 MG/DL (ref 65–99)
HDLC SERPL-MCNC: 75 MG/DL
LDLC SERPL CALC-MCNC: 115 MG/DL
POTASSIUM SERPL-SCNC: 3.9 MMOL/L (ref 3.6–5.5)
PROT SERPL-MCNC: 7 G/DL (ref 6–8.2)
SODIUM SERPL-SCNC: 140 MMOL/L (ref 135–145)
T4 FREE SERPL-MCNC: 0.97 NG/DL (ref 0.53–1.43)
TRIGL SERPL-MCNC: 96 MG/DL (ref 0–149)
TSH SERPL DL<=0.005 MIU/L-ACNC: 0.53 UIU/ML (ref 0.38–5.33)

## 2018-04-26 PROCEDURE — 36415 COLL VENOUS BLD VENIPUNCTURE: CPT

## 2018-04-26 PROCEDURE — 80053 COMPREHEN METABOLIC PANEL: CPT

## 2018-04-26 PROCEDURE — 80061 LIPID PANEL: CPT

## 2018-04-26 PROCEDURE — 84439 ASSAY OF FREE THYROXINE: CPT

## 2018-04-26 PROCEDURE — 84443 ASSAY THYROID STIM HORMONE: CPT

## 2018-05-17 ENCOUNTER — HOSPITAL ENCOUNTER (OUTPATIENT)
Dept: RADIOLOGY | Facility: MEDICAL CENTER | Age: 63
End: 2018-05-17
Attending: PHYSICIAN ASSISTANT
Payer: COMMERCIAL

## 2018-05-17 ENCOUNTER — APPOINTMENT (OUTPATIENT)
Dept: MEDICAL GROUP | Facility: MEDICAL CENTER | Age: 63
End: 2018-05-17
Payer: COMMERCIAL

## 2018-05-17 ENCOUNTER — OFFICE VISIT (OUTPATIENT)
Dept: MEDICAL GROUP | Facility: MEDICAL CENTER | Age: 63
End: 2018-05-17
Payer: COMMERCIAL

## 2018-05-17 ENCOUNTER — TELEPHONE (OUTPATIENT)
Dept: MEDICAL GROUP | Facility: MEDICAL CENTER | Age: 63
End: 2018-05-17

## 2018-05-17 VITALS
SYSTOLIC BLOOD PRESSURE: 128 MMHG | HEIGHT: 65 IN | HEART RATE: 60 BPM | WEIGHT: 293 LBS | BODY MASS INDEX: 48.82 KG/M2 | OXYGEN SATURATION: 99 % | DIASTOLIC BLOOD PRESSURE: 78 MMHG | TEMPERATURE: 98 F

## 2018-05-17 DIAGNOSIS — E89.0 POSTOPERATIVE HYPOTHYROIDISM: ICD-10-CM

## 2018-05-17 DIAGNOSIS — M25.562 BILATERAL CHRONIC KNEE PAIN: ICD-10-CM

## 2018-05-17 DIAGNOSIS — M25.561 BILATERAL CHRONIC KNEE PAIN: ICD-10-CM

## 2018-05-17 DIAGNOSIS — Z12.31 VISIT FOR SCREENING MAMMOGRAM: ICD-10-CM

## 2018-05-17 DIAGNOSIS — G89.29 BILATERAL CHRONIC KNEE PAIN: ICD-10-CM

## 2018-05-17 DIAGNOSIS — R11.0 NAUSEA: ICD-10-CM

## 2018-05-17 DIAGNOSIS — K21.9 GASTROESOPHAGEAL REFLUX DISEASE WITHOUT ESOPHAGITIS: ICD-10-CM

## 2018-05-17 DIAGNOSIS — E66.01 MORBID OBESITY WITH BMI OF 50.0-59.9, ADULT (HCC): ICD-10-CM

## 2018-05-17 PROBLEM — N75.0 BARTHOLIN CYST: Status: RESOLVED | Noted: 2018-04-17 | Resolved: 2018-05-17

## 2018-05-17 PROCEDURE — 99214 OFFICE O/P EST MOD 30 MIN: CPT | Performed by: PHYSICIAN ASSISTANT

## 2018-05-17 PROCEDURE — 73590 X-RAY EXAM OF LOWER LEG: CPT | Mod: RT

## 2018-05-17 RX ORDER — OMEPRAZOLE 40 MG/1
40 CAPSULE, DELAYED RELEASE ORAL DAILY
Qty: 90 CAP | Refills: 3 | Status: SHIPPED | OUTPATIENT
Start: 2018-05-17 | End: 2019-05-28 | Stop reason: SDUPTHER

## 2018-05-17 RX ORDER — ONDANSETRON 4 MG/1
4 TABLET, ORALLY DISINTEGRATING ORAL EVERY 8 HOURS PRN
Qty: 15 TAB | Refills: 2 | Status: ON HOLD | OUTPATIENT
Start: 2018-05-17 | End: 2018-10-21 | Stop reason: CLARIF

## 2018-05-17 RX ORDER — OXYCODONE AND ACETAMINOPHEN 10; 325 MG/1; MG/1
1 TABLET ORAL 2 TIMES DAILY PRN
Qty: 60 TAB | Refills: 0 | Status: SHIPPED | OUTPATIENT
Start: 2018-05-17 | End: 2018-05-17 | Stop reason: SDUPTHER

## 2018-05-17 RX ORDER — OXYCODONE AND ACETAMINOPHEN 10; 325 MG/1; MG/1
1 TABLET ORAL 2 TIMES DAILY PRN
Qty: 60 TAB | Refills: 0 | Status: SHIPPED | OUTPATIENT
Start: 2018-06-16 | End: 2018-05-17 | Stop reason: SDUPTHER

## 2018-05-17 RX ORDER — OXYCODONE AND ACETAMINOPHEN 10; 325 MG/1; MG/1
1 TABLET ORAL 2 TIMES DAILY PRN
Qty: 60 TAB | Refills: 0 | Status: SHIPPED | OUTPATIENT
Start: 2018-07-16 | End: 2018-06-15 | Stop reason: SDUPTHER

## 2018-05-17 NOTE — ASSESSMENT & PLAN NOTE
This is a 62-year-old female who is excited about her initial consultation with Dr. Ganser for gastric sleeve. She is expecting to have these procedures done soon. She has chronic pain with her knees and hips. Recently she had labs returned that showed good control of her cholesterol. CMP was within normal limits.

## 2018-05-17 NOTE — ASSESSMENT & PLAN NOTE
Has chronic intermittent nausea. Nausea usually worse when drinking alcohol. She is taking Prilosec daily. The past she had postherpetic neuralgia but those symptoms have improved. Requesting refill of Zofran that helps with her nauseous.

## 2018-05-17 NOTE — ASSESSMENT & PLAN NOTE
She follows with Dr. Kang for her hypothyroidism. There still is a noted thyroid nodule on the right side. Her endocrinologist believes that it is stable. Her thyroid level recently obtained showed good control. She is taking Synthroid 25 µg daily.

## 2018-05-17 NOTE — LETTER
May 17, 2018        Alfonso Posada    Current Outpatient Prescriptions on File Prior to Visit   Medication Sig Dispense Refill   • omeprazole (PRILOSEC) 40 MG delayed-release capsule Take 1 Cap by mouth every day. 90 Cap 3   • oxyCODONE-acetaminophen (PERCOCET-10)  MG Tab Take 1 Tab by mouth 2 times a day as needed for Severe Pain for up to 30 days. 60 Tab 0   • tramadol (ULTRAM) 50 MG Tab Take 1 Tab by mouth every 8 hours as needed for up to 30 days. 90 Tab 0   • atorvastatin (LIPITOR) 40 MG Tab Take 1 Tab by mouth every day. TAKE ONE TABLET BY MOUTH DAILY 90 Tab 3   • metoprolol (LOPRESSOR) 50 MG Tab TAKE ONE TABLET BY MOUTH TWICE A  Tab 3   • losartan (COZAAR) 25 MG Tab TAKE ONE TABLET BY MOUTH DAILY 90 Tab 1   • furosemide (LASIX) 40 MG Tab TAKE ONE TABLET BY MOUTH DAILY 90 Tab 1   • montelukast (SINGULAIR) 10 MG Tab Take 1 Tab by mouth every day. TAKE ONE TABLET BY MOUTH DAILY 90 Tab 3   • levothyroxine (SYNTHROID) 25 MCG Tab TAKE ONE TABLET BY MOUTH EVERY MORNING ON AN  EMPTY STOMACH 30 Tab 3   • sertraline (ZOLOFT) 100 MG Tab Take 1 Tab by mouth every day. 90 Tab 3   • potassium chloride (KLOR-CON) 8 MEQ tablet TAKE ONE TABLET BY MOUTH DAILY 90 Tab 1   • ALPRAZolam (XANAX) 0.5 MG Tab Take 1 Tab by mouth 2 times a day as needed for Anxiety for up to 60 days. 60 Tab 0   • meloxicam (MOBIC) 7.5 MG Tab Take 1 Tab by mouth 2 Times a Day. 60 Tab 0   • fluticasone (FLONASE) 50 MCG/ACT nasal spray Spray 1 Spray in nose 2 times a day. 16 g 1     No current facility-administered medications on file prior to visit.                                    Mayra Senior

## 2018-05-17 NOTE — PROGRESS NOTES
Subjective:   Alfonso Posada is a 62 y.o. female here today for morbid obesity, hypothyroidism and chronic bilateral knee pain.    Morbid obesity with BMI of 50.0-59.9, adult (CMS-[facility])  This is a 62-year-old female who is excited about her initial consultation with Dr. Ganser for gastric sleeve. She is expecting to have these procedures done soon. She has chronic pain with her knees and hips. Recently she had labs returned that showed good control of her cholesterol. CMP was within normal limits.    Postoperative hypothyroidism  She follows with Dr. Kang for her hypothyroidism. There still is a noted thyroid nodule on the right side. Her endocrinologist believes that it is stable. Her thyroid level recently obtained showed good control. She is taking Synthroid 25 µg daily.    Bilateral chronic knee pain  Also requesting a refill of oxycodone. Her knee pain is continuous and chronic. No surgery options until she loses weight. She is follows with orthopedics. She is taking Percocet 10 mg twice daily. Requesting a refill. She is using a cane to help with her gait and knee pain. Right side is worse than the left.    Gastroesophageal reflux disease without esophagitis  Takes Prilosec daily for chronic GERD symptoms. Medication controls her symptoms. Is requesting a refill.    Nausea  Has chronic intermittent nausea. Nausea usually worse when drinking alcohol. She is taking Prilosec daily. The past she had postherpetic neuralgia but those symptoms have improved. Requesting refill of Zofran that helps with her nauseous.       Current medicines (including changes today)  Current Outpatient Prescriptions   Medication Sig Dispense Refill   • [START ON 7/16/2018] oxyCODONE-acetaminophen (PERCOCET-10)  MG Tab Take 1 Tab by mouth 2 times a day as needed for Severe Pain for up to 30 days. 60 Tab 0   • ondansetron (ZOFRAN ODT) 4 MG TABLET DISPERSIBLE Take 1 Tab by mouth every 8 hours as needed. AS NEEDED FOR NAUSEA AND  "VOMITING 15 Tab 2   • omeprazole (PRILOSEC) 40 MG delayed-release capsule Take 1 Cap by mouth every day. 90 Cap 3   • tramadol (ULTRAM) 50 MG Tab Take 1 Tab by mouth every 8 hours as needed for up to 30 days. 90 Tab 0   • atorvastatin (LIPITOR) 40 MG Tab Take 1 Tab by mouth every day. TAKE ONE TABLET BY MOUTH DAILY 90 Tab 3   • metoprolol (LOPRESSOR) 50 MG Tab TAKE ONE TABLET BY MOUTH TWICE A  Tab 3   • losartan (COZAAR) 25 MG Tab TAKE ONE TABLET BY MOUTH DAILY 90 Tab 1   • furosemide (LASIX) 40 MG Tab TAKE ONE TABLET BY MOUTH DAILY 90 Tab 1   • montelukast (SINGULAIR) 10 MG Tab Take 1 Tab by mouth every day. TAKE ONE TABLET BY MOUTH DAILY 90 Tab 3   • levothyroxine (SYNTHROID) 25 MCG Tab TAKE ONE TABLET BY MOUTH EVERY MORNING ON AN  EMPTY STOMACH 30 Tab 3   • sertraline (ZOLOFT) 100 MG Tab Take 1 Tab by mouth every day. 90 Tab 3   • potassium chloride (KLOR-CON) 8 MEQ tablet TAKE ONE TABLET BY MOUTH DAILY 90 Tab 1   • ALPRAZolam (XANAX) 0.5 MG Tab Take 1 Tab by mouth 2 times a day as needed for Anxiety for up to 60 days. 60 Tab 0   • meloxicam (MOBIC) 7.5 MG Tab Take 1 Tab by mouth 2 Times a Day. 60 Tab 0     No current facility-administered medications for this visit.      She  has a past medical history of Asthma and Thyroid disease.    Social History and Family History were reviewed and updated.    ROS   No chest pain, no shortness of breath, no abdominal pain and all other systems were reviewed and are negative.       Objective:     Blood pressure 128/78, pulse 60, temperature 36.7 °C (98 °F), height 1.651 m (5' 5\"), weight (!) 145.6 kg (321 lb), last menstrual period 03/20/2016, SpO2 99 %. Body mass index is 53.42 kg/m².   Physical Exam:  Constitutional: Alert, no distress.  Skin: Warm, dry, good turgor, no rashes in visible areas.  Eye: Equal, round and reactive, conjunctiva clear, lids normal.  ENMT: Lips without lesions, good dentition, oropharynx clear.  Neck: Trachea midline, right mass noted of " thyroid.   Lymph: No cervical or supraclavicular lymphadenopathy  Respiratory: Unlabored respiratory effort, lungs clear to auscultation, no wheezes, no ronchi.  Cardiovascular: Normal S1, S2, no murmur, no edema.  Psych: Alert and oriented x3, normal affect and mood.        Assessment and Plan:   The following treatment plan was discussed    1. Morbid obesity with BMI of 50.0-59.9, adult (HCC)  Chronic condition. Worsening BMI. Uncontrolled. Follow-up with bariatric surgery. Advised she will need to be seen for several months with me. Also be seen by psychiatry.    2. Postoperative hypothyroidism  Chronic condition. Controlled. Continue Synthroid as directed. Notable thyroid mass which has been acknowledged by endocrinology. Continue to monitor.    3. Bilateral chronic knee pain  Chronic condition.  reviewed. Medication is appropriate for patient. Prescribed oxycodone 10 mg one tablet twice a day as needed for pain. Given a 90 day supply with separate monthly refills. Contract for pain control on file. Morphine milliequivalents at 20 daily.  - oxyCODONE-acetaminophen (PERCOCET-10)  MG Tab; Take 1 Tab by mouth 2 times a day as needed for Severe Pain for up to 30 days.  Dispense: 60 Tab; Refill: 0    4. Gastroesophageal reflux disease without esophagitis  Chronic condition. Stable. Renewed omeprazole as directed.  - omeprazole (PRILOSEC) 40 MG delayed-release capsule; Take 1 Cap by mouth every day.  Dispense: 90 Cap; Refill: 3    5. Nausea  Chronic condition. Intermittent. Likely secondary to reflux. Provided Zofran use as directed.  - ondansetron (ZOFRAN ODT) 4 MG TABLET DISPERSIBLE; Take 1 Tab by mouth every 8 hours as needed. AS NEEDED FOR NAUSEA AND VOMITING  Dispense: 15 Tab; Refill: 2    6. Visit for screening mammogram  Order mammogram screening.  - MA-SCREEN MAMMO W/CAD-BILAT; Future      Followup: Return in about 3 months (around 8/17/2018).    Please note that this dictation was created using voice  recognition software. I have made every reasonable attempt to correct obvious errors, but I expect that there are errors of grammar and possibly content that I did not discover before finalizing the note.

## 2018-05-17 NOTE — ASSESSMENT & PLAN NOTE
Takes Prilosec daily for chronic GERD symptoms. Medication controls her symptoms. Is requesting a refill.

## 2018-05-17 NOTE — ASSESSMENT & PLAN NOTE
Also requesting a refill of oxycodone. Her knee pain is continuous and chronic. No surgery options until she loses weight. She is follows with orthopedics. She is taking Percocet 10 mg twice daily. Requesting a refill. She is using a cane to help with her gait and knee pain. Right side is worse than the left.

## 2018-05-18 NOTE — TELEPHONE ENCOUNTER
----- Message from Sim Brownlee P.A.-C. sent at 5/17/2018  5:10 PM PDT -----  Please contact Alfonso.  X-ray right leg were normal except knee with severe arthritis.  Thank you.    Sim

## 2018-05-31 DIAGNOSIS — E89.0 POSTSURGICAL HYPOTHYROIDISM: ICD-10-CM

## 2018-06-01 RX ORDER — LEVOTHYROXINE SODIUM 0.03 MG/1
TABLET ORAL
Qty: 30 TAB | Refills: 0 | Status: SHIPPED | OUTPATIENT
Start: 2018-06-01 | End: 2018-07-12 | Stop reason: SDUPTHER

## 2018-06-04 RX ORDER — POTASSIUM CHLORIDE 600 MG/1
TABLET, FILM COATED, EXTENDED RELEASE ORAL
Qty: 90 TAB | Refills: 0 | Status: SHIPPED | OUTPATIENT
Start: 2018-06-04 | End: 2018-08-21 | Stop reason: SDUPTHER

## 2018-06-04 NOTE — TELEPHONE ENCOUNTER
Was the patient seen in the last year in this department? Yes , 05/17/2018    Does patient have an active prescription for medications requested? No     Received Request Via: Pharmacy

## 2018-06-14 ENCOUNTER — APPOINTMENT (OUTPATIENT)
Dept: MEDICAL GROUP | Facility: MEDICAL CENTER | Age: 63
End: 2018-06-14
Payer: COMMERCIAL

## 2018-06-15 ENCOUNTER — OFFICE VISIT (OUTPATIENT)
Dept: MEDICAL GROUP | Facility: MEDICAL CENTER | Age: 63
End: 2018-06-15
Payer: COMMERCIAL

## 2018-06-15 VITALS
OXYGEN SATURATION: 97 % | BODY MASS INDEX: 48.82 KG/M2 | SYSTOLIC BLOOD PRESSURE: 130 MMHG | HEART RATE: 68 BPM | TEMPERATURE: 98 F | WEIGHT: 293 LBS | DIASTOLIC BLOOD PRESSURE: 78 MMHG | HEIGHT: 65 IN

## 2018-06-15 DIAGNOSIS — E66.01 MORBID OBESITY WITH BMI OF 50.0-59.9, ADULT (HCC): ICD-10-CM

## 2018-06-15 DIAGNOSIS — F43.21 GRIEVING: ICD-10-CM

## 2018-06-15 DIAGNOSIS — F41.9 ANXIETY: ICD-10-CM

## 2018-06-15 DIAGNOSIS — M25.562 BILATERAL CHRONIC KNEE PAIN: ICD-10-CM

## 2018-06-15 DIAGNOSIS — R11.0 NAUSEA: ICD-10-CM

## 2018-06-15 DIAGNOSIS — G89.29 BILATERAL CHRONIC KNEE PAIN: ICD-10-CM

## 2018-06-15 DIAGNOSIS — M25.561 BILATERAL CHRONIC KNEE PAIN: ICD-10-CM

## 2018-06-15 PROBLEM — R21 FACIAL RASH: Status: RESOLVED | Noted: 2017-10-26 | Resolved: 2018-06-15

## 2018-06-15 PROCEDURE — 99214 OFFICE O/P EST MOD 30 MIN: CPT | Performed by: PHYSICIAN ASSISTANT

## 2018-06-15 RX ORDER — ALPRAZOLAM 0.5 MG/1
0.5 TABLET ORAL 2 TIMES DAILY PRN
Qty: 60 TAB | Refills: 2 | Status: SHIPPED | OUTPATIENT
Start: 2018-06-15 | End: 2018-08-14

## 2018-06-15 RX ORDER — OXYCODONE AND ACETAMINOPHEN 10; 325 MG/1; MG/1
1 TABLET ORAL 2 TIMES DAILY PRN
Qty: 60 TAB | Refills: 0 | Status: SHIPPED | OUTPATIENT
Start: 2018-06-15 | End: 2018-06-15 | Stop reason: SDUPTHER

## 2018-06-15 RX ORDER — OXYCODONE AND ACETAMINOPHEN 10; 325 MG/1; MG/1
1 TABLET ORAL 2 TIMES DAILY PRN
Qty: 60 TAB | Refills: 0 | Status: SHIPPED | OUTPATIENT
Start: 2018-07-15 | End: 2018-07-12 | Stop reason: SDUPTHER

## 2018-06-15 NOTE — ASSESSMENT & PLAN NOTE
Her  recently passed away. It was a rather sudden death. He  on . It appears that likely he had lung cancer. Metastasized to the liver and bones. At the time he was on hospice care. She has support with her daughters. She denies any worsening depression with her loss. She has been coping well.

## 2018-06-15 NOTE — LETTER
Zoe 15, 2018         Patient: Alfonso Posada   YOB: 1955   Date of Visit: 6/15/2018           To Whom it May Concern:    Alfonso Posada was seen in my clinic on 6/15/2018. We have been discussing weight loss options and nutritional benefits for greater than 3 months.     If you have any questions or concerns, please don't hesitate to call.        Sincerely,           Sim Brownlee P.A.-C.  Electronically Signed

## 2018-06-15 NOTE — ASSESSMENT & PLAN NOTE
She complains of worsening nausea since over the past couple weeks. Her insurance will only provide her 10 dispersed will tablets at a time. We discussed this in the past and she is taking omeprazole but she still has the exacerbation of nauseousness but denies any significant vomiting.

## 2018-06-15 NOTE — ASSESSMENT & PLAN NOTE
Her opioid medication is due tomorrow but she has been out of medication for a couple days. She is requesting early refill. She was told by the pharmacist that we may call in the early refill.

## 2018-06-15 NOTE — ASSESSMENT & PLAN NOTE
This is a 62-year-old female comes in today to discuss her recent visit with bariatric surgery and Dr. Ganser. She would like to follow through with a bariatric procedure and we have been talking for months and even years about her being a good candidate. She has chronic knee pain and other comorbidities that would improve if she were to lose weight. She had a good appointment with the surgeon. Has to follow up with a nutritionist as well as with behavioral health. For months we have talked about her nutrition as well as weight loss options. She does eat healthy. He tries to avoid fatty foods. Even though she has been monitoring her portion control she is unable to lose weight. Unable to exercise secondary to her chronic knee condition. She will likely have surgery once she is able lose weight.

## 2018-06-15 NOTE — PROGRESS NOTES
Subjective:   Alfonso Posada is a 62 y.o. female here today for morbid obesity and anxiety.    Morbid obesity with BMI of 50.0-59.9, adult (CMS-[facility])  This is a 62-year-old female comes in today to discuss her recent visit with bariatric surgery and Dr. Ganser. She would like to follow through with a bariatric procedure and we have been talking for months and even years about her being a good candidate. She has chronic knee pain and other comorbidities that would improve if she were to lose weight. She had a good appointment with the surgeon. Has to follow up with a nutritionist as well as with behavioral health. For months we have talked about her nutrition as well as weight loss options. She does eat healthy. He tries to avoid fatty foods. Even though she has been monitoring her portion control she is unable to lose weight. Unable to exercise secondary to her chronic knee condition. She will likely have surgery once she is able lose weight.    Grieving  Her  recently passed away. It was a rather sudden death. He  on . It appears that likely he had lung cancer. Metastasized to the liver and bones. At the time he was on hospice care. She has support with her daughters. She denies any worsening depression with her loss. She has been coping well.    Anxiety  She is out of her anxiety medication. Last prescription was almost 2 months ago. She takes approximately one tablet daily. Obviously with the recent passing of her  anxiety has been worse. She denies any homicidal or suicidal ideations.    Bilateral chronic knee pain  Her opioid medication is due tomorrow but she has been out of medication for a couple days. She is requesting early refill. She was told by the pharmacist that we may call in the early refill.    Nausea  She complains of worsening nausea since over the past couple weeks. Her insurance will only provide her 10 dispersed will tablets at a time. We discussed this in the past  and she is taking omeprazole but she still has the exacerbation of nauseousness but denies any significant vomiting.       Current medicines (including changes today)  Current Outpatient Prescriptions   Medication Sig Dispense Refill   • ALPRAZolam (XANAX) 0.5 MG Tab Take 1 Tab by mouth 2 times a day as needed for Anxiety for up to 60 days. 60 Tab 2   • [START ON 7/15/2018] oxyCODONE-acetaminophen (PERCOCET-10)  MG Tab Take 1 Tab by mouth 2 times a day as needed for Severe Pain for up to 30 days. 60 Tab 0   • potassium chloride (KLOR-CON) 8 MEQ tablet TAKE ONE TABLET BY MOUTH ONE TIME A DAY 90 Tab 0   • levothyroxine (SYNTHROID) 25 MCG Tab TAKE ONE TABLET BY MOUTH EVERY MORNING ON AN EMPTY STOMACH 30 Tab 0   • ondansetron (ZOFRAN ODT) 4 MG TABLET DISPERSIBLE Take 1 Tab by mouth every 8 hours as needed. AS NEEDED FOR NAUSEA AND VOMITING 15 Tab 2   • omeprazole (PRILOSEC) 40 MG delayed-release capsule Take 1 Cap by mouth every day. 90 Cap 3   • atorvastatin (LIPITOR) 40 MG Tab Take 1 Tab by mouth every day. TAKE ONE TABLET BY MOUTH DAILY 90 Tab 3   • metoprolol (LOPRESSOR) 50 MG Tab TAKE ONE TABLET BY MOUTH TWICE A  Tab 3   • meloxicam (MOBIC) 7.5 MG Tab Take 1 Tab by mouth 2 Times a Day. 60 Tab 0   • losartan (COZAAR) 25 MG Tab TAKE ONE TABLET BY MOUTH DAILY 90 Tab 1   • furosemide (LASIX) 40 MG Tab TAKE ONE TABLET BY MOUTH DAILY 90 Tab 1   • montelukast (SINGULAIR) 10 MG Tab Take 1 Tab by mouth every day. TAKE ONE TABLET BY MOUTH DAILY 90 Tab 3   • sertraline (ZOLOFT) 100 MG Tab Take 1 Tab by mouth every day. 90 Tab 3     No current facility-administered medications for this visit.      She  has a past medical history of Asthma and Thyroid disease.    Social History and Family History were reviewed and updated.    ROS   No chest pain, no shortness of breath, no abdominal pain and all other systems were reviewed and are negative.       Objective:     Blood pressure 130/78, pulse 68, temperature 36.7 °C  "(98 °F), height 1.651 m (5' 5\"), weight (!) 146 kg (321 lb 14 oz), last menstrual period 03/20/2016, SpO2 97 %. Body mass index is 53.56 kg/m².   Physical Exam:  Constitutional: Alert, no distress. Walking with a cane.  Skin: Warm, dry, good turgor, no rashes in visible areas.  Eye: Equal, round and reactive, conjunctiva clear, lids normal.  ENMT: Lips without lesions, good dentition, oropharynx clear.  Neck: Trachea midline, no masses.   Lymph: No cervical or supraclavicular lymphadenopathy  Respiratory: Unlabored respiratory effort, lungs appear clear, no wheezes.  Cardiovascular: Normal S1, S2, no murmur, no edema.  Psych: Alert and oriented x3, normal affect and mood.        Assessment and Plan:   The following treatment plan was discussed    1. Morbid obesity with BMI of 50.0-59.9, adult (HCC)  Chronic condition. Stable. Faxed over a letter to her bariatric surgeon regarding seen her for least 3 months discussing weight loss and nutrition. There are also forms that need to be completed I will likely fill those out and back take them and I seen her monthly.    2. Grieving  Acute, new onset condition. Status post  passing. Stable condition. We'll monitor.    3. Anxiety  Condition with recent exacerbation status post loss of . Renewed Xanax 0.5 mg one tablet twice a day as needed. Last prescription lasted almost 2 months. Expect the same with this prescription.  reviewed. Medications appropriate for patient. Black box warning discussed regarding taking opioids and benzos. She understands.  - ALPRAZolam (XANAX) 0.5 MG Tab; Take 1 Tab by mouth 2 times a day as needed for Anxiety for up to 60 days.  Dispense: 60 Tab; Refill: 2    4. Bilateral chronic knee pain  Chronic condition. Unstable. Pharmacy requesting prescription only. Renewed Percocet to start today and gave another refill. She is to see me in 2 months.  reviewed. Medications appropriate for patient. Controlled substance agreement on " file.  - oxyCODONE-acetaminophen (PERCOCET-10)  MG Tab; Take 1 Tab by mouth 2 times a day as needed for Severe Pain for up to 30 days.  Dispense: 60 Tab; Refill: 0    5. Nausea  Chronic condition likely secondary to GERD. She has another refill of Zofran which she may  at the pharmacy.    Patient was seen for 25 minutes face to face of which > 50% of appointment time was spent on counseling and coordination of care regarding the above.    Followup: Return in about 4 weeks (around 7/13/2018), or if symptoms worsen or fail to improve.    Please note that this dictation was created using voice recognition software. I have made every reasonable attempt to correct obvious errors, but I expect that there are errors of grammar and possibly content that I did not discover before finalizing the note.

## 2018-06-27 ENCOUNTER — OFFICE VISIT (OUTPATIENT)
Dept: MEDICAL GROUP | Facility: PHYSICIAN GROUP | Age: 63
End: 2018-06-27
Payer: COMMERCIAL

## 2018-06-27 VITALS
OXYGEN SATURATION: 96 % | RESPIRATION RATE: 18 BRPM | DIASTOLIC BLOOD PRESSURE: 82 MMHG | HEART RATE: 76 BPM | BODY MASS INDEX: 53.42 KG/M2 | SYSTOLIC BLOOD PRESSURE: 128 MMHG | WEIGHT: 293 LBS | TEMPERATURE: 98.9 F

## 2018-06-27 DIAGNOSIS — H69.93 DISORDER OF BOTH EUSTACHIAN TUBES: ICD-10-CM

## 2018-06-27 DIAGNOSIS — J01.01 ACUTE RECURRENT MAXILLARY SINUSITIS: ICD-10-CM

## 2018-06-27 PROCEDURE — 99214 OFFICE O/P EST MOD 30 MIN: CPT | Performed by: FAMILY MEDICINE

## 2018-06-27 RX ORDER — AZITHROMYCIN 250 MG/1
TABLET, FILM COATED ORAL
Qty: 6 TAB | Refills: 0 | Status: SHIPPED | OUTPATIENT
Start: 2018-06-27 | End: 2018-07-12

## 2018-06-28 NOTE — ASSESSMENT & PLAN NOTE
She has sinus symptoms for about two weeks.  She has increased green nasal discharge that is constant.  She is congested at night and she reports facial pain and her ears are plugged.  The symptoms have not improved.  She is using flonase and afrin as needed, she is also taking zyrtec.

## 2018-06-28 NOTE — PROGRESS NOTES
CC:sinus symptoms ear pain    HISTORY OF PRESENT ILLNESS: Patient is a 62 y.o. female established patient who presents today to discuss the following    Health Maintenance: deferred    Acute recurrent maxillary sinusitis  She has sinus symptoms for about two weeks.  She has increased green nasal discharge that is constant.  She is congested at night and she reports facial pain and her ears are plugged.  The symptoms have not improved.  She is using flonase and afrin as needed, she is also taking zyrtec.      Disorder of both eustachian tubes  Ear pain: She also has bilateral ear pain that has been present for several weeks.  She describes a fullness that is constant.  She has not noted any discharge and has not had any fevers.      Patient Active Problem List    Diagnosis Date Noted   • Grieving 06/15/2018   • Hyperlipidemia 04/17/2018   • Primary insomnia 01/19/2018   • Mild intermittent asthma without complication 01/19/2018   • Chronic use of opiate for therapeutic purpose 01/19/2018   • Right leg pain 10/26/2017   • Acute recurrent maxillary sinusitis 08/03/2017   • Morbid obesity with BMI of 50.0-59.9, adult (HCC) 07/03/2017   • Chronic pain in right shoulder 06/02/2017   • Dry skin 05/19/2017   • Nausea 04/03/2017   • Gastroesophageal reflux disease without esophagitis 03/03/2017   • Postoperative hypothyroidism 02/03/2017   • Anxiety 02/03/2017   • Postherpetic neuralgia 02/03/2017   • Bilateral chronic knee pain 02/03/2017   • Arthritis 02/03/2017   • Allergic rhinitis 02/03/2017   • Essential hypertension 02/03/2017   • Tobacco dependence 02/03/2017      Allergies:Latex; Pcn [penicillins]; and Sulfa drugs    Current Outpatient Prescriptions   Medication Sig Dispense Refill   • azithromycin (ZITHROMAX) 250 MG Tab Take 2 tabs on day one and one tab for four days after for a total of 5 days 6 Tab 0   • ALPRAZolam (XANAX) 0.5 MG Tab Take 1 Tab by mouth 2 times a day as needed for Anxiety for up to 60 days. 60  Tab 2   • [START ON 7/15/2018] oxyCODONE-acetaminophen (PERCOCET-10)  MG Tab Take 1 Tab by mouth 2 times a day as needed for Severe Pain for up to 30 days. 60 Tab 0   • potassium chloride (KLOR-CON) 8 MEQ tablet TAKE ONE TABLET BY MOUTH ONE TIME A DAY 90 Tab 0   • levothyroxine (SYNTHROID) 25 MCG Tab TAKE ONE TABLET BY MOUTH EVERY MORNING ON AN EMPTY STOMACH 30 Tab 0   • ondansetron (ZOFRAN ODT) 4 MG TABLET DISPERSIBLE Take 1 Tab by mouth every 8 hours as needed. AS NEEDED FOR NAUSEA AND VOMITING 15 Tab 2   • omeprazole (PRILOSEC) 40 MG delayed-release capsule Take 1 Cap by mouth every day. 90 Cap 3   • atorvastatin (LIPITOR) 40 MG Tab Take 1 Tab by mouth every day. TAKE ONE TABLET BY MOUTH DAILY 90 Tab 3   • metoprolol (LOPRESSOR) 50 MG Tab TAKE ONE TABLET BY MOUTH TWICE A  Tab 3   • meloxicam (MOBIC) 7.5 MG Tab Take 1 Tab by mouth 2 Times a Day. 60 Tab 0   • losartan (COZAAR) 25 MG Tab TAKE ONE TABLET BY MOUTH DAILY 90 Tab 1   • furosemide (LASIX) 40 MG Tab TAKE ONE TABLET BY MOUTH DAILY 90 Tab 1   • montelukast (SINGULAIR) 10 MG Tab Take 1 Tab by mouth every day. TAKE ONE TABLET BY MOUTH DAILY 90 Tab 3   • sertraline (ZOLOFT) 100 MG Tab Take 1 Tab by mouth every day. 90 Tab 3     No current facility-administered medications for this visit.        Social History   Substance Use Topics   • Smoking status: Current Every Day Smoker     Packs/day: 0.50     Start date: 3/4/2016   • Smokeless tobacco: Never Used      Comment: 10 cigs   • Alcohol use 1.8 - 3.0 oz/week     3 - 5 Glasses of wine per week      Comment: casual     Social History     Social History Narrative   • No narrative on file       History reviewed. No pertinent family history.    Review of Systems:      - Constitutional: Negative for fever, chills, unexpected weight change, and fatigue/generalized weakness.       - Respiratory: Negative for cough, sputum production, chest congestion, dyspnea, wheezing, and crackles.          Exam:     Blood pressure 128/82, pulse 76, temperature 37.2 °C (98.9 °F), resp. rate 18, weight (!) 145.6 kg (321 lb), last menstrual period 03/20/2016, SpO2 96 %. Body mass index is 53.42 kg/m².    General:  Well nourished, well developed female in NAD  Physical Exam  General: mildly ill appearing in no apparent distress  Head: atraumatic, normocephalic  Eyes: normal lids and conjunctiva, pupils equally reactive and responsive to light and accomodation  Ears: normal external ears, left auditory canal with mild abrasion and no discharge right canal normal, normal TM bilaterally  Nose: mild mucosal edema, clear rhinorrhea, sinuses moderately tender to palpation in frontal and maxillary region  Mouth: normal oral mucosa, uvula midline, tonsils normal without erythema and exudate  Neck: supple, no nuchal rigidity, no palpable submandibular or cervical lymph nodes  Heart: Rate and rhythm are normal, no extra sounds, no pedal edema  Lungs: Normal effort and rate, lungs clear to auscultation and percussion bilaterally  Abdomen: no tenderness, no distension, no organomegaly    Please note that this dictation was created using voice recognition software. I have made every reasonable attempt to correct obvious errors, but I expect that there are errors of grammar and possibly content that I did not discover before finalizing the note.    Assessment/Plan:  1. Acute recurrent maxillary sinusitis  She does have symptoms of acute sinusitis and has been treated for the same four months ago.  She improved in the past on azithromycin and would like to take this again.  She was advised that this is not the preferred sinusitis medication but as she has improved with it we will start with that.  If symptoms persist she will contact the office and we can try an alternate medication.    - azithromycin (ZITHROMAX) 250 MG Tab; Take 2 tabs on day one and one tab for four days after for a total of 5 days  Dispense: 6 Tab; Refill: 0    2. Disorder  of both eustachian tubes  She is reassured that her ear pain is not due to an ear infection.  This is likely eustachian tube dysfunction due to the sinus condition.  There is a mild abrasion but it is not infected.  Continue symptomatic care and treat underlying sinus infection.

## 2018-07-02 ENCOUNTER — TELEPHONE (OUTPATIENT)
Dept: MEDICAL GROUP | Facility: PHYSICIAN GROUP | Age: 63
End: 2018-07-02

## 2018-07-02 DIAGNOSIS — J01.00 ACUTE NON-RECURRENT MAXILLARY SINUSITIS: ICD-10-CM

## 2018-07-02 RX ORDER — DOXYCYCLINE HYCLATE 100 MG
100 TABLET ORAL 2 TIMES DAILY
Qty: 14 TAB | Refills: 0 | Status: SHIPPED | OUTPATIENT
Start: 2018-07-02 | End: 2018-07-09

## 2018-07-02 NOTE — TELEPHONE ENCOUNTER
Prescription for doxycycline sent for 7 days.  If this medication does not work, I would like her to be seen in the office again.  Thanks

## 2018-07-02 NOTE — TELEPHONE ENCOUNTER
Patient called said that she was a week ago and was seen for sinus.  The Z pack didn't work and was told that she could call in for a different antibiotic.     Patient is allergic to penicillin and some sulfa drugs.    She uses [facility] in [place].

## 2018-07-09 RX ORDER — TRAMADOL HYDROCHLORIDE 50 MG/1
TABLET ORAL
Refills: 0 | OUTPATIENT
Start: 2018-07-09

## 2018-07-12 ENCOUNTER — OFFICE VISIT (OUTPATIENT)
Dept: MEDICAL GROUP | Facility: MEDICAL CENTER | Age: 63
End: 2018-07-12
Payer: COMMERCIAL

## 2018-07-12 VITALS
DIASTOLIC BLOOD PRESSURE: 84 MMHG | OXYGEN SATURATION: 97 % | BODY MASS INDEX: 48.82 KG/M2 | HEART RATE: 58 BPM | SYSTOLIC BLOOD PRESSURE: 138 MMHG | WEIGHT: 293 LBS | HEIGHT: 65 IN | TEMPERATURE: 98.6 F

## 2018-07-12 DIAGNOSIS — M25.562 BILATERAL CHRONIC KNEE PAIN: ICD-10-CM

## 2018-07-12 DIAGNOSIS — E66.01 MORBID OBESITY WITH BMI OF 50.0-59.9, ADULT (HCC): ICD-10-CM

## 2018-07-12 DIAGNOSIS — E89.0 POSTOPERATIVE HYPOTHYROIDISM: ICD-10-CM

## 2018-07-12 DIAGNOSIS — E89.0 POSTSURGICAL HYPOTHYROIDISM: ICD-10-CM

## 2018-07-12 DIAGNOSIS — M25.561 BILATERAL CHRONIC KNEE PAIN: ICD-10-CM

## 2018-07-12 DIAGNOSIS — G89.29 BILATERAL CHRONIC KNEE PAIN: ICD-10-CM

## 2018-07-12 PROBLEM — F43.21 GRIEVING: Status: RESOLVED | Noted: 2018-06-15 | Resolved: 2018-07-12

## 2018-07-12 PROCEDURE — 99214 OFFICE O/P EST MOD 30 MIN: CPT | Performed by: PHYSICIAN ASSISTANT

## 2018-07-12 RX ORDER — OXYCODONE AND ACETAMINOPHEN 10; 325 MG/1; MG/1
1 TABLET ORAL 2 TIMES DAILY PRN
Qty: 60 TAB | Refills: 0 | Status: SHIPPED | OUTPATIENT
Start: 2018-07-13 | End: 2018-08-09 | Stop reason: SDUPTHER

## 2018-07-12 RX ORDER — TRAMADOL HYDROCHLORIDE 50 MG/1
50 TABLET ORAL EVERY 4 HOURS PRN
COMMUNITY
End: 2018-07-12 | Stop reason: SDUPTHER

## 2018-07-12 RX ORDER — TRAMADOL HYDROCHLORIDE 50 MG/1
50 TABLET ORAL EVERY 4 HOURS PRN
Qty: 90 TAB | Refills: 0 | Status: SHIPPED | OUTPATIENT
Start: 2018-07-12 | End: 2018-08-09 | Stop reason: SDUPTHER

## 2018-07-12 RX ORDER — LEVOTHYROXINE SODIUM 0.03 MG/1
25 TABLET ORAL
Qty: 90 TAB | Refills: 3 | Status: ON HOLD | OUTPATIENT
Start: 2018-07-12 | End: 2018-10-21 | Stop reason: CLARIF

## 2018-07-12 RX ORDER — PHENTERMINE HYDROCHLORIDE 15 MG/1
15 CAPSULE ORAL EVERY MORNING
Qty: 30 CAP | Refills: 0 | Status: SHIPPED | OUTPATIENT
Start: 2018-07-12 | End: 2018-08-09

## 2018-07-12 NOTE — ASSESSMENT & PLAN NOTE
She is also here today to discuss her chronic bilateral knee pain. She recently received injections into her knees are knees are slightly improved today. Takes oxycodone and tramadol. She is going out of town and will be leaving earlier was requesting early refill of her medication. Uses a cane and has difficulty walking still. When she eventually has lost weight. Status post surgery on the knees.

## 2018-07-12 NOTE — PROGRESS NOTES
Subjective:   Alfonso Posada is a 62 y.o. female here today for her obesity and chronic bilateral knee pain.    Morbid obesity with BMI of 50.0-59.9, adult (CMS-[facility])  This is a 62-year-old female who is here today to discuss her OB status. Her bariatric surgery and is requesting her to lose 22 pounds. Likely to be at 300 or below. She is requesting a hunger suppressant. She will see psychiatry soon as asked for by bariatric surgery. She states since the death of her , Charli, she's been more active. Eating healthier. She states during the last 3-6 months of his life he was grumpy a lot of she confided this with me. She believes that was all because of his metastatic disease. She is unable to exercise because of her bilateral knee pain.    Postoperative hypothyroidism  States that she hasn't had medication for last couple days. Requesting a refill of Synthroid. Thyroid labs have been stable for a while. She usually will see her endocrinologist. Had her thyroid removed in 2005.    Bilateral chronic knee pain  She is also here today to discuss her chronic bilateral knee pain. She recently received injections into her knees are knees are slightly improved today. Takes oxycodone and tramadol. She is going out of town and will be leaving earlier was requesting early refill of her medication. Uses a cane and has difficulty walking still. When she eventually has lost weight. Status post surgery on the knees.        Current medicines (including changes today)  Current Outpatient Prescriptions   Medication Sig Dispense Refill   • levothyroxine (SYNTHROID) 25 MCG Tab Take 1 Tab by mouth Every morning on an empty stomach. ON EMPTY STOMACH 90 Tab 3   • phentermine 15 MG capsule Take 1 Cap by mouth every morning for 30 days. 30 Cap 0   • [START ON 7/13/2018] oxyCODONE-acetaminophen (PERCOCET-10)  MG Tab Take 1 Tab by mouth 2 times a day as needed for Severe Pain for up to 30 days. 60 Tab 0   • tramadol (ULTRAM) 50  "MG Tab Take 1 Tab by mouth every four hours as needed for up to 30 days. 90 Tab 0   • ALPRAZolam (XANAX) 0.5 MG Tab Take 1 Tab by mouth 2 times a day as needed for Anxiety for up to 60 days. 60 Tab 2   • potassium chloride (KLOR-CON) 8 MEQ tablet TAKE ONE TABLET BY MOUTH ONE TIME A DAY 90 Tab 0   • ondansetron (ZOFRAN ODT) 4 MG TABLET DISPERSIBLE Take 1 Tab by mouth every 8 hours as needed. AS NEEDED FOR NAUSEA AND VOMITING 15 Tab 2   • omeprazole (PRILOSEC) 40 MG delayed-release capsule Take 1 Cap by mouth every day. 90 Cap 3   • atorvastatin (LIPITOR) 40 MG Tab Take 1 Tab by mouth every day. TAKE ONE TABLET BY MOUTH DAILY 90 Tab 3   • metoprolol (LOPRESSOR) 50 MG Tab TAKE ONE TABLET BY MOUTH TWICE A  Tab 3   • meloxicam (MOBIC) 7.5 MG Tab Take 1 Tab by mouth 2 Times a Day. 60 Tab 0   • losartan (COZAAR) 25 MG Tab TAKE ONE TABLET BY MOUTH DAILY 90 Tab 1   • furosemide (LASIX) 40 MG Tab TAKE ONE TABLET BY MOUTH DAILY 90 Tab 1   • montelukast (SINGULAIR) 10 MG Tab Take 1 Tab by mouth every day. TAKE ONE TABLET BY MOUTH DAILY 90 Tab 3   • sertraline (ZOLOFT) 100 MG Tab Take 1 Tab by mouth every day. 90 Tab 3     No current facility-administered medications for this visit.      She  has a past medical history of Asthma and Thyroid disease.    Social History and Family History were reviewed and updated.    ROS   No chest pain, no shortness of breath, no abdominal pain and all other systems were reviewed and are negative.       Objective:     Blood pressure 138/84, pulse (!) 58, temperature 37 °C (98.6 °F), height 1.651 m (5' 5\"), weight (!) 144.2 kg (318 lb), last menstrual period 03/20/2016, SpO2 97 %. Body mass index is 52.92 kg/m².   Physical Exam:  Constitutional: Alert, no distress.  Skin: Warm, dry, good turgor, no rashes in visible areas.  Eye: Equal, round and reactive, conjunctiva clear, lids normal.  ENMT: Lips without lesions, good dentition, oropharynx clear.  Neck: Trachea midline, no masses. "   Lymph: No cervical or supraclavicular lymphadenopathy  Respiratory: Unlabored respiratory effort, lungs appear clear, no wheezes.  Cardiovascular: Normal S1, S2, no murmur, no edema.  Psych: Alert and oriented x3, normal affect and mood.        Assessment and Plan:   The following treatment plan was discussed    1. Morbid obesity with BMI of 50.0-59.9, adult (HCC)  Chronic condition. Goal is 300 pounds. Discussed phentermine being a controlled substance. Discussed with side effect being tachycardia. We'll start a low dose of phentermine 15 mg.  already in place. Follow-up in one month.  - phentermine 15 MG capsule; Take 1 Cap by mouth every morning for 30 days.  Dispense: 30 Cap; Refill: 0    2. Postoperative hypothyroidism  Chronic condition. Stable. Renewed Synthroid 25 µg. Recent TSH values were within normal limits.  - levothyroxine (SYNTHROID) 25 MCG Tab; Take 1 Tab by mouth Every morning on an empty stomach. ON EMPTY STOMACH  Dispense: 90 Tab; Refill: 3    3. Bilateral chronic knee pain  Chronic condition. Hopefully the weight loss and possibly her pending procedure she will have surgical intervention one day.  was reviewed. Medication is appropriate for patient. Renewed oxycodone and tramadol as directed. Morphine milliequivalents currently at 45.  - oxyCODONE-acetaminophen (PERCOCET-10)  MG Tab; Take 1 Tab by mouth 2 times a day as needed for Severe Pain for up to 30 days.  Dispense: 60 Tab; Refill: 0  - tramadol (ULTRAM) 50 MG Tab; Take 1 Tab by mouth every four hours as needed for up to 30 days.  Dispense: 90 Tab; Refill: 0      Followup: Return if symptoms worsen or fail to improve, for appointment already in place.    Please note that this dictation was created using voice recognition software. I have made every reasonable attempt to correct obvious errors, but I expect that there are errors of grammar and possibly content that I did not discover before finalizing the note.

## 2018-07-12 NOTE — ASSESSMENT & PLAN NOTE
States that she hasn't had medication for last couple days. Requesting a refill of Synthroid. Thyroid labs have been stable for a while. She usually will see her endocrinologist. Had her thyroid removed in 2005.

## 2018-07-12 NOTE — ASSESSMENT & PLAN NOTE
This is a 62-year-old female who is here today to discuss her OB status. Her bariatric surgery and is requesting her to lose 22 pounds. Likely to be at 300 or below. She is requesting a hunger suppressant. She will see psychiatry soon as asked for by bariatric surgery. She states since the death of her , Charli, she's been more active. Eating healthier. She states during the last 3-6 months of his life he was grumpy a lot of she confided this with me. She believes that was all because of his metastatic disease. She is unable to exercise because of her bilateral knee pain.

## 2018-08-02 RX ORDER — FUROSEMIDE 40 MG/1
TABLET ORAL
Qty: 90 TAB | Refills: 0 | Status: SHIPPED | OUTPATIENT
Start: 2018-08-02 | End: 2018-11-13 | Stop reason: SDUPTHER

## 2018-08-02 RX ORDER — LOSARTAN POTASSIUM 25 MG/1
TABLET ORAL
Qty: 90 TAB | Refills: 0 | Status: ON HOLD | OUTPATIENT
Start: 2018-08-02 | End: 2018-10-21 | Stop reason: CLARIF

## 2018-08-09 ENCOUNTER — OFFICE VISIT (OUTPATIENT)
Dept: MEDICAL GROUP | Facility: MEDICAL CENTER | Age: 63
End: 2018-08-09
Payer: COMMERCIAL

## 2018-08-09 VITALS
HEART RATE: 69 BPM | BODY MASS INDEX: 48.82 KG/M2 | DIASTOLIC BLOOD PRESSURE: 80 MMHG | SYSTOLIC BLOOD PRESSURE: 140 MMHG | HEIGHT: 65 IN | TEMPERATURE: 98.1 F | WEIGHT: 293 LBS | OXYGEN SATURATION: 96 %

## 2018-08-09 DIAGNOSIS — M25.561 BILATERAL CHRONIC KNEE PAIN: ICD-10-CM

## 2018-08-09 DIAGNOSIS — G89.29 BILATERAL CHRONIC KNEE PAIN: ICD-10-CM

## 2018-08-09 DIAGNOSIS — M25.562 BILATERAL CHRONIC KNEE PAIN: ICD-10-CM

## 2018-08-09 DIAGNOSIS — E66.01 MORBID OBESITY WITH BMI OF 50.0-59.9, ADULT (HCC): ICD-10-CM

## 2018-08-09 PROCEDURE — 99214 OFFICE O/P EST MOD 30 MIN: CPT | Performed by: PHYSICIAN ASSISTANT

## 2018-08-09 RX ORDER — TRAMADOL HYDROCHLORIDE 50 MG/1
50 TABLET ORAL EVERY 4 HOURS PRN
Qty: 90 TAB | Refills: 0 | Status: SHIPPED | OUTPATIENT
Start: 2018-08-10 | End: 2018-09-04 | Stop reason: SDUPTHER

## 2018-08-09 RX ORDER — PHENTERMINE HYDROCHLORIDE 30 MG/1
30 CAPSULE ORAL EVERY MORNING
Qty: 30 CAP | Refills: 0 | Status: SHIPPED | OUTPATIENT
Start: 2018-08-09 | End: 2018-09-04

## 2018-08-09 RX ORDER — OXYCODONE AND ACETAMINOPHEN 10; 325 MG/1; MG/1
1 TABLET ORAL 2 TIMES DAILY PRN
Qty: 60 TAB | Refills: 0 | Status: SHIPPED | OUTPATIENT
Start: 2018-08-10 | End: 2018-09-04 | Stop reason: SDUPTHER

## 2018-08-09 NOTE — ASSESSMENT & PLAN NOTE
This is a 62-year-old female who is here today to discuss obesity. I started her on phentermine 15 mg. She has had little change in her normal routine. She is unable to exercise because of the knee but she is active. Her diet has been stable. She is expected to lose 25 pounds and has lost currently about 10 pounds. Currently she is going through steps with her bariatric surgeon with doing online courses. The next few weeks she will see psychiatry.

## 2018-08-09 NOTE — ASSESSMENT & PLAN NOTE
Knee pain is stable. Eventually after her bariatric surgery she will consider possibly surgery on the right knee. Pain has been worse lately because she has been more active outside the home with chores. She is requesting a renewal of oxycodone and tramadol. Medications are due tomorrow.

## 2018-08-09 NOTE — PROGRESS NOTES
Subjective:   Alfonso Posada is a 62 y.o. female here today for obesity and chronic knee pain.    Morbid obesity with BMI of 50.0-59.9, adult (CMS-[facility])  This is a 62-year-old female who is here today to discuss obesity. I started her on phentermine 15 mg. She has had little change in her normal routine. She is unable to exercise because of the knee but she is active. Her diet has been stable. She is expected to lose 25 pounds and has lost currently about 10 pounds. Currently she is going through steps with her bariatric surgeon with doing online courses. The next few weeks she will see psychiatry.    Bilateral chronic knee pain  Knee pain is stable. Eventually after her bariatric surgery she will consider possibly surgery on the right knee. Pain has been worse lately because she has been more active outside the home with chores. She is requesting a renewal of oxycodone and tramadol. Medications are due tomorrow.       Current medicines (including changes today)  Current Outpatient Prescriptions   Medication Sig Dispense Refill   • phentermine 30 MG capsule Take 1 Cap by mouth every morning for 30 days. 30 Cap 0   • [START ON 8/10/2018] oxyCODONE-acetaminophen (PERCOCET-10)  MG Tab Take 1 Tab by mouth 2 times a day as needed for Severe Pain for up to 30 days. 60 Tab 0   • [START ON 8/10/2018] tramadol (ULTRAM) 50 MG Tab Take 1 Tab by mouth every four hours as needed for up to 30 days. 90 Tab 0   • losartan (COZAAR) 25 MG Tab TAKE ONE TABLET BY MOUTH DAILY 90 Tab 0   • furosemide (LASIX) 40 MG Tab TAKE ONE TABLET BY MOUTH DAILY 90 Tab 0   • levothyroxine (SYNTHROID) 25 MCG Tab Take 1 Tab by mouth Every morning on an empty stomach. ON EMPTY STOMACH 90 Tab 3   • ALPRAZolam (XANAX) 0.5 MG Tab Take 1 Tab by mouth 2 times a day as needed for Anxiety for up to 60 days. 60 Tab 2   • potassium chloride (KLOR-CON) 8 MEQ tablet TAKE ONE TABLET BY MOUTH ONE TIME A DAY 90 Tab 0   • ondansetron (ZOFRAN ODT) 4 MG TABLET  "DISPERSIBLE Take 1 Tab by mouth every 8 hours as needed. AS NEEDED FOR NAUSEA AND VOMITING 15 Tab 2   • omeprazole (PRILOSEC) 40 MG delayed-release capsule Take 1 Cap by mouth every day. 90 Cap 3   • atorvastatin (LIPITOR) 40 MG Tab Take 1 Tab by mouth every day. TAKE ONE TABLET BY MOUTH DAILY 90 Tab 3   • metoprolol (LOPRESSOR) 50 MG Tab TAKE ONE TABLET BY MOUTH TWICE A  Tab 3   • meloxicam (MOBIC) 7.5 MG Tab Take 1 Tab by mouth 2 Times a Day. 60 Tab 0   • montelukast (SINGULAIR) 10 MG Tab Take 1 Tab by mouth every day. TAKE ONE TABLET BY MOUTH DAILY 90 Tab 3   • sertraline (ZOLOFT) 100 MG Tab Take 1 Tab by mouth every day. 90 Tab 3     No current facility-administered medications for this visit.      She  has a past medical history of Asthma and Thyroid disease.    Social History and Family History were reviewed and updated.    ROS   No chest pain, no shortness of breath, no abdominal pain and all other systems were reviewed and are negative.       Objective:     Blood pressure 140/80, pulse 69, temperature 36.7 °C (98.1 °F), height 1.651 m (5' 5\"), weight (!) 143.3 kg (316 lb), last menstrual period 03/20/2016, SpO2 96 %. Body mass index is 52.59 kg/m².   Physical Exam:  Constitutional: Alert, no distress.  Skin: Warm, dry, good turgor, no rashes in visible areas.  Eye: Equal, round and reactive, conjunctiva clear, lids normal.  ENMT: Lips without lesions, good dentition, oropharynx clear.  Neck: Trachea midline, no masses.   Lymph: No cervical or supraclavicular lymphadenopathy  Respiratory: Unlabored respiratory effort, lungs appear clear, no wheezes.  Cardiovascular: Normal S1, S2, no murmur, no edema.  Abdomen: Soft, non-tender, no masses.  Psych: Alert and oriented x3, normal affect and mood.        Assessment and Plan:   The following treatment plan was discussed    1. Morbid obesity with BMI of 50.0-59.9, adult (HCC)  Chronic condition. We'll increase phentermine to 30 mg daily. Follow-up in one " month.  reviewed. Medication is appropriate. Follow with bariatric.  - phentermine 30 MG capsule; Take 1 Cap by mouth every morning for 30 days.  Dispense: 30 Cap; Refill: 0    2. Bilateral chronic knee pain  Chronic condition.  reviewed. Medication is appropriate. Controlled substance agreement on file. Prescribed Percocet 10 mg tablet one tablet twice a day as needed. Also given tramadol one tablet every 4 hours as needed. Given a 30 day supply. Morphine milliequivalents currently at 30.  - oxyCODONE-acetaminophen (PERCOCET-10)  MG Tab; Take 1 Tab by mouth 2 times a day as needed for Severe Pain for up to 30 days.  Dispense: 60 Tab; Refill: 0  - tramadol (ULTRAM) 50 MG Tab; Take 1 Tab by mouth every four hours as needed for up to 30 days.  Dispense: 90 Tab; Refill: 0    In follow-up we will ask again about mammogram.    Followup: Return in about 4 weeks (around 9/6/2018), or if symptoms worsen or fail to improve.    Please note that this dictation was created using voice recognition software. I have made every reasonable attempt to correct obvious errors, but I expect that there are errors of grammar and possibly content that I did not discover before finalizing the note.

## 2018-08-21 RX ORDER — POTASSIUM CHLORIDE 600 MG/1
TABLET, FILM COATED, EXTENDED RELEASE ORAL
Qty: 90 TAB | Refills: 0 | Status: SHIPPED | OUTPATIENT
Start: 2018-08-21 | End: 2018-11-27

## 2018-09-04 ENCOUNTER — OFFICE VISIT (OUTPATIENT)
Dept: MEDICAL GROUP | Facility: MEDICAL CENTER | Age: 63
End: 2018-09-04
Payer: COMMERCIAL

## 2018-09-04 VITALS
DIASTOLIC BLOOD PRESSURE: 80 MMHG | SYSTOLIC BLOOD PRESSURE: 132 MMHG | HEIGHT: 65 IN | HEART RATE: 63 BPM | OXYGEN SATURATION: 96 % | BODY MASS INDEX: 48.82 KG/M2 | TEMPERATURE: 97 F | WEIGHT: 293 LBS

## 2018-09-04 DIAGNOSIS — M25.562 BILATERAL CHRONIC KNEE PAIN: ICD-10-CM

## 2018-09-04 DIAGNOSIS — M79.605 ACUTE LEG PAIN, LEFT: ICD-10-CM

## 2018-09-04 DIAGNOSIS — E66.01 MORBID OBESITY WITH BMI OF 50.0-59.9, ADULT (HCC): ICD-10-CM

## 2018-09-04 DIAGNOSIS — R11.0 NAUSEA: ICD-10-CM

## 2018-09-04 DIAGNOSIS — M25.561 BILATERAL CHRONIC KNEE PAIN: ICD-10-CM

## 2018-09-04 DIAGNOSIS — Z23 INFLUENZA VACCINE NEEDED: ICD-10-CM

## 2018-09-04 DIAGNOSIS — G89.29 BILATERAL CHRONIC KNEE PAIN: ICD-10-CM

## 2018-09-04 PROCEDURE — 90686 IIV4 VACC NO PRSV 0.5 ML IM: CPT | Performed by: PHYSICIAN ASSISTANT

## 2018-09-04 PROCEDURE — 99214 OFFICE O/P EST MOD 30 MIN: CPT | Mod: 25 | Performed by: PHYSICIAN ASSISTANT

## 2018-09-04 PROCEDURE — 90471 IMMUNIZATION ADMIN: CPT | Performed by: PHYSICIAN ASSISTANT

## 2018-09-04 RX ORDER — OXYCODONE AND ACETAMINOPHEN 10; 325 MG/1; MG/1
1 TABLET ORAL 2 TIMES DAILY PRN
Qty: 60 TAB | Refills: 0 | Status: SHIPPED | OUTPATIENT
Start: 2018-09-07 | End: 2018-10-01 | Stop reason: SDUPTHER

## 2018-09-04 RX ORDER — PHENTERMINE HYDROCHLORIDE 37.5 MG/1
37.5 CAPSULE ORAL EVERY MORNING
Qty: 30 CAP | Refills: 0 | Status: SHIPPED | OUTPATIENT
Start: 2018-09-04 | End: 2018-10-01

## 2018-09-04 NOTE — PROGRESS NOTES
Subjective:   Alfonso Posada is a 62 y.o. female here today for acute left lower leg pain status post trauma, morbid obesity, chronic bilateral knee pain and nauseousness.    Acute leg pain, left  This is a 62-year-old female who presents today after falling in her driveway one dark night. She tripped over one of her dogs. She states that the car door slammed on her lower left leg. She's had some swelling and bruising. Able to walk. Denies any shortness of breath or chest pain. Is not taking any medications new for her symptoms. Does take Mobic and oxycodone for chronic conditions.    Morbid obesity with BMI of 50.0-59.9, adult (CMS-[facility])  Had an episode of tachycardia the other day and was seen at ED. On that day she did not take phentermine. States that she's been taking phentermine. Fortunately she has not lost any weight. She does not eat very much though. She would like to try the medication for another month. He is followed by Dr. Panda. Has an appointment to see him soon for follow-up on surgery.    Nausea  Has chronic nausea. Believes the medication she takes on a daily basis causes nauseousness. She will use Zofran when needed. Zofran is effective.    Bilateral chronic knee pain  Chronic bilateral knee pain. Awaiting bariatric surgery and then potentially have knee surgery. Takes oxycodone and tramadol when needed. Requesting refill today of oxycodone.      Current medicines (including changes today)  Current Outpatient Prescriptions   Medication Sig Dispense Refill   • phentermine 37.5 MG capsule Take 1 Cap by mouth every morning for 30 days. 30 Cap 0   • [START ON 9/7/2018] oxyCODONE-acetaminophen (PERCOCET-10)  MG Tab Take 1 Tab by mouth 2 times a day as needed for Severe Pain for up to 30 days. 60 Tab 0   • potassium chloride (KLOR-CON) 8 MEQ tablet TAKE ONE TABLET BY MOUTH DAILY 90 Tab 0   • tramadol (ULTRAM) 50 MG Tab Take 1 Tab by mouth every four hours as needed for up to 30 days. 90 Tab 0  "  • losartan (COZAAR) 25 MG Tab TAKE ONE TABLET BY MOUTH DAILY 90 Tab 0   • furosemide (LASIX) 40 MG Tab TAKE ONE TABLET BY MOUTH DAILY 90 Tab 0   • levothyroxine (SYNTHROID) 25 MCG Tab Take 1 Tab by mouth Every morning on an empty stomach. ON EMPTY STOMACH 90 Tab 3   • ondansetron (ZOFRAN ODT) 4 MG TABLET DISPERSIBLE Take 1 Tab by mouth every 8 hours as needed. AS NEEDED FOR NAUSEA AND VOMITING 15 Tab 2   • omeprazole (PRILOSEC) 40 MG delayed-release capsule Take 1 Cap by mouth every day. 90 Cap 3   • atorvastatin (LIPITOR) 40 MG Tab Take 1 Tab by mouth every day. TAKE ONE TABLET BY MOUTH DAILY 90 Tab 3   • metoprolol (LOPRESSOR) 50 MG Tab TAKE ONE TABLET BY MOUTH TWICE A  Tab 3   • meloxicam (MOBIC) 7.5 MG Tab Take 1 Tab by mouth 2 Times a Day. 60 Tab 0   • montelukast (SINGULAIR) 10 MG Tab Take 1 Tab by mouth every day. TAKE ONE TABLET BY MOUTH DAILY 90 Tab 3   • sertraline (ZOLOFT) 100 MG Tab Take 1 Tab by mouth every day. 90 Tab 3     No current facility-administered medications for this visit.      She  has a past medical history of Asthma and Thyroid disease.    Social History and Family History were reviewed and updated.    ROS  No chest pain, no shortness of breath, no abdominal pain and all other systems were reviewed and are negative.       Objective:     Blood pressure 132/80, pulse 63, temperature 36.1 °C (97 °F), height 1.651 m (5' 5\"), weight (!) 143.3 kg (316 lb), last menstrual period 03/20/2016, SpO2 96 %. Body mass index is 52.59 kg/m².   Physical Exam:  Constitutional: Alert, no distress.  Skin: Warm, dry, good turgor, no rashes in visible areas. Ecchymosis noted of her left lower extremity. No warmth noted. No significant swelling.  Eye: Equal, round and reactive, conjunctiva clear, lids normal.  ENMT: Lips without lesions, good dentition, oropharynx clear.  Neck: Trachea midline, no masses.   Lymph: No cervical or supraclavicular lymphadenopathy  Respiratory: Unlabored respiratory " effort, lungs clear to auscultation, no wheezes, no ronchi.  Cardiovascular: Normal S1, S2, no murmur, no edema.  Abdomen: Soft, non-tender, no masses.  Psych: Alert and oriented x3, normal affect and mood.        Assessment and Plan:   The following treatment plan was discussed    1. Acute leg pain, left  Acute, new onset condition status post trauma. Likely should be self-limiting. Will improve in time.    2. Morbid obesity with BMI of 50.0-59.9, adult (HCC)  Chronic condition. Stable. Renewed phentermine at a higher dose.  reviewed. Medication is appropriate. Controlled subsequent cream and on file.  - phentermine 37.5 MG capsule; Take 1 Cap by mouth every morning for 30 days.  Dispense: 30 Cap; Refill: 0    3. Bilateral chronic knee pain  Chronic condition. Stable. Renewed Norco. Currently at 50 morphine milliequivalents.  - oxyCODONE-acetaminophen (PERCOCET-10)  MG Tab; Take 1 Tab by mouth 2 times a day as needed for Severe Pain for up to 30 days.  Dispense: 60 Tab; Refill: 0    4. Nausea  Chronic condition. Symptoms likely secondary to medication. Continue antinausea medication as directed.    5. Influenza vaccine needed  Administered without complaints.  - Flu Quad Inj >3 Year Pre-Filled PF      Followup: Return in about 4 weeks (around 10/2/2018), or if symptoms worsen or fail to improve.    Please note that this dictation was created using voice recognition software. I have made every reasonable attempt to correct obvious errors, but I expect that there are errors of grammar and possibly content that I did not discover before finalizing the note.

## 2018-09-04 NOTE — ASSESSMENT & PLAN NOTE
Had an episode of tachycardia the other day and was seen at ED. On that day she did not take phentermine. States that she's been taking phentermine. Fortunately she has not lost any weight. She does not eat very much though. She would like to try the medication for another month. He is followed by Dr. Panda. Has an appointment to see him soon for follow-up on surgery.

## 2018-09-04 NOTE — ASSESSMENT & PLAN NOTE
Has chronic nausea. Believes the medication she takes on a daily basis causes nauseousness. She will use Zofran when needed. Zofran is effective.

## 2018-09-04 NOTE — ASSESSMENT & PLAN NOTE
Chronic bilateral knee pain. Awaiting bariatric surgery and then potentially have knee surgery. Takes oxycodone and tramadol when needed. Requesting refill today of oxycodone.

## 2018-09-04 NOTE — ASSESSMENT & PLAN NOTE
This is a 62-year-old female who presents today after falling in her driveway one dark night. She tripped over one of her dogs. She states that the car door slammed on her lower left leg. She's had some swelling and bruising. Able to walk. Denies any shortness of breath or chest pain. Is not taking any medications new for her symptoms. Does take Mobic and oxycodone for chronic conditions.

## 2018-09-07 DIAGNOSIS — B02.29 POSTHERPETIC NEURALGIA: ICD-10-CM

## 2018-09-07 DIAGNOSIS — J01.01 ACUTE RECURRENT MAXILLARY SINUSITIS: ICD-10-CM

## 2018-09-07 DIAGNOSIS — G89.29 BILATERAL CHRONIC KNEE PAIN: ICD-10-CM

## 2018-09-07 DIAGNOSIS — M25.561 BILATERAL CHRONIC KNEE PAIN: ICD-10-CM

## 2018-09-07 DIAGNOSIS — M25.562 BILATERAL CHRONIC KNEE PAIN: ICD-10-CM

## 2018-09-07 RX ORDER — FLUTICASONE PROPIONATE 50 MCG
SPRAY, SUSPENSION (ML) NASAL
Qty: 16 G | Refills: 5 | Status: SHIPPED | OUTPATIENT
Start: 2018-09-07 | End: 2019-01-28

## 2018-09-07 RX ORDER — ONDANSETRON 4 MG/1
TABLET, ORALLY DISINTEGRATING ORAL
Qty: 30 TAB | Refills: 5 | Status: SHIPPED | OUTPATIENT
Start: 2018-09-07 | End: 2019-02-14 | Stop reason: SDUPTHER

## 2018-09-07 RX ORDER — TRAMADOL HYDROCHLORIDE 50 MG/1
TABLET ORAL
Qty: 90 TAB | Refills: 0 | Status: SHIPPED | OUTPATIENT
Start: 2018-09-07 | End: 2018-10-01 | Stop reason: SDUPTHER

## 2018-09-21 ENCOUNTER — TELEPHONE (OUTPATIENT)
Dept: MEDICAL GROUP | Facility: MEDICAL CENTER | Age: 63
End: 2018-09-21

## 2018-09-21 NOTE — TELEPHONE ENCOUNTER
----- Message from Cristina Quispe sent at 9/21/2018 11:45 AM PDT -----  1. Caller Name: Alfonso Posada                            2. Call Back Number: [phone] (home)     3. Patient PCP: Sim      4. What is the patient requesting: Alfonso called stating she was in the ER all yesterday. They stated she has Afib, her bp and pulse is bouncing a lot and she is concerned. She is asking for a call from Sim to speak about this. The patient marked the call as urgent on my voicemail. Thank you    5. Does patient need immediate contact: Yes

## 2018-09-24 ENCOUNTER — APPOINTMENT (OUTPATIENT)
Dept: RADIOLOGY | Facility: MEDICAL CENTER | Age: 63
End: 2018-09-24
Attending: EMERGENCY MEDICINE
Payer: COMMERCIAL

## 2018-09-24 ENCOUNTER — OFFICE VISIT (OUTPATIENT)
Dept: CARDIOLOGY | Facility: MEDICAL CENTER | Age: 63
End: 2018-09-24
Payer: COMMERCIAL

## 2018-09-24 ENCOUNTER — HOSPITAL ENCOUNTER (EMERGENCY)
Facility: MEDICAL CENTER | Age: 63
End: 2018-09-24
Attending: EMERGENCY MEDICINE
Payer: COMMERCIAL

## 2018-09-24 VITALS
RESPIRATION RATE: 16 BRPM | HEIGHT: 64 IN | WEIGHT: 293 LBS | SYSTOLIC BLOOD PRESSURE: 144 MMHG | OXYGEN SATURATION: 95 % | BODY MASS INDEX: 50.02 KG/M2 | HEART RATE: 70 BPM | DIASTOLIC BLOOD PRESSURE: 88 MMHG

## 2018-09-24 VITALS
TEMPERATURE: 99 F | BODY MASS INDEX: 50.02 KG/M2 | DIASTOLIC BLOOD PRESSURE: 73 MMHG | SYSTOLIC BLOOD PRESSURE: 160 MMHG | HEART RATE: 69 BPM | HEIGHT: 64 IN | RESPIRATION RATE: 18 BRPM | WEIGHT: 293 LBS | OXYGEN SATURATION: 97 %

## 2018-09-24 DIAGNOSIS — I48.4 ATYPICAL ATRIAL FLUTTER (HCC): ICD-10-CM

## 2018-09-24 DIAGNOSIS — I48.0 PAROXYSMAL ATRIAL FIBRILLATION (HCC): ICD-10-CM

## 2018-09-24 DIAGNOSIS — R11.0 NAUSEA: ICD-10-CM

## 2018-09-24 DIAGNOSIS — I10 ESSENTIAL HYPERTENSION, BENIGN: ICD-10-CM

## 2018-09-24 DIAGNOSIS — R00.2 PALPITATIONS: ICD-10-CM

## 2018-09-24 LAB
ALBUMIN SERPL BCP-MCNC: 3.6 G/DL (ref 3.2–4.9)
ALBUMIN/GLOB SERPL: 1.1 G/DL
ALP SERPL-CCNC: 97 U/L (ref 30–99)
ALT SERPL-CCNC: 15 U/L (ref 2–50)
ANION GAP SERPL CALC-SCNC: 8 MMOL/L (ref 0–11.9)
APTT PPP: 28.5 SEC (ref 24.7–36)
AST SERPL-CCNC: 13 U/L (ref 12–45)
BASOPHILS # BLD AUTO: 0.1 % (ref 0–1.8)
BASOPHILS # BLD: 0.01 K/UL (ref 0–0.12)
BILIRUB SERPL-MCNC: 0.8 MG/DL (ref 0.1–1.5)
BUN SERPL-MCNC: 18 MG/DL (ref 8–22)
CALCIUM SERPL-MCNC: 9 MG/DL (ref 8.5–10.5)
CHLORIDE SERPL-SCNC: 106 MMOL/L (ref 96–112)
CO2 SERPL-SCNC: 25 MMOL/L (ref 20–33)
CREAT SERPL-MCNC: 0.64 MG/DL (ref 0.5–1.4)
EOSINOPHIL # BLD AUTO: 0.13 K/UL (ref 0–0.51)
EOSINOPHIL NFR BLD: 1.4 % (ref 0–6.9)
ERYTHROCYTE [DISTWIDTH] IN BLOOD BY AUTOMATED COUNT: 45.1 FL (ref 35.9–50)
GLOBULIN SER CALC-MCNC: 3.2 G/DL (ref 1.9–3.5)
GLUCOSE SERPL-MCNC: 104 MG/DL (ref 65–99)
HCT VFR BLD AUTO: 51.3 % (ref 37–47)
HGB BLD-MCNC: 17.2 G/DL (ref 12–16)
IMM GRANULOCYTES # BLD AUTO: 0.06 K/UL (ref 0–0.11)
IMM GRANULOCYTES NFR BLD AUTO: 0.6 % (ref 0–0.9)
INR PPP: 1.12 (ref 0.87–1.13)
LIPASE SERPL-CCNC: 20 U/L (ref 11–82)
LYMPHOCYTES # BLD AUTO: 1.46 K/UL (ref 1–4.8)
LYMPHOCYTES NFR BLD: 15.4 % (ref 22–41)
MAGNESIUM SERPL-MCNC: 2.3 MG/DL (ref 1.5–2.5)
MCH RBC QN AUTO: 31.4 PG (ref 27–33)
MCHC RBC AUTO-ENTMCNC: 33.5 G/DL (ref 33.6–35)
MCV RBC AUTO: 93.6 FL (ref 81.4–97.8)
MONOCYTES # BLD AUTO: 0.84 K/UL (ref 0–0.85)
MONOCYTES NFR BLD AUTO: 8.9 % (ref 0–13.4)
NEUTROPHILS # BLD AUTO: 6.95 K/UL (ref 2–7.15)
NEUTROPHILS NFR BLD: 73.6 % (ref 44–72)
NRBC # BLD AUTO: 0 K/UL
NRBC BLD-RTO: 0 /100 WBC
PLATELET # BLD AUTO: 232 K/UL (ref 164–446)
PMV BLD AUTO: 10.4 FL (ref 9–12.9)
POTASSIUM SERPL-SCNC: 4.4 MMOL/L (ref 3.6–5.5)
PROT SERPL-MCNC: 6.8 G/DL (ref 6–8.2)
PROTHROMBIN TIME: 14.6 SEC (ref 12–14.6)
RBC # BLD AUTO: 5.48 M/UL (ref 4.2–5.4)
SODIUM SERPL-SCNC: 139 MMOL/L (ref 135–145)
T4 FREE SERPL-MCNC: 1.24 NG/DL (ref 0.53–1.43)
TROPONIN I SERPL-MCNC: 0.01 NG/ML (ref 0–0.04)
TSH SERPL DL<=0.005 MIU/L-ACNC: 0.56 UIU/ML (ref 0.38–5.33)
WBC # BLD AUTO: 9.5 K/UL (ref 4.8–10.8)

## 2018-09-24 PROCEDURE — 84443 ASSAY THYROID STIM HORMONE: CPT

## 2018-09-24 PROCEDURE — 700105 HCHG RX REV CODE 258: Performed by: EMERGENCY MEDICINE

## 2018-09-24 PROCEDURE — 99204 OFFICE O/P NEW MOD 45 MIN: CPT | Performed by: INTERNAL MEDICINE

## 2018-09-24 PROCEDURE — 85730 THROMBOPLASTIN TIME PARTIAL: CPT

## 2018-09-24 PROCEDURE — 96375 TX/PRO/DX INJ NEW DRUG ADDON: CPT

## 2018-09-24 PROCEDURE — 84484 ASSAY OF TROPONIN QUANT: CPT

## 2018-09-24 PROCEDURE — 700102 HCHG RX REV CODE 250 W/ 637 OVERRIDE(OP): Performed by: EMERGENCY MEDICINE

## 2018-09-24 PROCEDURE — 85025 COMPLETE CBC W/AUTO DIFF WBC: CPT

## 2018-09-24 PROCEDURE — 96374 THER/PROPH/DIAG INJ IV PUSH: CPT

## 2018-09-24 PROCEDURE — A9270 NON-COVERED ITEM OR SERVICE: HCPCS | Performed by: EMERGENCY MEDICINE

## 2018-09-24 PROCEDURE — 84439 ASSAY OF FREE THYROXINE: CPT

## 2018-09-24 PROCEDURE — 93005 ELECTROCARDIOGRAM TRACING: CPT | Performed by: EMERGENCY MEDICINE

## 2018-09-24 PROCEDURE — 83690 ASSAY OF LIPASE: CPT

## 2018-09-24 PROCEDURE — 80053 COMPREHEN METABOLIC PANEL: CPT

## 2018-09-24 PROCEDURE — 96376 TX/PRO/DX INJ SAME DRUG ADON: CPT

## 2018-09-24 PROCEDURE — 71045 X-RAY EXAM CHEST 1 VIEW: CPT

## 2018-09-24 PROCEDURE — 99285 EMERGENCY DEPT VISIT HI MDM: CPT

## 2018-09-24 PROCEDURE — 85610 PROTHROMBIN TIME: CPT

## 2018-09-24 PROCEDURE — 83735 ASSAY OF MAGNESIUM: CPT

## 2018-09-24 PROCEDURE — 36415 COLL VENOUS BLD VENIPUNCTURE: CPT

## 2018-09-24 PROCEDURE — 700111 HCHG RX REV CODE 636 W/ 250 OVERRIDE (IP): Performed by: EMERGENCY MEDICINE

## 2018-09-24 RX ORDER — OMEPRAZOLE 40 MG/1
40 CAPSULE, DELAYED RELEASE ORAL DAILY
COMMUNITY
End: 2018-11-27

## 2018-09-24 RX ORDER — SERTRALINE HYDROCHLORIDE 100 MG/1
100 TABLET, FILM COATED ORAL DAILY
COMMUNITY
End: 2018-11-27

## 2018-09-24 RX ORDER — ONDANSETRON 4 MG/1
4 TABLET, ORALLY DISINTEGRATING ORAL EVERY 8 HOURS PRN
COMMUNITY
End: 2018-11-27

## 2018-09-24 RX ORDER — MONTELUKAST SODIUM 10 MG/1
10 TABLET ORAL PRN
COMMUNITY
End: 2018-11-27

## 2018-09-24 RX ORDER — OXYCODONE AND ACETAMINOPHEN 10; 325 MG/1; MG/1
1 TABLET ORAL 2 TIMES DAILY PRN
COMMUNITY
End: 2018-11-27

## 2018-09-24 RX ORDER — METOPROLOL TARTRATE 50 MG/1
50 TABLET, FILM COATED ORAL 2 TIMES DAILY
COMMUNITY
End: 2018-11-27 | Stop reason: SDUPTHER

## 2018-09-24 RX ORDER — FLUTICASONE PROPIONATE 50 MCG
1 SPRAY, SUSPENSION (ML) NASAL 2 TIMES DAILY
COMMUNITY
End: 2018-11-27

## 2018-09-24 RX ORDER — ATORVASTATIN CALCIUM 40 MG/1
40 TABLET, FILM COATED ORAL NIGHTLY
COMMUNITY
End: 2018-11-27

## 2018-09-24 RX ORDER — LOSARTAN POTASSIUM 50 MG/1
50 TABLET ORAL DAILY
Qty: 30 TAB | Refills: 3 | Status: SHIPPED | OUTPATIENT
Start: 2018-09-24 | End: 2018-11-27

## 2018-09-24 RX ORDER — SODIUM CHLORIDE 9 MG/ML
1000 INJECTION, SOLUTION INTRAVENOUS ONCE
Status: COMPLETED | OUTPATIENT
Start: 2018-09-24 | End: 2018-09-24

## 2018-09-24 RX ORDER — LOSARTAN POTASSIUM 25 MG/1
25 TABLET ORAL DAILY
COMMUNITY
End: 2018-09-24

## 2018-09-24 RX ORDER — ONDANSETRON 2 MG/ML
4 INJECTION INTRAMUSCULAR; INTRAVENOUS ONCE
Status: COMPLETED | OUTPATIENT
Start: 2018-09-24 | End: 2018-09-24

## 2018-09-24 RX ORDER — OXYCODONE AND ACETAMINOPHEN 10; 325 MG/1; MG/1
1 TABLET ORAL ONCE
Status: COMPLETED | OUTPATIENT
Start: 2018-09-24 | End: 2018-09-24

## 2018-09-24 RX ORDER — MORPHINE SULFATE 4 MG/ML
4 INJECTION, SOLUTION INTRAMUSCULAR; INTRAVENOUS ONCE
Status: COMPLETED | OUTPATIENT
Start: 2018-09-24 | End: 2018-09-24

## 2018-09-24 RX ORDER — POTASSIUM CHLORIDE 600 MG/1
8 CAPSULE, EXTENDED RELEASE ORAL DAILY
COMMUNITY
End: 2018-11-27

## 2018-09-24 RX ORDER — FUROSEMIDE 40 MG/1
40 TABLET ORAL DAILY
COMMUNITY
End: 2018-11-27

## 2018-09-24 RX ORDER — LEVOTHYROXINE SODIUM 0.03 MG/1
25 TABLET ORAL
COMMUNITY
End: 2018-11-27

## 2018-09-24 RX ADMIN — OXYCODONE HYDROCHLORIDE AND ACETAMINOPHEN 1 TABLET: 10; 325 TABLET ORAL at 09:17

## 2018-09-24 RX ADMIN — ONDANSETRON 4 MG: 2 INJECTION INTRAMUSCULAR; INTRAVENOUS at 06:30

## 2018-09-24 RX ADMIN — MORPHINE SULFATE 4 MG: 4 INJECTION INTRAVENOUS at 06:30

## 2018-09-24 RX ADMIN — SODIUM CHLORIDE 1000 ML: 9 INJECTION, SOLUTION INTRAVENOUS at 09:21

## 2018-09-24 RX ADMIN — ONDANSETRON 4 MG: 2 INJECTION INTRAMUSCULAR; INTRAVENOUS at 09:17

## 2018-09-24 RX ADMIN — SODIUM CHLORIDE 1000 ML: 9 INJECTION, SOLUTION INTRAVENOUS at 06:34

## 2018-09-24 ASSESSMENT — ENCOUNTER SYMPTOMS
SHORTNESS OF BREATH: 0
COUGH: 0
DIARRHEA: 1
PALPITATIONS: 1
NAUSEA: 1
FEVER: 0
FALLS: 0
VOMITING: 0
DIAPHORESIS: 1
CHILLS: 0
HEADACHES: 0

## 2018-09-24 ASSESSMENT — PAIN SCALES - GENERAL
PAINLEVEL_OUTOF10: 0
PAINLEVEL_OUTOF10: 0

## 2018-09-24 NOTE — ED NOTES
Assist RN  Discharge instructions given to pt including follow up with Cardiologist this afternoon or returning if no improvement of symptoms or to return if worse.  Questions answered by RN. Denies any new complaints. Discharged w/stable vitals and wheeled to the lobby by RN. Family members drove pt home.

## 2018-09-24 NOTE — ED PROVIDER NOTES
"ED Provider Note    ED Provider Note    Primary care provider: No primary care provider on file.  Means of arrival: EMS  History obtained from: Patient  History limited by: None    CHIEF COMPLAINT  Chief Complaint   Patient presents with   • N/V       HPI  Alfonso Posada is a 62 y.o. female who presents to the Emergency Department via EMS with multiple complaints.  She has had a difficult past year.  Most recently however, she was seen at [facility] 2 days ago she presented with a rapid heart rate.  She was apparently, found to be in SVT but then atrial fibrillation.  She was discharged and then presented again, yesterday to [facility] with similar presentation.  She states that that time, she converted with EMS.  She was told her \"thyroid was up and was instructed to stop taking her thyroid supplements.  She is on a beta-blocker 50 twice daily of metoprolol.  This was increased on her discharge to 75 twice daily and she was also started on Eliquis.  Patient's biggest complaint at this time, is nausea.  She does not have any vomiting.  She has had diarrhea.  Patient is in the process of trying to have her PCP, set her up to undergo gastric sleeve however, she requires cardiac clearance.  Currently, she has got an appointment through [facility] with cardiology but not until .  Patient does have a known history of SVT.  Patient also recently, was on phentermine for weight loss and she is also discontinued this.  She denies pain other than her arthritis pain in her knees for which she takes Percocet, 10 mg twice a day.  Her PCP is Dr. Levin.  The patient lives in [place].  She and her , moved to [place] about 18 months ago and built a new home.  Unfortunately, her  who had cancer,  suddenly in  of this year.  It has been a difficult adjustment for her.  In addition, she quit drinking, she is to have a cocktail every night and she also quit smoking.  Her " biggest complaint at this time is nausea and sweating.    REVIEW OF SYSTEMS  Review of Systems   Constitutional: Positive for diaphoresis. Negative for chills and fever.   HENT: Negative for congestion.    Respiratory: Negative for cough and shortness of breath.    Cardiovascular: Positive for palpitations.   Gastrointestinal: Positive for diarrhea and nausea. Negative for vomiting.   Genitourinary: Negative for dysuria and urgency.   Musculoskeletal: Negative for falls.   Neurological: Negative for headaches.   All other systems reviewed and are negative.      PAST MEDICAL HISTORY   has a past medical history of Anxiety disorder; Cancer (HCC); Depression; Hypertension; and Psychiatric disorder.    SURGICAL HISTORY   has a past surgical history that includes hysterectomy laparoscopy and thyroidectomy.    SOCIAL HISTORY  Social History   Substance Use Topics   • Smoking status: Former Smoker     Packs/day: 0.50     Start date: 9/24/2015     Quit date: 9/10/2018   • Smokeless tobacco: Never Used   • Alcohol use Yes      Comment: a couple daily, none this week      History   Drug Use No       FAMILY HISTORY  History reviewed. No pertinent family history.    CURRENT MEDICATIONS  Home Medications     Reviewed by Jos Ibarra (Pharmacy Tech) on 09/24/18 at 0930  Med List Status: Complete   Medication Last Dose Status   atorvastatin (LIPITOR) 40 MG Tab 9/23/2018 Active   fluticasone (FLONASE) 50 MCG/ACT nasal spray 9/23/2018 Active   furosemide (LASIX) 40 MG Tab 9/23/2018 Active   levothyroxine (SYNTHROID) 25 MCG Tab 3 DAYS Active   losartan (COZAAR) 25 MG Tab 9/23/2018 Active   metoprolol (LOPRESSOR) 50 MG Tab 9/23/2018 Active   montelukast (SINGULAIR) 10 MG Tab 9/23/2018 Active   omeprazole (PRILOSEC) 40 MG delayed-release capsule 9/23/2018 Active   ondansetron (ZOFRAN ODT) 4 MG TABLET DISPERSIBLE 9/23/2018 Active   oxyCODONE-acetaminophen (PERCOCET-10)  MG Tab 9/23/2018 Active   Potassium Chloride CR  "(MICRO-K) 8 MEQ Cap CR capsule 9/23/2018 Active   sertraline (ZOLOFT) 100 MG Tab 9/23/2018 Active                ALLERGIES  Allergies   Allergen Reactions   • Latex Itching   • Penicillins Hives and Itching       PHYSICAL EXAM  VITAL SIGNS: /73   Pulse 69   Temp 37.2 °C (99 °F)   Resp 18   Ht 1.626 m (5' 4\")   Wt (!) 136.1 kg (300 lb)   SpO2 97%   BMI 51.49 kg/m²   Vitals reviewed.  Constitutional: Patient is oriented to person, place, and time. Appears well-developed and well-nourished. No distress. Obese female.  Head: Normocephalic and atraumatic.   Ears: Normal external ears bilaterally.   Mouth/Throat: Oropharynx is clear dry mucous membranes. no exudates.   Eyes: Conjunctivae are normal. Pupils are equal, round, and reactive to light.   Neck: Normal range of motion. Neck supple.  Cardiovascular: Normal rate, regular rhythm and normal heart sounds. Normal peripheral pulses.  Pulmonary/Chest: Effort normal and breath sounds normal. No respiratory distress, no wheezes, rhonchi, or rales.  Abdominal: Soft. Bowel sounds are normal. There is no tenderness. No rebound or guarding, or peritoneal signs. No CVA tenderness.  Musculoskeletal: No edema and no tenderness.   Neurological: No focal deficits.   Skin: Skin is warm and dry. No erythema. No pallor.   Psychiatric: Patient has a normal mood and affect.     LABS  Results for orders placed or performed during the hospital encounter of 09/24/18   CBC w/ Differential   Result Value Ref Range    WBC 9.5 4.8 - 10.8 K/uL    RBC 5.48 (H) 4.20 - 5.40 M/uL    Hemoglobin 17.2 (H) 12.0 - 16.0 g/dL    Hematocrit 51.3 (H) 37.0 - 47.0 %    MCV 93.6 81.4 - 97.8 fL    MCH 31.4 27.0 - 33.0 pg    MCHC 33.5 (L) 33.6 - 35.0 g/dL    RDW 45.1 35.9 - 50.0 fL    Platelet Count 232 164 - 446 K/uL    MPV 10.4 9.0 - 12.9 fL    Neutrophils-Polys 73.60 (H) 44.00 - 72.00 %    Lymphocytes 15.40 (L) 22.00 - 41.00 %    Monocytes 8.90 0.00 - 13.40 %    Eosinophils 1.40 0.00 - 6.90 %    " Basophils 0.10 0.00 - 1.80 %    Immature Granulocytes 0.60 0.00 - 0.90 %    Nucleated RBC 0.00 /100 WBC    Neutrophils (Absolute) 6.95 2.00 - 7.15 K/uL    Lymphs (Absolute) 1.46 1.00 - 4.80 K/uL    Monos (Absolute) 0.84 0.00 - 0.85 K/uL    Eos (Absolute) 0.13 0.00 - 0.51 K/uL    Baso (Absolute) 0.01 0.00 - 0.12 K/uL    Immature Granulocytes (abs) 0.06 0.00 - 0.11 K/uL    NRBC (Absolute) 0.00 K/uL   Complete Metabolic Panel (CMP)   Result Value Ref Range    Sodium 139 135 - 145 mmol/L    Potassium 4.4 3.6 - 5.5 mmol/L    Chloride 106 96 - 112 mmol/L    Co2 25 20 - 33 mmol/L    Anion Gap 8.0 0.0 - 11.9    Glucose 104 (H) 65 - 99 mg/dL    Bun 18 8 - 22 mg/dL    Creatinine 0.64 0.50 - 1.40 mg/dL    Calcium 9.0 8.5 - 10.5 mg/dL    AST(SGOT) 13 12 - 45 U/L    ALT(SGPT) 15 2 - 50 U/L    Alkaline Phosphatase 97 30 - 99 U/L    Total Bilirubin 0.8 0.1 - 1.5 mg/dL    Albumin 3.6 3.2 - 4.9 g/dL    Total Protein 6.8 6.0 - 8.2 g/dL    Globulin 3.2 1.9 - 3.5 g/dL    A-G Ratio 1.1 g/dL   Troponin STAT   Result Value Ref Range    Troponin I 0.01 0.00 - 0.04 ng/mL   Lipase   Result Value Ref Range    Lipase 20 11 - 82 U/L   Magnesium   Result Value Ref Range    Magnesium 2.3 1.5 - 2.5 mg/dL   PT/INR   Result Value Ref Range    PT 14.6 12.0 - 14.6 sec    INR 1.12 0.87 - 1.13   APTT   Result Value Ref Range    APTT 28.5 24.7 - 36.0 sec   TSH (for screening thyroid dysfunction)   Result Value Ref Range    TSH 0.560 0.380 - 5.330 uIU/mL   Free Thyroxine (add to TSH for patients with existing thyroid dysfunction)   Result Value Ref Range    Free T-4 1.24 0.53 - 1.43 ng/dL   ESTIMATED GFR   Result Value Ref Range    GFR If African American >60 >60 mL/min/1.73 m 2    GFR If Non African American >60 >60 mL/min/1.73 m 2       All labs reviewed by me.      RADIOLOGY  DX-CHEST-PORTABLE (1 VIEW)   Final Result      No acute cardiac or pulmonary abnormalities are identified.        The radiologist's interpretation of all radiological studies have  been reviewed by me.      COURSE & MEDICAL DECISION MAKING  Pertinent Labs & Imaging studies reviewed. (See chart for details)    Obtained and reviewed past medical records.  No prior encounters in our EMR    5:50 AM - Patient seen and examined at bedside.  Patient presents with multiple complaints.  Mostly nausea and sweating.  We discussed the possibility that this may be related to medication changes.  She is in the last 48 hours, stopped her thyroid medication stopped phentermine and started Eliquis and increased her metoprolol dose.  Currently, she is in a sinus rhythm with a controlled rate.  She is requesting something for nausea as well as pain.  She states that occasionally, when she is having diarrhea, she has lower abdominal cramping.  Patient's been frustrated with her recent care at [facility], prompting her visit here.  She would like to get in to see cardiology sooner so she can move forward with gastric sleeve to assist her with weight loss.  Otherwise vital signs are normal.  IV started per nursing protocols.  In addition, I have ordered labs including CBC, chemistry, coags, thyroid studies, chest x-ray and EKG to evaluate her symptoms.  She will be given IV fluids for history of nausea, diarrhea with dry mucous membranes.  She is in a normal sinus rhythm with a controlled rate in the 70s.  Prior EKG from February shows sinus tachycardia at a rate of 141.    Differential diagnosis includes but not limited to: Medication reaction, SVT, electrolyte disturbance, dehydration, ACS, thyroid dysfunction    Patient reevaluated after IV fluids.  She is feeling better.  We discussed lab results which were overall unrevealing.  Again, I suspect, that this is likely related to the multiple medication changes she has undergone recently.  At this time, will attempt to connect her with outpatient cardiology follow-up.    BELLA/W Wilson call alex for cardiology, he recommends calling the  to arrange  outpatient follow-up.    9:23 AM D/W Jessica .  There is an appointment available today at 3:40 PM.  I discussed this with the patient and she would like to take it.  She will have her daughter take her there.  She was quite grateful.  She will be discharged home with nausea medications.  She has normal vital signs.  She is stable on discharge.    FINAL IMPRESSION  1. Nausea    2. Palpitations

## 2018-09-25 NOTE — PROGRESS NOTES
Chief Complaint   Patient presents with   • Irregular Heart Beat       Subjective:   Alfonso Posada is a 62 y.o. female who presents today for follow-up of tacky arrhythmia.  The patient has a history of SVT in the past but on  noticed the onset of tachycardia and went to [facility] where her initial EKG revealed a heart rate of 150.  She was given adenosine because of diagnosis of SVT but this provoked atrial fibrillation.  In retrospect it was felt that her initial rhythm may have been atrial flutter which converted to atrial fibrillation then, following diltiazem, converted to normal sinus rhythm.  The next day, patient went back to the emergency room because of the recurrence of the tachycardia but the rhythm converted before she arrived in the emergency room.  Eliquis was recommended but the patient has only taken a couple of doses because she is worried about side effects.  She has had multiple issues in the last year and a half since moving to [place].  Her  has  and she has been under stress regarding that.  She is also considering sleeve surgery to help with weight loss.  There is a history of hypertension for which losartan 25 mg a day has been administered.    Past Medical History:   Diagnosis Date   • Anxiety disorder    • Cancer (HCC)     uterine, treated   • Depression    • Hypertension    • Psychiatric disorder     anxiety     Past Surgical History:   Procedure Laterality Date   • HYSTERECTOMY LAPAROSCOPY     • THYROIDECTOMY      still has parathyroid     History reviewed. No pertinent family history.  Social History     Social History   • Marital status:      Spouse name: N/A   • Number of children: N/A   • Years of education: N/A     Occupational History   • Not on file.     Social History Main Topics   • Smoking status: Former Smoker     Packs/day: 0.50     Start date: 2015     Quit date: 9/10/2018   • Smokeless tobacco: Never Used   • Alcohol use Yes       Comment: a couple daily, none this week   • Drug use: No   • Sexual activity: Not on file     Other Topics Concern   • Not on file     Social History Narrative   • No narrative on file     Allergies   Allergen Reactions   • Latex Itching   • Penicillins Hives and Itching   • Sulfa Drugs      Itching feeling on leg, prickily/need-like sensation on skin.     Outpatient Encounter Prescriptions as of 9/24/2018   Medication Sig Dispense Refill   • furosemide (LASIX) 40 MG Tab Take 40 mg by mouth every day.     • atorvastatin (LIPITOR) 40 MG Tab Take 40 mg by mouth every evening.     • fluticasone (FLONASE) 50 MCG/ACT nasal spray Spray 1 Spray in nose 2 times a day.     • montelukast (SINGULAIR) 10 MG Tab Take 10 mg by mouth every day.     • metoprolol (LOPRESSOR) 50 MG Tab Take 50 mg by mouth 2 times a day.     • omeprazole (PRILOSEC) 40 MG delayed-release capsule Take 40 mg by mouth every day.     • ondansetron (ZOFRAN ODT) 4 MG TABLET DISPERSIBLE Take 4 mg by mouth every 8 hours as needed for Nausea.     • oxyCODONE-acetaminophen (PERCOCET-10)  MG Tab Take 1 Tab by mouth 2 times a day as needed for Severe Pain.     • Potassium Chloride CR (MICRO-K) 8 MEQ Cap CR capsule Take 8 mEq by mouth every day.     • sertraline (ZOLOFT) 100 MG Tab Take 100 mg by mouth every day.     • losartan (COZAAR) 50 MG Tab Take 1 Tab by mouth every day. 30 Tab 3   • levothyroxine (SYNTHROID) 25 MCG Tab Take 25 mcg by mouth Every morning on an empty stomach.     • [DISCONTINUED] losartan (COZAAR) 25 MG Tab Take 25 mg by mouth every day.       No facility-administered encounter medications on file as of 9/24/2018.      ROS.  Review of systems sheet was evaluated with the patient.  She complains of ringing in her ears heartburn and nausea possibly related to her new medication as well as neck and joint pain from arthritis anxiety and insomnia related to her issues currently.     Objective:   /88 (BP Location: Other (Comment),  "Patient Position: Sitting, BP Cuff Size: Adult)   Pulse 70   Resp 16   Ht 1.626 m (5' 4\")   Wt (!) 143.8 kg (317 lb)   SpO2 95%   BMI 54.41 kg/m²     Physical Exam   Exam:    Vitals:    09/24/18 1548   BP: 144/88   BP Location: Other (Comment)   Patient Position: Sitting   BP Cuff Size: Adult   Pulse: 70   Resp: 16   SpO2: 95%   Weight: (!) 143.8 kg (317 lb)   Height: 1.626 m (5' 4\")     Constitutional: Well developed, well nourished obese female in no acute distress. Appears approximate stated age and provides a good history.  Somewhat teary off and on.    HEENT: Normocephalic, pupils are equal and responsive. No arcus. Conjunctiva and sclera are clear. No xanthelasma. slight diagonal ear lobe crease noted. Mucus membranes are moist and pink. Good oral hygiene  Neck: No JVD or HJR. JVP = 4-5cm H2O. Good carotid upstroke with no bruit. No lymphadenopathy, No thyroid enlargement.  Cardiovascular: Regular rhythm, no ectopics. No murmur, gallop, rub or click. No lift, heave,  thrill, or cardiomegaly  Lungs: Normal effort, Clear to auscultation and percussion. No rales, rhonchi, wheezing   Abdomen: Soft, non tender, obese. No liver, kidney, spleen or mass palpable. The abdominal aorta is not palpable. Active bowel sounds are noted and there is no bruit  Extremities:  No cyanosis, edema, clubbing. Palpable  dorsal pedal pulses bilaterally.  Skin:   Warm and dry, no active lesions  Neurologic: Alert & oriented. Strength and sensation grossly intact. No lateralizing signs  Psychiatric:   mood and judgement are appropriate. Slightly depressed/anxious affect    Assessment:     1. Paroxysmal atrial fibrillation (HCC)     2. Atypical atrial flutter (HCC)     3. Essential hypertension, benign         Medical Decision Making:  Today's Assessment / Status / Plan:   Patient blood pressure remains slightly elevated.  Her rhythm is normal sinus by exam today.  We discussed ends and out of atrial fibrillation and flutter.  She " has a CHADS - VASC score of 2, suggesting that anticoagulation is indicated.  The patient currently takes 500 mg of aspirin daily which I suggested that she discontinue.  She will begin the Eliquis 5 mg twice daily and will price Xarelto at the pharmacy.  She will return in a month at which time a decision will be made regarding which form of anticoagulation is most reasonable for her budget.  I suggested that she increase her losartan to 50 mg a day and discontinue the metoprolol because of his lack of adequate suppression of atrial fibrillation.  She may take metoprolol if she develops a tachycardia again.  As long as she is on an adequate stroke prevention medication, there is no need tympanic if she does have a recurrence.  She will return in 1 month for repeat evaluation.

## 2018-10-01 ENCOUNTER — OFFICE VISIT (OUTPATIENT)
Dept: MEDICAL GROUP | Facility: MEDICAL CENTER | Age: 63
End: 2018-10-01
Payer: COMMERCIAL

## 2018-10-01 VITALS
HEART RATE: 66 BPM | BODY MASS INDEX: 48.82 KG/M2 | OXYGEN SATURATION: 98 % | DIASTOLIC BLOOD PRESSURE: 55 MMHG | HEIGHT: 65 IN | SYSTOLIC BLOOD PRESSURE: 115 MMHG | TEMPERATURE: 97.1 F | WEIGHT: 293 LBS

## 2018-10-01 DIAGNOSIS — I48.91 ATRIAL FIBRILLATION, UNSPECIFIED TYPE (HCC): ICD-10-CM

## 2018-10-01 DIAGNOSIS — M25.562 BILATERAL CHRONIC KNEE PAIN: ICD-10-CM

## 2018-10-01 DIAGNOSIS — M25.561 BILATERAL CHRONIC KNEE PAIN: ICD-10-CM

## 2018-10-01 DIAGNOSIS — F41.9 ANXIETY: ICD-10-CM

## 2018-10-01 DIAGNOSIS — R21 RASH OF FACE: ICD-10-CM

## 2018-10-01 DIAGNOSIS — G89.29 BILATERAL CHRONIC KNEE PAIN: ICD-10-CM

## 2018-10-01 DIAGNOSIS — E66.01 MORBID OBESITY WITH BMI OF 50.0-59.9, ADULT (HCC): ICD-10-CM

## 2018-10-01 PROBLEM — F17.200 TOBACCO DEPENDENCE: Status: RESOLVED | Noted: 2017-02-03 | Resolved: 2018-10-01

## 2018-10-01 PROCEDURE — 99214 OFFICE O/P EST MOD 30 MIN: CPT | Performed by: PHYSICIAN ASSISTANT

## 2018-10-01 RX ORDER — OXYCODONE AND ACETAMINOPHEN 10; 325 MG/1; MG/1
1 TABLET ORAL 2 TIMES DAILY PRN
Qty: 60 TAB | Refills: 0 | Status: SHIPPED | OUTPATIENT
Start: 2018-10-05 | End: 2018-10-01 | Stop reason: SDUPTHER

## 2018-10-01 RX ORDER — TRAMADOL HYDROCHLORIDE 50 MG/1
50 TABLET ORAL EVERY 8 HOURS PRN
Qty: 90 TAB | Refills: 0 | Status: SHIPPED | OUTPATIENT
Start: 2018-12-04 | End: 2019-01-03

## 2018-10-01 RX ORDER — ALPRAZOLAM 0.5 MG/1
0.5 TABLET ORAL 2 TIMES DAILY PRN
Qty: 60 TAB | Refills: 1 | Status: SHIPPED | OUTPATIENT
Start: 2018-10-05 | End: 2018-11-15 | Stop reason: SDUPTHER

## 2018-10-01 RX ORDER — TRAMADOL HYDROCHLORIDE 50 MG/1
50 TABLET ORAL EVERY 8 HOURS PRN
Qty: 90 TAB | Refills: 0 | Status: SHIPPED | OUTPATIENT
Start: 2018-11-04 | End: 2018-10-01 | Stop reason: SDUPTHER

## 2018-10-01 RX ORDER — APIXABAN 5 MG/1
5 TABLET, FILM COATED ORAL 2 TIMES DAILY
COMMUNITY
Start: 2018-09-21 | End: 2018-11-27

## 2018-10-01 RX ORDER — KETOCONAZOLE 20 MG/G
1 CREAM TOPICAL DAILY
Qty: 60 G | Refills: 5 | Status: ON HOLD | OUTPATIENT
Start: 2018-10-01 | End: 2018-10-21 | Stop reason: CLARIF

## 2018-10-01 RX ORDER — OXYCODONE AND ACETAMINOPHEN 10; 325 MG/1; MG/1
1 TABLET ORAL 2 TIMES DAILY PRN
Qty: 60 TAB | Refills: 0 | Status: SHIPPED | OUTPATIENT
Start: 2018-11-04 | End: 2018-10-01 | Stop reason: SDUPTHER

## 2018-10-01 RX ORDER — OXYCODONE AND ACETAMINOPHEN 10; 325 MG/1; MG/1
1 TABLET ORAL 2 TIMES DAILY PRN
Qty: 60 TAB | Refills: 0 | Status: SHIPPED | OUTPATIENT
Start: 2018-12-04 | End: 2018-10-30 | Stop reason: SDUPTHER

## 2018-10-01 RX ORDER — TRAMADOL HYDROCHLORIDE 50 MG/1
50 TABLET ORAL EVERY 8 HOURS PRN
Qty: 90 TAB | Refills: 0 | Status: SHIPPED | OUTPATIENT
Start: 2018-10-05 | End: 2018-10-01 | Stop reason: SDUPTHER

## 2018-10-01 NOTE — ASSESSMENT & PLAN NOTE
Chronic knee pain. Recently had bilateral injections. Requesting renewals of her pain management medication.

## 2018-10-01 NOTE — ASSESSMENT & PLAN NOTE
Complains of a continued rash on the face. The Right Side of the Upper Maxillary Area and Now on the Nose. The past Haven't Been Able to Detect It Because of Makeup. Today She Brought a wipe to wipe away the makeup. In the past She Used a Cream Provided by Gynecology That Was Effective. It's Not Working Anymore. Denies that it itches. Denies pain. She is concerned about shingles.

## 2018-10-01 NOTE — LETTER
[facility]  Sim Brownlee P.A.-C.  [address]  [place] [address]  Fax: [phone]   Authorization for Release/Disclosure of   Protected Health Information   Name: CHUY POSADA : 1955 SSN: [id number]   Address: [place] [address] Phone:    [phone] (home)    I authorize the entity listed below to release/disclose the PHI below to:   [facility]/Sim Brownlee P.A.-C. and Sim Brownlee P.A.-C.   Provider or Entity Name:  Félix Mosley   Address   City, State, Zip   Phone:      Fax:     Reason for request: continuity of care   Information to be released: Past two months   [  ] LAST COLONOSCOPY,  including any PATH REPORT and follow-up  [  ] LAST FIT/COLOGUARD RESULT [  ] LAST DEXA  [  ] LAST MAMMOGRAM  [  ] LAST PAP  [  ] LAST LABS [  ] RETINA EXAM REPORT  [  ] IMMUNIZATION RECORDS  [  X] Release all info      [  ] Check here and initial the line next to each item to release ALL health information INCLUDING  _____ Care and treatment for drug and / or alcohol abuse  _____ HIV testing, infection status, or AIDS  _____ Genetic Testing    DATES OF SERVICE OR TIME PERIOD TO BE DISCLOSED: _____________  I understand and acknowledge that:  * This Authorization may be revoked at any time by you in writing, except if your health information has already been used or disclosed.  * Your health information that will be used or disclosed as a result of you signing this authorization could be re-disclosed by the recipient. If this occurs, your re-disclosed health information may no longer be protected by State or Federal laws.  * You may refuse to sign this Authorization. Your refusal will not affect your ability to obtain treatment.  * This Authorization becomes effective upon signing and will  on (date) __________.      If no date is indicated, this Authorization will  one (1) year from the signature date.    Name: Chuy Posada    Signature:   Date:          10/1/2018       PLEASE FAX REQUESTED RECORDS BACK TO: [phone]

## 2018-10-01 NOTE — ASSESSMENT & PLAN NOTE
This is a 62-year-old female who was seen at the ED a couple times and diagnosed with atrial fibrillation. Also had paroxysmal SVT. She was seen by cardiology and Dr. Casey. She is currently taking Eliquis. Complains of no chest pain or shortness of breath. Feels no runs of tachycardia. She stopped taking losartan because of her blood pressure being low. She was feeling tired all the time. She now will take metoprolol 75 mg daily. Since her diagnosis she gave up smoking and alcohol.

## 2018-10-01 NOTE — PROGRESS NOTES
Subjective:   Alfonso Posada is a 62 y.o. female here today for atrial fibrillation, rash on face, bilateral knee pain and anxiety.    Atrial fibrillation (HCC)  This is a 62-year-old female who was seen at the ED a couple times and diagnosed with atrial fibrillation. Also had paroxysmal SVT. She was seen by cardiology and Dr. Casey. She is currently taking Eliquis. Complains of no chest pain or shortness of breath. Feels no runs of tachycardia. She stopped taking losartan because of her blood pressure being low. She was feeling tired all the time. She now will take metoprolol 75 mg daily. Since her diagnosis she gave up smoking and alcohol.     Rash of face  Complains of a continued rash on the face. The Right Side of the Upper Maxillary Area and Now on the Nose. The past Haven't Been Able to Detect It Because of Makeup. Today She Brought a wipe to wipe away the makeup. In the past She Used a Cream Provided by Gynecology That Was Effective. It's Not Working Anymore. Denies that it itches. Denies pain. She is concerned about shingles.    Bilateral chronic knee pain  Chronic knee pain. Recently had bilateral injections. Requesting renewals of her pain management medication.    Anxiety  Chronic condition. Stable. Requesting renewal of Xanax. Doesn't take the medication daily.       Current medicines (including changes today)  Current Outpatient Prescriptions   Medication Sig Dispense Refill   • [START ON 10/5/2018] ALPRAZolam (XANAX) 0.5 MG Tab Take 1 Tab by mouth 2 times a day as needed for Anxiety for up to 60 days. 60 Tab 1   • [START ON 12/4/2018] tramadol (ULTRAM) 50 MG Tab Take 1 Tab by mouth every 8 hours as needed for up to 30 days. 90 Tab 0   • [START ON 12/4/2018] oxyCODONE-acetaminophen (PERCOCET-10)  MG Tab Take 1 Tab by mouth 2 times a day as needed for Severe Pain for up to 30 days. 60 Tab 0   • ketoconazole (NIZORAL) 2 % Cream Apply 1 Application to affected area(s) every day. 60 g 5   •  "fluticasone (FLONASE) 50 MCG/ACT nasal spray SPRAY ONE SPRAY IN EACH NOSTRIL TWICE DAILY 16 g 5   • ondansetron (ZOFRAN ODT) 4 MG TABLET DISPERSIBLE DISSOLVE ONE TABLET BY MOUTH EVERY 8 HOURS AS NEEDED FOR NAUSEA AND VOMITING 30 Tab 5   • potassium chloride (KLOR-CON) 8 MEQ tablet TAKE ONE TABLET BY MOUTH DAILY 90 Tab 0   • losartan (COZAAR) 25 MG Tab TAKE ONE TABLET BY MOUTH DAILY 90 Tab 0   • furosemide (LASIX) 40 MG Tab TAKE ONE TABLET BY MOUTH DAILY 90 Tab 0   • levothyroxine (SYNTHROID) 25 MCG Tab Take 1 Tab by mouth Every morning on an empty stomach. ON EMPTY STOMACH 90 Tab 3   • ondansetron (ZOFRAN ODT) 4 MG TABLET DISPERSIBLE Take 1 Tab by mouth every 8 hours as needed. AS NEEDED FOR NAUSEA AND VOMITING 15 Tab 2   • omeprazole (PRILOSEC) 40 MG delayed-release capsule Take 1 Cap by mouth every day. 90 Cap 3   • atorvastatin (LIPITOR) 40 MG Tab Take 1 Tab by mouth every day. TAKE ONE TABLET BY MOUTH DAILY 90 Tab 3   • metoprolol (LOPRESSOR) 50 MG Tab TAKE ONE TABLET BY MOUTH TWICE A  Tab 3   • montelukast (SINGULAIR) 10 MG Tab Take 1 Tab by mouth every day. TAKE ONE TABLET BY MOUTH DAILY 90 Tab 3   • sertraline (ZOLOFT) 100 MG Tab Take 1 Tab by mouth every day. 90 Tab 3   • ELIQUIS 5 MG Tab Take 5 mg by mouth 2 Times a Day.       No current facility-administered medications for this visit.      She  has a past medical history of Asthma and Thyroid disease.    Social History and Family History were reviewed and updated.    ROS   No chest pain, no shortness of breath, no abdominal pain and all other systems were reviewed and are negative.       Objective:     Blood pressure 115/55, pulse 66, temperature 36.2 °C (97.1 °F), height 1.651 m (5' 5\"), weight (!) 140.5 kg (309 lb 12.8 oz), last menstrual period 03/20/2016, SpO2 98 %, not currently breastfeeding. Body mass index is 51.55 kg/m².   Physical Exam:  Constitutional: Alert, no distress.  Skin: Warm, dry, good turgor. There is an irregular circular " lesion on the face. Extends to the bridge of the nose all year round with thick erythema at the borders.  Eye: Equal, round and reactive, conjunctiva clear, lids normal.  ENMT: Lips without lesions, good dentition, oropharynx clear.  Neck: Trachea midline, no masses.   Lymph: No cervical or supraclavicular lymphadenopathy  Respiratory: Unlabored respiratory effort, lungs clear to auscultation, no wheezes, no ronchi.  Cardiovascular: Normal S1, S2, no murmur, no edema.  Psych: Alert and oriented x3, normal affect and mood.        Assessment and Plan:   The following treatment plan was discussed    1. Atrial fibrillation, unspecified type (HCC)  New-onset condition. Follow with cardiology. Continue metoprolol. Continue lifestyle changes. Continue anticoagulant.     2. Rash of face  Chronic condition. Appears to be a tinea infection. Provide ketoconazole as directed. Refer to dermatology given its chronicity.  - ketoconazole (NIZORAL) 2 % Cream; Apply 1 Application to affected area(s) every day.  Dispense: 60 g; Refill: 5  - REFERRAL TO DERMATOLOGY    3. Bilateral chronic knee pain  Chronic condition. Stable.  reviewed. Medication appropriate. Agreement on file. Renewed oxycodone and tramadol. Morphine milliequivalents currently at 30 daily.  - tramadol (ULTRAM) 50 MG Tab; Take 1 Tab by mouth every 8 hours as needed for up to 30 days.  Dispense: 90 Tab; Refill: 0  - oxyCODONE-acetaminophen (PERCOCET-10)  MG Tab; Take 1 Tab by mouth 2 times a day as needed for Severe Pain for up to 30 days.  Dispense: 60 Tab; Refill: 0    4. Anxiety  Chronic condition. Stable. Renewed Xanax as needed. Discussed black box warning taking opioids.  - ALPRAZolam (XANAX) 0.5 MG Tab; Take 1 Tab by mouth 2 times a day as needed for Anxiety for up to 60 days.  Dispense: 60 Tab; Refill: 1    5. Morbid obesity with BMI of 50.0-59.9, adult (HCC)  Chronic condition. Improved. Continue lifestyle changes.  - Patient identified as having  weight management issue.  Appropriate orders and counseling given.      Followup: Return in about 2 weeks (around 10/15/2018).    Please note that this dictation was created using voice recognition software. I have made every reasonable attempt to correct obvious errors, but I expect that there are errors of grammar and possibly content that I did not discover before finalizing the note.

## 2018-10-04 ENCOUNTER — TELEPHONE (OUTPATIENT)
Dept: MEDICAL GROUP | Facility: MEDICAL CENTER | Age: 63
End: 2018-10-04

## 2018-10-15 ENCOUNTER — APPOINTMENT (OUTPATIENT)
Dept: MEDICAL GROUP | Facility: MEDICAL CENTER | Age: 63
End: 2018-10-15
Payer: COMMERCIAL

## 2018-10-18 ENCOUNTER — APPOINTMENT (OUTPATIENT)
Dept: RADIOLOGY | Facility: MEDICAL CENTER | Age: 63
End: 2018-10-18
Attending: EMERGENCY MEDICINE
Payer: COMMERCIAL

## 2018-10-18 ENCOUNTER — HOSPITAL ENCOUNTER (EMERGENCY)
Facility: MEDICAL CENTER | Age: 63
End: 2018-10-18
Attending: EMERGENCY MEDICINE
Payer: COMMERCIAL

## 2018-10-18 ENCOUNTER — TELEPHONE (OUTPATIENT)
Dept: MEDICAL GROUP | Facility: MEDICAL CENTER | Age: 63
End: 2018-10-18

## 2018-10-18 VITALS
OXYGEN SATURATION: 93 % | DIASTOLIC BLOOD PRESSURE: 117 MMHG | WEIGHT: 293 LBS | BODY MASS INDEX: 48.82 KG/M2 | RESPIRATION RATE: 18 BRPM | SYSTOLIC BLOOD PRESSURE: 217 MMHG | HEART RATE: 66 BPM | HEIGHT: 65 IN | TEMPERATURE: 98 F

## 2018-10-18 DIAGNOSIS — S50.01XA CONTUSION OF RIGHT ELBOW, INITIAL ENCOUNTER: ICD-10-CM

## 2018-10-18 PROCEDURE — A9270 NON-COVERED ITEM OR SERVICE: HCPCS | Performed by: EMERGENCY MEDICINE

## 2018-10-18 PROCEDURE — 73080 X-RAY EXAM OF ELBOW: CPT | Mod: RT

## 2018-10-18 PROCEDURE — 99284 EMERGENCY DEPT VISIT MOD MDM: CPT

## 2018-10-18 PROCEDURE — 700102 HCHG RX REV CODE 250 W/ 637 OVERRIDE(OP): Performed by: EMERGENCY MEDICINE

## 2018-10-18 PROCEDURE — 73030 X-RAY EXAM OF SHOULDER: CPT | Mod: RT

## 2018-10-18 PROCEDURE — 73562 X-RAY EXAM OF KNEE 3: CPT | Mod: RT

## 2018-10-18 PROCEDURE — 73130 X-RAY EXAM OF HAND: CPT | Mod: LT

## 2018-10-18 RX ORDER — OXYCODONE HYDROCHLORIDE AND ACETAMINOPHEN 5; 325 MG/1; MG/1
1 TABLET ORAL ONCE
Status: COMPLETED | OUTPATIENT
Start: 2018-10-18 | End: 2018-10-18

## 2018-10-18 RX ADMIN — OXYCODONE HYDROCHLORIDE AND ACETAMINOPHEN 1 TABLET: 5; 325 TABLET ORAL at 10:39

## 2018-10-18 ASSESSMENT — PAIN DESCRIPTION - DESCRIPTORS: DESCRIPTORS: ACHING

## 2018-10-18 ASSESSMENT — PAIN SCALES - GENERAL: PAINLEVEL_OUTOF10: 3

## 2018-10-18 NOTE — ED PROVIDER NOTES
"ED Provider Note    ER PROVIDER NOTE        CHIEF COMPLAINT  Chief Complaint   Patient presents with   • Elbow Pain       HPI  Alfonso Poasda is a 62 y.o. female who presents to the emergency department complaining of right elbow and shoulder pain.  Patient reports that 8 days ago she tripped while walking her dog, also caught the lesion in her left hand.  She has had some pain to these areas since that event, most of her pain is in her elbow although her shoulder is sore when she moves around as well.  States her elbow feels warm and she has had some persistent swelling.  States she has had the heating pad on her elbow all night and when she woke this morning it was warm she denies any focal weakness numbness or tingling.  Denies any head injury or other extremity pain.  She does take Eliquis for her atrial fibrillation    Patient additionally reports that she has significant anxiety surrounding her 's death and takes both chronic opioid and chronic benzodiazepine therapy for her arthritis and anxiety    REVIEW OF SYSTEMS  Pertinent positives include elbow pain. Pertinent negatives include no fever. See HPI for details. All other systems reviewed and are negative.    PAST MEDICAL HISTORY   has a past medical history of Asthma and Thyroid disease.    SOCIAL HISTORY  Social History   Substance Use Topics   • Smoking status: Former Smoker     Packs/day: 0.50     Start date: 3/4/2016     Quit date: 9/9/2018   • Smokeless tobacco: Never Used      Comment: 10 cigs   • Alcohol use 1.8 - 3.0 oz/week     3 - 5 Glasses of wine per week      Comment: Nothing since 9/17/18       SURGICAL HISTORY   has a past surgical history that includes thyroidectomy total (2005).    CURRENT MEDICATIONS  Home Medications    **Home medications have not yet been reviewed for this encounter**         ALLERGIES  Allergies   Allergen Reactions   • Latex Swelling   • Pcn [Penicillins]      Hives on face    • Sulfa Drugs      \"some sulfa " "drugs- leg itchiness\"        PHYSICAL EXAM  VITAL SIGNS: BP (!) 217/117   Pulse 66   Temp 36.7 °C (98 °F)   Resp 18   Ht 1.651 m (5' 5\")   Wt (!) 146.5 kg (322 lb 15.6 oz)   LMP 03/20/2016   SpO2 93%   BMI 53.75 kg/m²   Pulse ox interpretation: I interpret this pulse ox as normal.    Constitutional: Alert.  In no apparent distress.  HENT: Normocephalic, Atraumatic, Bilateral external ears normal. Nose normal.   Eyes: Pupils are equal and reactive. Conjunctiva normal, non-icteric.   Heart: Regular rate and rhythm, no murmurs.    Lungs: Clear to auscultation bilaterally.  Skin: Warm, Dry, No erythema, No rash.   Musculoskeletal: Tenderness to palpation over distal posterior elbow, not over olecranon or bursa, nontender through lateral medial epicondyle, full range of motion with flexion, extension, pronation and supination no erythema, minimal swelling, nontender throughout right wrist and hand, mild anterior right shoulder tenderness, no deformity, some pain with abduction, full strength with abduction abduction flexion and extension, distal capillary refill less than 2 seconds, distal sensation intact to light touch in MRU distribution strength grossly intact, additional mild tenderness over the thenar eminence of left hand, no bruising, deformity or other tenderness to her right hand, distal capillary refill less than 2 seconds, distal sensation intact light touch otherwise no tenderness or major deformities noted. No edema.  Neurologic: Alert, Grossly non-focal.   Psychiatric: Affect normal, Judgment normal, Mood normal, Appears appropriate and not intoxicated.     Vitals:    10/18/18 0930 10/18/18 1022 10/18/18 1122   BP: (!) 217/117     Pulse: 68 64 66   Resp: 18     Temp: 36.7 °C (98 °F)     SpO2: 94% 94% 93%   Weight: (!) 146.5 kg (322 lb 15.6 oz)     Height: 1.651 m (5' 5\")             DIAGNOSTIC STUDIES / PROCEDURES        RADIOLOGY  DX-HAND 3+ LEFT   Final Result         No definite fracture seen " but evaluation limited due to osseous demineralization.      DX-SHOULDER 2+ RIGHT   Final Result         1. No definite acute osseous abnormality but evaluation is limited due to osseous demineralization.      DX-KNEE 3 VIEWS RIGHT   Final Result         1. No acute osseous abnormality but evaluation is limited due to osseous demineralization.      DX-ELBOW-COMPLETE 3+ RIGHT   Final Result         No acute osseous abnormality.        The radiologist's interpretation of all radiological studies have been reviewed by me.    COURSE & MEDICAL DECISION MAKING  Nursing notes, VS, PMSFHx reviewed in chart.    9:44 AM - Patient seen and examined at bedside. Patient will be treated with percocet. Ordered for xrays to evaluate her symptoms.    DX-HAND 3+ LEFT   Final Result         No definite fracture seen but evaluation limited due to osseous demineralization.      DX-SHOULDER 2+ RIGHT   Final Result         1. No definite acute osseous abnormality but evaluation is limited due to osseous demineralization.      DX-KNEE 3 VIEWS RIGHT   Final Result         1. No acute osseous abnormality but evaluation is limited due to osseous demineralization.      DX-ELBOW-COMPLETE 3+ RIGHT   Final Result         No acute osseous abnormality.            11:40 AM  Patient reevaluated, updated on the results of her x-rays, she is comfortable, systolic blood pressure 140s,    Decision Making:  This is a 62 y.o. female presented with elbow hand and shoulder pain after a mechanical fall. x-ray demonstrates no fracture or dislocation.  And pt has no obvious clinical findings to suggest this.  I did discuss the possibility of an occult fracture although this seems unlikely given she is 8 days out from injury, as well as a followup and home care as documented in discharge instructions. no e/o compartment sx , no e/o neurovascular compromise,and no other injury noted.  Patient has no erythema fevers or other exam findings suggestive of infectious  process either.  I do think she is having some longer course and resolution of her bruising secondary to her Eliquis use although I detect no significant continued bleeding at this time. discussed indications for return including new/worse pain, numbness or cool extremity, worsened swelling, and pale color to extremity. Pt understood well and she is already on oxycodone at home for pain.  She was initially hypertensive I think this was likely secondary to her pain as well as her baseline anxiety as this did improve with pain control     The patient will return for new or worsening symptoms and is stable at the time of discharge.    The patient is referred to a primary physician for blood pressure management, diabetic screening, and for all other preventative health concerns.      DISPOSITION:  Patient will be discharged home in stable condition.    FOLLOW UP:  Sim Brownlee, P.A.-C.  [address]  [facility] [address]  [phone]    In 1 week        OUTPATIENT MEDICATIONS:  Discharge Medication List as of 10/18/2018 11:44 AM            FINAL IMPRESSION  1. Contusion of right elbow, initial encounter Active       The note accurately reflects work and decisions made by me.  Ellis Bello  10/18/2018  11:46 AM

## 2018-10-18 NOTE — ED TRIAGE NOTES
Pt reports falling approximately 8 days ago with consequent injury to her right elbow.  She presents this AM with recurring pain.  On Eliquis therapy for a recent Hx of Afib.

## 2018-10-18 NOTE — TELEPHONE ENCOUNTER
----- Message from Cristina Quispe sent at 10/18/2018  9:10 AM PDT -----  1. Caller Name: SCHEDULING                         2. Call Back Number: [phone] (home)     3. Patient PCP: [facility]    4. What is the patient requesting: Received a call from scheduling, patient had a recent fall where she had called 911 and they came to help her up but she did not go to the er. She called today to make appt to see serenity with active symptoms that would warrant scheduling to suggest ER. Please call to follow up with pt.     5. Does patient need immediate contact: yes

## 2018-10-19 ENCOUNTER — HOSPITAL ENCOUNTER (EMERGENCY)
Facility: MEDICAL CENTER | Age: 63
End: 2018-10-19
Attending: EMERGENCY MEDICINE
Payer: COMMERCIAL

## 2018-10-19 ENCOUNTER — PATIENT OUTREACH (OUTPATIENT)
Dept: HEALTH INFORMATION MANAGEMENT | Facility: OTHER | Age: 63
End: 2018-10-19

## 2018-10-19 VITALS
TEMPERATURE: 99.6 F | WEIGHT: 293 LBS | OXYGEN SATURATION: 92 % | HEART RATE: 75 BPM | RESPIRATION RATE: 18 BRPM | SYSTOLIC BLOOD PRESSURE: 146 MMHG | DIASTOLIC BLOOD PRESSURE: 90 MMHG | HEIGHT: 65 IN | BODY MASS INDEX: 48.82 KG/M2

## 2018-10-19 DIAGNOSIS — L03.113 CELLULITIS OF RIGHT UPPER EXTREMITY: ICD-10-CM

## 2018-10-19 PROCEDURE — 99284 EMERGENCY DEPT VISIT MOD MDM: CPT

## 2018-10-19 PROCEDURE — A9270 NON-COVERED ITEM OR SERVICE: HCPCS | Performed by: EMERGENCY MEDICINE

## 2018-10-19 PROCEDURE — 700102 HCHG RX REV CODE 250 W/ 637 OVERRIDE(OP): Performed by: EMERGENCY MEDICINE

## 2018-10-19 RX ORDER — DOXYCYCLINE 100 MG/1
100 TABLET ORAL ONCE
Status: COMPLETED | OUTPATIENT
Start: 2018-10-19 | End: 2018-10-19

## 2018-10-19 RX ORDER — DOXYCYCLINE 100 MG/1
100 CAPSULE ORAL 2 TIMES DAILY
Qty: 20 CAP | Refills: 0 | Status: ON HOLD | OUTPATIENT
Start: 2018-10-19 | End: 2018-10-23

## 2018-10-19 RX ADMIN — DOXYCYCLINE 100 MG: 100 TABLET, FILM COATED ORAL at 18:42

## 2018-10-19 ASSESSMENT — PAIN SCALES - GENERAL
PAINLEVEL_OUTOF10: 6
PAINLEVEL_OUTOF10: 6

## 2018-10-19 ASSESSMENT — PAIN DESCRIPTION - DESCRIPTORS: DESCRIPTORS: ACHING

## 2018-10-20 ENCOUNTER — HOSPITAL ENCOUNTER (INPATIENT)
Facility: MEDICAL CENTER | Age: 63
LOS: 3 days | DRG: 872 | End: 2018-10-23
Attending: EMERGENCY MEDICINE | Admitting: INTERNAL MEDICINE
Payer: COMMERCIAL

## 2018-10-20 ENCOUNTER — APPOINTMENT (OUTPATIENT)
Dept: RADIOLOGY | Facility: MEDICAL CENTER | Age: 63
DRG: 872 | End: 2018-10-20
Attending: EMERGENCY MEDICINE
Payer: COMMERCIAL

## 2018-10-20 PROBLEM — A41.9 SEPSIS (HCC): Status: ACTIVE | Noted: 2018-10-20

## 2018-10-20 PROBLEM — L03.90 CELLULITIS: Status: ACTIVE | Noted: 2018-10-20

## 2018-10-20 LAB
ANION GAP SERPL CALC-SCNC: 12 MMOL/L (ref 0–11.9)
APPEARANCE UR: CLEAR
APTT PPP: 34.6 SEC (ref 24.7–36)
BACTERIA #/AREA URNS HPF: ABNORMAL /HPF
BASOPHILS # BLD AUTO: 0.5 % (ref 0–1.8)
BASOPHILS # BLD: 0.06 K/UL (ref 0–0.12)
BILIRUB UR QL STRIP.AUTO: NEGATIVE
BUN SERPL-MCNC: 12 MG/DL (ref 8–22)
CALCIUM SERPL-MCNC: 8.8 MG/DL (ref 8.4–10.2)
CHLORIDE SERPL-SCNC: 100 MMOL/L (ref 96–112)
CO2 SERPL-SCNC: 24 MMOL/L (ref 20–33)
COLOR UR: YELLOW
CREAT SERPL-MCNC: 0.8 MG/DL (ref 0.5–1.4)
EOSINOPHIL # BLD AUTO: 0.14 K/UL (ref 0–0.51)
EOSINOPHIL NFR BLD: 1.2 % (ref 0–6.9)
EPI CELLS #/AREA URNS HPF: ABNORMAL /HPF
ERYTHROCYTE [DISTWIDTH] IN BLOOD BY AUTOMATED COUNT: 44.2 FL (ref 35.9–50)
GLUCOSE SERPL-MCNC: 134 MG/DL (ref 65–99)
GLUCOSE UR STRIP.AUTO-MCNC: NEGATIVE MG/DL
HCT VFR BLD AUTO: 44.6 % (ref 37–47)
HGB BLD-MCNC: 14.9 G/DL (ref 12–16)
IMM GRANULOCYTES # BLD AUTO: 0.04 K/UL (ref 0–0.11)
IMM GRANULOCYTES NFR BLD AUTO: 0.3 % (ref 0–0.9)
INR PPP: 1.09 (ref 0.87–1.13)
KETONES UR STRIP.AUTO-MCNC: NEGATIVE MG/DL
LACTATE BLD-SCNC: 1.2 MMOL/L (ref 0.5–2)
LACTATE BLD-SCNC: 2.3 MMOL/L (ref 0.5–2)
LEUKOCYTE ESTERASE UR QL STRIP.AUTO: ABNORMAL
LYMPHOCYTES # BLD AUTO: 2.07 K/UL (ref 1–4.8)
LYMPHOCYTES NFR BLD: 17.3 % (ref 22–41)
MCH RBC QN AUTO: 31 PG (ref 27–33)
MCHC RBC AUTO-ENTMCNC: 33.4 G/DL (ref 33.6–35)
MCV RBC AUTO: 92.9 FL (ref 81.4–97.8)
MICRO URNS: ABNORMAL
MONOCYTES # BLD AUTO: 1.13 K/UL (ref 0–0.85)
MONOCYTES NFR BLD AUTO: 9.4 % (ref 0–13.4)
MUCOUS THREADS #/AREA URNS HPF: ABNORMAL /HPF
NEUTROPHILS # BLD AUTO: 8.52 K/UL (ref 2–7.15)
NEUTROPHILS NFR BLD: 71.3 % (ref 44–72)
NITRITE UR QL STRIP.AUTO: NEGATIVE
NRBC # BLD AUTO: 0 K/UL
NRBC BLD-RTO: 0 /100 WBC
PH UR STRIP.AUTO: 5 [PH]
PLATELET # BLD AUTO: 242 K/UL (ref 164–446)
PMV BLD AUTO: 9.5 FL (ref 9–12.9)
POTASSIUM SERPL-SCNC: 4.2 MMOL/L (ref 3.6–5.5)
PROT UR QL STRIP: 30 MG/DL
PROTHROMBIN TIME: 14 SEC (ref 12–14.6)
RBC # BLD AUTO: 4.8 M/UL (ref 4.2–5.4)
RBC # URNS HPF: ABNORMAL /HPF
RBC UR QL AUTO: ABNORMAL
SODIUM SERPL-SCNC: 136 MMOL/L (ref 135–145)
SP GR UR REFRACTOMETRY: 1.02
WBC # BLD AUTO: 12 K/UL (ref 4.8–10.8)
WBC #/AREA URNS HPF: ABNORMAL /HPF

## 2018-10-20 PROCEDURE — 700105 HCHG RX REV CODE 258: Performed by: EMERGENCY MEDICINE

## 2018-10-20 PROCEDURE — 94760 N-INVAS EAR/PLS OXIMETRY 1: CPT

## 2018-10-20 PROCEDURE — 83605 ASSAY OF LACTIC ACID: CPT

## 2018-10-20 PROCEDURE — A9270 NON-COVERED ITEM OR SERVICE: HCPCS | Performed by: INTERNAL MEDICINE

## 2018-10-20 PROCEDURE — 36415 COLL VENOUS BLD VENIPUNCTURE: CPT

## 2018-10-20 PROCEDURE — 700102 HCHG RX REV CODE 250 W/ 637 OVERRIDE(OP): Performed by: INTERNAL MEDICINE

## 2018-10-20 PROCEDURE — 81001 URINALYSIS AUTO W/SCOPE: CPT

## 2018-10-20 PROCEDURE — 99285 EMERGENCY DEPT VISIT HI MDM: CPT

## 2018-10-20 PROCEDURE — 87040 BLOOD CULTURE FOR BACTERIA: CPT

## 2018-10-20 PROCEDURE — 99223 1ST HOSP IP/OBS HIGH 75: CPT | Mod: AI | Performed by: INTERNAL MEDICINE

## 2018-10-20 PROCEDURE — 700111 HCHG RX REV CODE 636 W/ 250 OVERRIDE (IP): Performed by: EMERGENCY MEDICINE

## 2018-10-20 PROCEDURE — 85610 PROTHROMBIN TIME: CPT

## 2018-10-20 PROCEDURE — 73080 X-RAY EXAM OF ELBOW: CPT | Mod: RT

## 2018-10-20 PROCEDURE — 87086 URINE CULTURE/COLONY COUNT: CPT

## 2018-10-20 PROCEDURE — A9270 NON-COVERED ITEM OR SERVICE: HCPCS | Performed by: EMERGENCY MEDICINE

## 2018-10-20 PROCEDURE — 700102 HCHG RX REV CODE 250 W/ 637 OVERRIDE(OP): Performed by: EMERGENCY MEDICINE

## 2018-10-20 PROCEDURE — 96365 THER/PROPH/DIAG IV INF INIT: CPT

## 2018-10-20 PROCEDURE — 80048 BASIC METABOLIC PNL TOTAL CA: CPT

## 2018-10-20 PROCEDURE — 85025 COMPLETE CBC W/AUTO DIFF WBC: CPT

## 2018-10-20 PROCEDURE — 700105 HCHG RX REV CODE 258: Performed by: INTERNAL MEDICINE

## 2018-10-20 PROCEDURE — 700111 HCHG RX REV CODE 636 W/ 250 OVERRIDE (IP): Performed by: INTERNAL MEDICINE

## 2018-10-20 PROCEDURE — 770006 HCHG ROOM/CARE - MED/SURG/GYN SEMI*

## 2018-10-20 PROCEDURE — 85730 THROMBOPLASTIN TIME PARTIAL: CPT

## 2018-10-20 RX ORDER — ACETAMINOPHEN 325 MG/1
650 TABLET ORAL EVERY 6 HOURS PRN
Status: DISCONTINUED | OUTPATIENT
Start: 2018-10-20 | End: 2018-10-23 | Stop reason: HOSPADM

## 2018-10-20 RX ORDER — SODIUM CHLORIDE 9 MG/ML
INJECTION, SOLUTION INTRAVENOUS CONTINUOUS
Status: DISCONTINUED | OUTPATIENT
Start: 2018-10-20 | End: 2018-10-23

## 2018-10-20 RX ORDER — ONDANSETRON 4 MG/1
4 TABLET, ORALLY DISINTEGRATING ORAL EVERY 4 HOURS PRN
Status: DISCONTINUED | OUTPATIENT
Start: 2018-10-20 | End: 2018-10-23 | Stop reason: HOSPADM

## 2018-10-20 RX ORDER — ACETAMINOPHEN 325 MG/1
975 TABLET ORAL ONCE
Status: COMPLETED | OUTPATIENT
Start: 2018-10-20 | End: 2018-10-20

## 2018-10-20 RX ORDER — POLYETHYLENE GLYCOL 3350 17 G/17G
1 POWDER, FOR SOLUTION ORAL
Status: DISCONTINUED | OUTPATIENT
Start: 2018-10-20 | End: 2018-10-23 | Stop reason: HOSPADM

## 2018-10-20 RX ORDER — SODIUM CHLORIDE 9 MG/ML
1000 INJECTION, SOLUTION INTRAVENOUS
Status: DISCONTINUED | OUTPATIENT
Start: 2018-10-20 | End: 2018-10-23 | Stop reason: HOSPADM

## 2018-10-20 RX ORDER — AMOXICILLIN 250 MG
2 CAPSULE ORAL 2 TIMES DAILY
Status: DISCONTINUED | OUTPATIENT
Start: 2018-10-20 | End: 2018-10-23 | Stop reason: HOSPADM

## 2018-10-20 RX ORDER — ONDANSETRON 2 MG/ML
4 INJECTION INTRAMUSCULAR; INTRAVENOUS EVERY 4 HOURS PRN
Status: DISCONTINUED | OUTPATIENT
Start: 2018-10-20 | End: 2018-10-23 | Stop reason: HOSPADM

## 2018-10-20 RX ORDER — OXYCODONE HYDROCHLORIDE 10 MG/1
20 TABLET ORAL
Status: DISCONTINUED | OUTPATIENT
Start: 2018-10-20 | End: 2018-10-23 | Stop reason: HOSPADM

## 2018-10-20 RX ORDER — LEVOTHYROXINE SODIUM 0.05 MG/1
25 TABLET ORAL
Status: DISCONTINUED | OUTPATIENT
Start: 2018-10-21 | End: 2018-10-21

## 2018-10-20 RX ORDER — OXYCODONE HYDROCHLORIDE 10 MG/1
10 TABLET ORAL
Status: DISCONTINUED | OUTPATIENT
Start: 2018-10-20 | End: 2018-10-23 | Stop reason: HOSPADM

## 2018-10-20 RX ORDER — ATORVASTATIN CALCIUM 40 MG/1
40 TABLET, FILM COATED ORAL DAILY
Status: DISCONTINUED | OUTPATIENT
Start: 2018-10-21 | End: 2018-10-23 | Stop reason: HOSPADM

## 2018-10-20 RX ORDER — ZOLPIDEM TARTRATE 5 MG/1
5 TABLET ORAL
Status: DISCONTINUED | OUTPATIENT
Start: 2018-10-20 | End: 2018-10-23 | Stop reason: HOSPADM

## 2018-10-20 RX ORDER — SODIUM CHLORIDE 9 MG/ML
30 INJECTION, SOLUTION INTRAVENOUS
Status: DISCONTINUED | OUTPATIENT
Start: 2018-10-20 | End: 2018-10-23 | Stop reason: HOSPADM

## 2018-10-20 RX ORDER — LABETALOL HYDROCHLORIDE 5 MG/ML
10 INJECTION, SOLUTION INTRAVENOUS EVERY 4 HOURS PRN
Status: DISCONTINUED | OUTPATIENT
Start: 2018-10-20 | End: 2018-10-23 | Stop reason: HOSPADM

## 2018-10-20 RX ORDER — OMEPRAZOLE 20 MG/1
40 CAPSULE, DELAYED RELEASE ORAL DAILY
Status: DISCONTINUED | OUTPATIENT
Start: 2018-10-21 | End: 2018-10-23 | Stop reason: HOSPADM

## 2018-10-20 RX ORDER — LOSARTAN POTASSIUM 25 MG/1
25 TABLET ORAL
Status: DISCONTINUED | OUTPATIENT
Start: 2018-10-21 | End: 2018-10-21

## 2018-10-20 RX ORDER — BISACODYL 10 MG
10 SUPPOSITORY, RECTAL RECTAL
Status: DISCONTINUED | OUTPATIENT
Start: 2018-10-20 | End: 2018-10-23 | Stop reason: HOSPADM

## 2018-10-20 RX ORDER — PROMETHAZINE HYDROCHLORIDE 25 MG/1
12.5-25 SUPPOSITORY RECTAL EVERY 4 HOURS PRN
Status: DISCONTINUED | OUTPATIENT
Start: 2018-10-20 | End: 2018-10-23 | Stop reason: HOSPADM

## 2018-10-20 RX ORDER — SODIUM CHLORIDE 9 MG/ML
1000 INJECTION, SOLUTION INTRAVENOUS ONCE
Status: COMPLETED | OUTPATIENT
Start: 2018-10-20 | End: 2018-10-20

## 2018-10-20 RX ORDER — PROMETHAZINE HYDROCHLORIDE 25 MG/1
12.5-25 TABLET ORAL EVERY 4 HOURS PRN
Status: DISCONTINUED | OUTPATIENT
Start: 2018-10-20 | End: 2018-10-23 | Stop reason: HOSPADM

## 2018-10-20 RX ORDER — ALPRAZOLAM 0.5 MG/1
0.5 TABLET ORAL 2 TIMES DAILY PRN
Status: DISCONTINUED | OUTPATIENT
Start: 2018-10-20 | End: 2018-10-23 | Stop reason: HOSPADM

## 2018-10-20 RX ADMIN — ONDANSETRON HYDROCHLORIDE 4 MG: 2 INJECTION INTRAMUSCULAR; INTRAVENOUS at 22:30

## 2018-10-20 RX ADMIN — APIXABAN 5 MG: 5 TABLET, FILM COATED ORAL at 22:29

## 2018-10-20 RX ADMIN — SODIUM CHLORIDE 1000 ML: 9 INJECTION, SOLUTION INTRAVENOUS at 21:32

## 2018-10-20 RX ADMIN — ACETAMINOPHEN 975 MG: 325 TABLET, FILM COATED ORAL at 21:32

## 2018-10-20 RX ADMIN — OXYCODONE HYDROCHLORIDE 20 MG: 5 TABLET ORAL at 22:30

## 2018-10-20 RX ADMIN — METOPROLOL TARTRATE 25 MG: 25 TABLET ORAL at 22:30

## 2018-10-20 RX ADMIN — SODIUM CHLORIDE: 9 INJECTION, SOLUTION INTRAVENOUS at 22:35

## 2018-10-20 RX ADMIN — CEFEPIME HYDROCHLORIDE 2 G: 2 INJECTION, POWDER, FOR SOLUTION INTRAVENOUS at 21:32

## 2018-10-20 ASSESSMENT — COGNITIVE AND FUNCTIONAL STATUS - GENERAL
WALKING IN HOSPITAL ROOM: A LITTLE
SUGGESTED CMS G CODE MODIFIER DAILY ACTIVITY: CJ
CLIMB 3 TO 5 STEPS WITH RAILING: A LITTLE
MOBILITY SCORE: 18
DRESSING REGULAR UPPER BODY CLOTHING: A LITTLE
DRESSING REGULAR LOWER BODY CLOTHING: A LITTLE
TOILETING: A LITTLE
HELP NEEDED FOR BATHING: A LITTLE
TURNING FROM BACK TO SIDE WHILE IN FLAT BAD: A LITTLE
SUGGESTED CMS G CODE MODIFIER MOBILITY: CK
DAILY ACTIVITIY SCORE: 20
MOVING FROM LYING ON BACK TO SITTING ON SIDE OF FLAT BED: A LITTLE
MOVING TO AND FROM BED TO CHAIR: A LITTLE
STANDING UP FROM CHAIR USING ARMS: A LITTLE

## 2018-10-20 ASSESSMENT — PAIN DESCRIPTION - DESCRIPTORS: DESCRIPTORS: ACHING

## 2018-10-20 ASSESSMENT — ENCOUNTER SYMPTOMS
SHORTNESS OF BREATH: 0
TINGLING: 0
CONSTIPATION: 0
CHILLS: 1
DIZZINESS: 0
MYALGIAS: 1
VOMITING: 0
COUGH: 0
FEVER: 1
WEAKNESS: 0
DEPRESSION: 0
PALPITATIONS: 0
DIARRHEA: 0
NERVOUS/ANXIOUS: 1
SPUTUM PRODUCTION: 0
HEADACHES: 0
LOSS OF CONSCIOUSNESS: 0
FALLS: 1
STRIDOR: 0
NAUSEA: 1
ABDOMINAL PAIN: 0

## 2018-10-20 ASSESSMENT — LIFESTYLE VARIABLES
ALCOHOL_USE: NO
EVER_SMOKED: YES

## 2018-10-20 ASSESSMENT — PATIENT HEALTH QUESTIONNAIRE - PHQ9
2. FEELING DOWN, DEPRESSED, IRRITABLE, OR HOPELESS: NOT AT ALL
SUM OF ALL RESPONSES TO PHQ9 QUESTIONS 1 AND 2: 0
1. LITTLE INTEREST OR PLEASURE IN DOING THINGS: NOT AT ALL

## 2018-10-20 ASSESSMENT — PAIN SCALES - GENERAL
PAINLEVEL_OUTOF10: 6
PAINLEVEL_OUTOF10: 4

## 2018-10-20 NOTE — ED PROVIDER NOTES
"ED Provider Note    CHIEF COMPLAINT  Chief Complaint   Patient presents with   • Arm Pain     Right, seen yesterday for Contusion to R Arm s/p fall 8 days ago       HPI  Alfonso Posada is a 62 y.o. female who presents with redness over her right arm.  She will fell yesterday.  She says that the pain is now worsening.  She says that the redness has spread as well.  She denies any fevers or chills.  She does mention feeling slightly under the weather after the redness has begun.  She denies any chest pain or shortness of breath.  There is been no drainage from her arm.    REVIEW OF SYSTEMS  See HPI for further details. All other systems are negative.     PAST MEDICAL HISTORY   has a past medical history of Anxiety; Asthma; Atrial fibrillation (HCC); Bartholin cyst; Chronic knee pain; Hypertension; Morbid obesity (HCC); Postherpetic neuralgia; SVT (supraventricular tachycardia) (HCC); and Thyroid disease.    SOCIAL HISTORY  Social History     Social History Main Topics   • Smoking status: Former Smoker     Packs/day: 0.50     Start date: 3/4/2016     Quit date: 9/9/2018   • Smokeless tobacco: Never Used      Comment: 10 cigs   • Alcohol use 1.8 - 3.0 oz/week     3 - 5 Glasses of wine per week      Comment: Nothing since 9/17/18   • Drug use: No   • Sexual activity: Not Currently     Partners: Male      Comment:        SURGICAL HISTORY   has a past surgical history that includes thyroidectomy total (2005).    CURRENT MEDICATIONS  Home Medications    **Home medications have not yet been reviewed for this encounter**         ALLERGIES  Allergies   Allergen Reactions   • Latex Swelling   • Pcn [Penicillins]      Hives on face    • Sulfa Drugs      \"some sulfa drugs- leg itchiness\"        PHYSICAL EXAM  VITAL SIGNS: /105   Pulse 93   Temp 37.6 °C (99.6 °F)   Resp 20   Ht 1.651 m (5' 5\")   Wt (!) 145.6 kg (320 lb 15.8 oz)   LMP 03/20/2016   SpO2 93%   BMI 53.42 kg/m²  @CHRISTI[[id number]::@  Pulse ox " interpretation: I interpret this pulse ox as normal.  Constitutional: Alert in no apparent distress.  HENT: Normocephalic, Atraumatic, Bilateral external ears normal. Nose normal.   Eyes: Pupils are equal and reactive. Conjunctiva normal, non-icteric.   Heart: Regular rate and rythm, no murmurs.    Lungs: Clear to auscultation bilaterally.  Skin: Redness noted over her right arm that extends up to her elbow and down to her wrist.  There is no crepitance, there is no fluctuance, there is no firmness.  Neurologic: Alert, Grossly non-focal.   Psychiatric: Affect normal, Judgment normal, Mood normal, Appears appropriate and not intoxicated.           COURSE & MEDICAL DECISION MAKING  Pertinent Labs & Imaging studies reviewed. (See chart for details)    62-year-old female who presents with what appears to be cellulitis.  At this time patient does not appear to be systemically ill.  I will treat the patient with antibiotics and [place] the wound and dated.  In addition I will have the patient follow-up with a primary care physician.  I gave her strict return precautions.  She plans to see her doctor on Monday.    The patient will not drink alcohol nor drive with prescribed medications. The patient will return for worsening symptoms and is stable at the time of discharge. The patient verbalizes understanding and will comply.    FINAL IMPRESSION  1. Cellulitis of right upper extremity              Electronically signed by: Keo Castro, 10/19/2018 6:29 PM

## 2018-10-20 NOTE — ED TRIAGE NOTES
"Chief Complaint   Patient presents with   • Arm Pain     Right, seen yesterday for Contusion to R Arm s/p fall 8 days ago     /105   Pulse 93   Temp 37.6 °C (99.6 °F)   Resp 20   Ht 1.651 m (5' 5\")   Wt (!) 145.6 kg (320 lb 15.8 oz)   LMP 03/20/2016   SpO2 93%   BMI 53.42 kg/m²     Pt concerned arm is more red and swollen.  "

## 2018-10-21 PROBLEM — R73.9 HYPERGLYCEMIA: Status: ACTIVE | Noted: 2018-10-21

## 2018-10-21 PROBLEM — I47.10 SVT (SUPRAVENTRICULAR TACHYCARDIA): Status: ACTIVE | Noted: 2018-10-21

## 2018-10-21 PROBLEM — J30.2 SEASONAL ALLERGIES: Status: ACTIVE | Noted: 2018-10-21

## 2018-10-21 LAB
ANION GAP SERPL CALC-SCNC: 4 MMOL/L (ref 0–11.9)
BASOPHILS # BLD AUTO: 0.5 % (ref 0–1.8)
BASOPHILS # BLD: 0.05 K/UL (ref 0–0.12)
BUN SERPL-MCNC: 11 MG/DL (ref 8–22)
CALCIUM SERPL-MCNC: 8.4 MG/DL (ref 8.4–10.2)
CHLORIDE SERPL-SCNC: 106 MMOL/L (ref 96–112)
CO2 SERPL-SCNC: 27 MMOL/L (ref 20–33)
CREAT SERPL-MCNC: 0.57 MG/DL (ref 0.5–1.4)
EOSINOPHIL # BLD AUTO: 0.18 K/UL (ref 0–0.51)
EOSINOPHIL NFR BLD: 1.8 % (ref 0–6.9)
ERYTHROCYTE [DISTWIDTH] IN BLOOD BY AUTOMATED COUNT: 45.5 FL (ref 35.9–50)
GLUCOSE SERPL-MCNC: 103 MG/DL (ref 65–99)
HCT VFR BLD AUTO: 40.9 % (ref 37–47)
HGB BLD-MCNC: 13.4 G/DL (ref 12–16)
IMM GRANULOCYTES # BLD AUTO: 0.05 K/UL (ref 0–0.11)
IMM GRANULOCYTES NFR BLD AUTO: 0.5 % (ref 0–0.9)
LACTATE BLD-SCNC: 1 MMOL/L (ref 0.5–2)
LACTATE BLD-SCNC: 1.1 MMOL/L (ref 0.5–2)
LYMPHOCYTES # BLD AUTO: 2.84 K/UL (ref 1–4.8)
LYMPHOCYTES NFR BLD: 27.9 % (ref 22–41)
MCH RBC QN AUTO: 30.9 PG (ref 27–33)
MCHC RBC AUTO-ENTMCNC: 32.8 G/DL (ref 33.6–35)
MCV RBC AUTO: 94.2 FL (ref 81.4–97.8)
MONOCYTES # BLD AUTO: 1.07 K/UL (ref 0–0.85)
MONOCYTES NFR BLD AUTO: 10.5 % (ref 0–13.4)
NEUTROPHILS # BLD AUTO: 6 K/UL (ref 2–7.15)
NEUTROPHILS NFR BLD: 58.8 % (ref 44–72)
NRBC # BLD AUTO: 0 K/UL
NRBC BLD-RTO: 0 /100 WBC
PLATELET # BLD AUTO: 223 K/UL (ref 164–446)
PMV BLD AUTO: 9.6 FL (ref 9–12.9)
POTASSIUM SERPL-SCNC: 4.2 MMOL/L (ref 3.6–5.5)
RBC # BLD AUTO: 4.34 M/UL (ref 4.2–5.4)
SODIUM SERPL-SCNC: 137 MMOL/L (ref 135–145)
WBC # BLD AUTO: 10.2 K/UL (ref 4.8–10.8)

## 2018-10-21 PROCEDURE — 700105 HCHG RX REV CODE 258: Performed by: HOSPITALIST

## 2018-10-21 PROCEDURE — 83605 ASSAY OF LACTIC ACID: CPT | Mod: 91

## 2018-10-21 PROCEDURE — 700111 HCHG RX REV CODE 636 W/ 250 OVERRIDE (IP): Performed by: INTERNAL MEDICINE

## 2018-10-21 PROCEDURE — 85025 COMPLETE CBC W/AUTO DIFF WBC: CPT

## 2018-10-21 PROCEDURE — 700101 HCHG RX REV CODE 250: Performed by: HOSPITALIST

## 2018-10-21 PROCEDURE — 700111 HCHG RX REV CODE 636 W/ 250 OVERRIDE (IP): Performed by: HOSPITALIST

## 2018-10-21 PROCEDURE — 93010 ELECTROCARDIOGRAM REPORT: CPT | Mod: 76 | Performed by: INTERNAL MEDICINE

## 2018-10-21 PROCEDURE — 700102 HCHG RX REV CODE 250 W/ 637 OVERRIDE(OP): Performed by: INTERNAL MEDICINE

## 2018-10-21 PROCEDURE — 770020 HCHG ROOM/CARE - TELE (206)

## 2018-10-21 PROCEDURE — 93010 ELECTROCARDIOGRAM REPORT: CPT | Mod: 77 | Performed by: INTERNAL MEDICINE

## 2018-10-21 PROCEDURE — 99291 CRITICAL CARE FIRST HOUR: CPT | Performed by: HOSPITALIST

## 2018-10-21 PROCEDURE — A9270 NON-COVERED ITEM OR SERVICE: HCPCS | Performed by: INTERNAL MEDICINE

## 2018-10-21 PROCEDURE — 700102 HCHG RX REV CODE 250 W/ 637 OVERRIDE(OP): Performed by: HOSPITALIST

## 2018-10-21 PROCEDURE — 80048 BASIC METABOLIC PNL TOTAL CA: CPT

## 2018-10-21 PROCEDURE — 700105 HCHG RX REV CODE 258: Performed by: INTERNAL MEDICINE

## 2018-10-21 PROCEDURE — A9270 NON-COVERED ITEM OR SERVICE: HCPCS | Performed by: HOSPITALIST

## 2018-10-21 PROCEDURE — 33210 INSERT ELECTRD/PM CATH SNGL: CPT | Performed by: HOSPITALIST

## 2018-10-21 PROCEDURE — 700101 HCHG RX REV CODE 250: Performed by: INTERNAL MEDICINE

## 2018-10-21 RX ORDER — ACETAMINOPHEN 500 MG
1000 TABLET ORAL 3 TIMES DAILY PRN
COMMUNITY
End: 2020-08-04

## 2018-10-21 RX ORDER — METOPROLOL TARTRATE 50 MG/1
50 TABLET, FILM COATED ORAL TWICE DAILY
Status: DISCONTINUED | OUTPATIENT
Start: 2018-10-21 | End: 2018-10-21

## 2018-10-21 RX ORDER — LEVOTHYROXINE SODIUM 0.03 MG/1
25 TABLET ORAL
Status: DISCONTINUED | OUTPATIENT
Start: 2018-10-23 | End: 2018-10-21

## 2018-10-21 RX ORDER — CETIRIZINE HYDROCHLORIDE 10 MG/1
10 TABLET ORAL EVERY EVENING
COMMUNITY

## 2018-10-21 RX ORDER — LEVOTHYROXINE SODIUM 0.03 MG/1
25 TABLET ORAL
Status: ON HOLD | COMMUNITY
End: 2018-12-01

## 2018-10-21 RX ORDER — LOSARTAN POTASSIUM 25 MG/1
50 TABLET ORAL
Status: DISCONTINUED | OUTPATIENT
Start: 2018-10-22 | End: 2018-10-21

## 2018-10-21 RX ORDER — ADENOSINE 3 MG/ML
INJECTION, SOLUTION INTRAVENOUS
Status: COMPLETED
Start: 2018-10-21 | End: 2018-10-21

## 2018-10-21 RX ORDER — LEVOTHYROXINE SODIUM 0.03 MG/1
25 TABLET ORAL
Status: DISCONTINUED | OUTPATIENT
Start: 2018-10-23 | End: 2018-10-23 | Stop reason: HOSPADM

## 2018-10-21 RX ORDER — LOSARTAN POTASSIUM 25 MG/1
50 TABLET ORAL DAILY
Status: DISCONTINUED | OUTPATIENT
Start: 2018-10-22 | End: 2018-10-23

## 2018-10-21 RX ORDER — METOPROLOL TARTRATE 1 MG/ML
5 INJECTION, SOLUTION INTRAVENOUS
Status: DISCONTINUED | OUTPATIENT
Start: 2018-10-21 | End: 2018-10-23 | Stop reason: HOSPADM

## 2018-10-21 RX ORDER — KETOCONAZOLE 20 MG/G
1 CREAM TOPICAL DAILY
Status: DISCONTINUED | OUTPATIENT
Start: 2018-10-21 | End: 2018-10-23 | Stop reason: HOSPADM

## 2018-10-21 RX ORDER — DIPHENHYDRAMINE HCL 25 MG
25 TABLET ORAL EVERY 6 HOURS PRN
Status: ON HOLD | COMMUNITY
End: 2019-03-27

## 2018-10-21 RX ORDER — METOPROLOL TARTRATE 50 MG/1
50 TABLET, FILM COATED ORAL 2 TIMES DAILY
Status: DISCONTINUED | OUTPATIENT
Start: 2018-10-21 | End: 2018-10-23

## 2018-10-21 RX ORDER — LOSARTAN POTASSIUM 50 MG/1
50 TABLET ORAL DAILY
Status: ON HOLD | COMMUNITY
Start: 2018-09-24 | End: 2018-10-23

## 2018-10-21 RX ORDER — CETIRIZINE HYDROCHLORIDE 10 MG/1
10 TABLET ORAL EVERY EVENING
Status: DISCONTINUED | OUTPATIENT
Start: 2018-10-21 | End: 2018-10-23 | Stop reason: HOSPADM

## 2018-10-21 RX ORDER — LOSARTAN POTASSIUM 25 MG/1
25 TABLET ORAL ONCE
Status: COMPLETED | OUTPATIENT
Start: 2018-10-21 | End: 2018-10-21

## 2018-10-21 RX ORDER — DIPHENHYDRAMINE HCL 25 MG
25 TABLET ORAL EVERY 6 HOURS PRN
Status: DISCONTINUED | OUTPATIENT
Start: 2018-10-21 | End: 2018-10-23 | Stop reason: HOSPADM

## 2018-10-21 RX ORDER — LEVOFLOXACIN 5 MG/ML
750 INJECTION, SOLUTION INTRAVENOUS EVERY 24 HOURS
Status: DISCONTINUED | OUTPATIENT
Start: 2018-10-21 | End: 2018-10-21

## 2018-10-21 RX ORDER — KETOCONAZOLE 20 MG/G
1 CREAM TOPICAL DAILY
COMMUNITY
End: 2019-12-23

## 2018-10-21 RX ORDER — ADENOSINE 3 MG/ML
12 INJECTION, SOLUTION INTRAVENOUS ONCE
Status: COMPLETED | OUTPATIENT
Start: 2018-10-21 | End: 2018-10-21

## 2018-10-21 RX ADMIN — ALPRAZOLAM 0.5 MG: 0.5 TABLET ORAL at 16:50

## 2018-10-21 RX ADMIN — ACETAMINOPHEN 650 MG: 325 TABLET, FILM COATED ORAL at 20:42

## 2018-10-21 RX ADMIN — ATORVASTATIN CALCIUM 40 MG: 40 TABLET, FILM COATED ORAL at 06:01

## 2018-10-21 RX ADMIN — METOPROLOL TARTRATE 25 MG: 25 TABLET ORAL at 06:02

## 2018-10-21 RX ADMIN — OXYCODONE HYDROCHLORIDE 20 MG: 5 TABLET ORAL at 17:47

## 2018-10-21 RX ADMIN — OMEPRAZOLE 40 MG: 20 CAPSULE, DELAYED RELEASE ORAL at 06:01

## 2018-10-21 RX ADMIN — CEFTRIAXONE SODIUM 2 G: 2 INJECTION, POWDER, FOR SOLUTION INTRAMUSCULAR; INTRAVENOUS at 13:16

## 2018-10-21 RX ADMIN — ONDANSETRON HYDROCHLORIDE 4 MG: 2 INJECTION INTRAMUSCULAR; INTRAVENOUS at 06:11

## 2018-10-21 RX ADMIN — SENNOSIDES AND DOCUSATE SODIUM 2 TABLET: 8.6; 5 TABLET ORAL at 06:01

## 2018-10-21 RX ADMIN — ALPRAZOLAM 0.5 MG: 0.5 TABLET ORAL at 02:30

## 2018-10-21 RX ADMIN — KETOCONAZOLE 1 APPLICATION: 20 CREAM TOPICAL at 13:16

## 2018-10-21 RX ADMIN — LOSARTAN POTASSIUM 25 MG: 25 TABLET, FILM COATED ORAL at 09:51

## 2018-10-21 RX ADMIN — SERTRALINE HYDROCHLORIDE 100 MG: 50 TABLET ORAL at 06:01

## 2018-10-21 RX ADMIN — LOSARTAN POTASSIUM 25 MG: 25 TABLET, FILM COATED ORAL at 06:01

## 2018-10-21 RX ADMIN — ONDANSETRON HYDROCHLORIDE 4 MG: 2 INJECTION INTRAMUSCULAR; INTRAVENOUS at 17:13

## 2018-10-21 RX ADMIN — LEVOTHYROXINE SODIUM 25 MCG: 50 TABLET ORAL at 06:01

## 2018-10-21 RX ADMIN — METOPROLOL TARTRATE 5 MG: 5 INJECTION INTRAVENOUS at 17:16

## 2018-10-21 RX ADMIN — ZOLPIDEM TARTRATE 5 MG: 5 TABLET, FILM COATED ORAL at 00:07

## 2018-10-21 RX ADMIN — METOPROLOL TARTRATE 50 MG: 50 TABLET ORAL at 22:02

## 2018-10-21 RX ADMIN — APIXABAN 5 MG: 5 TABLET, FILM COATED ORAL at 06:02

## 2018-10-21 RX ADMIN — APIXABAN 5 MG: 5 TABLET, FILM COATED ORAL at 17:17

## 2018-10-21 RX ADMIN — ADENOSINE 12 MG: 3 INJECTION, SOLUTION INTRAVENOUS at 17:41

## 2018-10-21 RX ADMIN — METOPROLOL TARTRATE 5 MG: 5 INJECTION INTRAVENOUS at 17:21

## 2018-10-21 RX ADMIN — OXYCODONE HYDROCHLORIDE 20 MG: 5 TABLET ORAL at 09:52

## 2018-10-21 RX ADMIN — SODIUM CHLORIDE: 9 INJECTION, SOLUTION INTRAVENOUS at 08:01

## 2018-10-21 RX ADMIN — OXYCODONE HYDROCHLORIDE 10 MG: 5 TABLET ORAL at 22:03

## 2018-10-21 RX ADMIN — MAGNESIUM OXIDE TAB 400 MG (241.3 MG ELEMENTAL MG) 400 MG: 400 (241.3 MG) TAB at 16:17

## 2018-10-21 RX ADMIN — OXYCODONE HYDROCHLORIDE 20 MG: 5 TABLET ORAL at 06:11

## 2018-10-21 RX ADMIN — OXYCODONE HYDROCHLORIDE 10 MG: 5 TABLET ORAL at 04:20

## 2018-10-21 RX ADMIN — ZOLPIDEM TARTRATE 5 MG: 5 TABLET, FILM COATED ORAL at 20:42

## 2018-10-21 RX ADMIN — SODIUM CHLORIDE: 9 INJECTION, SOLUTION INTRAVENOUS at 20:43

## 2018-10-21 RX ADMIN — LABETALOL HYDROCHLORIDE 10 MG: 5 INJECTION, SOLUTION INTRAVENOUS at 06:18

## 2018-10-21 RX ADMIN — CETIRIZINE HYDROCHLORIDE 10 MG: 10 TABLET, FILM COATED ORAL at 16:17

## 2018-10-21 ASSESSMENT — CHA2DS2 SCORE
CHF OR LEFT VENTRICULAR DYSFUNCTION: NO
SEX: FEMALE
AGE 75 OR GREATER: NO
HYPERTENSION: YES
AGE 65 TO 74: NO
DIABETES: NO
CHA2DS2 VASC SCORE: 2
PRIOR STROKE OR TIA OR THROMBOEMBOLISM: NO
VASCULAR DISEASE: NO

## 2018-10-21 ASSESSMENT — ENCOUNTER SYMPTOMS
BACK PAIN: 0
SHORTNESS OF BREATH: 0
NECK PAIN: 0
FEVER: 0
TINGLING: 0
EYE PAIN: 0
HEADACHES: 0
CHILLS: 0
DEPRESSION: 0
PALPITATIONS: 0
SORE THROAT: 0
INSOMNIA: 0
BLURRED VISION: 0
ABDOMINAL PAIN: 0
NAUSEA: 0
NERVOUS/ANXIOUS: 1
VOMITING: 0
DIZZINESS: 0
COUGH: 0

## 2018-10-21 ASSESSMENT — PAIN SCALES - GENERAL
PAINLEVEL_OUTOF10: 6
PAINLEVEL_OUTOF10: 0
PAINLEVEL_OUTOF10: 6
PAINLEVEL_OUTOF10: 9
PAINLEVEL_OUTOF10: 0
PAINLEVEL_OUTOF10: 6
PAINLEVEL_OUTOF10: 8

## 2018-10-21 NOTE — CARE PLAN
Problem: Safety  Goal: Will remain free from injury  Outcome: PROGRESSING AS EXPECTED  Call light within reach, bed locked in lowest position, educated patient about fall risk.    Problem: Knowledge Deficit  Goal: Knowledge of disease process/condition, treatment plan, diagnostic tests, and medications will improve  Outcome: PROGRESSING AS EXPECTED  Educated patient about dse process, treatment plan and medications needed. Questions answered. Will continue to monitor.    Problem: Pain Management  Goal: Pain level will decrease to patient's comfort goal  Outcome: PROGRESSING AS EXPECTED  Medicated patient per MAR.

## 2018-10-21 NOTE — PROGRESS NOTES
Med Rec complete per Pt at bedside  Allergies Reviewed    Pt started a 10 day course of DOXYCYCLINE on 10/19

## 2018-10-21 NOTE — PROGRESS NOTES
"Pt made aware she is transferring to third floor to [facility]. Pt states she is upset and states \" nothing\" was done for her. Pt medications were administered as ordered, See MAR. Cell phone was also fully charged as pt requested, new name armband was applied per pt request. Pt educated on IV antibiotics and  was made aware she received IV ABX as ordered. Zyrtec was given per pt request. Pt also educated to not take own home medication, pt demonstrated understanding.   "

## 2018-10-21 NOTE — PROGRESS NOTES
"Pt stating she is in \" SVT\". Pt has own blood pressure monitor at bedside and HR was 174, pt was place on pulse oximetry and HR was sustaining in 172. Dr. Pineda notified, MD made aware pt received 25 mg of PO metoprolol and 10 mg of IV labetalol this morning, pt states she takes 50 mg of PO metoprolol daily, order is already in place for 50 mg of PO metoprolol daily. Per MD orders received for STAT EKG. EKG at bedside.   "

## 2018-10-21 NOTE — PROGRESS NOTES
Patient arrived, able to ambulate. Alert and oriented, she is anxious.Oriented patient to room, Discussed POC, pt in agreement, encouraged patient to call for assistance, call light within reach. Will continue to monitor.

## 2018-10-21 NOTE — H&P
[facility] Medicine History & Physical Note    Date of Service  10/20/2018    Primary Care Physician  Sim Brownlee P.A.-C.    Consultants  None    Code Status  Full    Chief Complaint  Pain and erythema of her left arm    History of Presenting Illness  62 y.o. female who presented 10/20/2018 with pain and erythema of her left arm.  Patient states 3 weeks ago she was given a steroid injection in her knee, at that point in time they noted atrial fibrillation, could not get it to convert so they started on Eliquis as well as metoprolol.  Patient states 10 days ago she lost her balance and fell onto her left elbow.  She had a knot after that but nothing significant.  3 days ago she began noticing swelling of the arm, noted it was hot and red.  Last night she came to the emergency department, they venkata a red line around the erythema, told her if it becomes worse she is to present to the emergency department.  Patient states it became worse so she presented to the emergency department.  She also complains of fever, chills, nausea and myalgias.    Review of Systems  Review of Systems   Constitutional: Positive for chills and fever. Negative for malaise/fatigue.   HENT: Negative for congestion.    Respiratory: Negative for cough, sputum production, shortness of breath and stridor.    Cardiovascular: Negative for chest pain, palpitations and leg swelling.   Gastrointestinal: Positive for nausea. Negative for abdominal pain, constipation, diarrhea and vomiting.   Genitourinary: Negative for dysuria and urgency.   Musculoskeletal: Positive for falls and myalgias.   Skin: Positive for rash.   Neurological: Negative for dizziness, tingling, loss of consciousness, weakness and headaches.   Psychiatric/Behavioral: Negative for depression and suicidal ideas. The patient is nervous/anxious.    All other systems reviewed and are negative.      Past Medical History   has a past medical history of Anxiety; Asthma; Atrial fibrillation  "([facility]); Bartholin cyst; Chronic knee pain; Hypertension; Morbid obesity (HCC); Postherpetic neuralgia; SVT (supraventricular tachycardia) ([facility]); and Thyroid disease.    Surgical History   has a past surgical history that includes thyroidectomy total (2005).     Family History  Reviewed, nonpertinent    Social History   reports that she quit smoking about 5 weeks ago. She started smoking about 2 years ago. She smoked 0.50 packs per day. She has never used smokeless tobacco. She reports that she drinks about 1.8 - 3.0 oz of alcohol per week . She reports that she does not use drugs.    Allergies  Allergies   Allergen Reactions   • Latex Swelling   • Pcn [Penicillins]      Hives on face    • Sulfa Drugs      \"some sulfa drugs- leg itchiness\"        Medications  Prior to Admission Medications   Prescriptions Last Dose Informant Patient Reported? Taking?   ALPRAZolam (XANAX) 0.5 MG Tab   No No   Sig: Take 1 Tab by mouth 2 times a day as needed for Anxiety for up to 60 days.   ELIQUIS 5 MG Tab   Yes No   Sig: Take 5 mg by mouth 2 Times a Day.   atorvastatin (LIPITOR) 40 MG Tab Taking  No No   Sig: Take 1 Tab by mouth every day. TAKE ONE TABLET BY MOUTH DAILY   doxycycline (MONODOX) 100 MG capsule   No No   Sig: Take 1 Cap by mouth 2 times a day for 10 days.   fluticasone (FLONASE) 50 MCG/ACT nasal spray Taking  No No   Sig: SPRAY ONE SPRAY IN EACH NOSTRIL TWICE DAILY   furosemide (LASIX) 40 MG Tab Taking  No No   Sig: TAKE ONE TABLET BY MOUTH DAILY   ketoconazole (NIZORAL) 2 % Cream   No No   Sig: Apply 1 Application to affected area(s) every day.   levothyroxine (SYNTHROID) 25 MCG Tab Taking  No No   Sig: Take 1 Tab by mouth Every morning on an empty stomach. ON EMPTY STOMACH   losartan (COZAAR) 25 MG Tab Taking  No No   Sig: TAKE ONE TABLET BY MOUTH DAILY   metoprolol (LOPRESSOR) 50 MG Tab Taking  No No   Sig: TAKE ONE TABLET BY MOUTH TWICE A DAY   montelukast (SINGULAIR) 10 MG Tab Taking  No No   Sig: Take 1 Tab by " mouth every day. TAKE ONE TABLET BY MOUTH DAILY   omeprazole (PRILOSEC) 40 MG delayed-release capsule Taking  No No   Sig: Take 1 Cap by mouth every day.   ondansetron (ZOFRAN ODT) 4 MG TABLET DISPERSIBLE Taking  No No   Sig: Take 1 Tab by mouth every 8 hours as needed. AS NEEDED FOR NAUSEA AND VOMITING   ondansetron (ZOFRAN ODT) 4 MG TABLET DISPERSIBLE Taking  No No   Sig: DISSOLVE ONE TABLET BY MOUTH EVERY 8 HOURS AS NEEDED FOR NAUSEA AND VOMITING   oxyCODONE-acetaminophen (PERCOCET-10)  MG Tab   No No   Sig: Take 1 Tab by mouth 2 times a day as needed for Severe Pain for up to 30 days.   potassium chloride (KLOR-CON) 8 MEQ tablet Taking  No No   Sig: TAKE ONE TABLET BY MOUTH DAILY   sertraline (ZOLOFT) 100 MG Tab Taking  No No   Sig: Take 1 Tab by mouth every day.   tramadol (ULTRAM) 50 MG Tab   No No   Sig: Take 1 Tab by mouth every 8 hours as needed for up to 30 days.      Facility-Administered Medications: None       Physical Exam  Temp:  [37.7 °C (99.8 °F)-37.9 °C (100.2 °F)] 37.9 °C (100.2 °F)  Pulse:  [96] 96  Resp:  [20] 20  BP: (170)/(95) 170/95    Physical Exam   Constitutional: She is oriented to person, place, and time. She appears well-developed. She is cooperative. No distress.   Morbidly obese    HENT:   Head: Normocephalic and atraumatic. Not macrocephalic and not microcephalic. Head is without raccoon's eyes and without Caraballo's sign.   Mouth/Throat: Oropharynx is clear and moist. No oropharyngeal exudate.   Eyes: Right eye exhibits no discharge. Left eye exhibits no discharge.   Neck: Neck supple. No tracheal deviation present.   Cardiovascular: Normal rate, regular rhythm and intact distal pulses.  Exam reveals no gallop, no distant heart sounds and no friction rub.    No murmur heard.  Pulmonary/Chest: Effort normal and breath sounds normal. No accessory muscle usage or stridor. No tachypnea and no bradypnea. No respiratory distress. She has no wheezes. She has no rales. She exhibits no  tenderness.   Abdominal: Soft. Bowel sounds are normal. She exhibits no distension. There is no splenomegaly or hepatomegaly. There is no tenderness. There is no rebound and no guarding.   Musculoskeletal: Normal range of motion. She exhibits no edema or tenderness.   Lymphadenopathy:     She has no cervical adenopathy.   Neurological: She is alert and oriented to person, place, and time. No cranial nerve deficit. She displays no seizure activity.   Skin: Skin is warm, dry and intact. She is not diaphoretic. There is erythema (left upper extremity). No pallor.   Psychiatric: Her speech is normal and behavior is normal. Judgment and thought content normal. Her mood appears anxious. Cognition and memory are normal.   Nursing note and vitals reviewed.      Laboratory:  Recent Labs      10/20/18   2040   WBC  12.0*   RBC  4.80   HEMOGLOBIN  14.9   HEMATOCRIT  44.6   MCV  92.9   MCH  31.0   MCHC  33.4*   RDW  44.2   PLATELETCT  242   MPV  9.5     Recent Labs      10/20/18   2040   SODIUM  136   POTASSIUM  4.2   CHLORIDE  100   CO2  24   GLUCOSE  134*   BUN  12   CREATININE  0.80   CALCIUM  8.8     Recent Labs      10/20/18   2040   GLUCOSE  134*     Recent Labs      10/20/18   2040   INR  1.09             No results for input(s): TROPONINI in the last 72 hours.    Urinalysis:    No results found     Imaging:  DX-ELBOW-COMPLETE 3+ RIGHT   Final Result         1.  No acute traumatic bony injury.               Assessment/Plan:  I anticipate this patient will require at least two midnights for appropriate medical management, necessitating inpatient admission.    * Cellulitis   Assessment & Plan    -Left upper extremity, failed outpatient oral doxycycline  -Was going to place the patient on IV Unasyn however she is allergic to penicillin  -avoid clindamycin at this time to avoid increasing the risk of C. Difficile  -Doxycycline was a good choice, will give a IV version of it see if she continues to have improvement, if not  may need to transition to another medication        Sepsis (HCC)   Assessment & Plan    -This is sepsis (without associated acute organ dysfunction).   -Due to cellulitis  -Sepsis order set has been initiated, patient will receive significant IV fluids  -Start IV doxycycline  -Await culture results        Atrial fibrillation (HCC)- (present on admission)   Assessment & Plan    -Currently seems like normal sinus rhythm, certainly rate controlled on metoprolol, continue  -Continue Eliquis        Hyperlipidemia- (present on admission)   Assessment & Plan    -Continue statin        Chronic use of opiate for therapeutic purpose- (present on admission)   Assessment & Plan    -Continue oral narcotics, avoid IV        Morbid obesity with BMI of 50.0-59.9, adult ([facility])- (present on admission)   Assessment & Plan    -Chronic, needs diet and exercise adjustment        Gastroesophageal reflux disease without esophagitis- (present on admission)   Assessment & Plan    -Continue omeprazole        Essential hypertension- (present on admission)   Assessment & Plan    -Continue metoprolol and losartan  -Place PRN labetalol and adjust as needed        Bilateral chronic knee pain- (present on admission)   Assessment & Plan    -Symptomatic care, patient did recently receive steroid injection        Anxiety- (present on admission)   Assessment & Plan    -With depression, chronic, continue home Xanax  -Continue Zoloft        Postoperative hypothyroidism- (present on admission)   Assessment & Plan    -Continue Synthroid            VTE prophylaxis: Eliquis
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
## 2018-10-21 NOTE — PROGRESS NOTES
Pt is awake, alert and oriented. RUE is red and hot, redness is outlined, does not appear to be getting larger. Pt states it's itchy. CMS intact, POC discussed, safety measures in place.

## 2018-10-21 NOTE — PROGRESS NOTES
[facility] Hospitalist Progress Note    Date of Service: 10/21/2018    Chief Complaint  62 y.o. female admitted 10/20/2018 with cellulitis in her left arm. Admission complicated by SVT.     Interval Problem Update  Erythema improving. She is quite anxious. Later in the day she went into svt with a rate of 170. She says this happens at home and she is supposed to take 75mg of her metoprolol when it does. Vagal manuvers were not effective. She was transferred to [facility]. She was given her 75mg PO and then 5mg IV x2. Her rate decreased to 140s but she remained in svt. The code cart was brought in and pads were placed for possible cardioversion. Then, Adenosine 12mg was given. She converted into sinus rhythm with a rate of 92.     Consultants/Specialty  none    Disposition  Hospital. Suspect home once improved.     Critical care time with active treatment of SVT as above today is 35min      Review of Systems   Constitutional: Positive for malaise/fatigue. Negative for chills and fever.   HENT: Negative for sore throat.    Eyes: Negative for blurred vision and pain.   Respiratory: Negative for cough and shortness of breath.    Cardiovascular: Negative for chest pain and palpitations.   Gastrointestinal: Negative for abdominal pain, nausea and vomiting.   Genitourinary: Negative for dysuria and urgency.   Musculoskeletal: Negative for back pain and neck pain.   Skin: Positive for rash. Negative for itching.   Neurological: Negative for dizziness, tingling and headaches.   Psychiatric/Behavioral: Negative for depression. The patient is nervous/anxious. The patient does not have insomnia.    All other systems reviewed and are negative.     Physical Exam  Laboratory/Imaging   Hemodynamics  Temp (24hrs), Av.3 °C (99.2 °F), Min:36.8 °C (98.3 °F), Max:38.2 °C (100.8 °F)   Temperature: 36.8 °C (98.3 °F)  Pulse  Av.5  Min: 72  Max: 96    Blood Pressure: 140/78      Respiratory      Respiration: 18, Pulse Oximetry: 94 %              Fluids    Intake/Output Summary (Last 24 hours) at 10/21/18 0915  Last data filed at 10/21/18 0200   Gross per 24 hour   Intake                0 ml   Output              400 ml   Net             -400 ml       Nutrition  Orders Placed This Encounter   Procedures   • Diet Order Regular     Standing Status:   Standing     Number of Occurrences:   1     Order Specific Question:   Diet:     Answer:   Regular [1]     Physical Exam   Constitutional: She is oriented to person, place, and time. She appears well-developed and well-nourished. No distress.   Patient seen and examined  Discussed plan with RN   ALONDRAT:   Right Ear: External ear normal.   Left Ear: External ear normal.   Nose: Nose normal.   Eyes: Conjunctivae are normal. Right eye exhibits no discharge. Left eye exhibits no discharge.   Neck: No JVD present.   Cardiovascular: Regular rhythm and normal heart sounds.    No murmur heard.  Cap refill 2sec  Pulses 2+ throughout  Tachy at 170     Pulmonary/Chest: Effort normal and breath sounds normal. No stridor. No respiratory distress. She has no wheezes. She has no rales.   Abdominal: Soft. Bowel sounds are normal. She exhibits no distension. There is no tenderness.   Musculoskeletal: She exhibits no edema or tenderness.   Erythema right arm with logan indurated area on elbow with minimal ecchymosis. Erythema warm but not that tender. Receding from demarkation made previously.    Neurological: She is alert and oriented to person, place, and time.   Skin: Skin is warm and dry. She is not diaphoretic. No erythema.   Normal skin  Color.    Psychiatric: She has a normal mood and affect. Her behavior is normal.   Nursing note and vitals reviewed.      Recent Labs      10/20/18   2040  10/21/18   0252   WBC  12.0*  10.2   RBC  4.80  4.34   HEMOGLOBIN  14.9  13.4   HEMATOCRIT  44.6  40.9   MCV  92.9  94.2   MCH  31.0  30.9   MCHC  33.4*  32.8*   RDW  44.2  45.5   PLATELETCT  242  223   MPV  9.5  9.6     Recent Labs       10/20/18   2040  10/21/18   0252   SODIUM  136  137   POTASSIUM  4.2  4.2   CHLORIDE  100  106   CO2  24  27   GLUCOSE  134*  103*   BUN  12  11   CREATININE  0.80  0.57   CALCIUM  8.8  8.4     Recent Labs      10/20/18   2040   APTT  34.6   INR  1.09                  Assessment/Plan     * Cellulitis   Assessment & Plan    Failed oral doxy  Iv rocephin  Trend exam and vitals/labs.         Sepsis ([facility])   Assessment & Plan    -This is sepsis (without associated acute organ dysfunction).   2/2 cellulitis right arm  Continue iv fluids, iv antibiotics.   Lactic normalizing.   Correct lytes.   Cultures neg to date.         Seasonal allergies   Assessment & Plan    zyrtec        SVT (supraventricular tachycardia) ([facility])   Assessment & Plan    Acute onset today.   Po and iv metoprolol given after vagal maneuvers failed.   Transferred to tele  Converted with adenosine  Check tsh.   Continue metoprolol PO  Trend on tele  Treat infection          Hyperglycemia   Assessment & Plan    Check a1c        Atrial fibrillation ([facility])- (present on admission)   Assessment & Plan    On eliquis.   Acute svt today could be afib but difficult to tell.   Iv metoprolol  Po metoprolol.         Hyperlipidemia- (present on admission)   Assessment & Plan    statin        Chronic use of opiate for therapeutic purpose- (present on admission)   Assessment & Plan    Continue home dose.         Morbid obesity with BMI of 50.0-59.9, adult ([facility])- (present on admission)   Assessment & Plan    Has gastric sleeve pending.           Gastroesophageal reflux disease without esophagitis- (present on admission)   Assessment & Plan    ppi        Essential hypertension- (present on admission)   Assessment & Plan    Mtp, losartan  controlled        Bilateral chronic knee pain- (present on admission)   Assessment & Plan    Recent steroid injection  Needs weight loss.         Anxiety- (present on admission)   Assessment & Plan    Xanax and zoloft        Postoperative  hypothyroidism- (present on admission)   Assessment & Plan    Synthroid. Check tsh chapis given low normal in past and with SVT now.           Quality-Core Measures   Reviewed items::  EKG reviewed, Labs reviewed, Medications reviewed and Radiology images reviewed  Hicks catheter::  No Hicks  DVT: eliquis.  DVT prophylaxis - mechanical:  SCDs  Ulcer Prophylaxis::  Not indicated  Antibiotics:  Treating active infection/contamination beyond 24 hours perioperative coverage  Assessed for rehabilitation services:  Patient was assess for and/or received rehabilitation services during this hospitalization

## 2018-10-21 NOTE — ED PROVIDER NOTES
"ED Provider Note  CHIEF COMPLAINT  Chief Complaint   Patient presents with   • Arm Pain       HPI  Alfonso Posada is a 62 y.o. female who presents to the emergency department complaining of right arm pain.  The patient states that she fell on her arm on 10/11/2018 and had swelling.  X-rays were completed she was seen at this facility and started on doxycycline.  She states that she has had increasing warmth to her arm, subjective fever, and spreading of the redness around the line that was written on her arm.  The patient also complains of slight dysuria and slight flank pain.  She was recently placed on anticoagulation secondary to her atrial fibrillation.  She states she is taking her medications as prescribed.    REVIEW OF SYSTEMS  Positives as above. Pertinent negatives include loss of sensation or strength to her arm, severe pain, nausea, vomiting  All other review of systems are negative    PAST MEDICAL HISTORY  Past Medical History:   Diagnosis Date   • Anxiety    • Asthma    • Atrial fibrillation (HCC)    • Bartholin cyst    • Chronic knee pain    • Hypertension    • Morbid obesity (HCC)    • Postherpetic neuralgia    • SVT (supraventricular tachycardia) (formerly [facility])    • Thyroid disease        FAMILY HISTORY  Noncontributory    SOCIAL HISTORY  Social History     Social History   • Marital status:      Spouse name: N/A   • Number of children: N/A   • Years of education: N/A     Social History Main Topics   • Smoking status: Former Smoker     Packs/day: 0.50     Start date: 3/4/2016     Quit date: 9/9/2018   • Smokeless tobacco: Never Used      Comment: 10 cigs   • Alcohol use 1.8 - 3.0 oz/week     3 - 5 Glasses of wine per week      Comment: \"Nothing since 9/17/18\"   • Drug use: No   • Sexual activity: Not Currently     Partners: Male      Comment:      Other Topics Concern   • Not on file     Social History Narrative   • No narrative on file       SURGICAL HISTORY  Past Surgical History: " "  Procedure Laterality Date   • THYROIDECTOMY TOTAL  2005       CURRENT MEDICATIONS  Home Medications     Reviewed by Guille Lorenzo R.N. (Registered Nurse) on 10/20/18 at 1859  Med List Status: Partial   Medication Last Dose Status   ALPRAZolam (XANAX) 0.5 MG Tab  Active   atorvastatin (LIPITOR) 40 MG Tab Taking Active   doxycycline (MONODOX) 100 MG capsule  Active   ELIQUIS 5 MG Tab  Active   fluticasone (FLONASE) 50 MCG/ACT nasal spray Taking Active   furosemide (LASIX) 40 MG Tab Taking Active   ketoconazole (NIZORAL) 2 % Cream  Active   levothyroxine (SYNTHROID) 25 MCG Tab Taking Active   losartan (COZAAR) 25 MG Tab Taking Active   metoprolol (LOPRESSOR) 50 MG Tab Taking Active   montelukast (SINGULAIR) 10 MG Tab Taking Active   omeprazole (PRILOSEC) 40 MG delayed-release capsule Taking Active   ondansetron (ZOFRAN ODT) 4 MG TABLET DISPERSIBLE Taking Active   ondansetron (ZOFRAN ODT) 4 MG TABLET DISPERSIBLE Taking Active   oxyCODONE-acetaminophen (PERCOCET-10)  MG Tab  Active   potassium chloride (KLOR-CON) 8 MEQ tablet Taking Active   sertraline (ZOLOFT) 100 MG Tab Taking Active   tramadol (ULTRAM) 50 MG Tab  Active                ALLERGIES  Allergies   Allergen Reactions   • Latex Swelling   • Pcn [Penicillins]      Hives on face    • Sulfa Drugs      \"some sulfa drugs- leg itchiness\"        PHYSICAL EXAM  VITAL SIGNS: BP (!) 170/95   Pulse 96   Temp 37.7 °C (99.8 °F)   Resp 20   Ht 1.651 m (5' 5\")   Wt (!) 145 kg (319 lb 10.7 oz)   LMP 03/20/2016   SpO2 94%   BMI 53.20 kg/m²      Constitutional: Well developed, Well nourished, No acute distress, Non-toxic appearance.   Eyes: PERRLA, EOMI, Conjunctiva normal, No discharge.   Cardiovascular: Normal heart rate, Normal rhythm, No murmurs, No rubs, No gallops, and intact distal pulses.   Thorax & Lungs:  No respiratory distress, no rales, no rhonchi, No wheezing, No chest wall tenderness.   Abdomen: Bowel sounds normal, Soft, No tenderness, No " guarding, No rebound, No pulsatile masses.   Skin: Warm, significant erythema to the lateral aspect of the right forearm and elbow, no discharge, no fluctuance, no induration   Extremities: Full range of motion, significant edema, erythema to the right lateral aspect of the elbow and forearm, no subcutaneous air, no woody texture, no bullae vascular pattern  Back: No CVA tenderness, no central spinous process tenderness in the lumbar thoracic spine  Neurologic: Alert & oriented x 3, No focal deficits noted, acting appropriately on exam.  Psychiatric: Affect normal for clinical presentation.      RADIOLOGY/PROCEDURES  DX-ELBOW-COMPLETE 3+ RIGHT   Final Result         1.  No acute traumatic bony injury.           Results for orders placed or performed during the hospital encounter of 10/20/18   CBC WITH DIFFERENTIAL   Result Value Ref Range    WBC 12.0 (H) 4.8 - 10.8 K/uL    RBC 4.80 4.20 - 5.40 M/uL    Hemoglobin 14.9 12.0 - 16.0 g/dL    Hematocrit 44.6 37.0 - 47.0 %    MCV 92.9 81.4 - 97.8 fL    MCH 31.0 27.0 - 33.0 pg    MCHC 33.4 (L) 33.6 - 35.0 g/dL    RDW 44.2 35.9 - 50.0 fL    Platelet Count 242 164 - 446 K/uL    MPV 9.5 9.0 - 12.9 fL    Neutrophils-Polys 71.30 44.00 - 72.00 %    Lymphocytes 17.30 (L) 22.00 - 41.00 %    Monocytes 9.40 0.00 - 13.40 %    Eosinophils 1.20 0.00 - 6.90 %    Basophils 0.50 0.00 - 1.80 %    Immature Granulocytes 0.30 0.00 - 0.90 %    Nucleated RBC 0.00 /100 WBC    Neutrophils (Absolute) 8.52 (H) 2.00 - 7.15 K/uL    Lymphs (Absolute) 2.07 1.00 - 4.80 K/uL    Monos (Absolute) 1.13 (H) 0.00 - 0.85 K/uL    Eos (Absolute) 0.14 0.00 - 0.51 K/uL    Baso (Absolute) 0.06 0.00 - 0.12 K/uL    Immature Granulocytes (abs) 0.04 0.00 - 0.11 K/uL    NRBC (Absolute) 0.00 K/uL   PROTHROMBIN TIME   Result Value Ref Range    PT 14.0 12.0 - 14.6 sec    INR 1.09 0.87 - 1.13   BASIC METABOLIC PANEL   Result Value Ref Range    Sodium 136 135 - 145 mmol/L    Potassium 4.2 3.6 - 5.5 mmol/L    Chloride 100 96 -  112 mmol/L    Co2 24 20 - 33 mmol/L    Glucose 134 (H) 65 - 99 mg/dL    Bun 12 8 - 22 mg/dL    Creatinine 0.80 0.50 - 1.40 mg/dL    Calcium 8.8 8.4 - 10.2 mg/dL    Anion Gap 12.0 (H) 0.0 - 11.9   LACTIC ACID   Result Value Ref Range    Lactic Acid 2.3 (H) 0.5 - 2.0 mmol/L   ESTIMATED GFR   Result Value Ref Range    GFR If African American >60 >60 mL/min/1.73 m 2    GFR If Non African American >60 >60 mL/min/1.73 m 2       COURSE & MEDICAL DECISION MAKING  Pertinent Labs & Imaging studies reviewed. (See chart for details)  A charming 62-year-old female presents with cellulitis of the right upper extremity.  She has no evidence of joint involvement, I do not suspect toxic joint.  She does have slight leukocytosis and lactic acid 2.3.  For this reason, she received 1 L normal saline IV fluid bolus.  She is allergic to penicillin 5 start her on cefepime IV.  X-ray of the elbow reveals no evidence of bony destruction, subcutaneous air suspicious for necrotizing fasciitis or overlying abnormality.  The patient will be admitted to Dr. Matt for further evaluation and management.  She received 1 L normal saline IV fluid bolus ordered I have not reevaluate the patient as I have just ordered the fluid bolus for the patient and she will be going upstairs.    FINAL IMPRESSION  Cellulitis right upper extremity         Electronically signed by: Ishmael Riley, 10/20/2018 8:39 PM

## 2018-10-21 NOTE — ASSESSMENT & PLAN NOTE
-This is sepsis (without associated acute organ dysfunction).   2/2 cellulitis right arm  Stop iv fluids  continue iv antibiotics.   Correct lytes.   Cultures neg to date.

## 2018-10-21 NOTE — ED NOTES
This patient was evaluated in our department yesterday with redness over her right arm.  She states falling approximately 8 days.  She presents today with worsening pain.  Notably she is on Eliquis to address her chronic Afib.

## 2018-10-22 ENCOUNTER — APPOINTMENT (OUTPATIENT)
Dept: MEDICAL GROUP | Facility: MEDICAL CENTER | Age: 63
End: 2018-10-22
Payer: COMMERCIAL

## 2018-10-22 PROBLEM — N39.0 UTI (URINARY TRACT INFECTION): Status: ACTIVE | Noted: 2018-10-22

## 2018-10-22 LAB
ALBUMIN SERPL BCP-MCNC: 3.4 G/DL (ref 3.2–4.9)
ALBUMIN/GLOB SERPL: 1 G/DL
ALP SERPL-CCNC: 102 U/L (ref 30–99)
ALT SERPL-CCNC: 18 U/L (ref 2–50)
ANION GAP SERPL CALC-SCNC: 6 MMOL/L (ref 0–11.9)
AST SERPL-CCNC: 15 U/L (ref 12–45)
BASOPHILS # BLD AUTO: 0.7 % (ref 0–1.8)
BASOPHILS # BLD: 0.06 K/UL (ref 0–0.12)
BILIRUB SERPL-MCNC: 0.6 MG/DL (ref 0.1–1.5)
BUN SERPL-MCNC: 8 MG/DL (ref 8–22)
CALCIUM SERPL-MCNC: 8.4 MG/DL (ref 8.4–10.2)
CHLORIDE SERPL-SCNC: 107 MMOL/L (ref 96–112)
CO2 SERPL-SCNC: 25 MMOL/L (ref 20–33)
CREAT SERPL-MCNC: 0.53 MG/DL (ref 0.5–1.4)
EKG IMPRESSION: NORMAL
EOSINOPHIL # BLD AUTO: 0.25 K/UL (ref 0–0.51)
EOSINOPHIL NFR BLD: 2.9 % (ref 0–6.9)
ERYTHROCYTE [DISTWIDTH] IN BLOOD BY AUTOMATED COUNT: 46.3 FL (ref 35.9–50)
EST. AVERAGE GLUCOSE BLD GHB EST-MCNC: 117 MG/DL
GLOBULIN SER CALC-MCNC: 3.3 G/DL (ref 1.9–3.5)
GLUCOSE SERPL-MCNC: 91 MG/DL (ref 65–99)
HBA1C MFR BLD: 5.7 % (ref 0–5.6)
HCT VFR BLD AUTO: 43.6 % (ref 37–47)
HGB BLD-MCNC: 14.1 G/DL (ref 12–16)
IMM GRANULOCYTES # BLD AUTO: 0.03 K/UL (ref 0–0.11)
IMM GRANULOCYTES NFR BLD AUTO: 0.3 % (ref 0–0.9)
LYMPHOCYTES # BLD AUTO: 3.32 K/UL (ref 1–4.8)
LYMPHOCYTES NFR BLD: 38.3 % (ref 22–41)
MCH RBC QN AUTO: 31.1 PG (ref 27–33)
MCHC RBC AUTO-ENTMCNC: 32.3 G/DL (ref 33.6–35)
MCV RBC AUTO: 96.2 FL (ref 81.4–97.8)
MONOCYTES # BLD AUTO: 0.74 K/UL (ref 0–0.85)
MONOCYTES NFR BLD AUTO: 8.5 % (ref 0–13.4)
NEUTROPHILS # BLD AUTO: 4.26 K/UL (ref 2–7.15)
NEUTROPHILS NFR BLD: 49.3 % (ref 44–72)
NRBC # BLD AUTO: 0 K/UL
NRBC BLD-RTO: 0 /100 WBC
PLATELET # BLD AUTO: 263 K/UL (ref 164–446)
PMV BLD AUTO: 9.6 FL (ref 9–12.9)
POTASSIUM SERPL-SCNC: 4 MMOL/L (ref 3.6–5.5)
PROT SERPL-MCNC: 6.7 G/DL (ref 6–8.2)
RBC # BLD AUTO: 4.53 M/UL (ref 4.2–5.4)
SODIUM SERPL-SCNC: 138 MMOL/L (ref 135–145)
TSH SERPL-ACNC: 0.88 UIU/ML (ref 0.35–5.5)
WBC # BLD AUTO: 8.7 K/UL (ref 4.8–10.8)

## 2018-10-22 PROCEDURE — 770020 HCHG ROOM/CARE - TELE (206)

## 2018-10-22 PROCEDURE — 700102 HCHG RX REV CODE 250 W/ 637 OVERRIDE(OP): Performed by: HOSPITALIST

## 2018-10-22 PROCEDURE — 80053 COMPREHEN METABOLIC PANEL: CPT

## 2018-10-22 PROCEDURE — 700102 HCHG RX REV CODE 250 W/ 637 OVERRIDE(OP): Performed by: INTERNAL MEDICINE

## 2018-10-22 PROCEDURE — 99233 SBSQ HOSP IP/OBS HIGH 50: CPT | Performed by: HOSPITALIST

## 2018-10-22 PROCEDURE — 85025 COMPLETE CBC W/AUTO DIFF WBC: CPT

## 2018-10-22 PROCEDURE — A9270 NON-COVERED ITEM OR SERVICE: HCPCS | Performed by: INTERNAL MEDICINE

## 2018-10-22 PROCEDURE — 700111 HCHG RX REV CODE 636 W/ 250 OVERRIDE (IP): Performed by: INTERNAL MEDICINE

## 2018-10-22 PROCEDURE — 93005 ELECTROCARDIOGRAM TRACING: CPT | Performed by: HOSPITALIST

## 2018-10-22 PROCEDURE — 700105 HCHG RX REV CODE 258: Performed by: INTERNAL MEDICINE

## 2018-10-22 PROCEDURE — 84443 ASSAY THYROID STIM HORMONE: CPT

## 2018-10-22 PROCEDURE — 700111 HCHG RX REV CODE 636 W/ 250 OVERRIDE (IP): Performed by: HOSPITALIST

## 2018-10-22 PROCEDURE — 700105 HCHG RX REV CODE 258: Performed by: HOSPITALIST

## 2018-10-22 PROCEDURE — A9270 NON-COVERED ITEM OR SERVICE: HCPCS | Performed by: HOSPITALIST

## 2018-10-22 PROCEDURE — 83036 HEMOGLOBIN GLYCOSYLATED A1C: CPT

## 2018-10-22 RX ORDER — FLUTICASONE PROPIONATE 50 MCG
2 SPRAY, SUSPENSION (ML) NASAL DAILY
Status: DISCONTINUED | OUTPATIENT
Start: 2018-10-22 | End: 2018-10-23 | Stop reason: HOSPADM

## 2018-10-22 RX ORDER — LACTULOSE 20 G/30ML
30 SOLUTION ORAL 3 TIMES DAILY
Status: DISCONTINUED | OUTPATIENT
Start: 2018-10-22 | End: 2018-10-23 | Stop reason: HOSPADM

## 2018-10-22 RX ADMIN — ONDANSETRON 4 MG: 4 TABLET, ORALLY DISINTEGRATING ORAL at 17:40

## 2018-10-22 RX ADMIN — KETOCONAZOLE 1 APPLICATION: 20 CREAM TOPICAL at 05:11

## 2018-10-22 RX ADMIN — MAGNESIUM OXIDE TAB 400 MG (241.3 MG ELEMENTAL MG) 400 MG: 400 (241.3 MG) TAB at 05:10

## 2018-10-22 RX ADMIN — OXYCODONE HYDROCHLORIDE 20 MG: 5 TABLET ORAL at 04:20

## 2018-10-22 RX ADMIN — ONDANSETRON HYDROCHLORIDE 4 MG: 2 INJECTION INTRAMUSCULAR; INTRAVENOUS at 12:02

## 2018-10-22 RX ADMIN — LOSARTAN POTASSIUM 50 MG: 25 TABLET, FILM COATED ORAL at 05:10

## 2018-10-22 RX ADMIN — APIXABAN 5 MG: 5 TABLET, FILM COATED ORAL at 17:40

## 2018-10-22 RX ADMIN — ALPRAZOLAM 0.5 MG: 0.5 TABLET ORAL at 19:50

## 2018-10-22 RX ADMIN — OMEPRAZOLE 40 MG: 20 CAPSULE, DELAYED RELEASE ORAL at 05:10

## 2018-10-22 RX ADMIN — SERTRALINE HYDROCHLORIDE 100 MG: 50 TABLET ORAL at 05:10

## 2018-10-22 RX ADMIN — SENNOSIDES AND DOCUSATE SODIUM 2 TABLET: 8.6; 5 TABLET ORAL at 05:17

## 2018-10-22 RX ADMIN — CETIRIZINE HYDROCHLORIDE 10 MG: 10 TABLET, FILM COATED ORAL at 17:39

## 2018-10-22 RX ADMIN — ACETAMINOPHEN 650 MG: 325 TABLET, FILM COATED ORAL at 04:20

## 2018-10-22 RX ADMIN — OXYCODONE HYDROCHLORIDE 20 MG: 5 TABLET ORAL at 10:02

## 2018-10-22 RX ADMIN — METOPROLOL TARTRATE 50 MG: 50 TABLET ORAL at 05:10

## 2018-10-22 RX ADMIN — SENNOSIDES AND DOCUSATE SODIUM 2 TABLET: 8.6; 5 TABLET ORAL at 17:40

## 2018-10-22 RX ADMIN — ZOLPIDEM TARTRATE 5 MG: 5 TABLET, FILM COATED ORAL at 21:41

## 2018-10-22 RX ADMIN — METOPROLOL TARTRATE 50 MG: 50 TABLET ORAL at 17:40

## 2018-10-22 RX ADMIN — ATORVASTATIN CALCIUM 40 MG: 40 TABLET, FILM COATED ORAL at 05:10

## 2018-10-22 RX ADMIN — OXYCODONE HYDROCHLORIDE 20 MG: 5 TABLET ORAL at 17:40

## 2018-10-22 RX ADMIN — FLUTICASONE PROPIONATE 100 MCG: 50 SPRAY, METERED NASAL at 11:55

## 2018-10-22 RX ADMIN — APIXABAN 5 MG: 5 TABLET, FILM COATED ORAL at 05:10

## 2018-10-22 RX ADMIN — ONDANSETRON 4 MG: 4 TABLET, ORALLY DISINTEGRATING ORAL at 04:20

## 2018-10-22 RX ADMIN — LACTULOSE 30 ML: 20 SOLUTION ORAL at 11:55

## 2018-10-22 RX ADMIN — OXYCODONE HYDROCHLORIDE 20 MG: 5 TABLET ORAL at 13:48

## 2018-10-22 RX ADMIN — CEFTRIAXONE SODIUM 2 G: 2 INJECTION, POWDER, FOR SOLUTION INTRAMUSCULAR; INTRAVENOUS at 05:10

## 2018-10-22 RX ADMIN — SODIUM CHLORIDE: 9 INJECTION, SOLUTION INTRAVENOUS at 04:22

## 2018-10-22 RX ADMIN — OXYCODONE HYDROCHLORIDE 20 MG: 5 TABLET ORAL at 21:41

## 2018-10-22 RX ADMIN — DIPHENHYDRAMINE HCL 25 MG: 25 TABLET ORAL at 05:10

## 2018-10-22 ASSESSMENT — PAIN SCALES - GENERAL
PAINLEVEL_OUTOF10: 9
PAINLEVEL_OUTOF10: 8
PAINLEVEL_OUTOF10: 8
PAINLEVEL_OUTOF10: 0
PAINLEVEL_OUTOF10: 5
PAINLEVEL_OUTOF10: 8
PAINLEVEL_OUTOF10: 8
PAINLEVEL_OUTOF10: 5

## 2018-10-22 ASSESSMENT — ENCOUNTER SYMPTOMS
SHORTNESS OF BREATH: 0
NERVOUS/ANXIOUS: 1
SORE THROAT: 0
PALPITATIONS: 0
BACK PAIN: 0
DEPRESSION: 0
BLURRED VISION: 0
ABDOMINAL PAIN: 0
FEVER: 0
CHILLS: 0
NECK PAIN: 0
DIZZINESS: 0
NAUSEA: 0
EYE PAIN: 0
TINGLING: 0
INSOMNIA: 0
COUGH: 0

## 2018-10-22 NOTE — CARE PLAN
Problem: Knowledge Deficit  Goal: Knowledge of disease process/condition, treatment plan, diagnostic tests, and medications will improve  POC discussed with patient. All questions answered. Patient verbalized understanding.     Problem: Pain Management  Goal: Pain level will decrease to patient's comfort goal  Patient is being medicated for pain as needed per MAR.

## 2018-10-22 NOTE — DIETARY
"Nutrition services: Day 2 of admit.  Alfonso Posada is a 62 y.o. female with admitting DX of Sepsis and Cellulitis.     Pt with BMI >40 (=55.21). Pt with PMH of SCT, Postherpetic neuralgia, HTN, Morbid obesity, Chronic knee pain, Bartholin cyst, A-Fib, Asthma, and Anxiety. Pt presented with erythema and pain to her left arm. Pt noted she fell about 10 days ago on her left arm. Xray of elbow showed no acute traumatic bony injury. Current diet order Regular and PO intake of 50-75% x 2 and adequate. Weight loss counseling not appropriate in acute care setting. RD available for consult as needed.     Assessment:  Height: 165.1 cm (5' 5\")  Weight: (!) 150.5 kg (331 lb 12.7 oz)  Body mass index is 55.21 kg/m².  Diet/Intake: Regular    Labs (Alk Phos 102), MAR and Chart reviewed.     Recommendations/Plan:Referral to outpatient nutrition services for weight management after D/C.   1. Diet as ordered.   2. Encourage intake of 50% or greater.   3. Document intake of all meals  as % taken in ADL's to provide interdisciplinary communication across all shifts.   4. Monitor weight.  5. Nutrition rep will continue to see patient for ongoing meal and snack preferences.           "

## 2018-10-22 NOTE — DISCHARGE PLANNING
Care Transition Team Assessment  Current discharge plan is to discharge home with family assistance for safe discharge, no needs noted. This CM to continue to follow for any needs through this admission.  Information Source, patient and chart  Information Given By: Patient  Informant's Name: Alfonso Harrisopeflorian Risk  Legal Hold: No  Ambulatory or Self Mobile in Wheelchair: Yes  Disoriented: No  Psychiatric Symptoms: None  History of Wandering: No  Elopement this Admit: No  Vocalizing Wanting to Leave: No  Displays Behaviors, Body Language Wanting to Leave: No-Not at Risk for Elopement  Elopement Risk: Not at Risk for Elopement    Interdisciplinary Discharge Planning  Does Admitting Nurse Feel This Could be a Complex Discharge?: No  Lives with - Patient's Self Care Capacity: Alone and Able to Care For Self  Patient or legal guardian wants to designate a caregiver (see row info): No  Support Systems: Family Member(s), Friends / Neighbors  Housing / Facility: [address]  Do You Take your Prescribed Medications Regularly: No  Able to Return to Previous ADL's: Yes  Mobility Issues: No  Assistance Needed: No  Durable Medical Equipment: Not Applicable                   Vision / Hearing Impairment  Vision Impairment : Yes  Right Eye Vision: Impaired, Wears Contacts  Left Eye Vision: Impaired, Wears Contacts  Hearing Impairment : No    Values / Beliefs / Concerns  Values / Beliefs Concerns : No         Domestic Abuse  Have you ever been the victim of abuse or violence?: No  Physical Abuse or Sexual Abuse: No  Verbal Abuse or Emotional Abuse: No  Possible Abuse Reported to:: Not Applicable         Discharge Risks or Barriers  Discharge risks or barriers?: No    Anticipated Discharge Information  Anticipated discharge disposition: Home

## 2018-10-22 NOTE — PROGRESS NOTES
Report received. Assumed care of patient. Patient is resting in bed. Patient denies pain states previous medication working well. Right arm swelling decreased, slight heat noted. All needs are currently met. All safety precautions in place. Will continue to monitor.

## 2018-10-22 NOTE — PROGRESS NOTES
[facility] Hospitalist Progress Note    Date of Service: 10/22/2018    Chief Complaint  62 y.o. female admitted 10/20/2018 with cellulitis in her left arm. Admission complicated by SVT.     Interval Problem Update  Stayed in nsr over night.   Cellulitis improved again today.     Consultants/Specialty  none    Disposition  Hospital. Suspect home once improved.           Review of Systems   Constitutional: Negative for chills and fever.   HENT: Negative for sore throat.    Eyes: Negative for blurred vision and pain.   Respiratory: Negative for cough and shortness of breath.    Cardiovascular: Negative for chest pain and palpitations.   Gastrointestinal: Negative for abdominal pain and nausea.   Genitourinary: Negative for dysuria and urgency.   Musculoskeletal: Negative for back pain and neck pain.   Skin: Positive for rash. Negative for itching.   Neurological: Negative for dizziness and tingling.   Psychiatric/Behavioral: Negative for depression. The patient is nervous/anxious. The patient does not have insomnia.    All other systems reviewed and are negative.     Physical Exam  Laboratory/Imaging   Hemodynamics  Temp (24hrs), Av.9 °C (98.5 °F), Min:36.5 °C (97.7 °F), Max:37.6 °C (99.7 °F)   Temperature: 36.8 °C (98.2 °F)  Pulse  Av.5  Min: 72  Max: 172    Blood Pressure: 160/89      Respiratory      Respiration: 18, Pulse Oximetry: 92 %             Fluids    Intake/Output Summary (Last 24 hours) at 10/22/18 0738  Last data filed at 10/21/18 1616   Gross per 24 hour   Intake              843 ml   Output                0 ml   Net              843 ml       Nutrition  Orders Placed This Encounter   Procedures   • Diet Order Regular     Standing Status:   Standing     Number of Occurrences:   1     Order Specific Question:   Diet:     Answer:   Regular [1]     Physical Exam   Constitutional: She is oriented to person, place, and time. She appears well-developed and well-nourished. No distress.   Patient seen and  examined  Discussed plan with RN   HENT:   Right Ear: External ear normal.   Left Ear: External ear normal.   Nose: Nose normal.   Eyes: Conjunctivae are normal. Right eye exhibits no discharge. Left eye exhibits no discharge.   Neck: No JVD present.   Cardiovascular: Regular rhythm and normal heart sounds.    No murmur heard.  Cap refill 2sec  Pulses 2+ throughout     Pulmonary/Chest: Effort normal and breath sounds normal. No stridor. No respiratory distress. She has no wheezes. She has no rales.   Abdominal: Soft. Bowel sounds are normal. She exhibits no distension. There is no tenderness.   Musculoskeletal: She exhibits no edema or tenderness.   Neurological: She is alert and oriented to person, place, and time.   Skin: Skin is warm and dry. She is not diaphoretic. No erythema.   Cellulitis right arm improved. Still with marked induration on elbow.    Psychiatric: She has a normal mood and affect. Her behavior is normal.   Nursing note and vitals reviewed.      Recent Labs      10/20/18   2040  10/21/18   0252  10/22/18   0433   WBC  12.0*  10.2  8.7   RBC  4.80  4.34  4.53   HEMOGLOBIN  14.9  13.4  14.1   HEMATOCRIT  44.6  40.9  43.6   MCV  92.9  94.2  96.2   MCH  31.0  30.9  31.1   MCHC  33.4*  32.8*  32.3*   RDW  44.2  45.5  46.3   PLATELETCT  242  223  263   MPV  9.5  9.6  9.6     Recent Labs      10/20/18   2040  10/21/18   0252  10/22/18   0433   SODIUM  136  137  138   POTASSIUM  4.2  4.2  4.0   CHLORIDE  100  106  107   CO2  24  27  25   GLUCOSE  134*  103*  91   BUN  12  11  8   CREATININE  0.80  0.57  0.53   CALCIUM  8.8  8.4  8.4     Recent Labs      10/20/18   2040   APTT  34.6   INR  1.09                  Assessment/Plan     * Cellulitis   Assessment & Plan    Failed oral doxy  Iv rocephin  Trend exam and vitals/labs.         Sepsis (HCC)   Assessment & Plan    -This is sepsis (without associated acute organ dysfunction).   2/2 cellulitis right arm  Stop iv fluids  continue iv antibiotics.   Correct  lytes.   Cultures neg to date.         Seasonal allergies   Assessment & Plan    zyrtec        SVT (supraventricular tachycardia) ([facility])   Assessment & Plan    S/p adenosine  Now in nsr  Trend on tele.           Hyperglycemia   Assessment & Plan    Pending a1c        Atrial fibrillation ([facility])- (present on admission)   Assessment & Plan    On eliquis.   Po metoprolol        Hyperlipidemia- (present on admission)   Assessment & Plan    statin        Chronic use of opiate for therapeutic purpose- (present on admission)   Assessment & Plan    Continue home dose.         Morbid obesity with BMI of 50.0-59.9, adult ([facility])- (present on admission)   Assessment & Plan    Has gastric sleeve pending.           Gastroesophageal reflux disease without esophagitis- (present on admission)   Assessment & Plan    ppi        Essential hypertension- (present on admission)   Assessment & Plan    Mtp, losartan  controlled        Bilateral chronic knee pain- (present on admission)   Assessment & Plan    Recent steroid injection  Needs weight loss.         Anxiety- (present on admission)   Assessment & Plan    Xanax and zoloft        Postoperative hypothyroidism- (present on admission)   Assessment & Plan    Synthroid. Low normal tsh          Quality-Core Measures   Reviewed items::  EKG reviewed, Labs reviewed, Medications reviewed and Radiology images reviewed  Hicks catheter::  No Hicks  DVT: eliquis.  DVT prophylaxis - mechanical:  SCDs  Ulcer Prophylaxis::  Not indicated  Antibiotics:  Treating active infection/contamination beyond 24 hours perioperative coverage  Assessed for rehabilitation services:  Patient was assess for and/or received rehabilitation services during this hospitalization

## 2018-10-22 NOTE — PROGRESS NOTES
Report given to Nima ARTEAGA. Plan of care discussed. Pt resting in bed with safety precautions in place.

## 2018-10-22 NOTE — PROGRESS NOTES
Bedside report received from Sarah ARTEAGA. Plan of care discussed. Pt resting in bed with safety precautions in place.

## 2018-10-22 NOTE — PROGRESS NOTES
Report received from David RN.  Patient resting comfortably, call bell in reach.  IVF infusing as ordered.

## 2018-10-22 NOTE — PROGRESS NOTES
Tele Rhythm Strip    SR/ST    HR:   N[place] Interval: 0.16  QRS Interval: 0.08  QT Interval: 0.36     Ectopy: R-coup, O-PVC

## 2018-10-23 ENCOUNTER — PATIENT OUTREACH (OUTPATIENT)
Dept: HEALTH INFORMATION MANAGEMENT | Facility: OTHER | Age: 63
End: 2018-10-23

## 2018-10-23 VITALS
BODY MASS INDEX: 48.82 KG/M2 | SYSTOLIC BLOOD PRESSURE: 165 MMHG | HEIGHT: 65 IN | TEMPERATURE: 97.9 F | HEART RATE: 70 BPM | WEIGHT: 293 LBS | RESPIRATION RATE: 18 BRPM | DIASTOLIC BLOOD PRESSURE: 95 MMHG | OXYGEN SATURATION: 96 %

## 2018-10-23 LAB
BACTERIA UR CULT: NORMAL
SIGNIFICANT IND 70042: NORMAL
SITE SITE: NORMAL
SOURCE SOURCE: NORMAL

## 2018-10-23 PROCEDURE — 700111 HCHG RX REV CODE 636 W/ 250 OVERRIDE (IP): Performed by: INTERNAL MEDICINE

## 2018-10-23 PROCEDURE — 700111 HCHG RX REV CODE 636 W/ 250 OVERRIDE (IP): Performed by: HOSPITALIST

## 2018-10-23 PROCEDURE — 99239 HOSP IP/OBS DSCHRG MGMT >30: CPT | Performed by: INTERNAL MEDICINE

## 2018-10-23 PROCEDURE — 700102 HCHG RX REV CODE 250 W/ 637 OVERRIDE(OP): Performed by: HOSPITALIST

## 2018-10-23 PROCEDURE — 700102 HCHG RX REV CODE 250 W/ 637 OVERRIDE(OP): Performed by: INTERNAL MEDICINE

## 2018-10-23 PROCEDURE — A9270 NON-COVERED ITEM OR SERVICE: HCPCS | Performed by: INTERNAL MEDICINE

## 2018-10-23 PROCEDURE — 700101 HCHG RX REV CODE 250: Performed by: INTERNAL MEDICINE

## 2018-10-23 PROCEDURE — A9270 NON-COVERED ITEM OR SERVICE: HCPCS | Performed by: HOSPITALIST

## 2018-10-23 PROCEDURE — 700105 HCHG RX REV CODE 258: Performed by: HOSPITALIST

## 2018-10-23 RX ORDER — LOSARTAN POTASSIUM 25 MG/1
100 TABLET ORAL DAILY
Status: DISCONTINUED | OUTPATIENT
Start: 2018-10-24 | End: 2018-10-23 | Stop reason: HOSPADM

## 2018-10-23 RX ORDER — CEPHALEXIN 500 MG/1
500 CAPSULE ORAL 4 TIMES DAILY
Qty: 28 CAP | Refills: 0 | Status: SHIPPED | OUTPATIENT
Start: 2018-10-23 | End: 2018-10-30

## 2018-10-23 RX ORDER — LOSARTAN POTASSIUM 25 MG/1
50 TABLET ORAL ONCE
Status: COMPLETED | OUTPATIENT
Start: 2018-10-23 | End: 2018-10-23

## 2018-10-23 RX ORDER — METOPROLOL TARTRATE 100 MG/1
100 TABLET ORAL 2 TIMES DAILY
Qty: 60 TAB | Refills: 2 | Status: SHIPPED | OUTPATIENT
Start: 2018-10-23 | End: 2018-10-30 | Stop reason: SDUPTHER

## 2018-10-23 RX ORDER — LOSARTAN POTASSIUM 100 MG/1
100 TABLET ORAL DAILY
Qty: 30 TAB | Refills: 2 | Status: ON HOLD | OUTPATIENT
Start: 2018-10-23 | End: 2018-12-01

## 2018-10-23 RX ORDER — METOPROLOL TARTRATE 50 MG/1
100 TABLET, FILM COATED ORAL 2 TIMES DAILY
Status: DISCONTINUED | OUTPATIENT
Start: 2018-10-23 | End: 2018-10-23 | Stop reason: HOSPADM

## 2018-10-23 RX ADMIN — LABETALOL HYDROCHLORIDE 10 MG: 5 INJECTION, SOLUTION INTRAVENOUS at 00:55

## 2018-10-23 RX ADMIN — LOSARTAN POTASSIUM 50 MG: 25 TABLET, FILM COATED ORAL at 05:09

## 2018-10-23 RX ADMIN — ONDANSETRON 4 MG: 4 TABLET, ORALLY DISINTEGRATING ORAL at 05:13

## 2018-10-23 RX ADMIN — ATORVASTATIN CALCIUM 40 MG: 40 TABLET, FILM COATED ORAL at 05:09

## 2018-10-23 RX ADMIN — CEFTRIAXONE SODIUM 2 G: 2 INJECTION, POWDER, FOR SOLUTION INTRAMUSCULAR; INTRAVENOUS at 06:51

## 2018-10-23 RX ADMIN — METOPROLOL TARTRATE 50 MG: 50 TABLET ORAL at 05:09

## 2018-10-23 RX ADMIN — OXYCODONE HYDROCHLORIDE 10 MG: 5 TABLET ORAL at 00:40

## 2018-10-23 RX ADMIN — OMEPRAZOLE 40 MG: 20 CAPSULE, DELAYED RELEASE ORAL at 05:09

## 2018-10-23 RX ADMIN — OXYCODONE HYDROCHLORIDE 20 MG: 5 TABLET ORAL at 06:32

## 2018-10-23 RX ADMIN — ACETAMINOPHEN 650 MG: 325 TABLET, FILM COATED ORAL at 07:36

## 2018-10-23 RX ADMIN — SERTRALINE HYDROCHLORIDE 100 MG: 50 TABLET ORAL at 05:09

## 2018-10-23 RX ADMIN — ZOLPIDEM TARTRATE 5 MG: 5 TABLET, FILM COATED ORAL at 01:41

## 2018-10-23 RX ADMIN — LABETALOL HYDROCHLORIDE 10 MG: 5 INJECTION, SOLUTION INTRAVENOUS at 07:36

## 2018-10-23 RX ADMIN — LEVOTHYROXINE SODIUM 25 MCG: 25 TABLET ORAL at 05:11

## 2018-10-23 RX ADMIN — APIXABAN 5 MG: 5 TABLET, FILM COATED ORAL at 05:09

## 2018-10-23 RX ADMIN — MAGNESIUM OXIDE TAB 400 MG (241.3 MG ELEMENTAL MG) 400 MG: 400 (241.3 MG) TAB at 05:09

## 2018-10-23 RX ADMIN — ACETAMINOPHEN 650 MG: 325 TABLET, FILM COATED ORAL at 00:54

## 2018-10-23 RX ADMIN — LOSARTAN POTASSIUM 50 MG: 25 TABLET, FILM COATED ORAL at 08:25

## 2018-10-23 RX ADMIN — FLUTICASONE PROPIONATE 100 MCG: 50 SPRAY, METERED NASAL at 05:11

## 2018-10-23 ASSESSMENT — PAIN SCALES - GENERAL
PAINLEVEL_OUTOF10: 5
PAINLEVEL_OUTOF10: 9

## 2018-10-23 NOTE — PROGRESS NOTES
Medicated for right elbow pain, headache, per patient request.  SBP elevated, hydralazine given.  No other needs/complaints.    0147 - /57 on recheck.

## 2018-10-23 NOTE — PROGRESS NOTES
"Voiced concern over Pt mentation, asking the Pt if she was feeling alright. Pt had a headache, and had been crying, voicing sadness and anxiety over the recent death of spouse/significant other. She talked through this, interrupting this RN, making occasionally cogent remarks, but given to tangential flights, mostly. This RN voiced he would return to check in on her to see if the headache had resolved.     Upon returning, this RN spoke again to her high blood pressure, how that could cause headaches. The Pt made comments on how other innocuous  Interventions were causing her headache. When trying to ask questions and explain as best as possible, the Pt continued to interrupt, ask questions, and seemed confused over all the information provided. This RN voiced concern over this lack of understanding or retention or integration, and the Pt told this RN that it was him that was confused. Further, attempts to explain and inquire were met with hostility, until she told this RN to \"get the fuck out.\" I informed her that she would be discharged soon, and left the room.  "

## 2018-10-23 NOTE — DISCHARGE SUMMARY
Discharge Summary    CHIEF COMPLAINT ON ADMISSION  Chief Complaint   Patient presents with   • Arm Pain       Reason for Admission  Arm Pain     Admission Date  10/20/2018    CODE STATUS  Full    HPI & HOSPITAL COURSE  This is a 62 y.o. female with a history of obesity, high retention, recent diagnosis of SVT started on Eliquis, here with right upper extremity cellulitis.  She was initially seen in the ER and sent home with oral doxycycline and a demarcation of her erythema with instruction to return if the redness was worsening.  She did notice that the erythema was extending beyond the borders of the previous demarcation therefore she represented to the ER for evaluation.  She was placed on IV antibiotic therapy and did have improvement of her right arm redness and swelling.  Due to the presence of infection, her metoprolol dose was decreased from 50 twice daily to 25 twice daily in order to avoid hypotension, however, she did develop SVT following this dose reduction.  Her metoprolol was increased back to 50 twice daily and she had no further episodes of SVT.  She remained on Eliquis therapy.  Her blood pressure was moderate Pankaj controlled to occasionally uncontrolled however she did not want to increase her Cozaar dose.  Her IV fluids were discontinued and her blood pressure normalized.    Range of motion in her elbow continued to improve therefore she was discharged in stable condition to follow-up with her primary care physician which she already has scheduled for 1 week.  Her pain was well controlled on her doses of chronic home medications including Ultram and oxycodone therefore no controlled substance narcotics were prescribed on discharge.    She will complete 7 additional days of Keflex therapy.       Therefore, she is discharged in fair and stable condition to home with close outpatient follow-up.    The patient met 2-midnight criteria for an inpatient stay at the time of discharge.    Discharge  Date  10/23/2018    FOLLOW UP ITEMS POST DISCHARGE  Continue oral antibiotics  Follow-up with primary care in 1 week, repeat blood pressure at these appointments  Return to ER if erythema, swelling or tenderness of the right elbow increases    DISCHARGE DIAGNOSES  Principal Problem:    Cellulitis POA: Unknown  Active Problems:    Sepsis (HCC) POA: Unknown    Postoperative hypothyroidism POA: Yes    Anxiety POA: Yes    Bilateral chronic knee pain POA: Yes      Overview: Right greater than left    Essential hypertension POA: Yes    Gastroesophageal reflux disease without esophagitis POA: Yes    Morbid obesity with BMI of 50.0-59.9, adult ([facility]) POA: Yes    Chronic use of opiate for therapeutic purpose POA: Yes    Hyperlipidemia POA: Yes    Atrial fibrillation (HCC) POA: Yes    Hyperglycemia POA: Unknown    SVT (supraventricular tachycardia) ([facility]) POA: Unknown    Seasonal allergies POA: Unknown    UTI (urinary tract infection) POA: Unknown  Resolved Problems:    Morbid obesity with BMI of 50.0-59.9, adult ([facility]) POA: Yes      FOLLOW UP  Future Appointments  Date Time Provider Department Center   10/30/2018 1:00 PM KEENAN Adames     No follow-up provider specified.    MEDICATIONS ON DISCHARGE     Medication List      START taking these medications      Instructions   cephALEXin 500 MG Caps  Commonly known as:  KEFLEX   Take 1 Cap by mouth 4 times a day for 7 days.  Dose:  500 mg        CHANGE how you take these medications      Instructions   losartan 100 MG Tabs  What changed:  · medication strength  · how much to take  Commonly known as:  COZAAR   Take 1 Tab by mouth every day.  Dose:  100 mg     metoprolol 100 MG Tabs  What changed:  · medication strength  · how much to take  Commonly known as:  LOPRESSOR   Doctor's comments:  Hold for SBP <110, HR <60  Take 1 Tab by mouth 2 times a day.  Dose:  100 mg        CONTINUE taking these medications      Instructions   acetaminophen 500 MG  Tabs  Commonly known as:  TYLENOL   Take 1,000 mg by mouth 3 times a day as needed.  Dose:  1000 mg     ALPRAZolam 0.5 MG Tabs  Commonly known as:  XANAX   Take 1 Tab by mouth 2 times a day as needed for Anxiety for up to 60 days.  Dose:  0.5 mg     atorvastatin 40 MG Tabs  Commonly known as:  LIPITOR   Take 1 Tab by mouth every day. TAKE ONE TABLET BY MOUTH DAILY  Dose:  40 mg     cetirizine 10 MG Tabs  Commonly known as:  ZYRTEC   Take 10 mg by mouth every day.  Dose:  10 mg     diphenhydrAMINE 25 MG Tabs  Commonly known as:  BENADRYL   Take 25 mg by mouth every 6 hours as needed for Itching.  Dose:  25 mg     ELIQUIS 5mg Tabs  Generic drug:  apixaban   Take 5 mg by mouth 2 Times a Day.  Dose:  5 mg     fluticasone 50 MCG/ACT nasal spray  Commonly known as:  FLONASE   SPRAY ONE SPRAY IN EACH NOSTRIL TWICE DAILY     furosemide 40 MG Tabs  Commonly known as:  LASIX   TAKE ONE TABLET BY MOUTH DAILY     ketoconazole 2 % Crea  Commonly known as:  NIZORAL   Apply 1 Application to affected area(s) every day. Apply to face  Dose:  1 Application     levothyroxine 25 MCG Tabs  Commonly known as:  SYNTHROID   Take 25 mcg by mouth every 48 hours.  Dose:  25 mcg     omeprazole 40 MG delayed-release capsule  Commonly known as:  PRILOSEC   Take 1 Cap by mouth every day.  Dose:  40 mg     ondansetron 4 MG Tbdp  Commonly known as:  ZOFRAN ODT   DISSOLVE ONE TABLET BY MOUTH EVERY 8 HOURS AS NEEDED FOR NAUSEA AND VOMITING     oxyCODONE-acetaminophen  MG Tabs  Start taking on:  12/4/2018  Commonly known as:  PERCOCET-10   Take 1 Tab by mouth 2 times a day as needed for Severe Pain for up to 30 days.  Dose:  1 Tab     potassium chloride 8 MEQ tablet  Commonly known as:  KLOR-CON   TAKE ONE TABLET BY MOUTH DAILY     sertraline 100 MG Tabs  Commonly known as:  ZOLOFT   Take 1 Tab by mouth every day.  Dose:  100 mg     tramadol 50 MG Tabs  Start taking on:  12/4/2018  Commonly known as:  ULTRAM   Take 1 Tab by mouth every 8 hours  "as needed for up to 30 days.  Dose:  50 mg        STOP taking these medications    doxycycline 100 MG capsule  Commonly known as:  MONODOX            Allergies  Allergies   Allergen Reactions   • Latex Swelling   • Pcn [Penicillins]      Hives on face    • Sulfa Drugs      \"some sulfa drugs- leg itchiness\"        DIET  No orders of the defined types were placed in this encounter.      ACTIVITY  As tolerated.  Weight bearing as tolerated    CONSULTATIONS  None    PROCEDURES  None    LABORATORY  Lab Results   Component Value Date    SODIUM 138 10/22/2018    POTASSIUM 4.0 10/22/2018    CHLORIDE 107 10/22/2018    CO2 25 10/22/2018    GLUCOSE 91 10/22/2018    BUN 8 10/22/2018    CREATININE 0.53 10/22/2018        Lab Results   Component Value Date    WBC 8.7 10/22/2018    HEMOGLOBIN 14.1 10/22/2018    HEMATOCRIT 43.6 10/22/2018    PLATELETCT 263 10/22/2018        Total time of the discharge process exceeds 37 minutes.  "

## 2018-10-23 NOTE — PROGRESS NOTES
Patient resting comfortably at this time.  Call bell in reach.  Hourly rounding performed.  Safety maintained.

## 2018-10-23 NOTE — PROGRESS NOTES
Tele Rhythm Strip    SR    HR: 70-80  IL Interval: 0.16  QRS Interval: 0.08  QT Interval: 0.40     Ectopy: R-PVC

## 2018-10-23 NOTE — CARE PLAN
Problem: Infection  Goal: Will remain free from infection  Outcome: PROGRESSING AS EXPECTED  Hand hygiene education provided.  Educated patient not to scratch at cellulitis.  Verbalized understanding.    Problem: Psychosocial Needs:  Goal: Level of anxiety will decrease  Outcome: PROGRESSING AS EXPECTED  Medication given PRN for anxiety.  Encouraged to express feelings.

## 2018-10-23 NOTE — DISCHARGE INSTRUCTIONS
Discharge Instructions    Discharged to home by car with relative. Discharged via wheelchair, hospital escort: Yes.  Special equipment needed: Not Applicable    Be sure to schedule a follow-up appointment with your primary care doctor or any specialists as instructed.     Discharge Plan:   Diet Plan: Discussed  Activity Level: Discussed  Smoking Cessation Offered: Patient Counseled  Confirmed Follow up Appointment: Appointment Scheduled  Confirmed Symptoms Management: Discussed  Medication Reconciliation Updated: Yes  Influenza Vaccine Indication: Not indicated: Previously immunized this influenza season and > 8 years of age    I understand that a diet low in cholesterol, fat, and sodium is recommended for good health. Unless I have been given specific instructions below for another diet, I accept this instruction as my diet prescription.   Other diet: diabetic, heart healthy    Special Instructions: Sepsis, Adult  Sepsis is a serious infection of your blood or tissues that affects your whole body. The infection that causes sepsis may be bacterial, viral, fungal, or parasitic. Sepsis may be life threatening. Sepsis can cause your blood pressure to drop. This may result in shock. Shock causes your central nervous system and your organs to stop working correctly.  What increases the risk?  Sepsis can happen in anyone, but it is more likely to happen in people who have weakened immune systems.  What are the signs or symptoms?  Symptoms of sepsis can include:  · Fever or low body temperature (hypothermia).  · Rapid breathing (hyperventilation).  · Chills.  · Rapid heartbeat (tachycardia).  · Confusion or light-headedness.  · Trouble breathing.  · Urinating much less than usual.  · Cool, clammy skin or red, flushed skin.  · Other problems with the heart, kidneys, or brain.  How is this diagnosed?  Your health care provider will likely do tests to look for an infection, to see if the infection has spread to your blood,  and to see how serious your condition is. Tests can include:  · Blood tests, including cultures of your blood.  · Cultures of other fluids from your body, such as:  ¨ Urine.  ¨ Pus from wounds.  ¨ Mucus coughed up from your lungs.  · Urine tests other than cultures.  · X-ray exams or other imaging tests.  How is this treated?  Treatment will begin with elimination of the source of infection. If your sepsis is likely caused by a bacterial or fungal infection, you will be given antibiotic or antifungal medicines.  You may also receive:  · Oxygen.  · Fluids through an IV tube.  · Medicines to increase your blood pressure.  · A machine to clean your blood (dialysis) if your kidneys fail.  · A machine to help you breathe if your lungs fail.  Get help right away if:  You get an infection or develop any of the signs and symptoms of sepsis after surgery or a hospitalization.  This information is not intended to replace advice given to you by your health care provider. Make sure you discuss any questions you have with your health care provider.  Document Released: 09/15/2004 Document Revised: 05/25/2017 Document Reviewed: 08/25/2014  [employer] Interactive Patient Education © 2017 [employer]      · Is patient discharged on Warfarin / Coumadin?   No       Cellulitis, Adult  Introduction  Cellulitis is a skin infection. The infected area is usually red and sore. This condition occurs most often in the arms and lower legs. It is very important to get treated for this condition.  Follow these instructions at home:  · Take over-the-counter and prescription medicines only as told by your doctor.  · If you were prescribed an antibiotic medicine, take it as told by your doctor. Do not stop taking the antibiotic even if you start to feel better.  · Drink enough fluid to keep your pee (urine) clear or pale yellow.  · Do not touch or rub the infected area.  · Raise (elevate) the infected area above the level of your heart while you  are sitting or lying down.  · Place warm or cold wet cloths (warm or cold compresses) on the infected area. Do this as told by your doctor.  · Keep all follow-up visits as told by your doctor. This is important. These visits let your doctor make sure your infection is not getting worse.  Contact a doctor if:  · You have a fever.  · Your symptoms do not get better after 1-2 days of treatment.  · Your bone or joint under the infected area starts to hurt after the skin has healed.  · Your infection comes back. This can happen in the same area or another area.  · You have a swollen bump in the infected area.  · You have new symptoms.  · You feel ill and also have muscle aches and pains.  Get help right away if:  · Your symptoms get worse.  · You feel very sleepy.  · You throw up (vomit) or have watery poop (diarrhea) for a long time.  · There are red streaks coming from the infected area.  · Your red area gets larger.  · Your red area turns darker.  This information is not intended to replace advice given to you by your health care provider. Make sure you discuss any questions you have with your health care provider.  Document Released: 06/05/2009 Document Revised: 05/25/2017 Document Reviewed: 10/26/2016  © 2017 [employer]    Diabetes Mellitus and Food  It is important for you to manage your blood sugar (glucose) level. Your blood glucose level can be greatly affected by what you eat. Eating healthier foods in the appropriate amounts throughout the day at about the same time each day will help you control your blood glucose level. It can also help slow or prevent worsening of your diabetes mellitus. Healthy eating may even help you improve the level of your blood pressure and reach or maintain a healthy weight.  General recommendations for healthful eating and cooking habits include:  · Eating meals and snacks regularly. Avoid going long periods of time without eating to lose weight.  · Eating a diet that consists mainly  of plant-based foods, such as fruits, vegetables, nuts, legumes, and whole grains.  · Using low-heat cooking methods, such as baking, instead of high-heat cooking methods, such as deep frying.  Work with your dietitian to make sure you understand how to use the Nutrition Facts information on food labels.  How can food affect me?  Carbohydrates   Carbohydrates affect your blood glucose level more than any other type of food. Your dietitian will help you determine how many carbohydrates to eat at each meal and teach you how to count carbohydrates. Counting carbohydrates is important to keep your blood glucose at a healthy level, especially if you are using insulin or taking certain medicines for diabetes mellitus.  Alcohol   Alcohol can cause sudden decreases in blood glucose (hypoglycemia), especially if you use insulin or take certain medicines for diabetes mellitus. Hypoglycemia can be a life-threatening condition. Symptoms of hypoglycemia (sleepiness, dizziness, and disorientation) are similar to symptoms of having too much alcohol.  If your health care provider has given you approval to drink alcohol, do so in moderation and use the following guidelines:  · Women should not have more than one drink per day, and men should not have more than two drinks per day. One drink is equal to:  ¨ 12 oz of beer.  ¨ 5 oz of wine.  ¨ 1½ oz of hard liquor.  · Do not drink on an empty stomach.  · Keep yourself hydrated. Have water, diet soda, or unsweetened iced tea.  · Regular soda, juice, and other mixers might contain a lot of carbohydrates and should be counted.  What foods are not recommended?  As you make food choices, it is important to remember that all foods are not the same. Some foods have fewer nutrients per serving than other foods, even though they might have the same number of calories or carbohydrates. It is difficult to get your body what it needs when you eat foods with fewer nutrients. Examples of foods that  you should avoid that are high in calories and carbohydrates but low in nutrients include:  · Trans fats (most processed foods list trans fats on the Nutrition Facts label).  · Regular soda.  · Juice.  · Candy.  · Sweets, such as cake, pie, doughnuts, and cookies.  · Fried foods.  What foods can I eat?  Eat nutrient-rich foods, which will nourish your body and keep you healthy. The food you should eat also will depend on several factors, including:  · The calories you need.  · The medicines you take.  · Your weight.  · Your blood glucose level.  · Your blood pressure level.  · Your cholesterol level.  You should eat a variety of foods, including:  · Protein.  ¨ Lean cuts of meat.  ¨ Proteins low in saturated fats, such as fish, egg whites, and beans. Avoid processed meats.  · Fruits and vegetables.  ¨ Fruits and vegetables that may help control blood glucose levels, such as apples, mangoes, and yams.  · Dairy products.  ¨ Choose fat-free or low-fat dairy products, such as milk, yogurt, and cheese.  · Grains, bread, pasta, and rice.  ¨ Choose whole grain products, such as multigrain bread, whole oats, and brown rice. These foods may help control blood pressure.  · Fats.  ¨ Foods containing healthful fats, such as nuts, avocado, olive oil, canola oil, and fish.  Does everyone with diabetes mellitus have the same meal plan?  Because every person with diabetes mellitus is different, there is not one meal plan that works for everyone. It is very important that you meet with a dietitian who will help you create a meal plan that is just right for you.  This information is not intended to replace advice given to you by your health care provider. Make sure you discuss any questions you have with your health care provider.  Document Released: 09/14/2006 Document Revised: 05/25/2017 Document Reviewed: 11/14/2014  [employer] Interactive Patient Education © 2017 [employer]        Supraventricular Tachycardia,  "Adult  Supraventricular tachycardia (SVT) is a kind of abnormal heartbeat. It makes your heart beat very fast and then beat at a normal speed.  A normal heart beats  times a minute. This condition can make your heart beat more than 150 times a minute. Times of having a fast heartbeat (episodes) can be scary, but they are usually not dangerous. They can lead to problems if:  · They happen often.  · They last a long time.  Symptoms of this condition include:  · A pounding heart.  · A feeling that your heart is skipping beats (palpitations).  · Weakness.  · Trouble getting enough air (shortness of breath).  · Pain or tightness in your chest.  · Feeling like you are going to pass out (light-headedness).  · Feeling worried or nervous (anxiety).  · Dizziness.  · Sweating.  · Feeling sick to your stomach (nausea).  · Passing out (fainting).  · Tiredness.  Sometimes, there are no symptoms.  Follow these instructions at home:  Stress  · Avoid things that make you feel stressed.  · Find out what helps you feel less stressed. Try:  ¨ Doing a relaxing activity, like yoga, meditation, or being out in nature.  ¨ Listening to relaxing music.  ¨ Doing relaxation techniques, like deep breathing.  ¨ Taking steps to be healthy. These include getting lots of sleep, exercising, and eating a balanced diet.  ¨ Talking with a mental health doctor.  Sleep  · Try to get at least 7 hours of sleep each night.  Tobacco and nicotine  · Do not use anything that has nicotine or tobacco, such as cigarettes and e-cigarettes. If you need help quitting, ask your doctor.  Alcohol  · If alcohol gives you a fast heartbeat, do not drink alcohol.  · If alcohol does not seem to give you a fast heartbeat, limit your alcohol. For nonpregnant women, this means no more than 1 drink a day. For men, this means no more than 2 drinks a day. \"One drink\" means one of these:  ¨ 12 oz of beer.  ¨ 5 oz of wine.  ¨ 1½ oz of hard liquor.  Caffeine  · If caffeine " gives you a fast heartbeat, do not eat, drink, or use anything with caffeine in it.  · If caffeine does not seem to give you a fast heartbeat, limit how much caffeine you eat, drink, or use.  Stimulant drugs  · Do not use stimulant drugs. These are drugs like cocaine or methamphetamine. If you need help quitting, ask your doctor.  General instructions  · Stay at a healthy weight.  · Exercise regularly. Ask your doctor to suggest some good activities for you. Try one of these options:  ¨ 150 minutes a week of gentle exercise, like walking or yoga.  ¨ 75 minutes a week of exercise that is very active, like running or swimming.  ¨ A combination of gentle exercise and very active exercise.  · Do home treatments to slow down your heartbeat as told by your doctor.  · Take over-the-counter and prescription medicines only as told by your doctor.  Contact a doctor if:  · You have a fast heartbeat more often.  · Times of having a fast heartbeat last longer than before.  · Your home treatments to slow down your heartbeat do not help.  · You have new symptoms.  Get help right away if:  · You have chest pain.  · Your symptoms get worse.  · You have trouble breathing.  · Your heart beats very fast for more than 20 minutes.  · You pass out (faint).  These symptoms may be an emergency. Do not wait to see if the symptoms will go away. Get medical help right away. Call your local emergency services (911 in the [place] Do not drive yourself to the hospital.   This information is not intended to replace advice given to you by your health care provider. Make sure you discuss any questions you have with your health care provider.  Document Released: 12/18/2006 Document Revised: 08/24/2017 Document Reviewed: 08/24/2017  [employer] Interactive Patient Education © 2017 [employer]      Apixaban oral tablets  What is this medicine?  APIXABAN (a PIX a ban) is an anticoagulant (blood thinner). It is used to lower the chance of stroke in people  with a medical condition called atrial fibrillation. It is also used to treat or prevent blood clots in the lungs or in the veins.  This medicine may be used for other purposes; ask your health care provider or pharmacist if you have questions.  COMMON BRAND NAME(S): Eliquis  What should I tell my health care provider before I take this medicine?  They need to know if you have any of these conditions:  -bleeding disorders  -bleeding in the brain  -blood in your stools (black or tarry stools) or if you have blood in your vomit  -history of stomach bleeding  -kidney disease  -liver disease  -mechanical heart valve  -an unusual or allergic reaction to apixaban, other medicines, foods, dyes, or preservatives  -pregnant or trying to get pregnant  -breast-feeding  How should I use this medicine?  Take this medicine by mouth with a glass of water. Follow the directions on the prescription label. You can take it with or without food. If it upsets your stomach, take it with food. Take your medicine at regular intervals. Do not take it more often than directed. Do not stop taking except on your doctor's advice. Stopping this medicine may increase your risk of a blot clot. Be sure to refill your prescription before you run out of medicine.  Talk to your pediatrician regarding the use of this medicine in children. Special care may be needed.  Overdosage: If you think you have taken too much of this medicine contact a poison control center or emergency room at once.  NOTE: This medicine is only for you. Do not share this medicine with others.  What if I miss a dose?  If you miss a dose, take it as soon as you can. If it is almost time for your next dose, take only that dose. Do not take double or extra doses.  What may interact with this medicine?  This medicine may interact with the following:  -aspirin and aspirin-like medicines  -certain medicines for fungal infections like ketoconazole and itraconazole  -certain medicines  for seizures like carbamazepine and phenytoin  -certain medicines that treat or prevent blood clots like warfarin, enoxaparin, and dalteparin  -clarithromycin  -NSAIDs, medicines for pain and inflammation, like ibuprofen or naproxen  -rifampin  -ritonavir  -Hemal's wort  This list may not describe all possible interactions. Give your health care provider a list of all the medicines, herbs, non-prescription drugs, or dietary supplements you use. Also tell them if you smoke, drink alcohol, or use illegal drugs. Some items may interact with your medicine.  What should I watch for while using this medicine?  Visit your doctor or health care professional for regular checks on your progress.  Notify your doctor or health care professional and seek emergency treatment if you develop breathing problems; changes in vision; chest pain; severe, sudden headache; pain, swelling, warmth in the leg; trouble speaking; sudden numbness or weakness of the face, arm or leg. These can be signs that your condition has gotten worse.  If you are going to have surgery or other procedure, tell your doctor that you are taking this medicine.  What side effects may I notice from receiving this medicine?  Side effects that you should report to your doctor or health care professional as soon as possible:  -allergic reactions like skin rash, itching or hives, swelling of the face, lips, or tongue  -signs and symptoms of bleeding such as bloody or black, tarry stools; red or dark-brown urine; spitting up blood or brown material that looks like coffee grounds; red spots on the skin; unusual bruising or bleeding from the eye, gums, or nose  This list may not describe all possible side effects. Call your doctor for medical advice about side effects. You may report side effects to FDA at 6-475-FDA-3462.  Where should I keep my medicine?  Keep out of the reach of children.  Store at room temperature between 20 and 25 degrees C (68 and 77 degrees F).  Throw away any unused medicine after the expiration date.  NOTE: This sheet is a summary. It may not cover all possible information. If you have questions about this medicine, talk to your doctor, pharmacist, or health care provider.  © 2018 [employer] (2017-07-10 11:54:23)  Losartan tablets  What is this medicine?  LOSARTAN (esteban FUENTES tan) is used to treat high blood pressure and to reduce the risk of stroke in certain patients. This drug also slows the progression of kidney disease in patients with diabetes.  This medicine may be used for other purposes; ask your health care provider or pharmacist if you have questions.  COMMON BRAND NAME(S): Cozaar  What should I tell my health care provider before I take this medicine?  They need to know if you have any of these conditions:  -heart failure  -kidney or liver disease  -an unusual or allergic reaction to losartan, other medicines, foods, dyes, or preservatives  -pregnant or trying to get pregnant  -breast-feeding  How should I use this medicine?  Take this medicine by mouth with a glass of water. Follow the directions on the prescription label. This medicine can be taken with or without food. Take your doses at regular intervals. Do not take your medicine more often than directed.  Talk to your pediatrician regarding the use of this medicine in children. Special care may be needed.  Overdosage: If you think you have taken too much of this medicine contact a poison control center or emergency room at once.  NOTE: This medicine is only for you. Do not share this medicine with others.  What if I miss a dose?  If you miss a dose, take it as soon as you can. If it is almost time for your next dose, take only that dose. Do not take double or extra doses.  What may interact with this medicine?  -blood pressure medicines  -diuretics, especially triamterene, spironolactone, or amiloride  -fluconazole  -NSAIDs, medicines for pain and inflammation, like ibuprofen or  naproxen  -potassium salts or potassium supplements  -rifampin  This list may not describe all possible interactions. Give your health care provider a list of all the medicines, herbs, non-prescription drugs, or dietary supplements you use. Also tell them if you smoke, drink alcohol, or use illegal drugs. Some items may interact with your medicine.  What should I watch for while using this medicine?  Visit your doctor or health care professional for regular checks on your progress. Check your blood pressure as directed. Ask your doctor or health care professional what your blood pressure should be and when you should contact him or her. Call your doctor or health care professional if you notice an irregular or fast heart beat.  Women should inform their doctor if they wish to become pregnant or think they might be pregnant. There is a potential for serious side effects to an unborn child, particularly in the second or third trimester. Talk to your health care professional or pharmacist for more information.  You may get drowsy or dizzy. Do not drive, use machinery, or do anything that needs mental alertness until you know how this drug affects you. Do not stand or sit up quickly, especially if you are an older patient. This reduces the risk of dizzy or fainting spells. Alcohol can make you more drowsy and dizzy. Avoid alcoholic drinks.  Avoid salt substitutes unless you are told otherwise by your doctor or health care professional.  Do not treat yourself for coughs, colds, or pain while you are taking this medicine without asking your doctor or health care professional for advice. Some ingredients may increase your blood pressure.  What side effects may I notice from receiving this medicine?  Side effects that you should report to your doctor or health care professional as soon as possible:  -confusion, dizziness, light headedness or fainting spells  -decreased amount of urine passed  -difficulty breathing or  swallowing, hoarseness, or tightening of the throat  -fast or irregular heart beat, palpitations, or chest pain  -skin rash, itching  -swelling of your face, lips, tongue, hands, or feet  Side effects that usually do not require medical attention (report to your doctor or health care professional if they continue or are bothersome):  -cough  -decreased sexual function or desire  -headache  -nasal congestion or stuffiness  -nausea or stomach pain  -sore or cramping muscles  This list may not describe all possible side effects. Call your doctor for medical advice about side effects. You may report side effects to FDA at 5-206-GBH-1198.  Where should I keep my medicine?  Keep out of the reach of children.  Store at room temperature between 15 and 30 degrees C (59 and 86 degrees F). Protect from light. Keep container tightly closed. Throw away any unused medicine after the expiration date.  NOTE: This sheet is a summary. It may not cover all possible information. If you have questions about this medicine, talk to your doctor, pharmacist, or health care provider.  © 2018 [employer] (2009-02-27 16:42:18)      Depression / Suicide Risk    As you are discharged from this [facility] facility, it is important to learn how to keep safe from harming yourself.    Recognize the warning signs:  · Abrupt changes in personality, positive or negative- including increase in energy   · Giving away possessions  · Change in eating patterns- significant weight changes-  positive or negative  · Change in sleeping patterns- unable to sleep or sleeping all the time   · Unwillingness or inability to communicate  · Depression  · Unusual sadness, discouragement and loneliness  · Talk of wanting to die  · Neglect of personal appearance   · Rebelliousness- reckless behavior  · Withdrawal from people/activities they love  · Confusion- inability to concentrate     If you or a loved one observes any of these behaviors or has concerns  about self-harm, here's what you can do:  · Talk about it- your feelings and reasons for harming yourself  · Remove any means that you might use to hurt yourself (examples: pills, rope, extension cords, firearm)  · Get professional help from the community (Mental Health, Substance Abuse, psychological counseling)  · Do not be alone:Call your Safe Contact- someone whom you trust who will be there for you.  · Call your local CRISIS HOTLINE 975-2456 or [phone]  · Call your local Children's Mobile Crisis Response Team [place] [phone] or www.[employer]  · Call the toll free National Suicide Prevention Hotlines   · National Suicide Prevention Lifeline 540-583-JFCT (4624)  · National Hope Line Network 800-SUICIDE (074-5780)

## 2018-10-23 NOTE — PROGRESS NOTES
Report received from Daisha ARTEAGA.  No complaints at this time, able to make needs known, call bell in reach.  Right elbow cellulitis improving.  No complaints of dizziness, SOB, chest pain.  Hourly rounding performed.

## 2018-10-24 ENCOUNTER — PATIENT OUTREACH (OUTPATIENT)
Dept: HEALTH INFORMATION MANAGEMENT | Facility: OTHER | Age: 63
End: 2018-10-24

## 2018-10-26 LAB
BACTERIA BLD CULT: NORMAL
BACTERIA BLD CULT: NORMAL
SIGNIFICANT IND 70042: NORMAL
SIGNIFICANT IND 70042: NORMAL
SITE SITE: NORMAL
SITE SITE: NORMAL
SOURCE SOURCE: NORMAL
SOURCE SOURCE: NORMAL

## 2018-10-30 ENCOUNTER — OFFICE VISIT (OUTPATIENT)
Dept: MEDICAL GROUP | Facility: MEDICAL CENTER | Age: 63
End: 2018-10-30
Payer: COMMERCIAL

## 2018-10-30 VITALS
DIASTOLIC BLOOD PRESSURE: 70 MMHG | OXYGEN SATURATION: 93 % | TEMPERATURE: 97.7 F | BODY MASS INDEX: 48.82 KG/M2 | HEART RATE: 63 BPM | HEIGHT: 65 IN | SYSTOLIC BLOOD PRESSURE: 122 MMHG | WEIGHT: 293 LBS

## 2018-10-30 DIAGNOSIS — M25.562 BILATERAL CHRONIC KNEE PAIN: ICD-10-CM

## 2018-10-30 DIAGNOSIS — G89.29 BILATERAL CHRONIC KNEE PAIN: ICD-10-CM

## 2018-10-30 DIAGNOSIS — M79.601 RIGHT ARM PAIN: ICD-10-CM

## 2018-10-30 DIAGNOSIS — G89.29 CHRONIC PAIN IN RIGHT SHOULDER: ICD-10-CM

## 2018-10-30 DIAGNOSIS — M25.511 CHRONIC PAIN IN RIGHT SHOULDER: ICD-10-CM

## 2018-10-30 DIAGNOSIS — M25.561 BILATERAL CHRONIC KNEE PAIN: ICD-10-CM

## 2018-10-30 DIAGNOSIS — I47.10 SVT (SUPRAVENTRICULAR TACHYCARDIA): ICD-10-CM

## 2018-10-30 DIAGNOSIS — L03.119 CELLULITIS OF UPPER EXTREMITY, UNSPECIFIED LATERALITY: ICD-10-CM

## 2018-10-30 PROBLEM — N39.0 UTI (URINARY TRACT INFECTION): Status: RESOLVED | Noted: 2018-10-22 | Resolved: 2018-10-30

## 2018-10-30 PROBLEM — A41.9 SEPSIS (HCC): Status: RESOLVED | Noted: 2018-10-20 | Resolved: 2018-10-30

## 2018-10-30 PROCEDURE — 99214 OFFICE O/P EST MOD 30 MIN: CPT | Performed by: PHYSICIAN ASSISTANT

## 2018-10-30 RX ORDER — OXYCODONE AND ACETAMINOPHEN 10; 325 MG/1; MG/1
1 TABLET ORAL
Qty: 90 TAB | Refills: 0 | Status: SHIPPED | OUTPATIENT
Start: 2018-10-30 | End: 2018-11-27 | Stop reason: SDUPTHER

## 2018-10-30 RX ORDER — DOXYCYCLINE HYCLATE 100 MG
100 TABLET ORAL 2 TIMES DAILY
Qty: 20 TAB | Refills: 0 | Status: SHIPPED | OUTPATIENT
Start: 2018-10-30 | End: 2018-11-15

## 2018-10-30 RX ORDER — METOPROLOL TARTRATE 50 MG/1
50 TABLET, FILM COATED ORAL 2 TIMES DAILY
Qty: 60 TAB | Refills: 5 | Status: ON HOLD | OUTPATIENT
Start: 2018-10-30 | End: 2018-12-01

## 2018-10-30 NOTE — PROGRESS NOTES
Subjective:   Alfonso Posada is a 62 y.o. female here today for hospitalization follow-up secondary to cellulitis of the right arm.  Also history of SVT and chronic bilateral knee pain.    Right arm pain  This is a 62-year-old female who was hospitalized after a fall.  She had multiple visits to the ER and was eventually diagnosed with sepsis from cellulitis presentation of her right elbow.  She was placed on IV antibiotics.  Discharged on Keflex.  Right elbow pain and swelling have improved greatly.  Still feels hot to the touch.  She has 1 more day of Keflex.  Denies any fevers.    SVT (supraventricular tachycardia) (HCC)  During her hospitalization Toprol was cut in half.  She then went into SVT and she was restarted on 50 mg twice a day.  A prescription for metoprolol 100 mg twice a day was sent over to the pharmacy.  She states that that high dose causes her to feel very fatigued.  She has an appointment on the second with Dr. Casey.  She is concerned that her blood pressure may not ever be controlled.    Bilateral chronic knee pain  Has worsening pain of her knees as well as her upper extremities after the fall.  Is out of oxycodone.  Would like to consider having the dose increased until her symptoms improve.       Current medicines (including changes today)  Current Outpatient Prescriptions   Medication Sig Dispense Refill   • metoprolol (LOPRESSOR) 50 MG Tab Take 1 Tab by mouth 2 times a day. 60 Tab 5   • oxyCODONE-acetaminophen (PERCOCET-10)  MG Tab Take 1 Tab by mouth 3 times a day for 30 days. 90 Tab 0   • doxycycline (VIBRAMYCIN) 100 MG Tab Take 1 Tab by mouth 2 times a day. 20 Tab 0   • losartan (COZAAR) 100 MG Tab Take 1 Tab by mouth every day. 30 Tab 2   • ketoconazole (NIZORAL) 2 % Cream Apply 1 Application to affected area(s) every day. Apply to face     • levothyroxine (SYNTHROID) 25 MCG Tab Take 25 mcg by mouth every 48 hours.     • cetirizine (ZYRTEC) 10 MG Tab Take 10 mg by mouth  every day.     • diphenhydrAMINE (BENADRYL) 25 MG Tab Take 25 mg by mouth every 6 hours as needed for Itching.     • acetaminophen (TYLENOL) 500 MG Tab Take 1,000 mg by mouth 3 times a day as needed.     • ELIQUIS 5 MG Tab Take 5 mg by mouth 2 Times a Day.     • ALPRAZolam (XANAX) 0.5 MG Tab Take 1 Tab by mouth 2 times a day as needed for Anxiety for up to 60 days. 60 Tab 1   • [START ON 12/4/2018] tramadol (ULTRAM) 50 MG Tab Take 1 Tab by mouth every 8 hours as needed for up to 30 days. 90 Tab 0   • fluticasone (FLONASE) 50 MCG/ACT nasal spray SPRAY ONE SPRAY IN EACH NOSTRIL TWICE DAILY 16 g 5   • ondansetron (ZOFRAN ODT) 4 MG TABLET DISPERSIBLE DISSOLVE ONE TABLET BY MOUTH EVERY 8 HOURS AS NEEDED FOR NAUSEA AND VOMITING 30 Tab 5   • potassium chloride (KLOR-CON) 8 MEQ tablet TAKE ONE TABLET BY MOUTH DAILY 90 Tab 0   • furosemide (LASIX) 40 MG Tab TAKE ONE TABLET BY MOUTH DAILY 90 Tab 0   • omeprazole (PRILOSEC) 40 MG delayed-release capsule Take 1 Cap by mouth every day. 90 Cap 3   • atorvastatin (LIPITOR) 40 MG Tab Take 1 Tab by mouth every day. TAKE ONE TABLET BY MOUTH DAILY 90 Tab 3   • sertraline (ZOLOFT) 100 MG Tab Take 1 Tab by mouth every day. 90 Tab 3     No current facility-administered medications for this visit.      She  has a past medical history of Anxiety; Asthma; Atrial fibrillation (HCC); Bartholin cyst; Chronic knee pain; Hypertension; Morbid obesity (HCC); Postherpetic neuralgia; SVT (supraventricular tachycardia) ([facility]); and Thyroid disease.    Social History and Family History were reviewed and updated.    ROS   No chest pain, no shortness of breath, no abdominal pain and all other systems were reviewed and are negative.       Objective:     Last menstrual period 03/20/2016, not currently breastfeeding. There is no height or weight on file to calculate BMI.   Physical Exam:  Constitutional: Alert, no distress.  Skin: Warm, dry, good turgor, no rashes in visible areas.  Eye: Equal, round and  reactive, conjunctiva clear, lids normal.  ENMT: Lips without lesions, good dentition, oropharynx clear.  Neck: Trachea midline, no masses.   Lymph: No cervical or supraclavicular lymphadenopathy  Respiratory: Unlabored respiratory effort, lungs clear to auscultation, no wheezes, no ronchi.  Cardiovascular: Normal S1, S2, no murmur, no edema.  Musculoskeletal: Distal to the right elbow on the posterior aspect there is an area of erythema and warmth.  Tenderness noted.  Psych: Alert and oriented x3, normal affect and mood.        Assessment and Plan:   The following treatment plan was discussed    1. Cellulitis of upper extremity, unspecified laterality  Acute, new onset condition.  We will continue course of antibiotic with doxycycline.  Finish Keflex today.  Advised if symptoms are not improving she needs to return to the ER.  - doxycycline (VIBRAMYCIN) 100 MG Tab; Take 1 Tab by mouth 2 times a day.  Dispense: 20 Tab; Refill: 0    2. SVT (supraventricular tachycardia) (HCC)  Chronic condition with recent exacerbation.  Advised to take metoprolol 50 mg if needed.  Do not take if her pulse is below 60.  Follow-up with cardiology on the second.  - metoprolol (LOPRESSOR) 50 MG Tab; Take 1 Tab by mouth 2 times a day.  Dispense: 60 Tab; Refill: 5    3. Bilateral chronic knee pain  Chronic condition.   reviewed.  Medication appropriate.  Increase oxycodone with 1 dose extra daily.  Morphine milliequivalents currently at 45.  - oxyCODONE-acetaminophen (PERCOCET-10)  MG Tab; Take 1 Tab by mouth 3 times a day for 30 days.  Dispense: 90 Tab; Refill: 0    4. Chronic pain in right shoulder  Chronic condition with recent exacerbation.  Refer to orthopedics for evaluation.  - oxyCODONE-acetaminophen (PERCOCET-10)  MG Tab; Take 1 Tab by mouth 3 times a day for 30 days.  Dispense: 90 Tab; Refill: 0  - REFERRAL TO ORTHOPEDICS    Patient was seen for 25 minutes face to face of which > 50% of appointment time was spent  on counseling and coordination of care regarding the above.      Followup: Return in about 4 weeks (around 11/27/2018), or if symptoms worsen or fail to improve.    Please note that this dictation was created using voice recognition software. I have made every reasonable attempt to correct obvious errors, but I expect that there are errors of grammar and possibly content that I did not discover before finalizing the note.

## 2018-10-30 NOTE — ASSESSMENT & PLAN NOTE
This is a 62-year-old female who was hospitalized after a fall.  She had multiple visits to the ER and was eventually diagnosed with sepsis from cellulitis presentation of her right elbow.  She was placed on IV antibiotics.  Discharged on Keflex.  Right elbow pain and swelling have improved greatly.  Still feels hot to the touch.  She has 1 more day of Keflex.  Denies any fevers.

## 2018-10-30 NOTE — ASSESSMENT & PLAN NOTE
Has worsening pain of her knees as well as her upper extremities after the fall.  Is out of oxycodone.  Would like to consider having the dose increased until her symptoms improve.

## 2018-10-30 NOTE — ASSESSMENT & PLAN NOTE
During her hospitalization Toprol was cut in half.  She then went into SVT and she was restarted on 50 mg twice a day.  A prescription for metoprolol 100 mg twice a day was sent over to the pharmacy.  She states that that high dose causes her to feel very fatigued.  She has an appointment on the second with Dr. Casey.  She is concerned that her blood pressure may not ever be controlled.

## 2018-11-02 ENCOUNTER — OFFICE VISIT (OUTPATIENT)
Dept: CARDIOLOGY | Facility: MEDICAL CENTER | Age: 63
End: 2018-11-02
Payer: COMMERCIAL

## 2018-11-02 VITALS
BODY MASS INDEX: 50.02 KG/M2 | SYSTOLIC BLOOD PRESSURE: 144 MMHG | HEIGHT: 64 IN | HEART RATE: 66 BPM | WEIGHT: 293 LBS | DIASTOLIC BLOOD PRESSURE: 94 MMHG | OXYGEN SATURATION: 97 % | RESPIRATION RATE: 16 BRPM

## 2018-11-02 DIAGNOSIS — I10 ESSENTIAL HYPERTENSION, BENIGN: ICD-10-CM

## 2018-11-02 DIAGNOSIS — I48.0 PAROXYSMAL ATRIAL FIBRILLATION (HCC): ICD-10-CM

## 2018-11-02 PROCEDURE — 99213 OFFICE O/P EST LOW 20 MIN: CPT | Performed by: INTERNAL MEDICINE

## 2018-11-02 RX ORDER — DOXYCYCLINE HYCLATE 100 MG
1 TABLET ORAL 2 TIMES DAILY
COMMUNITY
Start: 2018-10-30 | End: 2018-11-27

## 2018-11-02 RX ORDER — LOSARTAN POTASSIUM 100 MG/1
1 TABLET ORAL DAILY
COMMUNITY
Start: 2018-10-23 | End: 2018-11-27

## 2018-11-02 RX ORDER — APIXABAN 5 MG/1
5 TABLET, FILM COATED ORAL 2 TIMES DAILY
COMMUNITY
Start: 2018-10-17 | End: 2018-11-27

## 2018-11-02 NOTE — PROGRESS NOTES
Chief Complaint   Patient presents with   • Atrial Fibrillation     PAF.       Subjective:   Alfonso Posada is a 62 y.o. female who presents today for evaluation of atrial fibrillation.  Past month has been very eventful for the patient.  About a week after starting the Eliquis suffered a trip and fall time she struck her right elbow sustaining a hematoma that then became infected requiring 2 courses of antibiotics.  She also has a very sore shoulder suffered in the fall.  She decreased her dose of metoprolol and had a return of heart rates and perhaps relation.  She apparently went to the emergency room but converted by the time she seen.  She had injections in both knees for osteoarthritis.  Injection with a cartilage promoting drug plan.  Patient is still considering weight loss surgery and is still suffering some depression from her 's death in June of this year.  She is currently taking Eliquis for stroke prevention and 50 mg of metoprolol twice a day for rate control.  States that she has been in sinus rhythm for several weeks she feels very confident that she goes in atrial fibrillation definitely knows the sensation.    Past Medical History:   Diagnosis Date   • Anxiety disorder    • Cancer (HCC)     uterine, treated   • Depression    • Hypertension    • Psychiatric disorder     anxiety     Past Surgical History:   Procedure Laterality Date   • HYSTERECTOMY LAPAROSCOPY     • THYROIDECTOMY      still has parathyroid     History reviewed. No pertinent family history.  Social History     Social History   • Marital status:      Spouse name: N/A   • Number of children: N/A   • Years of education: N/A     Occupational History   • Not on file.     Social History Main Topics   • Smoking status: Former Smoker     Packs/day: 0.50     Start date: 9/24/2015     Quit date: 9/10/2018   • Smokeless tobacco: Never Used   • Alcohol use Yes      Comment: a couple daily, none this week   • Drug use: No   • Sexual  activity: Not on file     Other Topics Concern   • Not on file     Social History Narrative   • No narrative on file     Allergies   Allergen Reactions   • Latex Itching   • Penicillins Hives and Itching   • Sulfa Drugs      Itching feeling on leg, prickily/need-like sensation on skin.     Outpatient Encounter Prescriptions as of 11/2/2018   Medication Sig Dispense Refill   • losartan (COZAAR) 100 MG Tab Take 1 Tab by mouth every day at 6 PM. In the morning     • doxycycline (VIBRAMYCIN) 100 MG Tab Take 1 Tab by mouth 2 Times a Day. For infection (has 8 days left)     • ELIQUIS 5 MG Tab Take 5 mg by mouth 2 Times a Day.     • MAGNESIUM OXIDE PO Take 1 Tab by mouth every day at 6 PM.     • furosemide (LASIX) 40 MG Tab Take 40 mg by mouth every day.     • atorvastatin (LIPITOR) 40 MG Tab Take 40 mg by mouth every evening.     • fluticasone (FLONASE) 50 MCG/ACT nasal spray Spray 1 Spray in nose 2 times a day.     • levothyroxine (SYNTHROID) 25 MCG Tab Take 25 mcg by mouth Every morning on an empty stomach.     • montelukast (SINGULAIR) 10 MG Tab Take 10 mg by mouth as needed.     • metoprolol (LOPRESSOR) 50 MG Tab Take 50 mg by mouth 2 times a day.     • omeprazole (PRILOSEC) 40 MG delayed-release capsule Take 40 mg by mouth every day.     • ondansetron (ZOFRAN ODT) 4 MG TABLET DISPERSIBLE Take 4 mg by mouth every 8 hours as needed for Nausea.     • oxyCODONE-acetaminophen (PERCOCET-10)  MG Tab Take 1 Tab by mouth 2 times a day as needed for Severe Pain.     • Potassium Chloride CR (MICRO-K) 8 MEQ Cap CR capsule Take 8 mEq by mouth every day.     • sertraline (ZOLOFT) 100 MG Tab Take 100 mg by mouth every day.     • losartan (COZAAR) 50 MG Tab Take 1 Tab by mouth every day. (Patient not taking: Reported on 11/2/2018) 30 Tab 3     No facility-administered encounter medications on file as of 11/2/2018.      ROS.  See HPI     Objective:   /94 (BP Location: Left arm, Patient Position: Sitting, BP Cuff Size:  "Adult)   Pulse 66   Resp 16   Ht 1.626 m (5' 4\")   Wt (!) 142.9 kg (315 lb)   SpO2 97%   BMI 54.07 kg/m²     Physical Exam General: WD, WN, female in NAD. Weight is down 2 pounds  Neck:  Good carotid upstroke and no bruit  Chest: Clear to A & P  Heart: Regular rhythm, probably sinus.  Normal S1 and S2. No murmur, gallop, rub, click  Ext: No edema, resolving infected hematoma of the right upper extremity still slightly warm to touch   Neuro: Alert, oriented  Psych: normal mood, affect    Assessment:     1. Paroxysmal atrial fibrillation (HCC)     2. Essential hypertension, benign         Medical Decision Making:  Today's Assessment / Status / Plan:   Patient blood pressure is slightly elevated on losartan 100 mg.  At this point given the multiple stresses think this blood pressure is adequate.  Tighter control will be indicated in the future.  Patient is very responsible and understands atrial fibrillation and sensation of SVT.  Repeat is reasonable for her to use Eliquis on a as needed basis.  The way I suggested she take the medication is 5 mg twice a day the moment she senses tachycardia be it  SVT or atrial fibrillation.  She should then take Eliquis for 30 days at which time she can stop if she is been in sinus rhythm for that timeframe.  She can do the same with metoprolol taking it on a as needed basis for tachycardia.  The patient will give this a try and return in 1 month for repeat evaluation.  "

## 2018-11-13 RX ORDER — FUROSEMIDE 40 MG/1
TABLET ORAL
Qty: 90 TAB | Refills: 0 | Status: ON HOLD | OUTPATIENT
Start: 2018-11-13 | End: 2018-12-01

## 2018-11-15 ENCOUNTER — OFFICE VISIT (OUTPATIENT)
Dept: MEDICAL GROUP | Facility: MEDICAL CENTER | Age: 63
End: 2018-11-15
Payer: COMMERCIAL

## 2018-11-15 VITALS
SYSTOLIC BLOOD PRESSURE: 142 MMHG | HEIGHT: 65 IN | DIASTOLIC BLOOD PRESSURE: 80 MMHG | WEIGHT: 293 LBS | OXYGEN SATURATION: 92 % | TEMPERATURE: 97.2 F | HEART RATE: 62 BPM | BODY MASS INDEX: 48.82 KG/M2

## 2018-11-15 DIAGNOSIS — F51.01 PRIMARY INSOMNIA: ICD-10-CM

## 2018-11-15 DIAGNOSIS — M25.562 BILATERAL CHRONIC KNEE PAIN: ICD-10-CM

## 2018-11-15 DIAGNOSIS — I47.10 SVT (SUPRAVENTRICULAR TACHYCARDIA): ICD-10-CM

## 2018-11-15 DIAGNOSIS — F41.9 ANXIETY: ICD-10-CM

## 2018-11-15 DIAGNOSIS — M25.561 BILATERAL CHRONIC KNEE PAIN: ICD-10-CM

## 2018-11-15 DIAGNOSIS — G89.29 CHRONIC PAIN IN RIGHT SHOULDER: ICD-10-CM

## 2018-11-15 DIAGNOSIS — M25.511 CHRONIC PAIN IN RIGHT SHOULDER: ICD-10-CM

## 2018-11-15 DIAGNOSIS — I48.0 PAROXYSMAL ATRIAL FIBRILLATION (HCC): ICD-10-CM

## 2018-11-15 DIAGNOSIS — G89.29 BILATERAL CHRONIC KNEE PAIN: ICD-10-CM

## 2018-11-15 PROBLEM — R73.03 PREDIABETES: Status: ACTIVE | Noted: 2018-11-15

## 2018-11-15 PROBLEM — R73.9 HYPERGLYCEMIA: Status: RESOLVED | Noted: 2018-10-21 | Resolved: 2018-11-15

## 2018-11-15 PROBLEM — L85.3 DRY SKIN: Status: RESOLVED | Noted: 2017-05-19 | Resolved: 2018-11-15

## 2018-11-15 PROBLEM — M79.601 RIGHT ARM PAIN: Status: RESOLVED | Noted: 2018-10-30 | Resolved: 2018-11-15

## 2018-11-15 PROBLEM — M79.605 ACUTE LEG PAIN, LEFT: Status: RESOLVED | Noted: 2017-10-26 | Resolved: 2018-11-15

## 2018-11-15 PROBLEM — L03.90 CELLULITIS: Status: RESOLVED | Noted: 2018-10-20 | Resolved: 2018-11-15

## 2018-11-15 PROCEDURE — 99214 OFFICE O/P EST MOD 30 MIN: CPT | Performed by: PHYSICIAN ASSISTANT

## 2018-11-15 RX ORDER — ALPRAZOLAM 0.5 MG/1
0.5 TABLET ORAL 2 TIMES DAILY PRN
Qty: 60 TAB | Refills: 2 | Status: SHIPPED | OUTPATIENT
Start: 2018-11-15 | End: 2018-12-27 | Stop reason: SDUPTHER

## 2018-11-15 NOTE — PROGRESS NOTES
Subjective:   Alfonso Posada is a 63 y.o. female here today for chronic right shoulder pain, bilateral pain history of SVT and atrial fibrillation anxiety and insomnia.    Chronic pain in right shoulder  This is a 63-year-old female who is here today to discuss several of her chronic medical conditions.  She has an appointment at the [place] on the 27th to see the orthopedist for her chronic history of right shoulder pain.  In the past she was given a steroid injection that was helpful.  After the fall her shoulder pain worsened.  She is hoping to get some relief soon.    SVT (supraventricular tachycardia) ([facility])  Steroid use.    Bilateral chronic knee pain  Complains of significant pain on the right side of her body.  Pain is on the whole leg as well as the knee and the back.  On the 27th she will have injections into both of her knees.  She is currently on oxycodone 10 mg 3 times a day.  In the past she was on it twice a day but now uses it 3 times a day and it is barely effective.    Anxiety  Chronic history of anxiety.  Also having issues with sleep.  She takes alprazolam when needed for anxiety.  Has not taken it for sleep.  Last prescription was written on the fifth of last month.  She is requesting a refill.  She is not quite out of the medication.    Atrial fibrillation (HCC)  She saw cardiology for her history of atrial fibrillation and SVT.  Currently is still taking Eliquis daily.  Will be off the medication in another few weeks.  Continues taking metoprolol as well as her other blood pressure medications.  Denies any chest pain.  No shortness of breath.  No irregularities in her heart rate.       Current medicines (including changes today)  Current Outpatient Prescriptions   Medication Sig Dispense Refill   • ALPRAZolam (XANAX) 0.5 MG Tab Take 1 Tab by mouth 2 times a day as needed for Anxiety for up to 30 days. 60 Tab 2   • furosemide (LASIX) 40 MG Tab TAKE ONE TABLET BY MOUTH DAILY 90 Tab 0   •  metoprolol (LOPRESSOR) 50 MG Tab Take 1 Tab by mouth 2 times a day. 60 Tab 5   • oxyCODONE-acetaminophen (PERCOCET-10)  MG Tab Take 1 Tab by mouth 3 times a day for 30 days. 90 Tab 0   • losartan (COZAAR) 100 MG Tab Take 1 Tab by mouth every day. 30 Tab 2   • ketoconazole (NIZORAL) 2 % Cream Apply 1 Application to affected area(s) every day. Apply to face     • levothyroxine (SYNTHROID) 25 MCG Tab Take 25 mcg by mouth every 48 hours.     • cetirizine (ZYRTEC) 10 MG Tab Take 10 mg by mouth every day.     • diphenhydrAMINE (BENADRYL) 25 MG Tab Take 25 mg by mouth every 6 hours as needed for Itching.     • ELIQUIS 5 MG Tab Take 5 mg by mouth 2 Times a Day.     • [START ON 12/4/2018] tramadol (ULTRAM) 50 MG Tab Take 1 Tab by mouth every 8 hours as needed for up to 30 days. 90 Tab 0   • fluticasone (FLONASE) 50 MCG/ACT nasal spray SPRAY ONE SPRAY IN EACH NOSTRIL TWICE DAILY 16 g 5   • ondansetron (ZOFRAN ODT) 4 MG TABLET DISPERSIBLE DISSOLVE ONE TABLET BY MOUTH EVERY 8 HOURS AS NEEDED FOR NAUSEA AND VOMITING 30 Tab 5   • potassium chloride (KLOR-CON) 8 MEQ tablet TAKE ONE TABLET BY MOUTH DAILY 90 Tab 0   • omeprazole (PRILOSEC) 40 MG delayed-release capsule Take 1 Cap by mouth every day. 90 Cap 3   • atorvastatin (LIPITOR) 40 MG Tab Take 1 Tab by mouth every day. TAKE ONE TABLET BY MOUTH DAILY 90 Tab 3   • sertraline (ZOLOFT) 100 MG Tab Take 1 Tab by mouth every day. 90 Tab 3   • acetaminophen (TYLENOL) 500 MG Tab Take 1,000 mg by mouth 3 times a day as needed.       No current facility-administered medications for this visit.      She  has a past medical history of Anxiety; Asthma; Atrial fibrillation (HCC); Bartholin cyst; Chronic knee pain; Hypertension; Morbid obesity (HCC); Postherpetic neuralgia; SVT (supraventricular tachycardia) (HCC); and Thyroid disease.    Social History and Family History were reviewed and updated.    ROS   No chest pain, no shortness of breath, no abdominal pain and all other systems  "were reviewed and are negative.       Objective:     Blood pressure 142/80, pulse 62, temperature 36.2 °C (97.2 °F), height 1.651 m (5' 5\"), weight (!) 140.7 kg (310 lb 3.2 oz), last menstrual period 03/20/2016, SpO2 92 %, not currently breastfeeding. Body mass index is 51.62 kg/m².   Physical Exam:  Constitutional: Alert, no distress.  Skin: Warm, dry, good turgor, no rashes in visible areas.  Eye: Equal, round and reactive, conjunctiva clear, lids normal.  ENMT: Lips without lesions, good dentition, oropharynx clear.  Neck: Trachea midline, no masses.   Lymph: No cervical or supraclavicular lymphadenopathy  Respiratory: Unlabored respiratory effort, lungs clear to auscultation, no wheezes, no ronchi.  Cardiovascular: Normal S1, S2, no murmur, no edema.  Musculoskeletal: Walking with a limp favoring the right leg.  Limited range of motion of her right shoulder.  Psych: Alert and oriented x3, normal affect and mood.        Assessment and Plan:   The following treatment plan was discussed    1. Chronic pain in right shoulder  Chronic condition.  Follow with orthopedist.    2. Bilateral chronic knee pain  Chronic condition.   reviewed.  Continue oxycodone as directed.  No change in medication.  Follow with orthopedics for injections.    3. SVT (supraventricular tachycardia) (HCC)  Chronic condition.  In remission.  Continue metoprolol.    4. Paroxysmal atrial fibrillation (HCC)  Chronic condition.  In remission.  Continue Eliquis daily.    5. Anxiety  Chronic condition.   reviewed.  Medication appropriate.  Discussed black box warning taking opioids with benzos.  Renewed alprazolam 0.5 mg 1 tablet twice a day as needed.  - ALPRAZolam (XANAX) 0.5 MG Tab; Take 1 Tab by mouth 2 times a day as needed for Anxiety for up to 30 days.  Dispense: 60 Tab; Refill: 2    6. Primary insomnia  Chronic condition.  Advised I cannot suggest any other medications except possibly over-the-counter medications such as Unisom or " Benadryl to take for a sleep aid.  - ALPRAZolam (XANAX) 0.5 MG Tab; Take 1 Tab by mouth 2 times a day as needed for Anxiety for up to 30 days.  Dispense: 60 Tab; Refill: 2      Followup: Return in about 12 days (around 11/27/2018), or if symptoms worsen or fail to improve.    Please note that this dictation was created using voice recognition software. I have made every reasonable attempt to correct obvious errors, but I expect that there are errors of grammar and possibly content that I did not discover before finalizing the note.

## 2018-11-15 NOTE — ASSESSMENT & PLAN NOTE
This is a 63-year-old female who is here today to discuss several of her chronic medical conditions.  She has an appointment at the [place] on the 27th to see the orthopedist for her chronic history of right shoulder pain.  In the past she was given a steroid injection that was helpful.  After the fall her shoulder pain worsened.  She is hoping to get some relief soon.

## 2018-11-15 NOTE — ASSESSMENT & PLAN NOTE
She saw cardiology for her history of atrial fibrillation and SVT.  Currently is still taking Eliquis daily.  Will be off the medication in another few weeks.  Continues taking metoprolol as well as her other blood pressure medications.  Denies any chest pain.  No shortness of breath.  No irregularities in her heart rate.

## 2018-11-15 NOTE — ASSESSMENT & PLAN NOTE
Complains of significant pain on the right side of her body.  Pain is on the whole leg as well as the knee and the back.  On the 27th she will have injections into both of her knees.  She is currently on oxycodone 10 mg 3 times a day.  In the past she was on it twice a day but now uses it 3 times a day and it is barely effective.

## 2018-11-15 NOTE — ASSESSMENT & PLAN NOTE
Chronic history of anxiety.  Also having issues with sleep.  She takes alprazolam when needed for anxiety.  Has not taken it for sleep.  Last prescription was written on the fifth of last month.  She is requesting a refill.  She is not quite out of the medication.

## 2018-11-26 DIAGNOSIS — F41.9 ANXIETY: ICD-10-CM

## 2018-11-26 RX ORDER — SERTRALINE HYDROCHLORIDE 100 MG/1
TABLET, FILM COATED ORAL
Qty: 90 TAB | Refills: 2 | Status: SHIPPED | OUTPATIENT
Start: 2018-11-26 | End: 2019-08-29 | Stop reason: SDUPTHER

## 2018-11-26 RX ORDER — POTASSIUM CHLORIDE 600 MG/1
TABLET, FILM COATED, EXTENDED RELEASE ORAL
Qty: 90 TAB | Refills: 0 | Status: SHIPPED | OUTPATIENT
Start: 2018-11-26 | End: 2019-02-28 | Stop reason: SDUPTHER

## 2018-11-27 ENCOUNTER — OFFICE VISIT (OUTPATIENT)
Dept: MEDICAL GROUP | Facility: MEDICAL CENTER | Age: 63
End: 2018-11-27
Payer: COMMERCIAL

## 2018-11-27 VITALS
OXYGEN SATURATION: 93 % | DIASTOLIC BLOOD PRESSURE: 80 MMHG | HEIGHT: 65 IN | BODY MASS INDEX: 48.82 KG/M2 | SYSTOLIC BLOOD PRESSURE: 128 MMHG | TEMPERATURE: 98.1 F | HEART RATE: 83 BPM | WEIGHT: 293 LBS

## 2018-11-27 DIAGNOSIS — G89.29 BILATERAL CHRONIC KNEE PAIN: ICD-10-CM

## 2018-11-27 DIAGNOSIS — M25.511 CHRONIC PAIN IN RIGHT SHOULDER: ICD-10-CM

## 2018-11-27 DIAGNOSIS — M25.562 BILATERAL CHRONIC KNEE PAIN: ICD-10-CM

## 2018-11-27 DIAGNOSIS — G89.29 CHRONIC PAIN IN RIGHT SHOULDER: ICD-10-CM

## 2018-11-27 DIAGNOSIS — M25.561 BILATERAL CHRONIC KNEE PAIN: ICD-10-CM

## 2018-11-27 DIAGNOSIS — E66.01 MORBID OBESITY WITH BMI OF 50.0-59.9, ADULT (HCC): ICD-10-CM

## 2018-11-27 PROCEDURE — 99214 OFFICE O/P EST MOD 30 MIN: CPT | Performed by: PHYSICIAN ASSISTANT

## 2018-11-27 RX ORDER — METOPROLOL TARTRATE 50 MG/1
50 TABLET, FILM COATED ORAL 2 TIMES DAILY
Qty: 180 TAB | Refills: 1 | Status: SHIPPED | OUTPATIENT
Start: 2018-11-27 | End: 2018-11-27

## 2018-11-27 RX ORDER — OXYCODONE AND ACETAMINOPHEN 10; 325 MG/1; MG/1
1 TABLET ORAL
Qty: 90 TAB | Refills: 0 | Status: SHIPPED | OUTPATIENT
Start: 2018-11-27 | End: 2018-12-27 | Stop reason: SDUPTHER

## 2018-11-27 NOTE — ASSESSMENT & PLAN NOTE
This is a 63-year-old female who recently had Monovisc injections into both knees.  She denies that she has had any improvement.  Still has significant pain of both of her knees.  Has severe osteoarthritis.  Unfortunately orthopedics did not tell her it would take 7-8 weeks to become effective.  She is disappointed.  States that she needs medication to help with the pain.  Is currently taking tramadol which is somewhat effective.  Is requesting a renewal of her oxycodone.  Takes 10 mg 3 times a day.

## 2018-11-28 NOTE — ASSESSMENT & PLAN NOTE
She states she is pushing more to have a bariatric surgery done in the new year.  She has to go possibly to another round of psychiatry.  Overall though she is looking forward to losing weight which may help with her chronic pain of her knees and her ability to perform ADLs.

## 2018-11-28 NOTE — ASSESSMENT & PLAN NOTE
Also recently had a steroid injection from another orthopod into her right shoulder.  Has had also minimal improvement after the injection.  Follows with orthopedics in another few weeks for reevaluation.

## 2018-11-28 NOTE — PROGRESS NOTES
Subjective:   Alfonso Posada is a 63 y.o. female here today for chronic knee pain bilaterally, chronic right shoulder pain and morbid obesity.    Bilateral chronic knee pain  This is a 63-year-old female who recently had Monovisc injections into both knees.  She denies that she has had any improvement.  Still has significant pain of both of her knees.  Has severe osteoarthritis.  Unfortunately orthopedics did not tell her it would take 7-8 weeks to become effective.  She is disappointed.  States that she needs medication to help with the pain.  Is currently taking tramadol which is somewhat effective.  Is requesting a renewal of her oxycodone.  Takes 10 mg 3 times a day.    Chronic pain in right shoulder  Also recently had a steroid injection from another orthopod into her right shoulder.  Has had also minimal improvement after the injection.  Follows with orthopedics in another few weeks for reevaluation.    Morbid obesity with BMI of 50.0-59.9, adult (HCC)  She states she is pushing more to have a bariatric surgery done in the new year.  She has to go possibly to another round of psychiatry.  Overall though she is looking forward to losing weight which may help with her chronic pain of her knees and her ability to perform ADLs.       Current medicines (including changes today)  Current Outpatient Prescriptions   Medication Sig Dispense Refill   • oxyCODONE-acetaminophen (PERCOCET-10)  MG Tab Take 1 Tab by mouth 3 times a day for 30 days. 90 Tab 0   • sertraline (ZOLOFT) 100 MG Tab TAKE ONE TABLET BY MOUTH ONE TIME A DAY 90 Tab 2   • potassium chloride (KLOR-CON) 8 MEQ tablet TAKE ONE TABLET BY MOUTH ONE TIME A DAY 90 Tab 0   • ALPRAZolam (XANAX) 0.5 MG Tab Take 1 Tab by mouth 2 times a day as needed for Anxiety for up to 30 days. 60 Tab 2   • furosemide (LASIX) 40 MG Tab TAKE ONE TABLET BY MOUTH DAILY 90 Tab 0   • MAGNESIUM OXIDE PO Take 1 Tab by mouth every day at 6 PM.     • metoprolol (LOPRESSOR) 50  "MG Tab Take 1 Tab by mouth 2 times a day. 60 Tab 5   • losartan (COZAAR) 100 MG Tab Take 1 Tab by mouth every day. 30 Tab 2   • ketoconazole (NIZORAL) 2 % Cream Apply 1 Application to affected area(s) every day. Apply to face     • levothyroxine (SYNTHROID) 25 MCG Tab Take 25 mcg by mouth every 48 hours.     • cetirizine (ZYRTEC) 10 MG Tab Take 10 mg by mouth every day.     • diphenhydrAMINE (BENADRYL) 25 MG Tab Take 25 mg by mouth every 6 hours as needed for Itching.     • acetaminophen (TYLENOL) 500 MG Tab Take 1,000 mg by mouth 3 times a day as needed.     • [START ON 12/4/2018] tramadol (ULTRAM) 50 MG Tab Take 1 Tab by mouth every 8 hours as needed for up to 30 days. 90 Tab 0   • fluticasone (FLONASE) 50 MCG/ACT nasal spray SPRAY ONE SPRAY IN EACH NOSTRIL TWICE DAILY 16 g 5   • ondansetron (ZOFRAN ODT) 4 MG TABLET DISPERSIBLE DISSOLVE ONE TABLET BY MOUTH EVERY 8 HOURS AS NEEDED FOR NAUSEA AND VOMITING 30 Tab 5   • omeprazole (PRILOSEC) 40 MG delayed-release capsule Take 1 Cap by mouth every day. 90 Cap 3   • atorvastatin (LIPITOR) 40 MG Tab Take 1 Tab by mouth every day. TAKE ONE TABLET BY MOUTH DAILY 90 Tab 3     No current facility-administered medications for this visit.      She  has a past medical history of Anxiety; Anxiety disorder; Asthma; Atrial fibrillation (HCC); Bartholin cyst; Cancer (HCC); Chronic knee pain; Depression; Hypertension; Morbid obesity (HCC); Postherpetic neuralgia; Psychiatric disorder; SVT (supraventricular tachycardia) ([facility]); and Thyroid disease.    Social History and Family History were reviewed and updated.    ROS   No chest pain, no shortness of breath, no abdominal pain and all other systems were reviewed and are negative.       Objective:     Blood pressure 128/80, pulse 83, temperature 36.7 °C (98.1 °F), height 1.651 m (5' 5\"), weight (!) 142.6 kg (314 lb 6.4 oz), last menstrual period 03/20/2016, SpO2 93 %, not currently breastfeeding. Body mass index is 52.32 kg/m². "   Physical Exam:  Constitutional: Alert, no distress.  Skin: Warm, dry, good turgor, no rashes in visible areas.  Eye: Equal, round and reactive, conjunctiva clear, lids normal.  ENMT: Lips without lesions, good dentition, oropharynx clear.  Neck: Trachea midline, no masses.   Lymph: No cervical or supraclavicular lymphadenopathy  Respiratory: Unlabored respiratory effort, lungs clear to auscultation, no wheezes, no ronchi.  Cardiovascular: Normal S1, S2, no murmur, no edema.  Psych: Alert and oriented x3, normal affect and mood.        Assessment and Plan:   The following treatment plan was discussed    1. Bilateral chronic knee pain  Chronic condition.  We will wait to see if the injections are positive in the future.   reviewed.  Medication appropriate.  Agreement on file.  Renewed Percocet 10 mg 3 times a day.  Follow-up on the 23rd for refills.  Follow with orthopedics.  - oxyCODONE-acetaminophen (PERCOCET-10)  MG Tab; Take 1 Tab by mouth 3 times a day for 30 days.  Dispense: 90 Tab; Refill: 0    2. Chronic pain in right shoulder  Chronic condition.  Likely secondary osteoarthritis.  Recent steroid injection not effective.  Follow with orthopedics.  Renewed Percocet as directed.  - oxyCODONE-acetaminophen (PERCOCET-10)  MG Tab; Take 1 Tab by mouth 3 times a day for 30 days.  Dispense: 90 Tab; Refill: 0    3. Morbid obesity with BMI of 50.0-59.9, adult (HCC)  Chronic condition.  Stable.  Follow with bariatric surgery to discuss surgery.      Followup: Return if symptoms worsen or fail to improve, for 12/23/18.    Please note that this dictation was created using voice recognition software. I have made every reasonable attempt to correct obvious errors, but I expect that there are errors of grammar and possibly content that I did not discover before finalizing the note.

## 2018-12-01 ENCOUNTER — APPOINTMENT (OUTPATIENT)
Dept: CARDIOLOGY | Facility: MEDICAL CENTER | Age: 63
End: 2018-12-01
Attending: HOSPITALIST
Payer: COMMERCIAL

## 2018-12-01 ENCOUNTER — HOSPITAL ENCOUNTER (OUTPATIENT)
Facility: MEDICAL CENTER | Age: 63
End: 2018-12-01
Attending: EMERGENCY MEDICINE | Admitting: HOSPITALIST
Payer: COMMERCIAL

## 2018-12-01 ENCOUNTER — APPOINTMENT (OUTPATIENT)
Dept: RADIOLOGY | Facility: MEDICAL CENTER | Age: 63
End: 2018-12-01
Attending: EMERGENCY MEDICINE
Payer: COMMERCIAL

## 2018-12-01 VITALS
TEMPERATURE: 97.7 F | DIASTOLIC BLOOD PRESSURE: 82 MMHG | WEIGHT: 293 LBS | SYSTOLIC BLOOD PRESSURE: 144 MMHG | BODY MASS INDEX: 50.02 KG/M2 | RESPIRATION RATE: 16 BRPM | HEIGHT: 64 IN | OXYGEN SATURATION: 94 % | HEART RATE: 110 BPM

## 2018-12-01 DIAGNOSIS — I48.91 ATRIAL FIBRILLATION, UNSPECIFIED TYPE (HCC): ICD-10-CM

## 2018-12-01 DIAGNOSIS — R07.89 CHEST PRESSURE: ICD-10-CM

## 2018-12-01 DIAGNOSIS — I47.10 SVT (SUPRAVENTRICULAR TACHYCARDIA): ICD-10-CM

## 2018-12-01 LAB
ALBUMIN SERPL BCP-MCNC: 3.8 G/DL (ref 3.2–4.9)
ALBUMIN/GLOB SERPL: 1.2 G/DL
ALP SERPL-CCNC: 107 U/L (ref 30–99)
ALT SERPL-CCNC: 20 U/L (ref 2–50)
ANION GAP SERPL CALC-SCNC: 5 MMOL/L (ref 0–11.9)
AST SERPL-CCNC: 18 U/L (ref 12–45)
BASOPHILS # BLD AUTO: 0.7 % (ref 0–1.8)
BASOPHILS # BLD: 0.07 K/UL (ref 0–0.12)
BILIRUB SERPL-MCNC: 0.8 MG/DL (ref 0.1–1.5)
BUN SERPL-MCNC: 14 MG/DL (ref 8–22)
CALCIUM SERPL-MCNC: 8.8 MG/DL (ref 8.4–10.2)
CHLORIDE SERPL-SCNC: 110 MMOL/L (ref 96–112)
CO2 SERPL-SCNC: 24 MMOL/L (ref 20–33)
CREAT SERPL-MCNC: 0.57 MG/DL (ref 0.5–1.4)
EKG IMPRESSION: NORMAL
EKG IMPRESSION: NORMAL
EOSINOPHIL # BLD AUTO: 0.04 K/UL (ref 0–0.51)
EOSINOPHIL NFR BLD: 0.4 % (ref 0–6.9)
ERYTHROCYTE [DISTWIDTH] IN BLOOD BY AUTOMATED COUNT: 42.3 FL (ref 35.9–50)
GLOBULIN SER CALC-MCNC: 3.3 G/DL (ref 1.9–3.5)
GLUCOSE SERPL-MCNC: 132 MG/DL (ref 65–99)
HCT VFR BLD AUTO: 50.4 % (ref 37–47)
HGB BLD-MCNC: 16.8 G/DL (ref 12–16)
IMM GRANULOCYTES # BLD AUTO: 0.03 K/UL (ref 0–0.11)
IMM GRANULOCYTES NFR BLD AUTO: 0.3 % (ref 0–0.9)
LV EJECT FRACT  99904: 55
LV EJECT FRACT MOD 2C 99903: 55.72
LV EJECT FRACT MOD 4C 99902: 53
LV EJECT FRACT MOD BP 99901: 54.48
LYMPHOCYTES # BLD AUTO: 3.27 K/UL (ref 1–4.8)
LYMPHOCYTES NFR BLD: 30.8 % (ref 22–41)
MCH RBC QN AUTO: 30.2 PG (ref 27–33)
MCHC RBC AUTO-ENTMCNC: 33.3 G/DL (ref 33.6–35)
MCV RBC AUTO: 90.5 FL (ref 81.4–97.8)
MONOCYTES # BLD AUTO: 0.75 K/UL (ref 0–0.85)
MONOCYTES NFR BLD AUTO: 7.1 % (ref 0–13.4)
NEUTROPHILS # BLD AUTO: 6.45 K/UL (ref 2–7.15)
NEUTROPHILS NFR BLD: 60.7 % (ref 44–72)
NRBC # BLD AUTO: 0 K/UL
NRBC BLD-RTO: 0 /100 WBC
PLATELET # BLD AUTO: 366 K/UL (ref 164–446)
PMV BLD AUTO: 9.7 FL (ref 9–12.9)
POTASSIUM SERPL-SCNC: 3.8 MMOL/L (ref 3.6–5.5)
PROT SERPL-MCNC: 7.1 G/DL (ref 6–8.2)
RBC # BLD AUTO: 5.57 M/UL (ref 4.2–5.4)
SODIUM SERPL-SCNC: 139 MMOL/L (ref 135–145)
T4 FREE SERPL-MCNC: 1.06 NG/DL (ref 0.58–1.64)
T4 FREE SERPL-MCNC: 1.11 NG/DL (ref 0.58–1.64)
TROPONIN I SERPL-MCNC: 0.02 NG/ML (ref 0–0.04)
TROPONIN I SERPL-MCNC: <0.02 NG/ML (ref 0–0.04)
TSH SERPL DL<=0.005 MIU/L-ACNC: 0.06 UIU/ML (ref 0.38–5.33)
WBC # BLD AUTO: 10.6 K/UL (ref 4.8–10.8)

## 2018-12-01 PROCEDURE — 700101 HCHG RX REV CODE 250: Performed by: EMERGENCY MEDICINE

## 2018-12-01 PROCEDURE — G0378 HOSPITAL OBSERVATION PER HR: HCPCS

## 2018-12-01 PROCEDURE — 700101 HCHG RX REV CODE 250

## 2018-12-01 PROCEDURE — 99285 EMERGENCY DEPT VISIT HI MDM: CPT

## 2018-12-01 PROCEDURE — 36415 COLL VENOUS BLD VENIPUNCTURE: CPT

## 2018-12-01 PROCEDURE — 93005 ELECTROCARDIOGRAM TRACING: CPT | Performed by: EMERGENCY MEDICINE

## 2018-12-01 PROCEDURE — 84439 ASSAY OF FREE THYROXINE: CPT

## 2018-12-01 PROCEDURE — 84484 ASSAY OF TROPONIN QUANT: CPT

## 2018-12-01 PROCEDURE — 93306 TTE W/DOPPLER COMPLETE: CPT

## 2018-12-01 PROCEDURE — 700102 HCHG RX REV CODE 250 W/ 637 OVERRIDE(OP): Performed by: HOSPITALIST

## 2018-12-01 PROCEDURE — 99219 PR INITIAL OBSERVATION CARE,LEVL II: CPT | Performed by: HOSPITALIST

## 2018-12-01 PROCEDURE — 96375 TX/PRO/DX INJ NEW DRUG ADDON: CPT

## 2018-12-01 PROCEDURE — 71045 X-RAY EXAM CHEST 1 VIEW: CPT

## 2018-12-01 PROCEDURE — 80053 COMPREHEN METABOLIC PANEL: CPT

## 2018-12-01 PROCEDURE — 96374 THER/PROPH/DIAG INJ IV PUSH: CPT

## 2018-12-01 PROCEDURE — 85025 COMPLETE CBC W/AUTO DIFF WBC: CPT

## 2018-12-01 PROCEDURE — A9270 NON-COVERED ITEM OR SERVICE: HCPCS | Performed by: HOSPITALIST

## 2018-12-01 PROCEDURE — 700111 HCHG RX REV CODE 636 W/ 250 OVERRIDE (IP): Performed by: HOSPITALIST

## 2018-12-01 PROCEDURE — 700111 HCHG RX REV CODE 636 W/ 250 OVERRIDE (IP): Performed by: EMERGENCY MEDICINE

## 2018-12-01 PROCEDURE — 84443 ASSAY THYROID STIM HORMONE: CPT

## 2018-12-01 PROCEDURE — 93306 TTE W/DOPPLER COMPLETE: CPT | Mod: 26 | Performed by: INTERNAL MEDICINE

## 2018-12-01 RX ORDER — AMOXICILLIN 250 MG
2 CAPSULE ORAL 2 TIMES DAILY
Status: DISCONTINUED | OUTPATIENT
Start: 2018-12-01 | End: 2018-12-01 | Stop reason: HOSPADM

## 2018-12-01 RX ORDER — ATORVASTATIN CALCIUM 40 MG/1
40 TABLET, FILM COATED ORAL NIGHTLY
Status: DISCONTINUED | OUTPATIENT
Start: 2018-12-01 | End: 2018-12-01 | Stop reason: HOSPADM

## 2018-12-01 RX ORDER — ADENOSINE 3 MG/ML
6 INJECTION, SOLUTION INTRAVENOUS ONCE
Status: COMPLETED | OUTPATIENT
Start: 2018-12-01 | End: 2018-12-01

## 2018-12-01 RX ORDER — POLYETHYLENE GLYCOL 3350 17 G/17G
1 POWDER, FOR SOLUTION ORAL
Status: DISCONTINUED | OUTPATIENT
Start: 2018-12-01 | End: 2018-12-01 | Stop reason: HOSPADM

## 2018-12-01 RX ORDER — KETOCONAZOLE 20 MG/G
1 CREAM TOPICAL DAILY
Status: DISCONTINUED | OUTPATIENT
Start: 2018-12-01 | End: 2018-12-01 | Stop reason: HOSPADM

## 2018-12-01 RX ORDER — METOPROLOL TARTRATE 1 MG/ML
INJECTION, SOLUTION INTRAVENOUS
Status: COMPLETED
Start: 2018-12-01 | End: 2018-12-01

## 2018-12-01 RX ORDER — LOSARTAN POTASSIUM 25 MG/1
50 TABLET ORAL DAILY
Status: DISCONTINUED | OUTPATIENT
Start: 2018-12-01 | End: 2018-12-01 | Stop reason: HOSPADM

## 2018-12-01 RX ORDER — METOPROLOL TARTRATE 1 MG/ML
5 INJECTION, SOLUTION INTRAVENOUS ONCE
Status: COMPLETED | OUTPATIENT
Start: 2018-12-01 | End: 2018-12-01

## 2018-12-01 RX ORDER — LOSARTAN POTASSIUM 100 MG/1
50 TABLET ORAL DAILY
Qty: 30 TAB | Refills: 2 | Status: SHIPPED | OUTPATIENT
Start: 2018-12-01 | End: 2019-01-28

## 2018-12-01 RX ORDER — PROMETHAZINE HYDROCHLORIDE 25 MG/1
12.5-25 TABLET ORAL EVERY 4 HOURS PRN
Status: DISCONTINUED | OUTPATIENT
Start: 2018-12-01 | End: 2018-12-01 | Stop reason: HOSPADM

## 2018-12-01 RX ORDER — TRAMADOL HYDROCHLORIDE 50 MG/1
50 TABLET ORAL EVERY 8 HOURS PRN
Status: DISCONTINUED | OUTPATIENT
Start: 2018-12-01 | End: 2018-12-01 | Stop reason: HOSPADM

## 2018-12-01 RX ORDER — LOSARTAN POTASSIUM 25 MG/1
100 TABLET ORAL DAILY
Status: DISCONTINUED | OUTPATIENT
Start: 2018-12-01 | End: 2018-12-01

## 2018-12-01 RX ORDER — ATORVASTATIN CALCIUM 40 MG/1
40 TABLET, FILM COATED ORAL NIGHTLY
COMMUNITY
End: 2019-11-05

## 2018-12-01 RX ORDER — OXYCODONE AND ACETAMINOPHEN 10; 325 MG/1; MG/1
1 TABLET ORAL EVERY 4 HOURS PRN
Status: DISCONTINUED | OUTPATIENT
Start: 2018-12-01 | End: 2018-12-01 | Stop reason: HOSPADM

## 2018-12-01 RX ORDER — OMEPRAZOLE 20 MG/1
40 CAPSULE, DELAYED RELEASE ORAL DAILY
Status: DISCONTINUED | OUTPATIENT
Start: 2018-12-02 | End: 2018-12-01 | Stop reason: HOSPADM

## 2018-12-01 RX ORDER — METOPROLOL TARTRATE 50 MG/1
50 TABLET, FILM COATED ORAL 2 TIMES DAILY
Status: DISCONTINUED | OUTPATIENT
Start: 2018-12-01 | End: 2018-12-01

## 2018-12-01 RX ORDER — PROMETHAZINE HYDROCHLORIDE 25 MG/1
12.5-25 SUPPOSITORY RECTAL EVERY 4 HOURS PRN
Status: DISCONTINUED | OUTPATIENT
Start: 2018-12-01 | End: 2018-12-01 | Stop reason: HOSPADM

## 2018-12-01 RX ORDER — ONDANSETRON 4 MG/1
4 TABLET, ORALLY DISINTEGRATING ORAL EVERY 4 HOURS PRN
Status: DISCONTINUED | OUTPATIENT
Start: 2018-12-01 | End: 2018-12-01 | Stop reason: HOSPADM

## 2018-12-01 RX ORDER — DIPHENHYDRAMINE HCL 25 MG
25 TABLET ORAL EVERY 6 HOURS PRN
Status: DISCONTINUED | OUTPATIENT
Start: 2018-12-01 | End: 2018-12-01 | Stop reason: HOSPADM

## 2018-12-01 RX ORDER — BISACODYL 10 MG
10 SUPPOSITORY, RECTAL RECTAL
Status: DISCONTINUED | OUTPATIENT
Start: 2018-12-01 | End: 2018-12-01 | Stop reason: HOSPADM

## 2018-12-01 RX ORDER — DOXYCYCLINE HYCLATE 100 MG
100 TABLET ORAL 2 TIMES DAILY
Status: ON HOLD | COMMUNITY
Start: 2018-10-30 | End: 2018-12-01

## 2018-12-01 RX ORDER — FLUTICASONE PROPIONATE 50 MCG
1 SPRAY, SUSPENSION (ML) NASAL DAILY
Status: DISCONTINUED | OUTPATIENT
Start: 2018-12-01 | End: 2018-12-01 | Stop reason: HOSPADM

## 2018-12-01 RX ORDER — METHIMAZOLE 5 MG/1
5 TABLET ORAL DAILY
Qty: 30 TAB | Refills: 1 | Status: SHIPPED | OUTPATIENT
Start: 2018-12-01 | End: 2019-02-14 | Stop reason: SDUPTHER

## 2018-12-01 RX ORDER — ALPRAZOLAM 0.5 MG/1
0.5 TABLET ORAL 2 TIMES DAILY PRN
Status: DISCONTINUED | OUTPATIENT
Start: 2018-12-01 | End: 2018-12-01 | Stop reason: HOSPADM

## 2018-12-01 RX ORDER — ONDANSETRON 4 MG/1
4 TABLET, ORALLY DISINTEGRATING ORAL ONCE
Status: COMPLETED | OUTPATIENT
Start: 2018-12-01 | End: 2018-12-01

## 2018-12-01 RX ORDER — ACETAMINOPHEN 325 MG/1
650 TABLET ORAL EVERY 6 HOURS PRN
Status: DISCONTINUED | OUTPATIENT
Start: 2018-12-01 | End: 2018-12-01 | Stop reason: HOSPADM

## 2018-12-01 RX ORDER — CETIRIZINE HYDROCHLORIDE 10 MG/1
10 TABLET ORAL EVERY EVENING
Status: DISCONTINUED | OUTPATIENT
Start: 2018-12-01 | End: 2018-12-01 | Stop reason: HOSPADM

## 2018-12-01 RX ORDER — ONDANSETRON 2 MG/ML
4 INJECTION INTRAMUSCULAR; INTRAVENOUS EVERY 4 HOURS PRN
Status: DISCONTINUED | OUTPATIENT
Start: 2018-12-01 | End: 2018-12-01 | Stop reason: HOSPADM

## 2018-12-01 RX ORDER — CLONIDINE HYDROCHLORIDE 0.1 MG/1
0.1 TABLET ORAL EVERY 6 HOURS PRN
Status: DISCONTINUED | OUTPATIENT
Start: 2018-12-01 | End: 2018-12-01 | Stop reason: HOSPADM

## 2018-12-01 RX ADMIN — ADENOSINE 6 MG: 3 INJECTION, SOLUTION INTRAVENOUS at 08:36

## 2018-12-01 RX ADMIN — OXYCODONE HYDROCHLORIDE AND ACETAMINOPHEN 1 TABLET: 10; 325 TABLET ORAL at 15:00

## 2018-12-01 RX ADMIN — STANDARDIZED SENNA CONCENTRATE AND DOCUSATE SODIUM 2 TABLET: 8.6; 5 TABLET, FILM COATED ORAL at 11:18

## 2018-12-01 RX ADMIN — METOPROLOL TARTRATE 5 MG: 5 INJECTION, SOLUTION INTRAVENOUS at 09:08

## 2018-12-01 RX ADMIN — ONDANSETRON 4 MG: 4 TABLET, ORALLY DISINTEGRATING ORAL at 11:18

## 2018-12-01 RX ADMIN — FLUTICASONE PROPIONATE 50 MCG: 50 SPRAY, METERED NASAL at 11:18

## 2018-12-01 RX ADMIN — METOPROLOL TARTRATE 5 MG: 5 INJECTION, SOLUTION INTRAVENOUS at 08:47

## 2018-12-01 RX ADMIN — METOPROLOL TARTRATE 5 MG: 1 INJECTION, SOLUTION INTRAVENOUS at 08:47

## 2018-12-01 ASSESSMENT — LIFESTYLE VARIABLES
ALCOHOL_USE: YES
CONSUMPTION TOTAL: NEGATIVE
HAVE YOU EVER FELT YOU SHOULD CUT DOWN ON YOUR DRINKING: NO
TOTAL SCORE: 0
EVER_SMOKED: NEVER
TOTAL SCORE: 0
EVER FELT BAD OR GUILTY ABOUT YOUR DRINKING: NO
HOW MANY TIMES IN THE PAST YEAR HAVE YOU HAD 5 OR MORE DRINKS IN A DAY: 0
HAVE PEOPLE ANNOYED YOU BY CRITICIZING YOUR DRINKING: NO
EVER HAD A DRINK FIRST THING IN THE MORNING TO STEADY YOUR NERVES TO GET RID OF A HANGOVER: NO
TOTAL SCORE: 0
ON A TYPICAL DAY WHEN YOU DRINK ALCOHOL HOW MANY DRINKS DO YOU HAVE: 0
AVERAGE NUMBER OF DAYS PER WEEK YOU HAVE A DRINK CONTAINING ALCOHOL: 1

## 2018-12-01 ASSESSMENT — CHA2DS2 SCORE
VASCULAR DISEASE: NO
PRIOR STROKE OR TIA OR THROMBOEMBOLISM: NO
AGE 75 OR GREATER: NO
AGE 65 TO 74: NO
CHA2DS2 VASC SCORE: 2
DIABETES: NO
HYPERTENSION: YES
CHF OR LEFT VENTRICULAR DYSFUNCTION: NO
SEX: FEMALE

## 2018-12-01 ASSESSMENT — COGNITIVE AND FUNCTIONAL STATUS - GENERAL
DAILY ACTIVITIY SCORE: 24
MOBILITY SCORE: 24
SUGGESTED CMS G CODE MODIFIER MOBILITY: CH
SUGGESTED CMS G CODE MODIFIER DAILY ACTIVITY: CH

## 2018-12-01 ASSESSMENT — PATIENT HEALTH QUESTIONNAIRE - PHQ9
1. LITTLE INTEREST OR PLEASURE IN DOING THINGS: NOT AT ALL
SUM OF ALL RESPONSES TO PHQ9 QUESTIONS 1 AND 2: 0
2. FEELING DOWN, DEPRESSED, IRRITABLE, OR HOPELESS: NOT AT ALL

## 2018-12-01 ASSESSMENT — PAIN SCALES - GENERAL
PAINLEVEL_OUTOF10: 7
PAINLEVEL_OUTOF10: 0
PAINLEVEL_OUTOF10: 9

## 2018-12-01 NOTE — ED PROVIDER NOTES
ED Provider Note    CHIEF COMPLAINT  Chief Complaint   Patient presents with   • Chest Pain     chest pain started at 0630 this am        HPI  Alfonso Posada is a 63 y.o. female who presents with anterior chest pressure, palpitations, onset 6:30 AM.  Review of chart shows patient with history of SVT, sensitive to reduction in her beta-blocker dose on previous admission.  Patient states she is taking her medications as prescribed.  On previous episode, she was told she had atrial fibrillation which converted however she was placed on Eliquis for 1 month.  She has leftover tablets of this, taking a dose this morning.  No syncope.  No shortness of breath.  Patient drove here.  Patient states she is preparing for upcoming gastric sleeve operation.  She states she avoids caffeine.  She states she no longer drinks alcohol or smokes.  Patient states her   several months ago.  Last stress test the patient states she had was 2-3 years ago, out of town, described as a negative    REVIEW OF SYSTEMS    Constitutional: No fever, no dizziness  Respiratory: No shortness of breath  Cardiac: Chest pressure, palpitation, history of SVT  Gastrointestinal: No abdominal pain, no vomiting  Musculoskeletal: No neck pain  Neurologic: Denies headache  Skin: No diaphoresis  Endocrine: Hypothyroidism       All other systems are negative.       PAST MEDICAL HISTORY  Past Medical History:   Diagnosis Date   • Anxiety    • Anxiety disorder    • Asthma    • Atrial fibrillation (HCC)    • Bartholin cyst    • Cancer (HCC)     uterine, treated   • Chronic knee pain    • Depression    • Hypertension    • Morbid obesity (HCC)    • Postherpetic neuralgia    • Psychiatric disorder     anxiety   • SVT (supraventricular tachycardia) (HCC)    • Thyroid disease        FAMILY HISTORY  History reviewed. No pertinent family history.    SOCIAL HISTORY  Social History     Social History   • Marital status:      Spouse name: N/A   • Number  "of children: N/A   • Years of education: N/A     Social History Main Topics   • Smoking status: Former Smoker     Packs/day: 0.50     Start date: 3/4/2016     Quit date: 9/9/2018   • Smokeless tobacco: Never Used      Comment: 10 cigs   • Alcohol use 1.8 - 3.0 oz/week     3 - 5 Glasses of wine per week      Comment: \"Nothing since 9/17/18\"   • Drug use: No   • Sexual activity: Not Currently     Partners: Male      Comment:      Other Topics Concern   • Not on file     Social History Narrative    ** Merged History Encounter **            SURGICAL HISTORY  Past Surgical History:   Procedure Laterality Date   • THYROIDECTOMY TOTAL  2005   • HYSTERECTOMY LAPAROSCOPY     • THYROIDECTOMY      still has parathyroid       CURRENT MEDICATIONS  Home Medications    **Home medications have not yet been reviewed for this encounter**         ALLERGIES  Allergies   Allergen Reactions   • Latex Swelling   • Latex Itching   • Penicillins Hives and Itching   • Pcn [Penicillins]      Hives on face    • Sulfa Drugs      \"some sulfa drugs- leg itchiness\"    • Sulfa Drugs      Itching feeling on leg, prickily/need-like sensation on skin.       PHYSICAL EXAM  VITAL SIGNS: /100   Pulse (!) 145   Temp (!) 35.2 °C (95.4 °F) (Temporal)   Resp (!) 21   Ht 1.626 m (5' 4\")   Wt (!) 142.7 kg (314 lb 9.5 oz)   LMP 03/20/2016   SpO2 94%   BMI 54.00 kg/m²   Constitutional:  Non-toxic appearance.  No distress  HENT: No facial swelling  Eyes: Anicteric, no conjunctivitis.     Cardiovascular: Tachycardic heart rate, regular rhythm  Pulmonary:  No wheezing, No rales.   Gastrointestinal: Soft, No tenderness, No masses  Skin: Warm, Dry, No cyanosis.  Trace peripheral edema lower extremities, no asymmetric swelling  Neurologic: Speech is clear, follows commands, facial expression is symmetrical.  Strength intact  Psychiatric: Affect normal,  Mood normal.  Patient is calm and cooperative  Musculoskeletal: No chest wall " tenderness    EKG/Labs  Results for orders placed or performed during the hospital encounter of 12/01/18   CBC WITH DIFFERENTIAL   Result Value Ref Range    WBC 10.6 4.8 - 10.8 K/uL    RBC 5.57 (H) 4.20 - 5.40 M/uL    Hemoglobin 16.8 (H) 12.0 - 16.0 g/dL    Hematocrit 50.4 (H) 37.0 - 47.0 %    MCV 90.5 81.4 - 97.8 fL    MCH 30.2 27.0 - 33.0 pg    MCHC 33.3 (L) 33.6 - 35.0 g/dL    RDW 42.3 35.9 - 50.0 fL    Platelet Count 366 164 - 446 K/uL    MPV 9.7 9.0 - 12.9 fL    Neutrophils-Polys 60.70 44.00 - 72.00 %    Lymphocytes 30.80 22.00 - 41.00 %    Monocytes 7.10 0.00 - 13.40 %    Eosinophils 0.40 0.00 - 6.90 %    Basophils 0.70 0.00 - 1.80 %    Immature Granulocytes 0.30 0.00 - 0.90 %    Nucleated RBC 0.00 /100 WBC    Neutrophils (Absolute) 6.45 2.00 - 7.15 K/uL    Lymphs (Absolute) 3.27 1.00 - 4.80 K/uL    Monos (Absolute) 0.75 0.00 - 0.85 K/uL    Eos (Absolute) 0.04 0.00 - 0.51 K/uL    Baso (Absolute) 0.07 0.00 - 0.12 K/uL    Immature Granulocytes (abs) 0.03 0.00 - 0.11 K/uL    NRBC (Absolute) 0.00 K/uL   COMP METABOLIC PANEL   Result Value Ref Range    Sodium 139 135 - 145 mmol/L    Potassium 3.8 3.6 - 5.5 mmol/L    Chloride 110 96 - 112 mmol/L    Co2 24 20 - 33 mmol/L    Anion Gap 5.0 0.0 - 11.9    Glucose 132 (H) 65 - 99 mg/dL    Bun 14 8 - 22 mg/dL    Creatinine 0.57 0.50 - 1.40 mg/dL    Calcium 8.8 8.4 - 10.2 mg/dL    AST(SGOT) 18 12 - 45 U/L    ALT(SGPT) 20 2 - 50 U/L    Alkaline Phosphatase 107 (H) 30 - 99 U/L    Total Bilirubin 0.8 0.1 - 1.5 mg/dL    Albumin 3.8 3.2 - 4.9 g/dL    Total Protein 7.1 6.0 - 8.2 g/dL    Globulin 3.3 1.9 - 3.5 g/dL    A-G Ratio 1.2 g/dL   TROPONIN   Result Value Ref Range    Troponin I <0.02 0.00 - 0.04 ng/mL   TSH   Result Value Ref Range    TSH 0.060 (L) 0.380 - 5.330 uIU/mL   FREE THYROXINE   Result Value Ref Range    Free T-4 1.11 0.58 - 1.64 ng/dL   ESTIMATED GFR   Result Value Ref Range    GFR If African American >60 >60 mL/min/1.73 m 2    GFR If Non  >60 >60  mL/min/1.73 m 2   EKG   Result Value Ref Range    Report       [facility] Emergency Dept.    Test Date:  2018  Pt Name:    CHUY CLAUDOI                Department: [facility]  MRN:        [id number]                      [facility]  Gender:     Female                       Technician: [id number]  :        [id number]                   Requested By:ER TRIAGE PROTOCOL  Order #:    [id number]                    Reading MD: ASHIA MARROQUIN MD    Measurements  Intervals                                Axis  Rate:       146                          P:  VA:                                      QRS:        -7  QRSD:       119                          T:          61  QT:         300  QTc:        468    Interpretive Statements  Supraventricular tachycardia  Incomplete right bundle branch block  Probable left ventricular hypertrophy  Baseline wander in lead(s) I,II,III,aVR,aVF,V1  Compared to ECG 10/21/2018 17:40:40  Supraventricular tachycardia  Incomplete right bundle-branch block now present  Sinus rhythm no longer present     Electronically Signed On 2018 8:33:46 PST by ASHIA MARROQUIN MD       EKG Interpretation    Interpreted by emergency department physician    Rhythm: atrial fibrillation - controlled  Rate: 100  Axis: normal  Ectopy: none  Conduction: normal  ST Segments: J-point elevation in lead V3 through V6 slight ST segment elevation in leads V3 and V4  T Waves: no acute change  Q Waves: none    Clinical Impression: Rate controlled atrial fibrillation, nonspecific ST segment change in lead V3 and V4.  Previous ST segment depressions and incomplete right bundle branch block has resolved from previous EKG. previous rhythm noted as SVT may have been rapid atrial fibrillation        RADIOLOGY/PROCEDURES  DX-CHEST-PORTABLE (1 VIEW)   Final Result      No acute cardiopulmonary abnormality identified.            COURSE & MEDICAL DECISION MAKING  Pertinent Labs & Imaging studies  reviewed. (See chart for details)  Patient found to be in narrow complex regular tachycardia felt to be SVT given the patient's history.  She was treated with 6 mg of adenosine which slowed the heart rate down into the she was then given 2 doses of 5 mg IV metoprolol, heart rate currently in the 80s.  Chest pressure is now resolved.  Given multiple risk factors and chest discomfort she is admitted for ongoing workup.  Patient remains in atrial fibrillation, had taken first dose of Eliquis this morning.    FINAL IMPRESSION     1. Chest pressure    2. Atrial fibrillation, unspecified type (HCC)    3. SVT (supraventricular tachycardia) (HCC)                    Electronically signed by: Kobi Bowie, 12/1/2018 8:31 AM

## 2018-12-01 NOTE — CARE PLAN
Problem: Safety  Goal: Will remain free from injury  Outcome: PROGRESSING AS EXPECTED  Reinforced fall risk precautions. Non-skid socks on feet, call light in reach. Steady with ambulation. Independent to the bathroom.    Problem: Pain Management  Goal: Pain level will decrease to patient's comfort goal  Outcome: PROGRESSING AS EXPECTED  No c/o pain at this time. No chest pain.

## 2018-12-01 NOTE — PROGRESS NOTES
Pt arrives to unit from ER to unit via gurney. Ambulated from gurney to bed, accompanied by staff. Pt A&Ox4, No c/o pain. Tele monitor applied, vitals taken. History, allergies, and med rec reviewed and admission profile completed. Home medications bagged and sent to pharmacy.    Pt oriented to unit and plan of care discussed, including Echo and cardiac monitoring. Welcome folder provided and discussed. Communication board filled out. Pt instructed on call light usage and encouraged to call for any questions, needs, or concerns and prior to getting out of bed. Fall precautions in place. Pt agrees with the plan and declines any needs at this time. Bed locked and low and bed alarm on.

## 2018-12-01 NOTE — ED NOTES
"0836-adenosine 6mg IV fast push followed by 30ml NS flush given as order. Post adenosine pt appears to be in A-fib with a rate of 90's. Pt's AAOx4. Pt states she feels \"relaxed\"   0847-pt medicated with Metoprolol 5mg IV as ordered. Pt assisted to position of comfort on gurney. Call light in reach and gurney in low position for pt safety. Pt denies Cp/SOB.   "

## 2018-12-01 NOTE — DIETARY
"Nutrition services: Day 0 of admit.  Alfonso Posada is a 63 y.o. female with admitting DX of A Fib.     Pt with BMI >40 (=55.22). Pt with PMH including Thyroid Disease, Anxiety, Postherpetic Neuralgia, Depression, HTN, Chronic Knee Pain, Uterine CA, Bartholin Cyst, A Fib, Asthma. Pt presented with chest pressure per ED MD. Pt noted she is preparing for an upcoming gastric sleeve operation and avoids caffeine. Pt states she no longer drinks alcohol or smokes and her  recently past several months ago. Pt with Regular diet. Negative nursing nutrition screening. Weight loss counseling not appropriate in acute care setting. RD available for further consult as needed.     Assessment:  Height: 162.6 cm (5' 4.02\")  Weight: (!) 146 kg (321 lb 14 oz)  Body mass index is 55.22 kg/m².  Diet/Intake: Regular    Labs (Hgb 16.8 Hct 50.4 Alk Phos 107 Blood Glucose 132 Previous A1C on 10/22/2018 5.7%), MAR and Chart reviewed.     Recommendations/Plan:Referral to outpatient nutrition services for weight management after D/C. * pt with upcoming gastric sleeve procedure planned.     1. Diet as ordered.    2. Encourage intake of 50% or greater.   3. Document intake of all meals  as % taken in ADL's to provide interdisciplinary communication across all shifts.   4. Monitor weight.  5. Nutrition rep will continue to see patient for ongoing meal and snack preferences.           "

## 2018-12-01 NOTE — ED NOTES
"0908-When this RN entered pt's room, pt was observed by this RN taking an oral medicaction. Pt states she was taking her own \"pain medicine\"  Pt advised to not take her own medications. Pt verbalized understanding. Pt  medicated with Metoprolol 5mg IV as ordered.   1026-pt transported to floor by this RN on cardiac monitor and bedside report will be done in room 313-1.  "

## 2018-12-01 NOTE — H&P
Hospital Medicine History & Physical Note    Date of Service  12/1/2018    Primary Care Physician  Sim Brownlee P.A.-C.    Consultants  none    Code Status  full    Chief Complaint  Palpitations and chest pressure.     History of Presenting Illness  63 y.o. female who presented 12/1/2018 with palpitations and chest pressure. She has a known history of svt and atrial fibrillation that leads to these symptoms. She has noted that she has been having a rapid heart rate for an prior to this. Hre pressure resolves with resolution of her svt, does not radiate and is associated with her palpitations. She recently had atrial fibrillation and apparently was placed onto eliquis for a month. She says she took a dose of eliquis this morning thinking it would help with her afib but it did not. She was given adenosine for svt in the ed and did not convert. Atrial fibrillation was noted as her underlying rhythm. She has been given 2 doses of 5mg of metoprolol and her rate is about 90 at the time of my evaluation. Her chest pressure has resolved. She has not been ill recently and denies other symptoms on review of them.     Review of Systems  Review of Systems   Constitutional: Negative for chills and fever.   HENT: Negative for sore throat.    Eyes: Negative for blurred vision and pain.   Respiratory: Negative for cough and shortness of breath.    Cardiovascular: Positive for chest pain and palpitations.   Gastrointestinal: Negative for abdominal pain, nausea and vomiting.   Genitourinary: Negative for dysuria and urgency.   Musculoskeletal: Negative for back pain and neck pain.   Skin: Negative for itching and rash.   Neurological: Negative for dizziness, tingling and headaches.   Psychiatric/Behavioral: Negative for depression. The patient does not have insomnia.    All other systems reviewed and are negative.      Past Medical History   has a past medical history of Anxiety; Anxiety disorder; Asthma; Atrial fibrillation  (HCC); Bartholin cyst; Cancer (HCC); Chronic knee pain; Depression; Hypertension; Morbid obesity (HCC); Postherpetic neuralgia; Psychiatric disorder; SVT (supraventricular tachycardia) (HCC); and Thyroid disease.    Surgical History   has a past surgical history that includes hysterectomy laparoscopy; thyroidectomy; and thyroidectomy total (2005).     Family History  family history is not on file.   Reviewed and not contributary    Social History   reports that she quit smoking about 2 months ago. She started smoking about 2 years ago. She smoked 0.50 packs per day. She has never used smokeless tobacco. She reports that she drinks about 1.8 - 3.0 oz of alcohol per week . She reports that she does not use drugs.    Allergies  Allergies   Allergen Reactions   • Latex Itching   • Penicillins Hives, Rash and Itching     Itching & Rash     • Sulfa Drugs Itching     Itching feeling on leg, prickily/need-like sensation on skin.       Medications  Prior to Admission Medications   Prescriptions Last Dose Informant Patient Reported? Taking?   ALPRAZolam (XANAX) 0.5 MG Tab 12/1/2018 at 0700 Patient No No   Sig: Take 1 Tab by mouth 2 times a day as needed for Anxiety for up to 30 days.   MAGNESIUM OXIDE PO 11/29/2018 at 1800 Patient Yes No   Sig: Take 1 Tab by mouth every day at 6 PM.   acetaminophen (TYLENOL) 500 MG Tab 11/29/2018 at Unknown Patient Yes No   Sig: Take 1,000 mg by mouth 3 times a day as needed.   atorvastatin (LIPITOR) 40 MG Tab 11/30/2018 at 2130 Patient Yes Yes   Sig: Take 40 mg by mouth every evening.   cetirizine (ZYRTEC) 10 MG Tab 11/30/2018 at 2130 Patient Yes No   Sig: Take 10 mg by mouth every evening.   diphenhydrAMINE (BENADRYL) 25 MG Tab 11/29/2018 at Unknown Patient's Home Pharmacy Yes No   Sig: Take 25 mg by mouth every 6 hours as needed for Itching.   doxycycline (VIBRAMYCIN) 100 MG Tab 11/9/2018 at FINISHED Patient Yes Yes   Sig: Take 100 mg by mouth 2 times a day. 10 day course   fluticasone  (FLONASE) 50 MCG/ACT nasal spray 11/30/2018 at 2130 Patient No No   Sig: SPRAY ONE SPRAY IN EACH NOSTRIL TWICE DAILY   furosemide (LASIX) 40 MG Tab 11/30/2018 at AM Patient No No   Sig: TAKE ONE TABLET BY MOUTH DAILY   ketoconazole (NIZORAL) 2 % Cream 11/29/2018 at Unknown Patient Yes No   Sig: Apply 1 Application to affected area(s) every day. Apply to face   levothyroxine (SYNTHROID) 25 MCG Tab 11/30/2018 at 2130 Patient Yes No   Sig: Take 25 mcg by mouth every 48 hours.   losartan (COZAAR) 100 MG Tab 12/1/2018 at 0630 Patient No No   Sig: Take 1 Tab by mouth every day.   metoprolol (LOPRESSOR) 50 MG Tab 12/1/2018 at 0630 Patient No No   Sig: Take 1 Tab by mouth 2 times a day.   omeprazole (PRILOSEC) 40 MG delayed-release capsule 12/1/2018 at 0630 Patient No No   Sig: Take 1 Cap by mouth every day.   ondansetron (ZOFRAN ODT) 4 MG TABLET DISPERSIBLE 11/30/2018 at AFTERNOON Patient No No   Sig: DISSOLVE ONE TABLET BY MOUTH EVERY 8 HOURS AS NEEDED FOR NAUSEA AND VOMITING   oxyCODONE-acetaminophen (PERCOCET-10)  MG Tab 11/30/2018 at 2130 Patient No No   Sig: Take 1 Tab by mouth 3 times a day for 30 days.   potassium chloride (KLOR-CON) 8 MEQ tablet 12/1/2018 at 0630 Patient No No   Sig: TAKE ONE TABLET BY MOUTH ONE TIME A DAY   sertraline (ZOLOFT) 100 MG Tab 12/1/2018 at 0630 Patient No No   Sig: TAKE ONE TABLET BY MOUTH ONE TIME A DAY   tramadol (ULTRAM) 50 MG Tab 11/29/2018 at AFTERNOON Patient No No   Sig: Take 1 Tab by mouth every 8 hours as needed for up to 30 days.      Facility-Administered Medications: None       Physical Exam  Temp:  [35.2 °C (95.4 °F)-35.9 °C (96.6 °F)] 35.9 °C (96.6 °F)  Pulse:  [] 81  Resp:  [19-21] 19  BP: (142)/(100) 142/100    Physical Exam   Constitutional: She is oriented to person, place, and time. She appears well-developed and well-nourished. No distress.   Patient seen and examined  Discussed plan with RN   HENT:   Right Ear: External ear normal.   Left Ear: External  ear normal.   Nose: Nose normal.   Eyes: Conjunctivae are normal. Right eye exhibits no discharge. Left eye exhibits no discharge.   Neck: No JVD present.   Cardiovascular: Normal heart sounds.  An irregularly irregular rhythm present.   No murmur heard.  Cap refill 2sec  Pulses 2+ throughout     Pulmonary/Chest: Effort normal and breath sounds normal. No stridor. No respiratory distress. She has no wheezes. She has no rales.   Abdominal: Soft. Bowel sounds are normal. She exhibits no distension. There is no tenderness.   obese   Musculoskeletal: She exhibits no edema or tenderness.   Neurological: She is alert and oriented to person, place, and time.   Skin: Skin is warm and dry. She is not diaphoretic. No erythema.   Normal skin  Color.    Psychiatric: She has a normal mood and affect. Her behavior is normal.   Nursing note and vitals reviewed.      Laboratory:  Recent Labs      12/01/18   0826   WBC  10.6   RBC  5.57*   HEMOGLOBIN  16.8*   HEMATOCRIT  50.4*   MCV  90.5   MCH  30.2   MCHC  33.3*   RDW  42.3   PLATELETCT  366   MPV  9.7     Recent Labs      12/01/18   0826   SODIUM  139   POTASSIUM  3.8   CHLORIDE  110   CO2  24   GLUCOSE  132*   BUN  14   CREATININE  0.57   CALCIUM  8.8     Recent Labs      12/01/18   0826   ALTSGPT  20   ASTSGOT  18   ALKPHOSPHAT  107*   TBILIRUBIN  0.8   GLUCOSE  132*                 Recent Labs      12/01/18   0826   TROPONINI  <0.02       Urinalysis:    No results found     Imaging:  EC-ECHOCARDIOGRAM COMPLETE W/O CONT   Final Result      DX-CHEST-PORTABLE (1 VIEW)   Final Result      No acute cardiopulmonary abnormality identified.            Assessment/Plan:  I anticipate this patient is appropriate for observation admission .    * Paroxysmal atrial fibrillation (HCC)- (present on admission)   Assessment & Plan    With RVR today.   She has responded well to metoprolol and is rj rate controlled  She will be admitted for mnitoring, an echocardiogram and her metoprolol will be  "increased. I will decrease her cozaar so that her blood pressure can tolerate this. She should stay on eliquis and this has been restarted. In addition her TSH is suppressed. Her synthroid will be stopped. She says that her previous endocrinologist has suggested that she may have hyperthyroidism. She will be started on a small dose of methimazole for now as well given this. Hopefully these measures will prevent her from having episodes of RVR.      Hyperthyroidism   Assessment & Plan    On synthroid. Will stop this. Check free t3/4 and start methimazole low dose for now. Needs outpatient follow up. She says her endocrinologist has quit recently but will talk with her pcp about getting a new one. She says that her previous doctor said that she was \"secreting hormone\" and that she has been tapering off her meds. ???history of a thyroidectomy???? Possible not complete. Likely needs a thyroid uptake scan.      Morbid obesity (HCC)   Assessment & Plan    Needs weight loss  She says she is planning on getting a gastric bypass soon. We discussed her getting cleared by cardiology first.      Mild pulmonary hypertension (HCC)   Assessment & Plan    Noted on echo that I reviewed today.      SVT (supraventricular tachycardia) (HCC)- (present on admission)   Assessment & Plan    ? Now more afib. Not clear but has clearly had svt that responds to adenosine in the past. Will treat as afib for now and trend on tele. Increasing beta blocker therapy.      Hyperlipidemia- (present on admission)   Assessment & Plan    Consideration of statin therapy per her PCP and cardiolody can be made as an outpatient.      Chronic pain in right shoulder- (present on admission)   Assessment & Plan    Seeing ortho and getting injections     Gastroesophageal reflux disease without esophagitis- (present on admission)   Assessment & Plan    ppi ongoing     Essential hypertension- (present on admission)   Assessment & Plan    Decrease cozaar to 50 and " increase mtp given afib issues. Pcp can increase cozaar up again if not controlled after thyroid issues are corrected.      Arthritis- (present on admission)   Assessment & Plan    Getting synvisc injections with ortho  Needs weight loss     Postherpetic neuralgia- (present on admission)   Assessment & Plan    stable     Anxiety- (present on admission)   Assessment & Plan    Prn meds         VTE prophylaxis: none

## 2018-12-01 NOTE — ED NOTES
Med rec complete per pt at bedside  Allergies have been verified and updated    Pt reports that she finished a 10 day course of DOXYCYCLINE on 11-

## 2018-12-01 NOTE — PROGRESS NOTES
Telemetry Strip    Strip printed: 1133  Measurements from am strip were as follows:  Rhythm:  Afib  HR: 92  Measurements:  -/.08/-    Telemetry Shift Summary:    Rhythm: Afib  HR:   Ectopy: none          Normal Values  Rhythm SR  HR Range    Measurements 0.12-0.20 / 0.06-0.10  / 0.30-0.52

## 2018-12-02 ENCOUNTER — PATIENT OUTREACH (OUTPATIENT)
Dept: HEALTH INFORMATION MANAGEMENT | Facility: OTHER | Age: 63
End: 2018-12-02

## 2018-12-02 PROBLEM — I27.20 MILD PULMONARY HYPERTENSION (HCC): Status: ACTIVE | Noted: 2018-12-02

## 2018-12-02 PROBLEM — I48.0 PAROXYSMAL ATRIAL FIBRILLATION (HCC): Status: ACTIVE | Noted: 2018-10-01

## 2018-12-02 PROBLEM — E66.01 MORBID OBESITY (HCC): Status: ACTIVE | Noted: 2018-12-02

## 2018-12-02 PROBLEM — E05.90 HYPERTHYROIDISM: Status: ACTIVE | Noted: 2018-12-02

## 2018-12-02 ASSESSMENT — ENCOUNTER SYMPTOMS
DIZZINESS: 0
DEPRESSION: 0
BLURRED VISION: 0
SHORTNESS OF BREATH: 0
SORE THROAT: 0
ABDOMINAL PAIN: 0
INSOMNIA: 0
FEVER: 0
NAUSEA: 0
VOMITING: 0
NECK PAIN: 0
CHILLS: 0
BACK PAIN: 0
COUGH: 0
TINGLING: 0
PALPITATIONS: 1
EYE PAIN: 0
HEADACHES: 0

## 2018-12-02 NOTE — ASSESSMENT & PLAN NOTE
"On synthroid. Will stop this. Check free t3/4 and start methimazole low dose for now. Needs outpatient follow up. She says her endocrinologist has quit recently but will talk with her pcp about getting a new one. She says that her previous doctor said that she was \"secreting hormone\" and that she has been tapering off her meds. ???history of a thyroidectomy???? Possible not complete. Likely needs a thyroid uptake scan.   "

## 2018-12-02 NOTE — PROGRESS NOTES
Patient given discharge information, verbalized understanding. Home meds given back to patient. Appointment scheduled for f/u with cardiology in 5 days. Patient discharged by wheelchair by escort.

## 2018-12-02 NOTE — ASSESSMENT & PLAN NOTE
? Now more afib. Not clear but has clearly had svt that responds to adenosine in the past. Will treat as afib for now and trend on tele. Increasing beta blocker therapy.

## 2018-12-02 NOTE — ASSESSMENT & PLAN NOTE
Decrease cozaar to 50 and increase mtp given afib issues. Pcp can increase cozaar up again if not controlled after thyroid issues are corrected.

## 2018-12-02 NOTE — DISCHARGE SUMMARY
Discharge Summary    CHIEF COMPLAINT ON ADMISSION  Chief Complaint   Patient presents with   • Chest Pain     chest pain started at 0630 this am        Reason for Admission  heart palpitations     Admission Date  12/1/2018    CODE STATUS  Prior    HPI & HOSPITAL COURSE  This is a 63 y.o. female here with palpitations found to be in svt. She was given adenosine and converted to rapid afib. She was admitted and monitored and placed onto beta blocker therapy. She was noted to be hyperthyroid and her synthroid was stopped.  She has been placed onto a small dose of methimazole and her metoprolol has been increased. She has a ihstory of a goiter and a partial thyroidectomy, and she is likely producing hormone again. She needs follow up with cardiology, her PCP and endocrinology for consideration of I131 ablation. This hopefully will help keep her out of atrial fibrillation. However she needs to address other risk factors as well such as her weight. A sleep study may be a consideration in the future per her PCp as well. She has a gastric bypass being planned and I have suggested she get cleared by cardiology prior to this given her cardiac and thyroid issues. She is in rate controlled atrial fibrillation at the time of discharge and feeling improved.  I contacted her the day after discharge and she has converted back into NSR with these changes and is much more comfortable.        Therefore, she is discharged in good and stable condition to home with close outpatient follow-up.        Discharge Date  12/1/2018    FOLLOW UP ITEMS POST DISCHARGE  As above  Endocrine  Cardiology  PCP  ?sleep study    DISCHARGE DIAGNOSES  Principal Problem:    Paroxysmal atrial fibrillation (HCC) POA: Yes  Active Problems:    Anxiety POA: Yes    Postherpetic neuralgia POA: Yes      Overview: Numbness    Arthritis POA: Yes    Essential hypertension POA: Yes    Gastroesophageal reflux disease without esophagitis POA: Yes    Chronic pain in right  shoulder POA: Yes    Morbid obesity with BMI of 50.0-59.9, adult (HCC) POA: Yes    Hyperlipidemia POA: Yes    SVT (supraventricular tachycardia) (HCC) POA: Yes    Mild pulmonary hypertension (HCC) POA: Unknown    Morbid obesity (HCC) POA: Unknown    Hyperthyroidism POA: Unknown  Resolved Problems:    * No resolved hospital problems. *      FOLLOW UP  Future Appointments  Date Time Provider Department Center   12/6/2018 2:00 PM Emerson Casey M.D. [facility] None   12/27/2018 2:00 PM Sim Brownlee P.DONALD.-FIDELINA CARTY ALFIE RiveraAmarallulu Brownlee P.DONALD[facility]FIDELINA  [address]  [address]  [facility] [id number] [phone]            MEDICATIONS ON DISCHARGE     Medication List      START taking these medications      Instructions   apixaban 5mg Tabs  Commonly known as:  ELIQUIS   Take 1 Tab by mouth 2 Times a Day.  Dose:  5 mg     methimazole 5 MG Tabs  Commonly known as:  TAPAZOLE   Take 1 Tab by mouth every day.  Dose:  5 mg        CHANGE how you take these medications      Instructions   losartan 100 MG Tabs  What changed:  how much to take  Commonly known as:  COZAAR   Take 0.5 Tabs by mouth every day.  Dose:  50 mg     * metoprolol 25 MG Tabs  What changed:  · medication strength  · how much to take  · when to take this  Commonly known as:  LOPRESSOR   Take 3 Tabs by mouth 2 Times a Day.  Dose:  75 mg     * metoprolol 25 MG Tabs  What changed:  You were already taking a medication with the same name, and this prescription was added. Make sure you understand how and when to take each.  Commonly known as:  LOPRESSOR   Doctor's comments:  Hold for SBP <110, HR <60  Take one tab up to 2 times per day as needed for heart rate over 120. Please do not take if your blood pressure is less than 100/50        * This list has 2 medication(s) that are the same as other medications prescribed for you. Read the directions carefully, and ask your doctor or other care provider to review them with you.            CONTINUE taking these  medications      Instructions   acetaminophen 500 MG Tabs  Commonly known as:  TYLENOL   Take 1,000 mg by mouth 3 times a day as needed.  Dose:  1000 mg     ALPRAZolam 0.5 MG Tabs  Commonly known as:  XANAX   Doctor's comments:  Change to 30 days please.  Take 1 Tab by mouth 2 times a day as needed for Anxiety for up to 30 days.  Dose:  0.5 mg     atorvastatin 40 MG Tabs  Commonly known as:  LIPITOR   Take 40 mg by mouth every evening.  Dose:  40 mg     cetirizine 10 MG Tabs  Commonly known as:  ZYRTEC   Take 10 mg by mouth every evening.  Dose:  10 mg     diphenhydrAMINE 25 MG Tabs  Commonly known as:  BENADRYL   Take 25 mg by mouth every 6 hours as needed for Itching.  Dose:  25 mg     fluticasone 50 MCG/ACT nasal spray  Commonly known as:  FLONASE   SPRAY ONE SPRAY IN EACH NOSTRIL TWICE DAILY     ketoconazole 2 % Crea  Commonly known as:  NIZORAL   Apply 1 Application to affected area(s) every day. Apply to face  Dose:  1 Application     MAGNESIUM OXIDE PO   Take 1 Tab by mouth every day at 6 PM.  Dose:  1 Tab     omeprazole 40 MG delayed-release capsule  Commonly known as:  PRILOSEC   Take 1 Cap by mouth every day.  Dose:  40 mg     ondansetron 4 MG Tbdp  Commonly known as:  ZOFRAN ODT   DISSOLVE ONE TABLET BY MOUTH EVERY 8 HOURS AS NEEDED FOR NAUSEA AND VOMITING     oxyCODONE-acetaminophen  MG Tabs  Commonly known as:  PERCOCET-10   Take 1 Tab by mouth 3 times a day for 30 days.  Dose:  1 Tab     potassium chloride 8 MEQ tablet  Commonly known as:  KLOR-CON   TAKE ONE TABLET BY MOUTH ONE TIME A DAY     sertraline 100 MG Tabs  Commonly known as:  ZOLOFT   TAKE ONE TABLET BY MOUTH ONE TIME A DAY     tramadol 50 MG Tabs  Start taking on:  12/4/2018  Commonly known as:  ULTRAM   Take 1 Tab by mouth every 8 hours as needed for up to 30 days.  Dose:  50 mg        STOP taking these medications    doxycycline 100 MG Tabs  Commonly known as:  VIBRAMYCIN     furosemide 40 MG Tabs  Commonly known as:  LASIX      levothyroxine 25 MCG Tabs  Commonly known as:  SYNTHROID            Allergies  Allergies   Allergen Reactions   • Latex Itching   • Penicillins Hives, Rash and Itching     Itching & Rash     • Sulfa Drugs Itching     Itching feeling on leg, prickily/need-like sensation on skin.       DIET  No orders of the defined types were placed in this encounter.      ACTIVITY  As tolerated.  Weight bearing as tolerated    CONSULTATIONS  none    PROCEDURES  none    LABORATORY  Lab Results   Component Value Date    SODIUM 139 12/01/2018    POTASSIUM 3.8 12/01/2018    CHLORIDE 110 12/01/2018    CO2 24 12/01/2018    GLUCOSE 132 (H) 12/01/2018    BUN 14 12/01/2018    CREATININE 0.57 12/01/2018        Lab Results   Component Value Date    WBC 10.6 12/01/2018    HEMOGLOBIN 16.8 (H) 12/01/2018    HEMATOCRIT 50.4 (H) 12/01/2018    PLATELETCT 366 12/01/2018        Total time of the discharge process exceeds 57 minutes.

## 2018-12-02 NOTE — ASSESSMENT & PLAN NOTE
Needs weight loss  She says she is planning on getting a gastric bypass soon. We discussed her getting cleared by cardiology first.

## 2018-12-02 NOTE — DISCHARGE INSTRUCTIONS
Monitor heart rate at home. Heart rate under 120 is ok for now while your medication changes start to take effect. Take one additional tab of 25 mg metoprolol, up to 2 times per day as needed for heart rate over 120. Please do not take if your blood pressure is less than 100/50. If this does not lower your heart rate below 120 return to the ER. If you have chest pain return to the emergency dept right away.     Atrial Fibrillation    Atrial fibrillation is a type of irregular or rapid heartbeat (arrhythmia). In atrial fibrillation, the heart quivers continuously in a chaotic pattern. This occurs when parts of the heart receive disorganized signals that make the heart unable to pump blood normally. This can increase the risk for stroke, heart failure, and other heart-related conditions. There are different types of atrial fibrillation, including:  · Paroxysmal atrial fibrillation. This type starts suddenly, and it usually stops on its own shortly after it starts.  · Persistent atrial fibrillation. This type often lasts longer than a week. It may stop on its own or with treatment.  · Long-lasting persistent atrial fibrillation. This type lasts longer than 12 months.  · Permanent atrial fibrillation. This type does not go away.  Talk with your health care provider to learn about the type of atrial fibrillation that you have.    What are the causes?  This condition is caused by some heart-related conditions or procedures, including:  · A heart attack.  · Coronary artery disease.  · Heart failure.  · Heart valve conditions.  · High blood pressure.  · Inflammation of the sac that surrounds the heart (pericarditis).  · Heart surgery.  · Certain heart rhythm disorders, such as Lundberg-Parkinson-White syndrome.    Other causes include:  · Pneumonia.  · Obstructive sleep apnea.  · Blockage of an artery in the lungs (pulmonary embolism, or PE).  · Lung cancer.  · Chronic lung disease.  · Thyroid problems, especially if the  thyroid is overactive (hyperthyroidism).  · Caffeine.  · Excessive alcohol use or illegal drug use.  · Use of some medicines, including certain decongestants and diet pills.  Sometimes, the cause cannot be found.    What increases the risk?  This condition is more likely to develop in:  · People who are older in age.  · People who smoke.  · People who have diabetes mellitus.  · People who are overweight (obese).  · Athletes who exercise vigorously.    What are the signs or symptoms?  Symptoms of this condition include:  · A feeling that your heart is beating rapidly or irregularly.  · A feeling of discomfort or pain in your chest.  · Shortness of breath.  · Sudden light-headedness or weakness.  · Getting tired easily during exercise.  In some cases, there are no symptoms.    How is this diagnosed?  Your health care provider may be able to detect atrial fibrillation when taking your pulse. If detected, this condition may be diagnosed with:  · An electrocardiogram (ECG).  · A Holter monitor test that records your heartbeat patterns over a 24-hour period.  · Transthoracic echocardiogram (TTE) to evaluate how blood flows through your heart.  · Transesophageal echocardiogram (MONIQUE) to view more detailed images of your heart.  · A stress test.  · Imaging tests, such as a CT scan or chest X-ray.  · Blood tests.    How is this treated?  The main goals of treatment are to prevent blood clots from forming and to keep your heart beating at a normal rate and rhythm. The type of treatment that you receive depends on many factors, such as your underlying medical conditions and how you feel when you are experiencing atrial fibrillation.  This condition may be treated with:  · Medicine to slow down the heart rate, bring the heart’s rhythm back to normal, or prevent clots from forming.  · Electrical cardioversion. This is a procedure that resets your heart’s rhythm by delivering a controlled, low-energy shock to the heart through  your skin.  · Different types of ablation, such as catheter ablation, catheter ablation with pacemaker, or surgical ablation. These procedures destroy the heart tissues that send abnormal signals. When the pacemaker is used, it is placed under your skin to help your heart beat in a regular rhythm.    Follow these instructions at home:  · Take over-the counter and prescription medicines only as told by your health care provider.  · If your health care provider prescribed a blood-thinning medicine (anticoagulant), take it exactly as told. Taking too much blood-thinning medicine can cause bleeding. If you do not take enough blood-thinning medicine, you will not have the protection that you need against stroke and other problems.  · Do not use tobacco products, including cigarettes, chewing tobacco, and e-cigarettes. If you need help quitting, ask your health care provider.  · If you have obstructive sleep apnea, manage your condition as told by your health care provider.  · Do not drink alcohol.  · Do not drink beverages that contain caffeine, such as coffee, soda, and tea.  · Maintain a healthy weight. Do not use diet pills unless your health care provider approves. Diet pills may make heart problems worse.  · Follow diet instructions as told by your health care provider.  · Exercise regularly as told by your health care provider.  · Keep all follow-up visits as told by your health care provider. This is important.    How is this prevented?  · Avoid drinking beverages that contain caffeine or alcohol.  · Avoid certain medicines, especially medicines that are used for breathing problems.  · Avoid certain herbs and herbal medicines, such as those that contain ephedra or ginseng.  · Do not use illegal drugs, such as cocaine and amphetamines.  · Do not smoke.  · Manage your high blood pressure.    Contact a health care provider if:  · You notice a change in the rate, rhythm, or strength of your heartbeat.  · You are  taking an anticoagulant and you notice increased bruising.  · You tire more easily when you exercise or exert yourself.    Get help right away if:  · You have chest pain, abdominal pain, sweating, or weakness.  · You feel nauseous.  · You notice blood in your vomit, bowel movement, or urine.  · You have shortness of breath.  · You suddenly have swollen feet and ankles.  · You feel dizzy.  · You have sudden weakness or numbness of the face, arm, or leg, especially on one side of the body.  · You have trouble speaking, trouble understanding, or both (aphasia).  · Your face or your eyelid droops on one side.    These symptoms may represent a serious problem that is an emergency. Do not wait to see if the symptoms will go away. Get medical help right away. Call your local emergency services (911 in the [place] Do not drive yourself to the hospital.   This information is not intended to replace advice given to you by your health care provider. Make sure you discuss any questions you have with your health care provider.  Document Released: 12/18/2006 Document Revised: 04/26/2017 Document Reviewed: 04/13/2016  [employer] Patient Education © 2017 [employer]    Discharge Instructions    Discharged to home by car with self. Discharged via walking, hospital escort: Yes.  Special equipment needed: Not Applicable    Be sure to schedule a follow-up appointment with your primary care doctor or any specialists as instructed.     Discharge Plan:   Pneumococcal Vaccine Administered/Refused: Not given - Patient refused pneumococcal vaccine (Primary care said she should wait until age 65. )  Influenza Vaccine Indication: Not indicated: Previously immunized this influenza season and > 8 years of age    I understand that a diet low in cholesterol, fat, and sodium is recommended for good health. Unless I have been given specific instructions below for another diet, I accept this instruction as my diet prescription.   Other  diet: regular    Special Instructions: Atrial fibrillation    · Is patient discharged on Warfarin / Coumadin?   No     Depression / Suicide Risk    As you are discharged from this [facility] facility, it is important to learn how to keep safe from harming yourself.    Recognize the warning signs:  · Abrupt changes in personality, positive or negative- including increase in energy   · Giving away possessions  · Change in eating patterns- significant weight changes-  positive or negative  · Change in sleeping patterns- unable to sleep or sleeping all the time   · Unwillingness or inability to communicate  · Depression  · Unusual sadness, discouragement and loneliness  · Talk of wanting to die  · Neglect of personal appearance   · Rebelliousness- reckless behavior  · Withdrawal from people/activities they love  · Confusion- inability to concentrate     If you or a loved one observes any of these behaviors or has concerns about self-harm, here's what you can do:  · Talk about it- your feelings and reasons for harming yourself  · Remove any means that you might use to hurt yourself (examples: pills, rope, extension cords, firearm)  · Get professional help from the community (Mental Health, Substance Abuse, psychological counseling)  · Do not be alone:Call your Safe Contact- someone whom you trust who will be there for you.  · Call your local CRISIS HOTLINE 760-2223 or 937-326-4898  · Call your local Children's Mobile Crisis Response Team [place] (726) 340-7955 or www.[employer]  · Call the toll free National Suicide Prevention Hotlines   · National Suicide Prevention Lifeline 719-156-ELBI (3862)  · National Hope Line Network 800-SUICIDE (772-3553)      Methimazole tablets  What is this medicine?  METHIMAZOLE (meth IM a zole) prevents the thyroid gland from producing too much thyroid hormone. It is used to treat a condition known as hyperthyroidism.  This medicine may be used for other purposes; ask your  health care provider or pharmacist if you have questions.  COMMON BRAND NAME(S): [employer], Tapazole    What should I tell my health care provider before I take this medicine?  They need to know if you have any of these conditions:  -bone marrow disease  -liver disease  -an unusual or allergic reaction to methimazole, other medicines, foods, dyes, or preservatives  -pregnant or trying to get pregnant  -breast-feeding    How should I use this medicine?  Take this medicine by mouth with a glass of water. Follow the directions on the prescription label. You can take this medicine with or without food. However, you should always take it the same way to make sure the effects are the same. Take your doses at regular intervals. Do not take your medicine more often than directed. Do not stop taking this medicine except on the advice of your doctor or health care professional.  Talk to your pediatrician regarding the use of this medicine in children. Special care may be needed. While this drug may be prescribed for children for selected conditions, precautions do apply.  Overdosage: If you think you have taken too much of this medicine contact a poison control center or emergency room at once.  NOTE: This medicine is only for you. Do not share this medicine with others.    What if I miss a dose?  If you miss a dose, take it as soon as you can. If it is almost time for your next dose, take only that dose. Do not take double or extra doses.    What may interact with this medicine?  Do not take this medicine with any of the following medications:  -sodium iodide  -thyroid hormones  This medicine may also interact with the following medications:  -certain medicines for high blood pressure like metoprolol and propranolol  -digoxin  -theophylline  -warfarin  This list may not describe all possible interactions. Give your health care provider a list of all the medicines, herbs, non-prescription drugs, or dietary supplements you use.  Also tell them if you smoke, drink alcohol, or use illegal drugs. Some items may interact with your medicine.    What should I watch for while using this medicine?  Visit your doctor or health care professional for regular checks on your progress. Your thyroid hormone levels will need to be checked.  This medicine can reduce your resistance to infection. Contact your doctor or health care professional if you have any infection or injury. Avoid people who have colds, flu, bronchitis or other infectious disease. Do not have any vaccinations without asking your doctor or health care professional. Avoid people who have recently received oral polio vaccine.    What side effects may I notice from receiving this medicine?  Side effects that you should report to your doctor or health care professional as soon as possible:  -black, tarry stools  -fever, sore throat, hoarseness  -numbness or tingling in the hands or feet  -severe redness or itching of the skin, or dry cracked skin  -stomach pain  -swelling of the feet or legs  -unusual bleeding or bruising, pinpoint red spots on the skin  -unusual or sudden weight increase  -unusually weak or tired  -yellowing of skin or eyes  Side effects that usually do not require medical attention (report to your doctor or health care professional if they continue or are bothersome):  -headache  -nausea, vomiting  -mild skin rash, itching  -muscle aches and pains  This list may not describe all possible side effects. Call your doctor for medical advice about side effects. You may report side effects to FDA at 3-159-FDA-6207.    Where should I keep my medicine?  Keep out of the reach of children.  Store at room temperature between 15 and 30 degrees C (59 and 86 degrees F). Throw away any unused medicine after the expiration date.  NOTE: This sheet is a summary. It may not cover all possible information. If you have questions about this medicine, talk to your doctor, pharmacist, or health  care provider.  © 2018 [employer] (2009-07-06 15:59:43)

## 2018-12-02 NOTE — ASSESSMENT & PLAN NOTE
With RVR today.   She has responded well to metoprolol and is rj rate controlled  She will be admitted for mnitoring, an echocardiogram and her metoprolol will be increased. I will decrease her cozaar so that her blood pressure can tolerate this. She should stay on eliquis and this has been restarted. In addition her TSH is suppressed. Her synthroid will be stopped. She says that her previous endocrinologist has suggested that she may have hyperthyroidism. She will be started on a small dose of methimazole for now as well given this. Hopefully these measures will prevent her from having episodes of RVR.

## 2018-12-06 ENCOUNTER — OFFICE VISIT (OUTPATIENT)
Dept: MEDICAL GROUP | Facility: MEDICAL CENTER | Age: 63
End: 2018-12-06
Payer: COMMERCIAL

## 2018-12-06 ENCOUNTER — OFFICE VISIT (OUTPATIENT)
Dept: CARDIOLOGY | Facility: MEDICAL CENTER | Age: 63
End: 2018-12-06
Payer: COMMERCIAL

## 2018-12-06 VITALS
WEIGHT: 293 LBS | BODY MASS INDEX: 48.82 KG/M2 | RESPIRATION RATE: 16 BRPM | OXYGEN SATURATION: 93 % | SYSTOLIC BLOOD PRESSURE: 120 MMHG | DIASTOLIC BLOOD PRESSURE: 80 MMHG | HEIGHT: 65 IN | HEART RATE: 62 BPM

## 2018-12-06 VITALS
DIASTOLIC BLOOD PRESSURE: 70 MMHG | HEIGHT: 65 IN | BODY MASS INDEX: 48.82 KG/M2 | OXYGEN SATURATION: 96 % | TEMPERATURE: 97.6 F | HEART RATE: 62 BPM | WEIGHT: 293 LBS | SYSTOLIC BLOOD PRESSURE: 128 MMHG

## 2018-12-06 DIAGNOSIS — M25.562 BILATERAL CHRONIC KNEE PAIN: ICD-10-CM

## 2018-12-06 DIAGNOSIS — E05.90 HYPERTHYROIDISM: ICD-10-CM

## 2018-12-06 DIAGNOSIS — I48.0 PAROXYSMAL ATRIAL FIBRILLATION (HCC): ICD-10-CM

## 2018-12-06 DIAGNOSIS — M25.561 BILATERAL CHRONIC KNEE PAIN: ICD-10-CM

## 2018-12-06 DIAGNOSIS — G89.29 BILATERAL CHRONIC KNEE PAIN: ICD-10-CM

## 2018-12-06 PROCEDURE — 99214 OFFICE O/P EST MOD 30 MIN: CPT | Performed by: PHYSICIAN ASSISTANT

## 2018-12-06 PROCEDURE — 99213 OFFICE O/P EST LOW 20 MIN: CPT | Performed by: INTERNAL MEDICINE

## 2018-12-06 RX ORDER — FUROSEMIDE 40 MG/1
40 TABLET ORAL DAILY
COMMUNITY
End: 2019-02-22

## 2018-12-06 NOTE — PROGRESS NOTES
"Chief Complaint   Patient presents with   • Atrial Fibrillation     Paroxysmal AFIB.       Subjective:   Alfonso Posada is a 63 y.o. female who presents today with a history of paroxysmal atrial fibrillation.  Patient had an episode on 12/1/2018 time she awakened because of the onset of atrial fibrillation.  She took Eliquis as instructed and went to the emergency room.  She was found to have SVT and was given adenosine which resulted in conversion to atrial fibrillation with rapid ventricular response.  She was given beta-blocker therapy and her thyroid supplement was stopped.  She had a low dose of Tapazole and the metoprolol was increased felt better and converted to sinus rhythm after she was discharged from the hospital.  Feels fine back to normal.  She is continued to take Eliquis 5 mg twice a day.  She is having her thyroid evaluated by an endocrinologist.    Past Medical History:   Diagnosis Date   • Anxiety    • Anxiety disorder    • Asthma    • Atrial fibrillation (HCC)    • Bartholin cyst    • Cancer (HCC)     uterine, treated   • Chronic knee pain    • Depression    • Hypertension    • Morbid obesity (HCC)    • Postherpetic neuralgia    • Psychiatric disorder     anxiety   • SVT (supraventricular tachycardia) (HCC)    • Thyroid disease      Past Surgical History:   Procedure Laterality Date   • THYROIDECTOMY TOTAL  2005   • HYSTERECTOMY LAPAROSCOPY     • THYROIDECTOMY      still has parathyroid     History reviewed. No pertinent family history.  Social History     Social History   • Marital status:      Spouse name: N/A   • Number of children: N/A   • Years of education: N/A     Occupational History   • Not on file.     Social History Main Topics   • Smoking status: Former Smoker     Packs/day: 0.50     Start date: 3/4/2016     Quit date: 9/9/2018   • Smokeless tobacco: Never Used      Comment: 10 cigs   • Alcohol use 1.8 - 3.0 oz/week     3 - 5 Glasses of wine per week      Comment: \"Nothing " "since 9/17/18\"   • Drug use: No   • Sexual activity: Not Currently     Partners: Male      Comment:      Other Topics Concern   • Not on file     Social History Narrative    ** Merged History Encounter **          Allergies   Allergen Reactions   • Latex Itching   • Penicillins Hives, Rash and Itching     Itching & Rash     • Sulfa Drugs Itching     Itching feeling on leg, prickily/need-like sensation on skin.     Outpatient Encounter Prescriptions as of 12/6/2018   Medication Sig Dispense Refill   • furosemide (LASIX) 40 MG Tab Take 40 mg by mouth every day.     • atorvastatin (LIPITOR) 40 MG Tab Take 40 mg by mouth every evening.     • losartan (COZAAR) 100 MG Tab Take 0.5 Tabs by mouth every day. (Patient taking differently: Take 100 mg by mouth every day.) 30 Tab 2   • metoprolol (LOPRESSOR) 25 MG Tab Take one tab up to 2 times per day as needed for heart rate over 120. Please do not take if your blood pressure is less than 100/50 (Patient taking differently: Take 50 mg by mouth 2 times a day. Take one tab up to 2 times per day as needed for heart rate over 120. Please do not take if your blood pressure is less than 100/50) 30 Tab 1   • apixaban (ELIQUIS) 5mg Tab Take 1 Tab by mouth 2 Times a Day. 60 Tab 3   • methimazole (TAPAZOLE) 5 MG Tab Take 1 Tab by mouth every day. 30 Tab 1   • oxyCODONE-acetaminophen (PERCOCET-10)  MG Tab Take 1 Tab by mouth 3 times a day for 30 days. 90 Tab 0   • sertraline (ZOLOFT) 100 MG Tab TAKE ONE TABLET BY MOUTH ONE TIME A DAY 90 Tab 2   • potassium chloride (KLOR-CON) 8 MEQ tablet TAKE ONE TABLET BY MOUTH ONE TIME A DAY 90 Tab 0   • ALPRAZolam (XANAX) 0.5 MG Tab Take 1 Tab by mouth 2 times a day as needed for Anxiety for up to 30 days. (Patient taking differently: Take 0.5 mg by mouth as needed for Anxiety.) 60 Tab 2   • MAGNESIUM OXIDE PO Take 1 Tab by mouth every day at 6 PM.     • cetirizine (ZYRTEC) 10 MG Tab Take 10 mg by mouth every evening.     • " "diphenhydrAMINE (BENADRYL) 25 MG Tab Take 25 mg by mouth every 6 hours as needed for Itching.     • acetaminophen (TYLENOL) 500 MG Tab Take 1,000 mg by mouth 3 times a day as needed.     • tramadol (ULTRAM) 50 MG Tab Take 1 Tab by mouth every 8 hours as needed for up to 30 days. 90 Tab 0   • fluticasone (FLONASE) 50 MCG/ACT nasal spray SPRAY ONE SPRAY IN EACH NOSTRIL TWICE DAILY 16 g 5   • ondansetron (ZOFRAN ODT) 4 MG TABLET DISPERSIBLE DISSOLVE ONE TABLET BY MOUTH EVERY 8 HOURS AS NEEDED FOR NAUSEA AND VOMITING 30 Tab 5   • omeprazole (PRILOSEC) 40 MG delayed-release capsule Take 1 Cap by mouth every day. 90 Cap 3   • metoprolol (LOPRESSOR) 25 MG Tab Take 3 Tabs by mouth 2 Times a Day. (Patient not taking: Reported on 12/6/2018) 180 Tab 2   • ketoconazole (NIZORAL) 2 % Cream Apply 1 Application to affected area(s) every day. Apply to face       No facility-administered encounter medications on file as of 12/6/2018.      ROS.  See HPI     Objective:   /80 (BP Location: Left arm, Patient Position: Sitting, BP Cuff Size: Adult)   Pulse 62   Resp 16   Ht 1.651 m (5' 5\")   Wt (!) 143.3 kg (316 lb)   LMP 03/20/2016   SpO2 93%   BMI 52.59 kg/m²     Physical Exam General: WD, WN, female in NAD. Weight is up 2 pounds  Neck: No JVD.  Good carotid upstroke and no bruit  Chest: Clear to A & P  Heart: Regular rhythm, fairly slow at 60 beats a minute.  Normal S1 and S2. No murmur, gallop, rub, click  Ext: No edema  Neuro: Alert, oriented  Psych: normal mood, affect    Assessment:     1. Paroxysmal atrial fibrillation (HCC)     2. Hyperthyroidism         Medical Decision Making:  Today's Assessment / Status / Plan:   I suggested the patient continue Eliquis until her thyroid function is under control.  I asked her to return in 2 months at which time we will reevaluate her anticoagulation.  In the meantime, we will also continue metoprolol 50 mg.  Is recommended that does not absolutely have to go to the emergency " room for an episode of atrial fibrillation and less she feels awful.  As long she takes Eliquis she can stay at home and take an additional dose of metoprolol and notify Sim Levin P.A.-C, or this office.  The patient's surgical risk will not be any higher.  It would be reasonable to have her thyroid function stable going into surgery, however.

## 2018-12-07 NOTE — ASSESSMENT & PLAN NOTE
This is a 63-year-old female who was recently seen at the emergency room for irregular heartbeat.  She was having a mix of SVTs and atrial fibrillation.  She states that prior to her visit at the ER she began Eliquis.  Her cardiologist told her to take it when she went through fits of fibrillation.  During her hospital visit thyroid panel was drawn that showed the following:     Ref. Range 12/1/2018 08:26 12/1/2018 08:26   TSH Latest Ref Range: 0.380 - 5.330 uIU/mL 0.060 (L)    Free T-4 Latest Ref Range: 0.58 - 1.64 ng/dL 1.11 1.06     There was a concern from the ED department that she has hyperthyroidism.  Previous records indicate that her values were always fluctuating.  She was also followed by endocrinology and taking Synthroid 25 mcg daily.  She was provided a prescription from the ER of Tapazole.  She does state that she felt like getting up in the morning since she has been on the medication.  She denies any current chest pain or shortness of breath.  Denies any weight loss.

## 2018-12-07 NOTE — ASSESSMENT & PLAN NOTE
States that finally there is some improvement in both of the knees after the Monovisc injections.  Does not feel as poor when she gets up from a seated position.  Generalized improvement with pain.

## 2018-12-07 NOTE — PROGRESS NOTES
Subjective:   Alfonso Posada is a 63 y.o. female here today for abnormal lab values suggesting hyperthyroid state.  Plus a history of atrial fibrillation and chronic knee pain.    Hyperthyroidism  This is a 63-year-old female who was recently seen at the emergency room for irregular heartbeat.  She was having a mix of SVTs and atrial fibrillation.  She states that prior to her visit at the ER she began Eliquis.  Her cardiologist told her to take it when she went through fits of fibrillation.  During her hospital visit thyroid panel was drawn that showed the following:     Ref. Range 12/1/2018 08:26 12/1/2018 08:26   TSH Latest Ref Range: 0.380 - 5.330 uIU/mL 0.060 (L)    Free T-4 Latest Ref Range: 0.58 - 1.64 ng/dL 1.11 1.06     There was a concern from the ED department that she has hyperthyroidism.  Previous records indicate that her values were always fluctuating.  She was also followed by endocrinology and taking Synthroid 25 mcg daily.  She was provided a prescription from the ER of Tapazole.  She does state that she felt like getting up in the morning since she has been on the medication.  She denies any current chest pain or shortness of breath.  Denies any weight loss.    Bilateral chronic knee pain  States that finally there is some improvement in both of the knees after the Monovisc injections.  Does not feel as poor when she gets up from a seated position.  Generalized improvement with pain.       Current medicines (including changes today)  Current Outpatient Prescriptions   Medication Sig Dispense Refill   • furosemide (LASIX) 40 MG Tab Take 40 mg by mouth every day.     • atorvastatin (LIPITOR) 40 MG Tab Take 40 mg by mouth every evening.     • losartan (COZAAR) 100 MG Tab Take 0.5 Tabs by mouth every day. (Patient taking differently: Take 100 mg by mouth every day.) 30 Tab 2   • metoprolol (LOPRESSOR) 25 MG Tab Take one tab up to 2 times per day as needed for heart rate over 120. Please do not  take if your blood pressure is less than 100/50 (Patient taking differently: Take 50 mg by mouth 2 times a day. Take one tab up to 2 times per day as needed for heart rate over 120. Please do not take if your blood pressure is less than 100/50) 30 Tab 1   • apixaban (ELIQUIS) 5mg Tab Take 1 Tab by mouth 2 Times a Day. 60 Tab 3   • methimazole (TAPAZOLE) 5 MG Tab Take 1 Tab by mouth every day. 30 Tab 1   • oxyCODONE-acetaminophen (PERCOCET-10)  MG Tab Take 1 Tab by mouth 3 times a day for 30 days. 90 Tab 0   • sertraline (ZOLOFT) 100 MG Tab TAKE ONE TABLET BY MOUTH ONE TIME A DAY 90 Tab 2   • potassium chloride (KLOR-CON) 8 MEQ tablet TAKE ONE TABLET BY MOUTH ONE TIME A DAY 90 Tab 0   • ALPRAZolam (XANAX) 0.5 MG Tab Take 1 Tab by mouth 2 times a day as needed for Anxiety for up to 30 days. (Patient taking differently: Take 0.5 mg by mouth as needed for Anxiety.) 60 Tab 2   • MAGNESIUM OXIDE PO Take 1 Tab by mouth every day at 6 PM.     • ketoconazole (NIZORAL) 2 % Cream Apply 1 Application to affected area(s) every day. Apply to face     • cetirizine (ZYRTEC) 10 MG Tab Take 10 mg by mouth every evening.     • diphenhydrAMINE (BENADRYL) 25 MG Tab Take 25 mg by mouth every 6 hours as needed for Itching.     • acetaminophen (TYLENOL) 500 MG Tab Take 1,000 mg by mouth 3 times a day as needed.     • tramadol (ULTRAM) 50 MG Tab Take 1 Tab by mouth every 8 hours as needed for up to 30 days. 90 Tab 0   • fluticasone (FLONASE) 50 MCG/ACT nasal spray SPRAY ONE SPRAY IN EACH NOSTRIL TWICE DAILY 16 g 5   • ondansetron (ZOFRAN ODT) 4 MG TABLET DISPERSIBLE DISSOLVE ONE TABLET BY MOUTH EVERY 8 HOURS AS NEEDED FOR NAUSEA AND VOMITING 30 Tab 5   • omeprazole (PRILOSEC) 40 MG delayed-release capsule Take 1 Cap by mouth every day. 90 Cap 3     No current facility-administered medications for this visit.      She  has a past medical history of Anxiety; Anxiety disorder; Asthma; Atrial fibrillation (HCC); Bartholin cyst; Cancer  "(HCC); Chronic knee pain; Depression; Hypertension; Morbid obesity (HCC); Postherpetic neuralgia; Psychiatric disorder; SVT (supraventricular tachycardia) (HCC); and Thyroid disease.    Social History and Family History were reviewed and updated.    ROS   No chest pain, no shortness of breath, no abdominal pain and all other systems were reviewed and are negative.       Objective:     Blood pressure 128/70, pulse 62, temperature 36.4 °C (97.6 °F), height 1.651 m (5' 5\"), weight (!) 142.2 kg (313 lb 9.6 oz), last menstrual period 03/20/2016, SpO2 96 %, not currently breastfeeding. Body mass index is 52.19 kg/m².   Physical Exam:  Constitutional: Alert, no distress.  Skin: Warm, dry, good turgor, no rashes in visible areas.  Eye: Equal, round and reactive, conjunctiva clear, lids normal.  ENMT: Lips without lesions, good dentition, oropharynx clear.  Neck: Trachea midline.  Right neck mass noted.  Lymph: No cervical or supraclavicular lymphadenopathy  Respiratory: Unlabored respiratory effort, lungs clear to auscultation, no wheezes, no ronchi.  Cardiovascular: Normal S1, S2, no murmur, no edema.  Abdomen: Soft, non-tender, no masses.  Psych: Alert and oriented x3, normal affect and mood.        Assessment and Plan:   The following treatment plan was discussed    1. Hyperthyroidism  New onset condition.  Not a true hyperthyroid state.  Referred to endocrinology and she may continue Tapazole.  Repeat TSH value in 1 month.  Also ordered an ultrasound of soft tissues given the neck mass on the right side of her neck.  - US-SOFT TISSUES OF HEAD - NECK; Future  - REFERRAL TO ENDOCRINOLOGY  - TSH WITH REFLEX TO FT4; Future    2. Bilateral chronic knee pain  Chronic condition.  Slight improvement after injections.  Continue to follow with Orth O.  Follow-up at the end of the month to discuss knees.    Patient was seen for 25 minutes face to face of which > 50% of appointment time was spent on counseling and coordination of " care regarding the above.    Followup: Return if symptoms worsen or fail to improve.    Please note that this dictation was created using voice recognition software. I have made every reasonable attempt to correct obvious errors, but I expect that there are errors of grammar and possibly content that I did not discover before finalizing the note.

## 2018-12-18 ENCOUNTER — OFFICE VISIT (OUTPATIENT)
Dept: MEDICAL GROUP | Facility: LAB | Age: 63
End: 2018-12-18
Payer: COMMERCIAL

## 2018-12-18 ENCOUNTER — OFFICE VISIT (OUTPATIENT)
Dept: ENDOCRINOLOGY | Facility: MEDICAL CENTER | Age: 63
End: 2018-12-18
Payer: COMMERCIAL

## 2018-12-18 VITALS
DIASTOLIC BLOOD PRESSURE: 70 MMHG | HEIGHT: 65 IN | BODY MASS INDEX: 48.82 KG/M2 | SYSTOLIC BLOOD PRESSURE: 120 MMHG | HEART RATE: 89 BPM | WEIGHT: 293 LBS | OXYGEN SATURATION: 92 %

## 2018-12-18 VITALS
DIASTOLIC BLOOD PRESSURE: 74 MMHG | HEART RATE: 68 BPM | RESPIRATION RATE: 14 BRPM | WEIGHT: 293 LBS | BODY MASS INDEX: 48.82 KG/M2 | SYSTOLIC BLOOD PRESSURE: 124 MMHG | OXYGEN SATURATION: 93 % | HEIGHT: 65 IN | TEMPERATURE: 98.4 F

## 2018-12-18 DIAGNOSIS — I48.0 PAROXYSMAL ATRIAL FIBRILLATION (HCC): ICD-10-CM

## 2018-12-18 DIAGNOSIS — E05.90 HYPERTHYROIDISM: Primary | ICD-10-CM

## 2018-12-18 DIAGNOSIS — R22.1 MASS OF RIGHT SIDE OF NECK: ICD-10-CM

## 2018-12-18 DIAGNOSIS — J01.00 ACUTE NON-RECURRENT MAXILLARY SINUSITIS: Primary | ICD-10-CM

## 2018-12-18 DIAGNOSIS — I47.10 SVT (SUPRAVENTRICULAR TACHYCARDIA): ICD-10-CM

## 2018-12-18 PROCEDURE — 99203 OFFICE O/P NEW LOW 30 MIN: CPT | Performed by: INTERNAL MEDICINE

## 2018-12-18 PROCEDURE — 99214 OFFICE O/P EST MOD 30 MIN: CPT | Performed by: INTERNAL MEDICINE

## 2018-12-18 RX ORDER — AZITHROMYCIN 250 MG/1
250 TABLET, FILM COATED ORAL DAILY
Qty: 6 TAB | Refills: 0 | Status: SHIPPED | OUTPATIENT
Start: 2018-12-18 | End: 2018-12-23

## 2018-12-18 NOTE — ASSESSMENT & PLAN NOTE
New to discuss, uncontrolled problem.  She reported 1 week history of nasal congestion, sinus pain, mainly involving her bilateral maxillary sinuses with a yellow/green postnasal drip.  She also has an associated pain behind her left ear that she described as a dull achy pain.  Her symptoms continued to worsen despite home conservative care and she has been applying Vicks to her sinuses and neck area.  She denies any hearing impairment or ringing in her ear.  She denies apart from her baseline seasonal allergy cough.  No shortness of breath or wheezing.  She denies fever/chills but she reported some temperature changes that she attributed to her thyroid problem.  She does not have any sick contacts and she quit smoking 4 months ago.  She also lost her  about 4 months ago and she is going to spend the holiday season with her kids in [facility], she would like to make sure that she is treated before she leaves.

## 2018-12-18 NOTE — PROGRESS NOTES
Chief Complaint   Patient presents with   • Sinus Problem   • Sinusitis   • Epistaxis       Subjective:     HPI:   Alfonso presents today with the following.    Acute non-recurrent maxillary sinusitis  New to discuss, uncontrolled problem.  She reported 1 week history of nasal congestion, sinus pain, mainly involving her bilateral maxillary sinuses with a yellow/green postnasal drip.  She also has an associated pain behind her left ear that she described as a dull achy pain.  Her symptoms continued to worsen despite home conservative care and she has been applying Vicks to her sinuses and neck area.  She denies any hearing impairment or ringing in her ear.  She denies apart from her baseline seasonal allergy cough.  No shortness of breath or wheezing.  She denies fever/chills but she reported some temperature changes that she attributed to her thyroid problem.  She does not have any sick contacts and she quit smoking 4 months ago.  She also lost her  about 4 months ago and she is going to spend the holiday season with her kids in [facility], she would like to make sure that she is treated before she leaves.            Patient Active Problem List    Diagnosis Date Noted   • Mild pulmonary hypertension ([facility]) 12/02/2018   • Morbid obesity ([facility]) 12/02/2018   • Hyperthyroidism 12/02/2018   • Prediabetes 11/15/2018   • SVT (supraventricular tachycardia) ([facility]) 10/21/2018   • Seasonal allergies 10/21/2018   • Paroxysmal atrial fibrillation ([facility]) 10/01/2018   • Hyperlipidemia 04/17/2018   • Primary insomnia 01/19/2018   • Mild intermittent asthma without complication 01/19/2018   • Chronic use of opiate for therapeutic purpose 01/19/2018   • Rash of face 10/26/2017   • Acute non-recurrent maxillary sinusitis 08/03/2017   • Morbid obesity with BMI of 50.0-59.9, adult ([facility]) 07/03/2017   • Chronic pain in right shoulder 06/02/2017   • Nausea 04/03/2017   • Gastroesophageal reflux disease without esophagitis 03/03/2017    • Anxiety 02/03/2017   • Postherpetic neuralgia 02/03/2017   • Bilateral chronic knee pain 02/03/2017   • Arthritis 02/03/2017   • Allergic rhinitis 02/03/2017   • Essential hypertension 02/03/2017       Current Outpatient Prescriptions   Medication Sig Dispense Refill   • azithromycin (ZITHROMAX Z-PHYLLIS) 250 MG Tab Take 1 Tab by mouth every day for 5 days. Take 2 tabs on day 1 6 Tab 0   • furosemide (LASIX) 40 MG Tab Take 40 mg by mouth every day.     • atorvastatin (LIPITOR) 40 MG Tab Take 40 mg by mouth every evening.     • losartan (COZAAR) 100 MG Tab Take 0.5 Tabs by mouth every day. (Patient taking differently: Take 100 mg by mouth every day.) 30 Tab 2   • metoprolol (LOPRESSOR) 25 MG Tab Take one tab up to 2 times per day as needed for heart rate over 120. Please do not take if your blood pressure is less than 100/50 (Patient taking differently: Take 50 mg by mouth 2 times a day. Take one tab up to 2 times per day as needed for heart rate over 120. Please do not take if your blood pressure is less than 100/50) 30 Tab 1   • apixaban (ELIQUIS) 5mg Tab Take 1 Tab by mouth 2 Times a Day. 60 Tab 3   • methimazole (TAPAZOLE) 5 MG Tab Take 1 Tab by mouth every day. 30 Tab 1   • oxyCODONE-acetaminophen (PERCOCET-10)  MG Tab Take 1 Tab by mouth 3 times a day for 30 days. 90 Tab 0   • sertraline (ZOLOFT) 100 MG Tab TAKE ONE TABLET BY MOUTH ONE TIME A DAY 90 Tab 2   • potassium chloride (KLOR-CON) 8 MEQ tablet TAKE ONE TABLET BY MOUTH ONE TIME A DAY 90 Tab 0   • MAGNESIUM OXIDE PO Take 1 Tab by mouth every day at 6 PM.     • cetirizine (ZYRTEC) 10 MG Tab Take 10 mg by mouth every evening.     • diphenhydrAMINE (BENADRYL) 25 MG Tab Take 25 mg by mouth every 6 hours as needed for Itching.     • acetaminophen (TYLENOL) 500 MG Tab Take 1,000 mg by mouth 3 times a day as needed.     • tramadol (ULTRAM) 50 MG Tab Take 1 Tab by mouth every 8 hours as needed for up to 30 days. 90 Tab 0   • fluticasone (FLONASE) 50 MCG/ACT  "nasal spray SPRAY ONE SPRAY IN EACH NOSTRIL TWICE DAILY 16 g 5   • ondansetron (ZOFRAN ODT) 4 MG TABLET DISPERSIBLE DISSOLVE ONE TABLET BY MOUTH EVERY 8 HOURS AS NEEDED FOR NAUSEA AND VOMITING 30 Tab 5   • omeprazole (PRILOSEC) 40 MG delayed-release capsule Take 1 Cap by mouth every day. 90 Cap 3   • ketoconazole (NIZORAL) 2 % Cream Apply 1 Application to affected area(s) every day. Apply to face       No current facility-administered medications for this visit.        Allergies as of 12/18/2018 - Reviewed 12/18/2018   Allergen Reaction Noted   • Latex Itching 09/24/2018   • Penicillins Hives, Rash, and Itching 09/24/2018   • Sulfa drugs Itching 09/24/2018        Past Medical History:   Diagnosis Date   • Anxiety    • Anxiety disorder    • Asthma    • Atrial fibrillation (HCC)    • Bartholin cyst    • Cancer (HCC)     uterine, treated   • Chronic knee pain    • Depression    • Hypertension    • Morbid obesity (HCC)    • Postherpetic neuralgia    • Psychiatric disorder     anxiety   • SVT (supraventricular tachycardia) (HCC)    • Thyroid disease        Past Surgical History:   Procedure Laterality Date   • THYROIDECTOMY TOTAL  2005   • HYSTERECTOMY LAPAROSCOPY     • THYROIDECTOMY      still has parathyroid       Social History   Substance Use Topics   • Smoking status: Former Smoker     Packs/day: 0.50     Start date: 3/4/2016     Quit date: 9/9/2018   • Smokeless tobacco: Never Used      Comment: 10 cigs   • Alcohol use 1.8 - 3.0 oz/week     3 - 5 Glasses of wine per week      Comment: \"Nothing since 9/17/18\"       History reviewed. No pertinent family history.    ROS:     - Constitutional: Negative for fever, chills, unexpected weight change, and fatigue/generalized weakness.     - HEENT: Per HPI.    - Respiratory: + Baseline chronic cough. Negative for sputum production, dyspnea and wheezing.    - Cardiovascular: Negative for chest pain or palpitations.      - Gastrointestinal: Negative for heartburn, nausea, " "vomiting, abdominal pain, diarrhea or constipation.     - Genitourinary: Negative for dysuria, polyuria or urinary urgency.    - Musculoskeletal: + chronic hip pain. Negative for myalgias, back pain, and other joint pain.     - Skin: Negative for rash, itching, cyanotic skin color change.     - Psychiatric/Behavioral: Negative for depression or suicidal/homicidal ideation.       Physical Exam:     Blood pressure 124/74, pulse 68, temperature 36.9 °C (98.4 °F), temperature source Temporal, resp. rate 14, height 1.651 m (5' 5\"), weight (!) 138.3 kg (305 lb), last menstrual period 03/20/2016, SpO2 93 %, not currently breastfeeding. Body mass index is 50.75 kg/m².   Gen:         Alert and oriented, No apparent distress. Obese.  HEENT: Eyes conjunctiva is clear, lids without ptosis, pupils equal round and reactive to light and accommodation.  Ears normal shape and contour, canals are clear bilaterally, TMs with good light reflex and appear normal.  Nasal mucosa pale and edematous with clear rhinorrhea.  Oropharynx benign.  Sinuses (frontal and maxillary) are very ntender to palpation.  Neck:        No Lymphadenopathy or Bruits.  Lungs:     Clear to auscultation bilaterally  CV:          Regular rate and rhythm. No murmurs, rubs or gallops.        Ext:          No clubbing, cyanosis, edema.    Data:     LABS:12/218 : Results reviewed and discussed with the patient, questions answered.  Will be following with her endocrinologist this afternoon for her abnormal thyroid function test.      Assessment and Plan:     63 y.o. female with the following issues.    1. Acute non-recurrent maxillary sinusitis  New, uncontrolled problem.  Recommended to proceed with a Z-Phyllis.  Recommended home supportive care with hydration, home remedies and [place] pot use.  She is currently using Afrin at home, commended not to use on 3 days in a row.    - azithromycin (ZITHROMAX Z-PHYLLIS) 250 MG Tab; Take 1 Tab by mouth every day for 5 days. Take 2 " tabs on day 1  Dispense: 6 Tab; Refill: 0      Follow Up:      Return for PRN with PCP.      Please note that this dictation was created using voice recognition software. I have made every reasonable attempt to correct obvious errors, but I expect that there are errors of grammar and possibly content that I did not discover before finalizing the note.    Signed by: Casie Meza M.D.

## 2018-12-19 NOTE — PROGRESS NOTES
Chief Complaint   Patient presents with   • Thyrotoxicosis     Right neck mass /nodule ?        HPI:    Thyrotoxicosis    I am taking over this patient’s care from Dr. Kang who saw her I believe one time in February 2017.  Since then, she has tried to manage her thyroid problem long distance mainly by telephone as she lives in [place].  It would appear that an interesting evolution has taken place.      As stated originally by Dr. Kang her history goes back at least 30 years to a time that she had a thyroidectomy.  It was her understanding this was done for thyroid nodules.  She was not thyrotoxic at the time.  There was no thyroid cancer involved.  She has been on thyroid hormone replacement since then.  When she saw Dr. Kang last year, she was taking levothyroxine 100 mcg per day.  The year before, she was taking 125 mcg and she was found to have low TSH and elevated free T4 so her dose was lowered to 100 mcg.  It was then lowered again based on lab tests, down to 25 mcg.  When she went off of thyroid replacement altogether, her TSH began to elevate.  About that time, she resumed levothyroxine 25 mcg.  An ultrasound that we do not have reportedly showed nodules.  Also she was beginning to experience episodes of tachycardia and was given metoprolol.  In April 2017, her TSH was 1.2 and free T4 1.0 taking levothyroxine 25 mcg per day.  In August, her TSH was slightly low at 0.28 and free T4 mid range at 1.2.  No dose change was suggested.  The next significant event occurred in September this year when she presented to the emergency room in tachycardia and found to be in SVT and then atrial fibrillation.  She was told at that time to stop her thyroid supplements because the thyroid levels were “up” and metoprolol was started.  About two weeks later that month, her TSH was 0.5 and free T4 1.2 off of thyroid hormone replacement.  She also saw Dr. Leon, her cardiologist, September 24 who noted the atrial  fibrillation and started Eloquist 5 mg b.i.d., increased her Losartan to 50 mg per day and thought she should discontinue metoprolol and told her to use it on a prn basis for tachycardia.  She then presented to [facility] with palpitations and chest pain.  At that time, her TSH was found to be suppressed at .06 with a mid normal free T4 of 1.1.  Her thyroid replacement was discontinued and she was given methimazole 5 mg per day which she is taking for the present time.     Things have calmed down for her considerably.  She still has a tendency to have palpitations.  She is not overtly thyrotoxic and feels the methimazole 5 mg a day that she has been on the past two weeks has been of benefit.      She has particularly noted a swelling in her right thyroid area.  It is not tender.  She feels it is a nodule and she is guessing it is a recurrence of her previous thyroid gland or nodules.  On my examination I can’t discern that but there definitely is something in that area and she could well be correct.    My plan therefore will be to continue methimazole for another week or two and then update lab including thyroid blood levels, TSH and thyrotropin receptor antibody, TSI.  She does not have eye changes consistent with Grave’s disease.  We will get a thyroid ultrasound and I will speak with her again when I see that result.     If it does look like a recurrent thyroid nodule, then I will suspend her methimazole and get an I-123 uptake and scan to see if this confirms that she does have recurrent thyroid tissue that is thyrotoxic.      She is already looking ahead thinking that I-131 might be her best solution and she might be right.  Next follow up by phone in about two weeks and I would like to see her in one month.     ROS:  Recently lost her .  I think it was cancer      Allergies:   Allergies   Allergen Reactions   • Latex Itching   • Penicillins Hives, Rash and Itching     Itching & Rash     • Sulfa Drugs  Itching     Itching feeling on leg, prickily/need-like sensation on skin.       Current medicines including changes today:  Current Outpatient Prescriptions   Medication Sig Dispense Refill   • azithromycin (ZITHROMAX Z-PHYLLIS) 250 MG Tab Take 1 Tab by mouth every day for 5 days. Take 2 tabs on day 1 6 Tab 0   • furosemide (LASIX) 40 MG Tab Take 40 mg by mouth every day.     • atorvastatin (LIPITOR) 40 MG Tab Take 40 mg by mouth every evening.     • losartan (COZAAR) 100 MG Tab Take 0.5 Tabs by mouth every day. (Patient taking differently: Take 100 mg by mouth every day.) 30 Tab 2   • metoprolol (LOPRESSOR) 25 MG Tab Take one tab up to 2 times per day as needed for heart rate over 120. Please do not take if your blood pressure is less than 100/50 (Patient taking differently: Take 50 mg by mouth 2 times a day. Take one tab up to 2 times per day as needed for heart rate over 120. Please do not take if your blood pressure is less than 100/50) 30 Tab 1   • apixaban (ELIQUIS) 5mg Tab Take 1 Tab by mouth 2 Times a Day. 60 Tab 3   • methimazole (TAPAZOLE) 5 MG Tab Take 1 Tab by mouth every day. 30 Tab 1   • oxyCODONE-acetaminophen (PERCOCET-10)  MG Tab Take 1 Tab by mouth 3 times a day for 30 days. 90 Tab 0   • sertraline (ZOLOFT) 100 MG Tab TAKE ONE TABLET BY MOUTH ONE TIME A DAY 90 Tab 2   • potassium chloride (KLOR-CON) 8 MEQ tablet TAKE ONE TABLET BY MOUTH ONE TIME A DAY 90 Tab 0   • MAGNESIUM OXIDE PO Take 1 Tab by mouth every day at 6 PM.     • cetirizine (ZYRTEC) 10 MG Tab Take 10 mg by mouth every evening.     • diphenhydrAMINE (BENADRYL) 25 MG Tab Take 25 mg by mouth every 6 hours as needed for Itching.     • acetaminophen (TYLENOL) 500 MG Tab Take 1,000 mg by mouth 3 times a day as needed.     • tramadol (ULTRAM) 50 MG Tab Take 1 Tab by mouth every 8 hours as needed for up to 30 days. 90 Tab 0   • fluticasone (FLONASE) 50 MCG/ACT nasal spray SPRAY ONE SPRAY IN EACH NOSTRIL TWICE DAILY 16 g 5   • ondansetron  "(ZOFRAN ODT) 4 MG TABLET DISPERSIBLE DISSOLVE ONE TABLET BY MOUTH EVERY 8 HOURS AS NEEDED FOR NAUSEA AND VOMITING 30 Tab 5   • omeprazole (PRILOSEC) 40 MG delayed-release capsule Take 1 Cap by mouth every day. 90 Cap 3   • ketoconazole (NIZORAL) 2 % Cream Apply 1 Application to affected area(s) every day. Apply to face       No current facility-administered medications for this visit.         Past Medical History:   Diagnosis Date   • Anxiety    • Anxiety disorder    • Asthma    • Atrial fibrillation (HCC)    • Bartholin cyst    • Cancer (HCC)     uterine, treated   • Chronic knee pain    • Depression    • Hypertension    • Morbid obesity (HCC)    • Postherpetic neuralgia    • Psychiatric disorder     anxiety   • SVT (supraventricular tachycardia) (HCC)    • Thyroid disease        PHYSICAL EXAM:    /70 (BP Location: Right arm, Patient Position: Sitting)   Pulse 89   Ht 1.651 m (5' 5\")   Wt (!) 138.3 kg (305 lb)   LMP 03/20/2016   SpO2 92%   BMI 50.75 kg/m²     Gen.   appears clinically euthyroid right now.  Not overtly thyrotoxic    Skin   appropriate for sex and age    HEENT    no eye signs of Graves' ophthalmopathy    Neck     definite fullness in the area of the right thyroid bed.  Probably regenerated thyroid tissue or perhaps a nodule.  Nontender    Heart  regular    Extremities  no edema    Neuro  gait and station normal, no tremor of the hands    Psych  appropriate, very tired and worn out from the sequence of events in the back and forth from [place]      ASSESSMENT AND RECOMMENDATIONS    1. Hyperthyroidism ?              This diagnosis is by no means confirmed.  When she has had low TSH levels she had been on thyroid hormone.  - FREE THYROXINE; Future  - T3 FREE; Future  - TSH; Future  - THYROTROPIN RECEP AB; Future  - TSI; Future    2. Paroxysmal atrial fibrillation (HCC)      3. SVT (supraventricular tachycardia) ([facility])      4. Mass of right side of neck             ?  Recurrent thyroid tissue / " thyroid nodule ?              We will do an ultrasound.  If this appears to be consistent with recurrent thyroid tissue or nodule I will have her stop methimazole and do I-123 uptake and scan.  In the meantime Graves' antibody levels are pending    DISPOSITION: Review ultrasound results by telephone.                            Return to office in 1 month      Kamran Hyman M.D.    Copies to: Sim Brownlee P.A.-C. [phone]

## 2018-12-27 ENCOUNTER — HOSPITAL ENCOUNTER (OUTPATIENT)
Dept: LAB | Facility: MEDICAL CENTER | Age: 63
End: 2018-12-27
Attending: INTERNAL MEDICINE
Payer: COMMERCIAL

## 2018-12-27 ENCOUNTER — HOSPITAL ENCOUNTER (OUTPATIENT)
Dept: LAB | Facility: MEDICAL CENTER | Age: 63
End: 2018-12-27
Attending: PHYSICIAN ASSISTANT
Payer: COMMERCIAL

## 2018-12-27 ENCOUNTER — OFFICE VISIT (OUTPATIENT)
Dept: MEDICAL GROUP | Facility: MEDICAL CENTER | Age: 63
End: 2018-12-27
Payer: COMMERCIAL

## 2018-12-27 VITALS
SYSTOLIC BLOOD PRESSURE: 128 MMHG | BODY MASS INDEX: 48.82 KG/M2 | WEIGHT: 293 LBS | RESPIRATION RATE: 16 BRPM | TEMPERATURE: 97.1 F | OXYGEN SATURATION: 98 % | HEART RATE: 66 BPM | DIASTOLIC BLOOD PRESSURE: 78 MMHG | HEIGHT: 65 IN

## 2018-12-27 DIAGNOSIS — E05.90 HYPERTHYROIDISM: ICD-10-CM

## 2018-12-27 DIAGNOSIS — M25.562 BILATERAL CHRONIC KNEE PAIN: ICD-10-CM

## 2018-12-27 DIAGNOSIS — M25.511 CHRONIC PAIN IN RIGHT SHOULDER: ICD-10-CM

## 2018-12-27 DIAGNOSIS — M25.561 BILATERAL CHRONIC KNEE PAIN: ICD-10-CM

## 2018-12-27 DIAGNOSIS — F51.01 PRIMARY INSOMNIA: ICD-10-CM

## 2018-12-27 DIAGNOSIS — F41.9 ANXIETY: ICD-10-CM

## 2018-12-27 DIAGNOSIS — G89.29 CHRONIC PAIN IN RIGHT SHOULDER: ICD-10-CM

## 2018-12-27 DIAGNOSIS — G89.29 BILATERAL CHRONIC KNEE PAIN: ICD-10-CM

## 2018-12-27 LAB
T3FREE SERPL-MCNC: 3.78 PG/ML (ref 2.4–4.2)
T4 FREE SERPL-MCNC: 0.81 NG/DL (ref 0.53–1.43)
TSH SERPL DL<=0.005 MIU/L-ACNC: 0.7 UIU/ML (ref 0.38–5.33)
TSH SERPL DL<=0.005 MIU/L-ACNC: 0.74 UIU/ML (ref 0.38–5.33)

## 2018-12-27 PROCEDURE — 84443 ASSAY THYROID STIM HORMONE: CPT

## 2018-12-27 PROCEDURE — 84443 ASSAY THYROID STIM HORMONE: CPT | Mod: 91

## 2018-12-27 PROCEDURE — 84481 FREE ASSAY (FT-3): CPT

## 2018-12-27 PROCEDURE — 84439 ASSAY OF FREE THYROXINE: CPT

## 2018-12-27 PROCEDURE — 36415 COLL VENOUS BLD VENIPUNCTURE: CPT

## 2018-12-27 PROCEDURE — 84445 ASSAY OF TSI GLOBULIN: CPT

## 2018-12-27 PROCEDURE — 99214 OFFICE O/P EST MOD 30 MIN: CPT | Performed by: PHYSICIAN ASSISTANT

## 2018-12-27 PROCEDURE — 83520 IMMUNOASSAY QUANT NOS NONAB: CPT

## 2018-12-27 RX ORDER — OXYCODONE AND ACETAMINOPHEN 10; 325 MG/1; MG/1
1 TABLET ORAL
Qty: 90 TAB | Refills: 0 | Status: SHIPPED | OUTPATIENT
Start: 2018-12-27 | End: 2018-12-27 | Stop reason: SDUPTHER

## 2018-12-27 RX ORDER — OXYCODONE AND ACETAMINOPHEN 10; 325 MG/1; MG/1
1 TABLET ORAL
Qty: 90 TAB | Refills: 0 | Status: SHIPPED | OUTPATIENT
Start: 2019-02-25 | End: 2019-03-21 | Stop reason: SDUPTHER

## 2018-12-27 RX ORDER — ALPRAZOLAM 0.5 MG/1
0.5 TABLET ORAL PRN
Qty: 60 TAB | Refills: 2 | Status: SHIPPED | OUTPATIENT
Start: 2018-12-27 | End: 2019-01-26

## 2018-12-27 RX ORDER — OXYCODONE AND ACETAMINOPHEN 10; 325 MG/1; MG/1
1 TABLET ORAL
Qty: 90 TAB | Refills: 0 | Status: SHIPPED | OUTPATIENT
Start: 2019-01-26 | End: 2018-12-27 | Stop reason: SDUPTHER

## 2018-12-27 NOTE — PROGRESS NOTES
Subjective:   Alfonso Posada is a 63 y.o. female here today for chronic knee pain and anxiety with insomnia.    Bilateral chronic knee pain  This is a 63-year-old female who is here today to discuss her chronic history of knee pain.  The pain is not improved.  She has an appointment in the near future with orthopedics.  Requesting a refill of her opiate medication.  Takes 10 mg 3 times a day.  Denies any complaints while taking the medication.  Also takes it with tramadol.  Knows the black box warning of taking opiates with Xanax which she takes as well for anxiety and insomnia.    Anxiety  Has a chronic history of anxiety as well as insomnia.  Takes Xanax.  Takes it once or twice a day.  Does not take it daily.  Sometimes has a surplus of medication after the month.  Last prescription was filled over a month ago.       Current medicines (including changes today)  Current Outpatient Prescriptions   Medication Sig Dispense Refill   • [START ON 2/25/2019] oxyCODONE-acetaminophen (PERCOCET-10)  MG Tab Take 1 Tab by mouth 3 times a day for 30 days. 90 Tab 0   • ALPRAZolam (XANAX) 0.5 MG Tab Take 1 Tab by mouth as needed for Anxiety for up to 30 days. 60 Tab 2   • furosemide (LASIX) 40 MG Tab Take 40 mg by mouth every day.     • atorvastatin (LIPITOR) 40 MG Tab Take 40 mg by mouth every evening.     • losartan (COZAAR) 100 MG Tab Take 0.5 Tabs by mouth every day. (Patient taking differently: Take 100 mg by mouth every day.) 30 Tab 2   • metoprolol (LOPRESSOR) 25 MG Tab Take one tab up to 2 times per day as needed for heart rate over 120. Please do not take if your blood pressure is less than 100/50 (Patient taking differently: Take 50 mg by mouth 2 times a day. Take one tab up to 2 times per day as needed for heart rate over 120. Please do not take if your blood pressure is less than 100/50) 30 Tab 1   • apixaban (ELIQUIS) 5mg Tab Take 1 Tab by mouth 2 Times a Day. 60 Tab 3   • methimazole (TAPAZOLE) 5 MG Tab  "Take 1 Tab by mouth every day. 30 Tab 1   • sertraline (ZOLOFT) 100 MG Tab TAKE ONE TABLET BY MOUTH ONE TIME A DAY 90 Tab 2   • potassium chloride (KLOR-CON) 8 MEQ tablet TAKE ONE TABLET BY MOUTH ONE TIME A DAY 90 Tab 0   • MAGNESIUM OXIDE PO Take 1 Tab by mouth every day at 6 PM.     • ketoconazole (NIZORAL) 2 % Cream Apply 1 Application to affected area(s) every day. Apply to face     • cetirizine (ZYRTEC) 10 MG Tab Take 10 mg by mouth every evening.     • diphenhydrAMINE (BENADRYL) 25 MG Tab Take 25 mg by mouth every 6 hours as needed for Itching.     • acetaminophen (TYLENOL) 500 MG Tab Take 1,000 mg by mouth 3 times a day as needed.     • tramadol (ULTRAM) 50 MG Tab Take 1 Tab by mouth every 8 hours as needed for up to 30 days. 90 Tab 0   • fluticasone (FLONASE) 50 MCG/ACT nasal spray SPRAY ONE SPRAY IN EACH NOSTRIL TWICE DAILY 16 g 5   • ondansetron (ZOFRAN ODT) 4 MG TABLET DISPERSIBLE DISSOLVE ONE TABLET BY MOUTH EVERY 8 HOURS AS NEEDED FOR NAUSEA AND VOMITING 30 Tab 5   • omeprazole (PRILOSEC) 40 MG delayed-release capsule Take 1 Cap by mouth every day. 90 Cap 3     No current facility-administered medications for this visit.      She  has a past medical history of Anxiety; Anxiety disorder; Asthma; Atrial fibrillation (HCC); Bartholin cyst; Cancer (HCC); Chronic knee pain; Depression; Hypertension; Morbid obesity (HCC); Postherpetic neuralgia; Psychiatric disorder; SVT (supraventricular tachycardia) ([facility]); and Thyroid disease.    Social History and Family History were reviewed and updated.    ROS   No chest pain, no shortness of breath, no abdominal pain and all other systems were reviewed and are negative.       Objective:     Blood pressure 128/78, pulse 66, temperature 36.2 °C (97.1 °F), temperature source Temporal, resp. rate 16, height 1.651 m (5' 5\"), weight (!) 142.6 kg (314 lb 6 oz), last menstrual period 03/20/2016, SpO2 98 %, not currently breastfeeding. Body mass index is 52.31 kg/m².   Physical " Exam:  Constitutional: Alert, no distress.  Skin: Warm, dry, good turgor, no rashes in visible areas.  Eye: Equal, round and reactive, conjunctiva clear, lids normal.  ENMT: Lips without lesions, good dentition, oropharynx clear.  Neck: Trachea midline, no masses.   Lymph: No cervical or supraclavicular lymphadenopathy  Respiratory: Unlabored respiratory effort, lungs clear to auscultation, no wheezes, no ronchi.  Cardiovascular: Normal S1, S2, no murmur, no edema.  Musculoskeletal: Walking with a significant limp affecting both sides.  Psych: Alert and oriented x3, normal affect and mood.        Assessment and Plan:   The following treatment plan was discussed    1. Bilateral chronic knee pain  Chronic condition.  Stable.   reviewed.  Medication appropriate.  Discussed black box warning.  Renewed oxycodone 10 mg 3 times daily.  Provided a 90-day supply.  She will call for an appointment.  - oxyCODONE-acetaminophen (PERCOCET-10)  MG Tab; Take 1 Tab by mouth 3 times a day for 30 days.  Dispense: 90 Tab; Refill: 0    2. Anxiety  Chronic condition.  Stable.   reviewed.  Medication appropriate.  Black box warning discussed.  Renewed Xanax 0.5 mg 1 tablet twice daily as needed.  Provided a 90-day supply.  - ALPRAZolam (XANAX) 0.5 MG Tab; Take 1 Tab by mouth as needed for Anxiety for up to 30 days.  Dispense: 60 Tab; Refill: 2      Followup: Return in about 3 months (around 3/27/2019), or if symptoms worsen or fail to improve.    Please note that this dictation was created using voice recognition software. I have made every reasonable attempt to correct obvious errors, but I expect that there are errors of grammar and possibly content that I did not discover before finalizing the note.

## 2018-12-27 NOTE — ASSESSMENT & PLAN NOTE
Has a chronic history of anxiety as well as insomnia.  Takes Xanax.  Takes it once or twice a day.  Does not take it daily.  Sometimes has a surplus of medication after the month.  Last prescription was filled over a month ago.

## 2018-12-27 NOTE — ASSESSMENT & PLAN NOTE
This is a 63-year-old female who is here today to discuss her chronic history of knee pain.  The pain is not improved.  She has an appointment in the near future with orthopedics.  Requesting a refill of her opiate medication.  Takes 10 mg 3 times a day.  Denies any complaints while taking the medication.  Also takes it with tramadol.  Knows the black box warning of taking opiates with Xanax which she takes as well for anxiety and insomnia.

## 2018-12-28 ENCOUNTER — TELEPHONE (OUTPATIENT)
Dept: MEDICAL GROUP | Facility: MEDICAL CENTER | Age: 63
End: 2018-12-28

## 2018-12-28 NOTE — TELEPHONE ENCOUNTER
Phone Number Called: [phone] (home)       Message: Letter mailed to patient.     Left Message for patient to call back: N\A

## 2018-12-28 NOTE — TELEPHONE ENCOUNTER
----- Message from Sim Brownlee P.A.-C. sent at 12/28/2018  6:55 AM PST -----  Please contact.  Thyroid level in normal range.  Follow with endocrinology.  Get ultrasound performed.  Thank you.

## 2018-12-29 LAB — TSH RECEP AB SER-ACNC: 6.32 IU/L

## 2018-12-31 LAB — TSI ACT/NOR SER: 153 %

## 2019-01-03 ENCOUNTER — TELEPHONE (OUTPATIENT)
Dept: MEDICAL GROUP | Facility: MEDICAL CENTER | Age: 64
End: 2019-01-03

## 2019-01-03 ENCOUNTER — TELEPHONE (OUTPATIENT)
Dept: ENDOCRINOLOGY | Facility: MEDICAL CENTER | Age: 64
End: 2019-01-03

## 2019-01-03 NOTE — TELEPHONE ENCOUNTER
Phone Number Called: [phone] (home)       Message: attempted to contact patient voicemail was not set up yet. Not able to leave a message    Left Message for patient to call back: n/A

## 2019-01-03 NOTE — TELEPHONE ENCOUNTER
----- Message from Kamran Hyman M.D. sent at 1/2/2019  9:26 PM PST -----  Please tell patient that her thyroid blood levels are normal but she does have Graves' disease antibodies present.  Please do the thyroid ultrasound prior to our February 5 appointment.    Kamran Hyman M.D.

## 2019-01-04 ENCOUNTER — TELEPHONE (OUTPATIENT)
Dept: ENDOCRINOLOGY | Facility: MEDICAL CENTER | Age: 64
End: 2019-01-04

## 2019-01-04 NOTE — TELEPHONE ENCOUNTER
Patient left a message asking If you could go over her lab results. Patient stated that she is feel very tired.

## 2019-01-07 ENCOUNTER — TELEPHONE (OUTPATIENT)
Dept: ENDOCRINOLOGY | Facility: MEDICAL CENTER | Age: 64
End: 2019-01-07

## 2019-01-07 DIAGNOSIS — B02.29 POSTHERPETIC NEURALGIA: ICD-10-CM

## 2019-01-07 DIAGNOSIS — R11.0 NAUSEA: ICD-10-CM

## 2019-01-08 RX ORDER — ONDANSETRON 4 MG/1
TABLET, FILM COATED ORAL
Qty: 20 TAB | Refills: 0 | Status: SHIPPED | OUTPATIENT
Start: 2019-01-08 | End: 2019-01-18

## 2019-01-08 NOTE — TELEPHONE ENCOUNTER
Telephone conversation with patient    I reviewed the test results.  She is taking methimazole 5 mg/day and her TSH is low normal but normal at 0.7.  Free T4 also low normal at 0.8 and free T3 is high normal at 3.7.  She does have Graves' antibodies.  She had a thyroidectomy many years ago and it is possible that she has grown enough thyroid tissue back that she can become thyrotoxic.    She is going to have her thyroid ultrasound in 2 days.  If it looks like there might be some thyroid tissue and I am going to have her stop methimazole and do an I-123 uptake and scan.  Depending on that she might need I-131 ablation.  If not she would like to get on with her gastric sleeve procedure so she can begin to lose some weight.    Kamran Hyman M.D.

## 2019-01-09 ENCOUNTER — HOSPITAL ENCOUNTER (OUTPATIENT)
Dept: RADIOLOGY | Facility: MEDICAL CENTER | Age: 64
End: 2019-01-09
Attending: PHYSICIAN ASSISTANT
Payer: COMMERCIAL

## 2019-01-09 DIAGNOSIS — E05.90 HYPERTHYROIDISM: ICD-10-CM

## 2019-01-09 DIAGNOSIS — B02.29 POSTHERPETIC NEURALGIA: ICD-10-CM

## 2019-01-09 PROCEDURE — 76536 US EXAM OF HEAD AND NECK: CPT

## 2019-01-09 RX ORDER — ONDANSETRON 4 MG/1
4 TABLET, ORALLY DISINTEGRATING ORAL
Qty: 30 TAB | Refills: 5 | Status: CANCELLED | OUTPATIENT
Start: 2019-01-09

## 2019-01-10 DIAGNOSIS — B02.29 POSTHERPETIC NEURALGIA: ICD-10-CM

## 2019-01-10 RX ORDER — ONDANSETRON 4 MG/1
TABLET, ORALLY DISINTEGRATING ORAL
Qty: 30 TAB | Refills: 5 | Status: CANCELLED | OUTPATIENT
Start: 2019-01-10

## 2019-01-10 NOTE — TELEPHONE ENCOUNTER
Was the patient seen in the last year in this department? Yes    Does patient have an active prescription for medications requested? No     Received Request Via: Patient   Patient called 1/9/2019 to say she lost her script for nausea medication.  Please fill with no refills

## 2019-01-12 DIAGNOSIS — E07.89 THYROID MASS OF UNCLEAR ETIOLOGY: ICD-10-CM

## 2019-01-14 ENCOUNTER — TELEPHONE (OUTPATIENT)
Dept: MEDICAL GROUP | Facility: MEDICAL CENTER | Age: 64
End: 2019-01-14

## 2019-01-14 NOTE — TELEPHONE ENCOUNTER
Called patient at only phone number listed to give patient results. No voicemail set up/ could not leave a message to have her call us back.

## 2019-01-14 NOTE — TELEPHONE ENCOUNTER
----- Message from Sim Brownlee P.A.-C. sent at 1/12/2019  9:47 PM PST -----  Please contact Alfonso. Radiology saw an area of the right thyroid they would like biopsied.  I ordered it so contact imaging to make appt.  I also notified Dr. Hyman.  Thank you.    Sincerely,    Sim

## 2019-01-24 ENCOUNTER — HOSPITAL ENCOUNTER (OUTPATIENT)
Dept: RADIOLOGY | Facility: MEDICAL CENTER | Age: 64
End: 2019-01-24
Attending: PHYSICIAN ASSISTANT
Payer: COMMERCIAL

## 2019-01-24 DIAGNOSIS — E07.89 THYROID MASS OF UNCLEAR ETIOLOGY: ICD-10-CM

## 2019-01-24 PROCEDURE — 88112 CYTOPATH CELL ENHANCE TECH: CPT

## 2019-01-24 PROCEDURE — 700101 HCHG RX REV CODE 250

## 2019-01-24 PROCEDURE — 76942 ECHO GUIDE FOR BIOPSY: CPT

## 2019-01-24 RX ORDER — LIDOCAINE HYDROCHLORIDE 10 MG/ML
INJECTION, SOLUTION INFILTRATION; PERINEURAL
Status: COMPLETED
Start: 2019-01-24 | End: 2019-01-24

## 2019-01-24 RX ADMIN — LIDOCAINE HYDROCHLORIDE: 10 INJECTION, SOLUTION INFILTRATION; PERINEURAL at 14:47

## 2019-01-25 LAB — CYTOLOGY REG CYTOL: NORMAL

## 2019-01-25 NOTE — PROGRESS NOTES
"Patient given [facility] \"Preventing the Spread of Infection\" brochure upon arrival.     US guided Right thyroid nodule fine needle aspiration done by Dr. Seymour; Right anterior aspect of neck access site; 1 jar of cytolyt obtained and sent to pathology lab; pt tolerated the procedure well; pt hemodynamically stable pre/intra/post procedure; all questions and concerns answered prior to being d/c; patient provided with appropriate education for procedure; pt d/c home.  "

## 2019-01-28 ENCOUNTER — APPOINTMENT (OUTPATIENT)
Dept: RADIOLOGY | Facility: MEDICAL CENTER | Age: 64
End: 2019-01-28
Attending: EMERGENCY MEDICINE
Payer: COMMERCIAL

## 2019-01-28 ENCOUNTER — TELEPHONE (OUTPATIENT)
Dept: MEDICAL GROUP | Facility: MEDICAL CENTER | Age: 64
End: 2019-01-28

## 2019-01-28 ENCOUNTER — HOSPITAL ENCOUNTER (EMERGENCY)
Facility: MEDICAL CENTER | Age: 64
End: 2019-01-28
Attending: EMERGENCY MEDICINE
Payer: COMMERCIAL

## 2019-01-28 VITALS
OXYGEN SATURATION: 95 % | HEART RATE: 72 BPM | HEIGHT: 64 IN | TEMPERATURE: 98.1 F | RESPIRATION RATE: 16 BRPM | WEIGHT: 293 LBS | SYSTOLIC BLOOD PRESSURE: 102 MMHG | DIASTOLIC BLOOD PRESSURE: 78 MMHG | BODY MASS INDEX: 50.02 KG/M2

## 2019-01-28 DIAGNOSIS — I47.10 SVT (SUPRAVENTRICULAR TACHYCARDIA): ICD-10-CM

## 2019-01-28 LAB
ALBUMIN SERPL BCP-MCNC: 3.7 G/DL (ref 3.2–4.9)
ALBUMIN/GLOB SERPL: 1.1 G/DL
ALP SERPL-CCNC: 104 U/L (ref 30–99)
ALT SERPL-CCNC: 21 U/L (ref 2–50)
ANION GAP SERPL CALC-SCNC: 8 MMOL/L (ref 0–11.9)
AST SERPL-CCNC: 22 U/L (ref 12–45)
BASOPHILS # BLD AUTO: 0.9 % (ref 0–1.8)
BASOPHILS # BLD: 0.07 K/UL (ref 0–0.12)
BILIRUB SERPL-MCNC: 0.5 MG/DL (ref 0.1–1.5)
BNP SERPL-MCNC: 87 PG/ML (ref 0–100)
BUN SERPL-MCNC: 15 MG/DL (ref 8–22)
CALCIUM SERPL-MCNC: 8.5 MG/DL (ref 8.4–10.2)
CHLORIDE SERPL-SCNC: 108 MMOL/L (ref 96–112)
CO2 SERPL-SCNC: 22 MMOL/L (ref 20–33)
CREAT SERPL-MCNC: 0.78 MG/DL (ref 0.5–1.4)
EKG IMPRESSION: NORMAL
EKG IMPRESSION: NORMAL
EOSINOPHIL # BLD AUTO: 0.18 K/UL (ref 0–0.51)
EOSINOPHIL NFR BLD: 2.4 % (ref 0–6.9)
ERYTHROCYTE [DISTWIDTH] IN BLOOD BY AUTOMATED COUNT: 46.5 FL (ref 35.9–50)
GLOBULIN SER CALC-MCNC: 3.3 G/DL (ref 1.9–3.5)
GLUCOSE SERPL-MCNC: 129 MG/DL (ref 65–99)
HCT VFR BLD AUTO: 50 % (ref 37–47)
HGB BLD-MCNC: 16.2 G/DL (ref 12–16)
IMM GRANULOCYTES # BLD AUTO: 0.01 K/UL (ref 0–0.11)
IMM GRANULOCYTES NFR BLD AUTO: 0.1 % (ref 0–0.9)
LYMPHOCYTES # BLD AUTO: 3.22 K/UL (ref 1–4.8)
LYMPHOCYTES NFR BLD: 42.4 % (ref 22–41)
MCH RBC QN AUTO: 30 PG (ref 27–33)
MCHC RBC AUTO-ENTMCNC: 32.4 G/DL (ref 33.6–35)
MCV RBC AUTO: 92.6 FL (ref 81.4–97.8)
MONOCYTES # BLD AUTO: 0.55 K/UL (ref 0–0.85)
MONOCYTES NFR BLD AUTO: 7.2 % (ref 0–13.4)
NEUTROPHILS # BLD AUTO: 3.56 K/UL (ref 2–7.15)
NEUTROPHILS NFR BLD: 47 % (ref 44–72)
NRBC # BLD AUTO: 0 K/UL
NRBC BLD-RTO: 0 /100 WBC
PLATELET # BLD AUTO: 274 K/UL (ref 164–446)
PMV BLD AUTO: 9.4 FL (ref 9–12.9)
POTASSIUM SERPL-SCNC: 3.9 MMOL/L (ref 3.6–5.5)
PROT SERPL-MCNC: 7 G/DL (ref 6–8.2)
RBC # BLD AUTO: 5.4 M/UL (ref 4.2–5.4)
SODIUM SERPL-SCNC: 138 MMOL/L (ref 135–145)
TROPONIN I SERPL-MCNC: 0.02 NG/ML (ref 0–0.04)
WBC # BLD AUTO: 7.6 K/UL (ref 4.8–10.8)

## 2019-01-28 PROCEDURE — 83880 ASSAY OF NATRIURETIC PEPTIDE: CPT

## 2019-01-28 PROCEDURE — 85025 COMPLETE CBC W/AUTO DIFF WBC: CPT

## 2019-01-28 PROCEDURE — 80053 COMPREHEN METABOLIC PANEL: CPT

## 2019-01-28 PROCEDURE — 71045 X-RAY EXAM CHEST 1 VIEW: CPT

## 2019-01-28 PROCEDURE — 84484 ASSAY OF TROPONIN QUANT: CPT

## 2019-01-28 PROCEDURE — 93005 ELECTROCARDIOGRAM TRACING: CPT | Performed by: EMERGENCY MEDICINE

## 2019-01-28 PROCEDURE — 99284 EMERGENCY DEPT VISIT MOD MDM: CPT

## 2019-01-28 RX ORDER — LOSARTAN POTASSIUM 100 MG/1
50 TABLET ORAL DAILY
Status: SHIPPED | COMMUNITY
End: 2019-02-22

## 2019-01-28 RX ORDER — ALPRAZOLAM 0.5 MG/1
0.5 TABLET ORAL 3 TIMES DAILY PRN
Status: SHIPPED | COMMUNITY
End: 2019-05-20

## 2019-01-28 RX ORDER — MONTELUKAST SODIUM 10 MG/1
10 TABLET ORAL
Status: SHIPPED | COMMUNITY
End: 2019-11-05

## 2019-01-28 RX ORDER — METOPROLOL TARTRATE 50 MG/1
50 TABLET, FILM COATED ORAL 2 TIMES DAILY
Status: SHIPPED | COMMUNITY
End: 2019-02-22

## 2019-01-28 RX ORDER — FLUTICASONE PROPIONATE 50 MCG
1 SPRAY, SUSPENSION (ML) NASAL PRN
Status: SHIPPED | COMMUNITY
End: 2019-11-05 | Stop reason: SDUPTHER

## 2019-01-28 RX ORDER — ADENOSINE 3 MG/ML
INJECTION, SOLUTION INTRAVENOUS
Status: DISCONTINUED
Start: 2019-01-28 | End: 2019-01-28 | Stop reason: HOSPADM

## 2019-01-28 NOTE — ED NOTES
Patient heart rate 160's upon arrival to ER  Now down to 114 and sinus tachycardia -  notified and repeat EKG done by tech

## 2019-01-28 NOTE — ED TRIAGE NOTES
"Chief Complaint   Patient presents with   • Palpitations     started this morning   • Chest Pressure     started this morning, described as \"someone squeezing me\"       "

## 2019-01-28 NOTE — ED PROVIDER NOTES
"ED Provider Note    CHIEF COMPLAINT  Chief Complaint   Patient presents with   • Palpitations     started this morning   • Chest Pressure     started this morning, described as \"someone squeezing me\"       HPI  Alfonso Posada is a 63 y.o. female who presents for evaluation of a rapid heartbeat.  The patient has a history of atrial fibrillation as well as PSVT.  She states she had acute onset of a rapid heart rate this morning.  She had some lightheadedness but no syncope.  She had some slight discomfort in her chest.  She denies any shortness of breath.  She states that she is had adenosine multiple times in the past with success.  Prior to the onset of her symptoms today she states she was feeling fine.  REVIEW OF SYSTEMS  See HPI for further details. All other systems negative.    PAST MEDICAL HISTORY  Past Medical History:   Diagnosis Date   • Anxiety    • Anxiety disorder    • Asthma    • Atrial fibrillation (HCC)    • Bartholin cyst    • Cancer (HCC)     uterine, treated   • Chronic knee pain    • Depression    • Graves disease    • Hypertension    • Morbid obesity (HCC)    • Postherpetic neuralgia    • Psychiatric disorder     anxiety   • SVT (supraventricular tachycardia) (HCC)    • Thyroid disease        FAMILY HISTORY  History reviewed. No pertinent family history.    SOCIAL HISTORY  Social History     Social History   • Marital status:      Spouse name: N/A   • Number of children: N/A   • Years of education: N/A     Social History Main Topics   • Smoking status: Former Smoker     Packs/day: 0.50     Start date: 3/4/2016     Quit date: 9/9/2018   • Smokeless tobacco: Never Used      Comment: 10 cigs   • Alcohol use 1.8 - 3.0 oz/week     3 - 5 Glasses of wine per week   • Drug use: No   • Sexual activity: Not Currently     Partners: Male      Comment:      Other Topics Concern   • Not on file     Social History Narrative    ** Merged History Encounter **            SURGICAL HISTORY  Past " "Surgical History:   Procedure Laterality Date   • THYROIDECTOMY TOTAL  2005   • HYSTERECTOMY LAPAROSCOPY     • THYROIDECTOMY      still has parathyroid       CURRENT MEDICATIONS  Home Medications     Reviewed by Jos Avalos (Pharmacy Tech) on 01/28/19 at 1103  Med List Status: Complete   Medication Last Dose Status   acetaminophen (TYLENOL) 500 MG Tab 1/28/2019 Active   ALPRAZolam (XANAX) 0.5 MG Tab 1/28/2019 Active   apixaban (ELIQUIS) 5mg Tab 1/28/2019 Active   atorvastatin (LIPITOR) 40 MG Tab 1/27/2019 Active   cetirizine (ZYRTEC) 10 MG Tab 1/27/2019 Active   diphenhydrAMINE (BENADRYL) 25 MG Tab > 2 days Active   fluticasone (FLONASE) 50 MCG/ACT nasal spray 1/27/2019 Active   furosemide (LASIX) 40 MG Tab 1/27/2019 Active   ketoconazole (NIZORAL) 2 % Cream 1/28/2019 Active   losartan (COZAAR) 100 MG Tab 1/28/2019 Active   MAGNESIUM OXIDE PO 1/27/2019 Active   methimazole (TAPAZOLE) 5 MG Tab 1/28/2019 Active   metoprolol (LOPRESSOR) 50 MG Tab 1/28/2019 Active   montelukast (SINGULAIR) 10 MG Tab 1/27/2019 Active   omeprazole (PRILOSEC) 40 MG delayed-release capsule 1/28/2019 Active   ondansetron (ZOFRAN ODT) 4 MG TABLET DISPERSIBLE 1/27/2019 Active   oxyCODONE-acetaminophen (PERCOCET-10)  MG Tab 1/28/2019 Active   Oxymetazoline HCl (AFRIN 12 HOUR NA) 1/27/2019 Active   potassium chloride (KLOR-CON) 8 MEQ tablet 1/28/2019 Active   sertraline (ZOLOFT) 100 MG Tab 1/28/2019 Active                ALLERGIES  Allergies   Allergen Reactions   • Latex Itching   • Penicillins Hives, Rash and Itching     Itching & Rash     • Sulfa Drugs Itching     Itching feeling on leg, prickily/need-like sensation on skin.       PHYSICAL EXAM  VITAL SIGNS: /78   Pulse 72   Temp 37.1 °C (98.7 °F) (Oral)   Resp 18   Ht 1.626 m (5' 4\")   Wt (!) 145.4 kg (320 lb 8.8 oz)   LMP 03/20/2016   SpO2 95%   BMI 55.02 kg/m²   Constitutional: Well developed, morbidly obese, No acute distress, Non-toxic appearance.   HENT: " Normocephalic, Atraumatic.  Eyes:  EOMI, Conjunctiva normal, No discharge.   Cardiovascular: Tachycardic, Normal rhythm, No murmurs, No rubs, No gallops.   Thorax & Lungs: Clear to auscultation without wheezes, rales, or rhonchi.   Abdomen: Soft and nontender.   Skin: Warm, Dry.  Musculoskeletal: Good range of motion in all major joints.    Neurologic: Awake and alert, No focal deficits noted.        EKG  EKG Interpretation    Interpreted by emergency department physician    Rhythm: paroxismal supraventricular tachycardia  Rate: 150-160  Axis: normal  Ectopy: none  Conduction: normal  ST Segments: no acute change  T Waves: no acute change  Q Waves: none    Clinical Impression: supraventricular tachycardia        RADIOLOGY/PROCEDURES  DX-CHEST-PORTABLE (1 VIEW)   Final Result      No acute cardiopulmonary findings.            COURSE & MEDICAL DECISION MAKING  Pertinent Labs & Imaging studies reviewed. (See chart for details)  This is a 63-year-old here for evaluation of a rapid heartbeat.  Patient had an EKG in triage that was brought to me for evaluation.  This demonstrates a supraventricular tachycardia.  Patient was placed in room 7B and I saw her immediately.  She is essentially asymptomatic at this time with her tachycardia.  It sounds as if she has had multiple episodes in the past which have responded to adenosine.  An IV is established and laboratories are obtained.  These include a BNP which is normal.  Troponin I which is negative.  Chemistries are normal with the exception of a glucose of 129 and a nonfasting study.  CBC shows a normal white count and differential.  Chest x-ray shows no acute cardiopulmonary processes.  Prior to receiving adenosine the patient spontaneously come converted to a sinus rhythm.  Prior to discharge she is reevaluated and states she feels absolutely fine.  She is in a normal sinus rhythm in the 60s.  I believe she is fine for discharge at this point.  I will have her follow-up  with her primary care provider.  She also has follow-up scheduled with her endocrinologist.  She is given a discharge instruction sheet on supraventricular tachycardia.    FINAL IMPRESSION  1.  Supraventricular tachycardia  2.   3.         Electronically signed by: Mark Ovalle, 1/28/2019 12:08 PM

## 2019-01-28 NOTE — ED NOTES
IV established with blood draw and oxygen 2 liters per nasal cannula applied  02 sats 89% while lying down

## 2019-01-29 ENCOUNTER — TELEPHONE (OUTPATIENT)
Dept: MEDICAL GROUP | Facility: MEDICAL CENTER | Age: 64
End: 2019-01-29

## 2019-01-29 NOTE — TELEPHONE ENCOUNTER
Pt notified.      ----- Message from Sim Brownlee P.A.-C. sent at 1/29/2019  7:08 AM PST -----  Please contact Alfonso. She has appointment in a few days with endocrinology and Dr. Hyman.  The pathology of the thyroid did not show any cancer but she will likely have repeat imaging or possibly a repeat biopsy in the future.  I will let Dr. Hyman handle the follow-up.  Thank you.    Sincerely,    Sim

## 2019-01-31 RX ORDER — METHIMAZOLE 5 MG/1
5 TABLET ORAL DAILY
Qty: 30 TAB | Refills: 1 | OUTPATIENT
Start: 2019-01-31

## 2019-02-05 ENCOUNTER — OFFICE VISIT (OUTPATIENT)
Dept: ENDOCRINOLOGY | Facility: MEDICAL CENTER | Age: 64
End: 2019-02-05
Payer: COMMERCIAL

## 2019-02-05 VITALS
WEIGHT: 293 LBS | SYSTOLIC BLOOD PRESSURE: 152 MMHG | HEART RATE: 82 BPM | OXYGEN SATURATION: 93 % | BODY MASS INDEX: 50.02 KG/M2 | RESPIRATION RATE: 18 BRPM | DIASTOLIC BLOOD PRESSURE: 80 MMHG | HEIGHT: 64 IN

## 2019-02-05 DIAGNOSIS — E05.90 HYPERTHYROIDISM: ICD-10-CM

## 2019-02-05 PROCEDURE — 99214 OFFICE O/P EST MOD 30 MIN: CPT | Performed by: INTERNAL MEDICINE

## 2019-02-05 NOTE — PROGRESS NOTES
"Chief Complaint   Patient presents with   • Thyrotoxicosis     Graves' disease        HPI:    Thyrotoxicosis        Symptoms are mixed.  She has sweats of her head at times and cold feet at others.  No longer has tachycardia.  Continues to have difficulty sleeping.  In someway that she cannot describe she just does not feel well.          Her thyroid ultrasound did show a mass on the right side which was biopsied indicating category 3 \"atypia of undetermined significance\".  It tissue did resemble that of Graves' disease which is what she has.  Thyrotropin receptor antibody is positive at 6.3 and thyroid-stimulating immunoglobulins also +153.           She continues to take methimazole low-dose 5 mg/day and I do not have a current thyroid blood level but last month her TSH was 0.7.            So what we have is a remnant previous thyroid after thyroidectomy that has grown back to the extent it can cause the thyrotoxicosis again.              My plan is to ablate the remaining tissue with I-131 and I explained that to her in some detail.  She is in agreement.    ROS:  All other systems reported as negative or unchanged since last exam      Allergies:   Allergies   Allergen Reactions   • Latex Itching   • Penicillins Hives, Rash and Itching     Itching & Rash     • Sulfa Drugs Itching     Itching feeling on leg, prickily/need-like sensation on skin.       Current medicines including changes today:  Current Outpatient Prescriptions   Medication Sig Dispense Refill   • fluticasone (FLONASE) 50 MCG/ACT nasal spray Spray 1 Spray in nose as needed.     • losartan (COZAAR) 100 MG Tab Take 50 mg by mouth every day.     • ALPRAZolam (XANAX) 0.5 MG Tab Take 0.5 mg by mouth 3 times a day as needed for Anxiety.     • Oxymetazoline HCl (AFRIN 12 HOUR NA) Spray 1 Spray in nose as needed (For congestion).     • metoprolol (LOPRESSOR) 50 MG Tab Take 50 mg by mouth 2 times a day.     • montelukast (SINGULAIR) 10 MG Tab Take 10 mg by " "mouth every bedtime.     • [START ON 2/25/2019] oxyCODONE-acetaminophen (PERCOCET-10)  MG Tab Take 1 Tab by mouth 3 times a day for 30 days. 90 Tab 0   • furosemide (LASIX) 40 MG Tab Take 40 mg by mouth every day.     • atorvastatin (LIPITOR) 40 MG Tab Take 40 mg by mouth every evening.     • apixaban (ELIQUIS) 5mg Tab Take 1 Tab by mouth 2 Times a Day. 60 Tab 3   • methimazole (TAPAZOLE) 5 MG Tab Take 1 Tab by mouth every day. 30 Tab 1   • sertraline (ZOLOFT) 100 MG Tab TAKE ONE TABLET BY MOUTH ONE TIME A DAY 90 Tab 2   • potassium chloride (KLOR-CON) 8 MEQ tablet TAKE ONE TABLET BY MOUTH ONE TIME A DAY 90 Tab 0   • MAGNESIUM OXIDE PO Take 3 Tabs by mouth every bedtime.     • ketoconazole (NIZORAL) 2 % Cream Apply 1 Application to affected area(s) every day. Apply to face     • cetirizine (ZYRTEC) 10 MG Tab Take 10 mg by mouth every evening.     • diphenhydrAMINE (BENADRYL) 25 MG Tab Take 25 mg by mouth every 6 hours as needed for Itching.     • acetaminophen (TYLENOL) 500 MG Tab Take 1,000 mg by mouth 3 times a day as needed for Moderate Pain.     • ondansetron (ZOFRAN ODT) 4 MG TABLET DISPERSIBLE DISSOLVE ONE TABLET BY MOUTH EVERY 8 HOURS AS NEEDED FOR NAUSEA AND VOMITING 30 Tab 5   • omeprazole (PRILOSEC) 40 MG delayed-release capsule Take 1 Cap by mouth every day. 90 Cap 3     No current facility-administered medications for this visit.         Past Medical History:   Diagnosis Date   • Anxiety    • Anxiety disorder    • Asthma    • Atrial fibrillation (HCC)    • Bartholin cyst    • Cancer (HCC)     uterine, treated   • Chronic knee pain    • Depression    • Graves disease    • Hypertension    • Morbid obesity (HCC)    • Postherpetic neuralgia    • Psychiatric disorder     anxiety   • SVT (supraventricular tachycardia) (HCC)    • Thyroid disease        PHYSICAL EXAM:    /80 (BP Location: Left arm, Patient Position: Sitting, BP Cuff Size: Large adult)   Pulse 82   Resp 18   Ht 1.626 m (5' 4\")   " Wt (!) 146.5 kg (323 lb)   LMP 03/20/2016   SpO2 93%   BMI 55.44 kg/m²     Gen.   appears clinically euthyroid    Skin   appropriate for sex and age    HEENT   no eye signs of Graves' ophthalmopathy    Neck   prominence in the right thyroid lobe area.  Difficult to feel an actual mass.  I think it is soft.    Heart  regular    Extremities  no edema    Neuro  gait and station normal, no tremor of the hands    Psych  appropriate    ASSESSMENT AND RECOMMENDATIONS    1. Hyperthyroidism, Graves' disease                Recurrent thyroid tissue post thyroidectomy years ago sufficient to cause thyrotoxicosis                      DISPOSITION: Discontinue methimazole                            Next week proceed with I-123 uptake and scan.  If appropriate ablate remaining tissue with I-131      Kamran Hyman M.D.    Copies to: Sim Brownlee PINGE.-C. [phone]

## 2019-02-13 ENCOUNTER — HOSPITAL ENCOUNTER (OUTPATIENT)
Dept: RADIOLOGY | Facility: MEDICAL CENTER | Age: 64
End: 2019-02-13
Attending: INTERNAL MEDICINE
Payer: COMMERCIAL

## 2019-02-13 DIAGNOSIS — E05.90 HYPERTHYROIDISM: ICD-10-CM

## 2019-02-13 PROCEDURE — A9516 IODINE I-123 SOD IODIDE MIC: HCPCS

## 2019-02-14 ENCOUNTER — TELEPHONE (OUTPATIENT)
Dept: ENDOCRINOLOGY | Facility: MEDICAL CENTER | Age: 64
End: 2019-02-14

## 2019-02-14 DIAGNOSIS — B02.29 POSTHERPETIC NEURALGIA: ICD-10-CM

## 2019-02-14 DIAGNOSIS — E05.90 HYPERTHYROIDISM: ICD-10-CM

## 2019-02-14 DIAGNOSIS — E07.89 THYROID MASS OF UNCLEAR ETIOLOGY: ICD-10-CM

## 2019-02-14 RX ORDER — ONDANSETRON 4 MG/1
TABLET, ORALLY DISINTEGRATING ORAL
Qty: 10 TAB | Refills: 4 | Status: SHIPPED | OUTPATIENT
Start: 2019-02-14 | End: 2019-04-11 | Stop reason: SDUPTHER

## 2019-02-14 RX ORDER — METHIMAZOLE 5 MG/1
5 TABLET ORAL DAILY
Qty: 30 TAB | Refills: 4 | Status: ON HOLD | OUTPATIENT
Start: 2019-02-14 | End: 2019-03-27

## 2019-02-14 NOTE — TELEPHONE ENCOUNTER
Was the patient seen in the last year in this department? Yes   LOV 2/5/19 [place]  Next appt 3/18/19 [place]    Does patient have an active prescription for medications requested? Yes    Received Request Via: Pharmacy

## 2019-02-14 NOTE — TELEPHONE ENCOUNTER
"Telephone conversation with patient    We did not go ahead with I-131 ablation of a thyroid remnant causing thyrotoxicosis as planned.  Her uptake was only 15% and it demonstrated a large 4 cm cold nodule.  Previous biopsy \"atypia of unknown significance\".  I quoted to her about a 15% chance of this being cancer and have recommended that she have this mass removed surgically.  She is concerned about her weight affecting the surgical outcome and that is a valid concern.    She is hoping to do a gastric sleeve procedure for her weight but that is not scheduled and will not be done right away.    I am going to refer her to a surgeon to talk with her about surgery and I will speak with Dr. Panda about this as well.    I also instructed her to return to her methimazole daily.    Kamran Hyman M.D.    Copies to Dr. Matthew Wiese Dr. John Ganser  "

## 2019-02-15 RX ORDER — FUROSEMIDE 40 MG/1
TABLET ORAL
Qty: 90 TAB | Refills: 0 | Status: SHIPPED | OUTPATIENT
Start: 2019-02-15 | End: 2019-05-20 | Stop reason: SDUPTHER

## 2019-02-22 ENCOUNTER — OFFICE VISIT (OUTPATIENT)
Dept: MEDICAL GROUP | Facility: MEDICAL CENTER | Age: 64
End: 2019-02-22
Payer: COMMERCIAL

## 2019-02-22 VITALS
HEART RATE: 84 BPM | TEMPERATURE: 98.3 F | BODY MASS INDEX: 50.02 KG/M2 | RESPIRATION RATE: 16 BRPM | WEIGHT: 293 LBS | SYSTOLIC BLOOD PRESSURE: 110 MMHG | OXYGEN SATURATION: 94 % | HEIGHT: 64 IN | DIASTOLIC BLOOD PRESSURE: 70 MMHG

## 2019-02-22 DIAGNOSIS — I10 ESSENTIAL HYPERTENSION: ICD-10-CM

## 2019-02-22 DIAGNOSIS — J01.00 ACUTE NON-RECURRENT MAXILLARY SINUSITIS: ICD-10-CM

## 2019-02-22 DIAGNOSIS — R22.1 MASS OF RIGHT SIDE OF NECK: ICD-10-CM

## 2019-02-22 DIAGNOSIS — E66.01 MORBID OBESITY WITH BMI OF 50.0-59.9, ADULT (HCC): ICD-10-CM

## 2019-02-22 PROCEDURE — 99214 OFFICE O/P EST MOD 30 MIN: CPT | Performed by: PHYSICIAN ASSISTANT

## 2019-02-22 RX ORDER — AZITHROMYCIN 250 MG/1
TABLET, FILM COATED ORAL
Qty: 6 TAB | Refills: 0 | Status: SHIPPED | OUTPATIENT
Start: 2019-02-22 | End: 2019-03-14 | Stop reason: SDUPTHER

## 2019-02-22 RX ORDER — LOSARTAN POTASSIUM 50 MG/1
50 TABLET ORAL DAILY
Qty: 90 TAB | Refills: 3 | Status: SHIPPED | OUTPATIENT
Start: 2019-02-22 | End: 2019-12-02 | Stop reason: SDUPTHER

## 2019-02-22 NOTE — PROGRESS NOTES
Subjective:   Alfonso Posada is a 63 y.o. female here today for thyroid masses, acute sinusitis, hypertension and morbid obesity.    Mass of right side of neck  This is a 63-year-old female presents today to discuss her history of thyroid masses.  She is currently taking methimazole at 5 mg.  Believes medication is causing her to gain weight as well as lose her hair.  She had a thyroid study with a 5-hour uptake that showed the following:    Impression       1.  Large cold defect associated with the 4.2 cm right lower pole nodule    2.  Approximate 2 cm cold nodule in the upper pole the right lobe    3.  Uptake only within the lower pole of the left lobe and on ultrasound there are no discrete nodule within the left lobe    4.  Five-hour uptake is upper limits normal     Her endocrinologist has referred her to see Dr. Patel for possible thyroidectomy.  She has not been contacted but is concerned about the procedure and how it would to have effects on her potential bariatric surgery.  She also has is a concerns regarding the surgery.    Acute non-recurrent maxillary sinusitis  Complains of a 3-week history of sinus congestion drainage.  Maxillary sinus tenderness.  She has been battling it with over-the-counter medications.  Also using Afrin.  No improvement.  Requesting antibiotic.    Essential hypertension  Was seen recently at the hospital for an episode of tachycardia.  It did resolve.  She was discharged.  During her ER visit her blood pressure was low.  She was told to take half the dose of losartan.  She is currently taking 50 mg daily.  Requesting a refill.       Current medicines (including changes today)  Current Outpatient Prescriptions   Medication Sig Dispense Refill   • azithromycin (ZITHROMAX) 250 MG Tab Take two tablets day one then one tablet day 2-5. 6 Tab 0   • losartan (COZAAR) 50 MG Tab Take 1 Tab by mouth every day. 90 Tab 3   • furosemide (LASIX) 40 MG Tab TAKE ONE TABLET BY MOUTH DAILY  90 Tab 0   • methimazole (TAPAZOLE) 5 MG Tab Take 1 Tab by mouth every day. 30 Tab 4   • ondansetron (ZOFRAN ODT) 4 MG TABLET DISPERSIBLE DISSOLVE ONE TABLET BY MOUTH EVERY 8 HOURS AS NEEDED FOR NAUSEA AND VOMITING 10 Tab 4   • fluticasone (FLONASE) 50 MCG/ACT nasal spray Spray 1 Spray in nose as needed.     • ALPRAZolam (XANAX) 0.5 MG Tab Take 0.5 mg by mouth 3 times a day as needed for Anxiety.     • montelukast (SINGULAIR) 10 MG Tab Take 10 mg by mouth every bedtime.     • [START ON 2/25/2019] oxyCODONE-acetaminophen (PERCOCET-10)  MG Tab Take 1 Tab by mouth 3 times a day for 30 days. 90 Tab 0   • atorvastatin (LIPITOR) 40 MG Tab Take 40 mg by mouth every evening.     • apixaban (ELIQUIS) 5mg Tab Take 1 Tab by mouth 2 Times a Day. 60 Tab 3   • sertraline (ZOLOFT) 100 MG Tab TAKE ONE TABLET BY MOUTH ONE TIME A DAY 90 Tab 2   • potassium chloride (KLOR-CON) 8 MEQ tablet TAKE ONE TABLET BY MOUTH ONE TIME A DAY 90 Tab 0   • MAGNESIUM OXIDE PO Take 3 Tabs by mouth every bedtime.     • ketoconazole (NIZORAL) 2 % Cream Apply 1 Application to affected area(s) every day. Apply to face     • cetirizine (ZYRTEC) 10 MG Tab Take 10 mg by mouth every evening.     • diphenhydrAMINE (BENADRYL) 25 MG Tab Take 25 mg by mouth every 6 hours as needed for Itching.     • acetaminophen (TYLENOL) 500 MG Tab Take 1,000 mg by mouth 3 times a day as needed for Moderate Pain.     • omeprazole (PRILOSEC) 40 MG delayed-release capsule Take 1 Cap by mouth every day. 90 Cap 3     No current facility-administered medications for this visit.      She  has a past medical history of Anxiety; Anxiety disorder; Asthma; Atrial fibrillation (HCC); Bartholin cyst; Cancer (HCC); Chronic knee pain; Depression; Graves disease; Hypertension; Morbid obesity (HCC); Postherpetic neuralgia; Psychiatric disorder; SVT (supraventricular tachycardia) ([facility]); and Thyroid disease.    Social History and Family History were reviewed and updated.    ROS   No chest  "pain, no shortness of breath, no abdominal pain and all other systems were reviewed and are negative.       Objective:     Blood pressure 110/70, pulse 84, temperature 36.8 °C (98.3 °F), temperature source Temporal, resp. rate 16, height 1.626 m (5' 4\"), weight (!) 146.5 kg (323 lb), last menstrual period 03/20/2016, SpO2 94 %, not currently breastfeeding. Body mass index is 55.44 kg/m².   Physical Exam:  Constitutional: Alert, no distress.  Skin: Warm, dry, good turgor, no rashes in visible areas.  Eye: Equal, round and reactive, conjunctiva clear, lids normal.  ENMT: Lips without lesions, good dentition, oropharynx clear.  Neck: Trachea midline, no masses.   Lymph: No cervical or supraclavicular lymphadenopathy  Respiratory: Unlabored respiratory effort, lungs appear clear, no wheezes, no ronchi.  Cardiovascular: Regular rate and rhythm.  Psych: Alert and oriented x3, normal affect and mood.        Assessment and Plan:   The following treatment plan was discussed    1. Mass of right side of neck  Urged her to make an appointment by calling [facility] to be evaluated by Dr. Patel.  Advised to have her surgery done first prior to the bariatric surgery.  She may ask Dr. Patel her questions about the surgery.     2. Acute non-recurrent maxillary sinusitis  Acute, new onset condition.  Discussed with likely viral process.  Take antibiotic as directed.  - azithromycin (ZITHROMAX) 250 MG Tab; Take two tablets day one then one tablet day 2-5.  Dispense: 6 Tab; Refill: 0    3. Essential hypertension  Chronic condition.  Controlled.  Renewed Cozaar 50 mg daily.  - losartan (COZAAR) 50 MG Tab; Take 1 Tab by mouth every day.  Dispense: 90 Tab; Refill: 3    4. Morbid obesity with BMI of 50.0-59.9, adult (HCC)  Chronic condition.  Urged her to be patient regarding her health.  Due time if warranted by a good health and clear thyroid concern she may schedule her bariatric surgery.      Followup: Return in about 4 " weeks (around 3/22/2019), or if symptoms worsen or fail to improve.    Please note that this dictation was created using voice recognition software. I have made every reasonable attempt to correct obvious errors, but I expect that there are errors of grammar and possibly content that I did not discover before finalizing the note.

## 2019-03-01 ENCOUNTER — TELEPHONE (OUTPATIENT)
Dept: ENDOCRINOLOGY | Facility: MEDICAL CENTER | Age: 64
End: 2019-03-01

## 2019-03-01 RX ORDER — POTASSIUM CHLORIDE 600 MG/1
TABLET, FILM COATED, EXTENDED RELEASE ORAL
Qty: 90 TAB | Refills: 0 | Status: SHIPPED | OUTPATIENT
Start: 2019-03-01 | End: 2019-05-28 | Stop reason: SDUPTHER

## 2019-03-01 NOTE — TELEPHONE ENCOUNTER
Patient called and stated that she is needing a letter for medical clearance. She is needing to have surgery to get a nodule from her thyroid removed. Please call patient when letter is complete.

## 2019-03-05 NOTE — TELEPHONE ENCOUNTER
Phone Number Called: [phone] (home)     Message: spoke to Pt, she has been seeing Dr. Alva at [facility]. She had her thyroid taken out about 30 years ago and it has partially grown back with a nodule. The bx done was negative for cancer but Dr. Alva would like to remove the mass just in case. She will also be seeing a ENT on Friday and her cardiologist next week.     Left Message for patient to call back: N\A      Called [facility] [place], they are faxing over the the notes.

## 2019-03-11 ENCOUNTER — OFFICE VISIT (OUTPATIENT)
Dept: CARDIOLOGY | Facility: MEDICAL CENTER | Age: 64
End: 2019-03-11
Payer: COMMERCIAL

## 2019-03-11 VITALS
WEIGHT: 293 LBS | BODY MASS INDEX: 50.02 KG/M2 | DIASTOLIC BLOOD PRESSURE: 80 MMHG | HEIGHT: 64 IN | HEART RATE: 82 BPM | SYSTOLIC BLOOD PRESSURE: 140 MMHG

## 2019-03-11 DIAGNOSIS — I48.0 PAROXYSMAL ATRIAL FIBRILLATION (HCC): ICD-10-CM

## 2019-03-11 DIAGNOSIS — E05.90 HYPERTHYROIDISM: ICD-10-CM

## 2019-03-11 DIAGNOSIS — E07.89 THYROID MASS OF UNCLEAR ETIOLOGY: ICD-10-CM

## 2019-03-11 LAB — EKG IMPRESSION: NORMAL

## 2019-03-11 PROCEDURE — 99213 OFFICE O/P EST LOW 20 MIN: CPT | Performed by: INTERNAL MEDICINE

## 2019-03-11 PROCEDURE — 93000 ELECTROCARDIOGRAM COMPLETE: CPT | Performed by: INTERNAL MEDICINE

## 2019-03-11 NOTE — PROGRESS NOTES
Chief Complaint   Patient presents with   • Atrial Fibrillation       Subjective:   Alfonso Posada is a 63 y.o. female who presents today for preop evaluation for thyroid nodule.  Patient has a history of a partial thyroidectomy 33 years ago.  She now has a cold nodule and relative hyperthyroidism.  She is planned for thyroidectomy at the end of this month.  The patient has had an episode of paroxysmal atrial fibrillation but the last documented spell was December 2018.  She has had an echocardiogram in the past which reveals normal ejection fraction and is moderately dilated left atrium.  Because of her atrial fibrillation seems to have been hyperthyroidism.  The patient is on Eliquis for stroke prevention and metoprolol for rate control    Past Medical History:   Diagnosis Date   • Anxiety    • Anxiety disorder    • Asthma    • Atrial fibrillation (HCC)    • Bartholin cyst    • Cancer (HCC)     uterine, treated   • Chronic knee pain    • Depression    • Graves disease    • Hypertension    • Morbid obesity (HCC)    • Postherpetic neuralgia    • Psychiatric disorder     anxiety   • SVT (supraventricular tachycardia) (HCC)    • Thyroid disease      Past Surgical History:   Procedure Laterality Date   • THYROIDECTOMY TOTAL  2005   • HYSTERECTOMY LAPAROSCOPY     • THYROIDECTOMY      still has parathyroid     No family history on file.  Social History     Social History   • Marital status:      Spouse name: N/A   • Number of children: N/A   • Years of education: N/A     Occupational History   • Not on file.     Social History Main Topics   • Smoking status: Former Smoker     Packs/day: 0.50     Start date: 3/4/2016     Quit date: 9/9/2018   • Smokeless tobacco: Never Used      Comment: 10 cigs   • Alcohol use 1.8 - 3.0 oz/week     3 - 5 Glasses of wine per week   • Drug use: No   • Sexual activity: Not Currently     Partners: Male      Comment:      Other Topics Concern   • Not on file     Social  History Narrative    ** Merged History Encounter **          Allergies   Allergen Reactions   • Latex Itching   • Penicillins Hives, Rash and Itching     Itching & Rash     • Sulfa Drugs Itching     Itching feeling on leg, prickily/need-like sensation on skin.     Outpatient Encounter Prescriptions as of 3/11/2019   Medication Sig Dispense Refill   • potassium chloride (KLOR-CON) 8 MEQ tablet TAKE ONE TABLET BY MOUTH DAILY 90 Tab 0   • losartan (COZAAR) 50 MG Tab Take 1 Tab by mouth every day. 90 Tab 3   • furosemide (LASIX) 40 MG Tab TAKE ONE TABLET BY MOUTH DAILY 90 Tab 0   • methimazole (TAPAZOLE) 5 MG Tab Take 1 Tab by mouth every day. 30 Tab 4   • fluticasone (FLONASE) 50 MCG/ACT nasal spray Spray 1 Spray in nose as needed.     • ALPRAZolam (XANAX) 0.5 MG Tab Take 0.5 mg by mouth 3 times a day as needed for Anxiety.     • montelukast (SINGULAIR) 10 MG Tab Take 10 mg by mouth every bedtime.     • atorvastatin (LIPITOR) 40 MG Tab Take 40 mg by mouth every evening.     • apixaban (ELIQUIS) 5mg Tab Take 1 Tab by mouth 2 Times a Day. 60 Tab 3   • sertraline (ZOLOFT) 100 MG Tab TAKE ONE TABLET BY MOUTH ONE TIME A DAY 90 Tab 2   • MAGNESIUM OXIDE PO Take 3 Tabs by mouth every bedtime.     • cetirizine (ZYRTEC) 10 MG Tab Take 10 mg by mouth every evening.     • acetaminophen (TYLENOL) 500 MG Tab Take 1,000 mg by mouth 3 times a day as needed for Moderate Pain.     • omeprazole (PRILOSEC) 40 MG delayed-release capsule Take 1 Cap by mouth every day. 90 Cap 3   • azithromycin (ZITHROMAX) 250 MG Tab Take two tablets day one then one tablet day 2-5. (Patient not taking: Reported on 3/11/2019) 6 Tab 0   • ondansetron (ZOFRAN ODT) 4 MG TABLET DISPERSIBLE DISSOLVE ONE TABLET BY MOUTH EVERY 8 HOURS AS NEEDED FOR NAUSEA AND VOMITING 10 Tab 4   • oxyCODONE-acetaminophen (PERCOCET-10)  MG Tab Take 1 Tab by mouth 3 times a day for 30 days. 90 Tab 0   • ketoconazole (NIZORAL) 2 % Cream Apply 1 Application to affected area(s)  "every day. Apply to face     • diphenhydrAMINE (BENADRYL) 25 MG Tab Take 25 mg by mouth every 6 hours as needed for Itching.       No facility-administered encounter medications on file as of 3/11/2019.      ROS.  There are no symptoms related to congestive heart failure or active angina pectoris.     Objective:   /80 (BP Location: Left arm, Patient Position: Sitting, BP Cuff Size: Adult)   Pulse 82   Ht 1.626 m (5' 4\")   Wt (!) 143.8 kg (317 lb)   LMP 03/20/2016   BMI 54.41 kg/m²     Physical Exam General: WD, WN, obese female in NAD.   Neck: No  JVD.  Good carotid upstroke and no bruit  Chest: Clear to A & P  Heart: Regular rhythm, Normal S1 and S2. No murmur, gallop, rub, click  Ext: No edema  Neuro: Alert, oriented  Psych: normal mood, affect    Assessment:     1. Paroxysmal atrial fibrillation (HCC)  CANCELED: EKG   2. Hyperthyroidism     3. Thyroid mass of unclear etiology         Medical Decision Making:  Today's Assessment / Status / Plan:   Patient has a history of paroxysmal atrial fibrillation but is currently in normal rhythm.  Her EKG revealed sinus tachycardia January 28.  EKG is pending today.  By exam she is in sinus rhythm and the rate is between 70 and 80.  Cardiac exam is normal and she has no symptoms of congestive heart failure or active angina pectoris.  The is cardiovascular risk for thyroidectomy is acceptably low.  No further tests are indicated.  Eliquis should be stopped 48 hours before surgery and resumed postoperatively as soon as the risk of bleeding has passed.  Patient will return here in 2 months as she is being considered for a weight loss surgery at that point.  Metoprolol can be continued after the thyroidectomy.  "

## 2019-03-12 ENCOUNTER — TELEPHONE (OUTPATIENT)
Dept: MEDICAL GROUP | Facility: MEDICAL CENTER | Age: 64
End: 2019-03-12

## 2019-03-13 NOTE — TELEPHONE ENCOUNTER
1. Caller Name: Alfonso                                         Call Back Number: [phone]      Patient approves a detailed voicemail message: N\A    Alfonso now would like something called in for sinus headache. Her message said she was prescribe a z-pack but her cold came back with a vengence.  Has terrible sinus headache.  She goes in for Thyroid surgery on 3/27 and does not want to be sick or have headaches when surgery day is here.

## 2019-03-14 ENCOUNTER — TELEPHONE (OUTPATIENT)
Dept: MEDICAL GROUP | Facility: MEDICAL CENTER | Age: 64
End: 2019-03-14

## 2019-03-14 DIAGNOSIS — J01.00 ACUTE NON-RECURRENT MAXILLARY SINUSITIS: ICD-10-CM

## 2019-03-14 RX ORDER — AZITHROMYCIN 250 MG/1
TABLET, FILM COATED ORAL
Qty: 6 TAB | Refills: 0 | Status: SHIPPED | OUTPATIENT
Start: 2019-03-14 | End: 2019-03-21

## 2019-03-18 ENCOUNTER — APPOINTMENT (OUTPATIENT)
Dept: MEDICAL GROUP | Facility: MEDICAL CENTER | Age: 64
End: 2019-03-18
Payer: COMMERCIAL

## 2019-03-21 ENCOUNTER — OFFICE VISIT (OUTPATIENT)
Dept: MEDICAL GROUP | Facility: MEDICAL CENTER | Age: 64
End: 2019-03-21
Payer: COMMERCIAL

## 2019-03-21 VITALS
WEIGHT: 293 LBS | HEIGHT: 64 IN | SYSTOLIC BLOOD PRESSURE: 140 MMHG | BODY MASS INDEX: 50.02 KG/M2 | HEART RATE: 78 BPM | DIASTOLIC BLOOD PRESSURE: 62 MMHG | RESPIRATION RATE: 16 BRPM | TEMPERATURE: 97.8 F | OXYGEN SATURATION: 96 %

## 2019-03-21 DIAGNOSIS — M25.511 CHRONIC PAIN IN RIGHT SHOULDER: ICD-10-CM

## 2019-03-21 DIAGNOSIS — B02.29 POSTHERPETIC NEURALGIA: ICD-10-CM

## 2019-03-21 DIAGNOSIS — G89.29 CHRONIC PAIN IN RIGHT SHOULDER: ICD-10-CM

## 2019-03-21 DIAGNOSIS — Z01.812 PRE-OPERATIVE LABORATORY EXAMINATION: ICD-10-CM

## 2019-03-21 DIAGNOSIS — G89.29 BILATERAL CHRONIC KNEE PAIN: ICD-10-CM

## 2019-03-21 DIAGNOSIS — M25.562 BILATERAL CHRONIC KNEE PAIN: ICD-10-CM

## 2019-03-21 DIAGNOSIS — R22.1 MASS OF RIGHT SIDE OF NECK: ICD-10-CM

## 2019-03-21 DIAGNOSIS — M25.561 BILATERAL CHRONIC KNEE PAIN: ICD-10-CM

## 2019-03-21 DIAGNOSIS — R11.0 NAUSEA: ICD-10-CM

## 2019-03-21 LAB
ANION GAP SERPL CALC-SCNC: 9 MMOL/L (ref 0–11.9)
BUN SERPL-MCNC: 14 MG/DL (ref 8–22)
CALCIUM SERPL-MCNC: 9.5 MG/DL (ref 8.5–10.5)
CHLORIDE SERPL-SCNC: 104 MMOL/L (ref 96–112)
CO2 SERPL-SCNC: 28 MMOL/L (ref 20–33)
CREAT SERPL-MCNC: 0.52 MG/DL (ref 0.5–1.4)
ERYTHROCYTE [DISTWIDTH] IN BLOOD BY AUTOMATED COUNT: 47.9 FL (ref 35.9–50)
GLUCOSE SERPL-MCNC: 113 MG/DL (ref 65–99)
HCT VFR BLD AUTO: 49.3 % (ref 37–47)
HGB BLD-MCNC: 15.8 G/DL (ref 12–16)
MCH RBC QN AUTO: 30.3 PG (ref 27–33)
MCHC RBC AUTO-ENTMCNC: 32 G/DL (ref 33.6–35)
MCV RBC AUTO: 94.4 FL (ref 81.4–97.8)
PLATELET # BLD AUTO: 275 K/UL (ref 164–446)
PMV BLD AUTO: 10.3 FL (ref 9–12.9)
POTASSIUM SERPL-SCNC: 4.6 MMOL/L (ref 3.6–5.5)
RBC # BLD AUTO: 5.22 M/UL (ref 4.2–5.4)
SODIUM SERPL-SCNC: 141 MMOL/L (ref 135–145)
WBC # BLD AUTO: 7.2 K/UL (ref 4.8–10.8)

## 2019-03-21 PROCEDURE — 85027 COMPLETE CBC AUTOMATED: CPT

## 2019-03-21 PROCEDURE — 99214 OFFICE O/P EST MOD 30 MIN: CPT | Performed by: PHYSICIAN ASSISTANT

## 2019-03-21 PROCEDURE — 80048 BASIC METABOLIC PNL TOTAL CA: CPT

## 2019-03-21 PROCEDURE — 36415 COLL VENOUS BLD VENIPUNCTURE: CPT

## 2019-03-21 RX ORDER — OXYCODONE AND ACETAMINOPHEN 10; 325 MG/1; MG/1
1 TABLET ORAL EVERY 8 HOURS PRN
Qty: 120 TAB | Refills: 0 | Status: SHIPPED | OUTPATIENT
Start: 2019-03-25 | End: 2019-03-21 | Stop reason: SDUPTHER

## 2019-03-21 RX ORDER — OXYCODONE AND ACETAMINOPHEN 10; 325 MG/1; MG/1
1 TABLET ORAL EVERY 6 HOURS PRN
Qty: 120 TAB | Refills: 0 | Status: SHIPPED | OUTPATIENT
Start: 2019-03-23 | End: 2019-05-20 | Stop reason: SDUPTHER

## 2019-03-21 RX ORDER — OXYMETAZOLINE HYDROCHLORIDE 0.05 G/100ML
2 SPRAY NASAL PRN
COMMUNITY
End: 2019-11-05

## 2019-03-21 RX ORDER — ONDANSETRON 4 MG/1
4 TABLET, FILM COATED ORAL EVERY 4 HOURS PRN
Qty: 30 TAB | Refills: 5 | Status: ON HOLD | OUTPATIENT
Start: 2019-03-21 | End: 2019-03-27

## 2019-03-21 RX ORDER — ASCORBIC ACID 500 MG
500 TABLET ORAL DAILY
COMMUNITY
End: 2019-12-23

## 2019-03-21 RX ORDER — OXYCODONE AND ACETAMINOPHEN 10; 325 MG/1; MG/1
1 TABLET ORAL EVERY 8 HOURS PRN
Qty: 90 TAB | Refills: 0 | Status: ON HOLD | OUTPATIENT
Start: 2019-05-24 | End: 2019-03-27

## 2019-03-21 RX ORDER — OXYCODONE AND ACETAMINOPHEN 10; 325 MG/1; MG/1
1 TABLET ORAL EVERY 8 HOURS PRN
Qty: 90 TAB | Refills: 0 | Status: SHIPPED | OUTPATIENT
Start: 2019-04-24 | End: 2019-03-21 | Stop reason: SDUPTHER

## 2019-03-21 ASSESSMENT — PATIENT HEALTH QUESTIONNAIRE - PHQ9: CLINICAL INTERPRETATION OF PHQ2 SCORE: 0

## 2019-03-21 NOTE — ASSESSMENT & PLAN NOTE
This is a 63-year-old female who is scheduled for a total thyroidectomy next week on Wednesday by Dr. Patel.  Is very concerned about the surgery.  Wants to make sure that she will be able to see another day.  It was requested by him that she have her oxycodone increased to 4 times a day.  She also is in need of new medication for chronic pain syndrome.  Back and hips.  Knees.  She is hoping once the surgery is completed that she will then get to be seen by bariatric surgery.

## 2019-03-21 NOTE — PROGRESS NOTES
Subjective:   Alfonso Posada is a 63 y.o. female here today for mass on the right side of her neck, chronic knee pain and chronic nauseousness secondary to postherpetic neuralgia.    Mass of right side of neck  This is a 63-year-old female who is scheduled for a total thyroidectomy next week on Wednesday by Dr. Patel.  Is very concerned about the surgery.  Wants to make sure that she will be able to see another day.  It was requested by him that she have her oxycodone increased to 4 times a day.  She also is in need of new medication for chronic pain syndrome.  Back and hips.  Knees.  She is hoping once the surgery is completed that she will then get to be seen by bariatric surgery.       Current medicines (including changes today)  Current Outpatient Prescriptions   Medication Sig Dispense Refill   • Menthol, Topical Analgesic, (BENGAY EX) by Apply externally route as needed.     • Multiple Vitamins-Minerals (MULTIVITAMIN WOMEN 50+) Tab Take  by mouth every day.     • ascorbic acid (ASCORBIC ACID) 500 MG Tab Take 500 mg by mouth every day.     • Cholecalciferol (VITAMIN D3 PO) Take  by mouth every day.     • Misc Natural Products (GLUCOSAMINE CHONDROITIN ADV PO) Take  by mouth every day.     • oxymetazoline (AFRIN) 0.05 % Solution Spray 2 Sprays in nose as needed for Congestion.     • Camphor-Eucalyptus-Menthol (VICKS VAPORUB EX) by Apply externally route every day.     • Metoprolol Succinate 50 MG Capsule ER 24 Hour Sprinkle Take  by mouth 2 Times a Day.     • [START ON 3/23/2019] oxyCODONE-acetaminophen (PERCOCET-10)  MG Tab Take 1 Tab by mouth every 6 hours as needed for Severe Pain for up to 30 days. 120 Tab 0   • [START ON 5/24/2019] oxyCODONE-acetaminophen (PERCOCET-10)  MG Tab Take 1 Tab by mouth every 8 hours as needed for Severe Pain for up to 30 days. 90 Tab 0   • ondansetron (ZOFRAN) 4 MG Tab tablet Take 1 Tab by mouth every four hours as needed for Nausea/Vomiting. 30 Tab 5   •  potassium chloride (KLOR-CON) 8 MEQ tablet TAKE ONE TABLET BY MOUTH DAILY 90 Tab 0   • losartan (COZAAR) 50 MG Tab Take 1 Tab by mouth every day. 90 Tab 3   • furosemide (LASIX) 40 MG Tab TAKE ONE TABLET BY MOUTH DAILY 90 Tab 0   • methimazole (TAPAZOLE) 5 MG Tab Take 1 Tab by mouth every day. 30 Tab 4   • ondansetron (ZOFRAN ODT) 4 MG TABLET DISPERSIBLE DISSOLVE ONE TABLET BY MOUTH EVERY 8 HOURS AS NEEDED FOR NAUSEA AND VOMITING 10 Tab 4   • fluticasone (FLONASE) 50 MCG/ACT nasal spray Spray 1 Spray in nose as needed.     • ALPRAZolam (XANAX) 0.5 MG Tab Take 0.5 mg by mouth 3 times a day as needed for Anxiety.     • montelukast (SINGULAIR) 10 MG Tab Take 10 mg by mouth every bedtime.     • atorvastatin (LIPITOR) 40 MG Tab Take 40 mg by mouth every evening.     • apixaban (ELIQUIS) 5mg Tab Take 1 Tab by mouth 2 Times a Day. 60 Tab 3   • sertraline (ZOLOFT) 100 MG Tab TAKE ONE TABLET BY MOUTH ONE TIME A DAY 90 Tab 2   • MAGNESIUM OXIDE PO Take 3 Tabs by mouth every bedtime.     • ketoconazole (NIZORAL) 2 % Cream Apply 1 Application to affected area(s) every day. Apply to face     • cetirizine (ZYRTEC) 10 MG Tab Take 10 mg by mouth every evening.     • diphenhydrAMINE (BENADRYL) 25 MG Tab Take 25 mg by mouth every 6 hours as needed for Itching.     • acetaminophen (TYLENOL) 500 MG Tab Take 1,000 mg by mouth 3 times a day as needed for Moderate Pain.     • omeprazole (PRILOSEC) 40 MG delayed-release capsule Take 1 Cap by mouth every day. 90 Cap 3     No current facility-administered medications for this visit.      She  has a past medical history of Anesthesia; Anxiety; Anxiety disorder; Arthritis; Asthma; Atrial fibrillation (HCC); Bartholin cyst; Cancer (HCC); Chronic knee pain; Depression; Graves disease; Heart burn; Hemorrhagic disorder (HCC); Hypertension; Morbid obesity (HCC); Postherpetic neuralgia; Psychiatric disorder; Sinus infection; SVT (supraventricular tachycardia) ([facility]); and Thyroid disease.    Social  "History and Family History were reviewed and updated.    ROS   No chest pain, no shortness of breath, no abdominal pain and all other systems were reviewed and are negative.       Objective:     Blood pressure 140/62, pulse 78, temperature 36.6 °C (97.8 °F), temperature source Temporal, resp. rate 16, height 1.626 m (5' 4\"), weight (!) 147 kg (324 lb), last menstrual period 03/20/2016, SpO2 96 %, not currently breastfeeding. Body mass index is 55.61 kg/m².   Physical Exam:  Constitutional: Alert, no distress.  Skin: Warm, dry, good turgor, no rashes in visible areas.  Eye: Equal, round and reactive, conjunctiva clear, lids normal.  ENMT: Lips without lesions, good dentition, oropharynx clear.  Neck: Trachea midline, no masses.   Lymph: No cervical or supraclavicular lymphadenopathy  Respiratory: Unlabored respiratory effort, lungs clear to auscultation, no wheezes, no ronchi.  Cardiovascular: Normal S1, S2, no murmur, no edema.  Psych: Alert and oriented x3, normal affect and mood.        Assessment and Plan:   The following treatment plan was discussed    1. Mass of right side of neck  Follow-up with surgery on the 27th with Dr. Patel.  Advised her that she will do well.  He is an excellent surgeon.    2. Bilateral chronic knee pain  Chronic condition.  Stable.   reviewed.  Medication appropriate.  Increased Norco to 4 times a day use.  After this coming month reduced it to 3 times a day.  Provided a 90-day supply.  - oxyCODONE-acetaminophen (PERCOCET-10)  MG Tab; Take 1 Tab by mouth every 6 hours as needed for Severe Pain for up to 30 days.  Dispense: 120 Tab; Refill: 0  - oxyCODONE-acetaminophen (PERCOCET-10)  MG Tab; Take 1 Tab by mouth every 8 hours as needed for Severe Pain for up to 30 days.  Dispense: 90 Tab; Refill: 0    3.  Postherpetic neuralgia with associated nauseousness  Chronic condition.  Still with associated nauseousness daily.  Renewed Zofran as directed but in a non-dispersible " tablet.      Followup: Return if symptoms worsen or fail to improve.    Please note that this dictation was created using voice recognition software. I have made every reasonable attempt to correct obvious errors, but I expect that there are errors of grammar and possibly content that I did not discover before finalizing the note.

## 2019-03-27 ENCOUNTER — ANESTHESIA (OUTPATIENT)
Dept: SURGERY | Facility: MEDICAL CENTER | Age: 64
End: 2019-03-27
Payer: COMMERCIAL

## 2019-03-27 ENCOUNTER — HOSPITAL ENCOUNTER (OUTPATIENT)
Facility: MEDICAL CENTER | Age: 64
End: 2019-03-28
Attending: SURGERY | Admitting: SURGERY
Payer: COMMERCIAL

## 2019-03-27 ENCOUNTER — ANESTHESIA EVENT (OUTPATIENT)
Dept: SURGERY | Facility: MEDICAL CENTER | Age: 64
End: 2019-03-27
Payer: COMMERCIAL

## 2019-03-27 ENCOUNTER — TELEPHONE (OUTPATIENT)
Dept: CARDIOLOGY | Facility: MEDICAL CENTER | Age: 64
End: 2019-03-27

## 2019-03-27 DIAGNOSIS — E07.89 THYROID MASS OF UNCLEAR ETIOLOGY: ICD-10-CM

## 2019-03-27 LAB — PATHOLOGY CONSULT NOTE: NORMAL

## 2019-03-27 PROCEDURE — 88307 TISSUE EXAM BY PATHOLOGIST: CPT

## 2019-03-27 PROCEDURE — 700111 HCHG RX REV CODE 636 W/ 250 OVERRIDE (IP)

## 2019-03-27 PROCEDURE — 160035 HCHG PACU - 1ST 60 MINS PHASE I: Performed by: SURGERY

## 2019-03-27 PROCEDURE — 160036 HCHG PACU - EA ADDL 30 MINS PHASE I: Performed by: SURGERY

## 2019-03-27 PROCEDURE — 501838 HCHG SUTURE GENERAL: Performed by: SURGERY

## 2019-03-27 PROCEDURE — 96375 TX/PRO/DX INJ NEW DRUG ADDON: CPT

## 2019-03-27 PROCEDURE — 93005 ELECTROCARDIOGRAM TRACING: CPT | Performed by: SURGERY

## 2019-03-27 PROCEDURE — 160048 HCHG OR STATISTICAL LEVEL 1-5: Performed by: SURGERY

## 2019-03-27 PROCEDURE — 502594 HCHG SCISSOR HANDLE, HARMONIC ACE: Performed by: SURGERY

## 2019-03-27 PROCEDURE — 700101 HCHG RX REV CODE 250

## 2019-03-27 PROCEDURE — 160041 HCHG SURGERY MINUTES - EA ADDL 1 MIN LEVEL 4: Performed by: SURGERY

## 2019-03-27 PROCEDURE — 160002 HCHG RECOVERY MINUTES (STAT): Performed by: SURGERY

## 2019-03-27 PROCEDURE — 700102 HCHG RX REV CODE 250 W/ 637 OVERRIDE(OP): Performed by: SURGERY

## 2019-03-27 PROCEDURE — 700111 HCHG RX REV CODE 636 W/ 250 OVERRIDE (IP): Performed by: SURGERY

## 2019-03-27 PROCEDURE — 160009 HCHG ANES TIME/MIN: Performed by: SURGERY

## 2019-03-27 PROCEDURE — 500002 HCHG ADHESIVE, DERMABOND: Performed by: SURGERY

## 2019-03-27 PROCEDURE — G0378 HOSPITAL OBSERVATION PER HR: HCPCS

## 2019-03-27 PROCEDURE — 700105 HCHG RX REV CODE 258

## 2019-03-27 PROCEDURE — 160029 HCHG SURGERY MINUTES - 1ST 30 MINS LEVEL 4: Performed by: SURGERY

## 2019-03-27 PROCEDURE — A9270 NON-COVERED ITEM OR SERVICE: HCPCS | Performed by: SURGERY

## 2019-03-27 PROCEDURE — 96374 THER/PROPH/DIAG INJ IV PUSH: CPT

## 2019-03-27 PROCEDURE — 700102 HCHG RX REV CODE 250 W/ 637 OVERRIDE(OP)

## 2019-03-27 PROCEDURE — A9270 NON-COVERED ITEM OR SERVICE: HCPCS

## 2019-03-27 RX ORDER — ACETAMINOPHEN 500 MG
1000 TABLET ORAL EVERY 6 HOURS
Status: DISCONTINUED | OUTPATIENT
Start: 2019-03-28 | End: 2019-03-28 | Stop reason: HOSPADM

## 2019-03-27 RX ORDER — POTASSIUM CHLORIDE 600 MG/1
8 TABLET, FILM COATED, EXTENDED RELEASE ORAL DAILY
Status: DISCONTINUED | OUTPATIENT
Start: 2019-03-27 | End: 2019-03-27

## 2019-03-27 RX ORDER — OXYCODONE HYDROCHLORIDE 10 MG/1
10 TABLET ORAL
Status: DISCONTINUED | OUTPATIENT
Start: 2019-03-27 | End: 2019-03-28 | Stop reason: HOSPADM

## 2019-03-27 RX ORDER — OXYCODONE HYDROCHLORIDE 5 MG/1
5 TABLET ORAL
Status: DISCONTINUED | OUTPATIENT
Start: 2019-03-27 | End: 2019-03-28 | Stop reason: HOSPADM

## 2019-03-27 RX ORDER — DIPHENHYDRAMINE HYDROCHLORIDE 50 MG/ML
12.5 INJECTION INTRAMUSCULAR; INTRAVENOUS
Status: DISCONTINUED | OUTPATIENT
Start: 2019-03-27 | End: 2019-03-27 | Stop reason: HOSPADM

## 2019-03-27 RX ORDER — LOSARTAN POTASSIUM 50 MG/1
50 TABLET ORAL DAILY
Status: DISCONTINUED | OUTPATIENT
Start: 2019-03-27 | End: 2019-03-28 | Stop reason: HOSPADM

## 2019-03-27 RX ORDER — FUROSEMIDE 40 MG/1
40 TABLET ORAL
Status: DISCONTINUED | OUTPATIENT
Start: 2019-03-27 | End: 2019-03-28 | Stop reason: HOSPADM

## 2019-03-27 RX ORDER — HALOPERIDOL 5 MG/ML
1 INJECTION INTRAMUSCULAR
Status: DISCONTINUED | OUTPATIENT
Start: 2019-03-27 | End: 2019-03-27 | Stop reason: HOSPADM

## 2019-03-27 RX ORDER — BUPIVACAINE HYDROCHLORIDE AND EPINEPHRINE 5; 5 MG/ML; UG/ML
INJECTION, SOLUTION EPIDURAL; INTRACAUDAL; PERINEURAL
Status: COMPLETED
Start: 2019-03-27 | End: 2019-03-27

## 2019-03-27 RX ORDER — ALPRAZOLAM 0.25 MG/1
0.5 TABLET ORAL 3 TIMES DAILY PRN
Status: DISCONTINUED | OUTPATIENT
Start: 2019-03-27 | End: 2019-03-28 | Stop reason: HOSPADM

## 2019-03-27 RX ORDER — FLUTICASONE PROPIONATE 50 MCG
1 SPRAY, SUSPENSION (ML) NASAL
Status: DISCONTINUED | OUTPATIENT
Start: 2019-03-27 | End: 2019-03-28 | Stop reason: HOSPADM

## 2019-03-27 RX ORDER — ATORVASTATIN CALCIUM 20 MG/1
40 TABLET, FILM COATED ORAL NIGHTLY
Status: DISCONTINUED | OUTPATIENT
Start: 2019-03-27 | End: 2019-03-28 | Stop reason: HOSPADM

## 2019-03-27 RX ORDER — CETIRIZINE HYDROCHLORIDE 10 MG/1
10 TABLET ORAL EVERY EVENING
Status: DISCONTINUED | OUTPATIENT
Start: 2019-03-27 | End: 2019-03-28 | Stop reason: HOSPADM

## 2019-03-27 RX ORDER — OXYCODONE HYDROCHLORIDE AND ACETAMINOPHEN 5; 325 MG/1; MG/1
1 TABLET ORAL
Status: COMPLETED | OUTPATIENT
Start: 2019-03-27 | End: 2019-03-27

## 2019-03-27 RX ORDER — HYDRALAZINE HYDROCHLORIDE 20 MG/ML
INJECTION INTRAMUSCULAR; INTRAVENOUS PRN
Status: DISCONTINUED | OUTPATIENT
Start: 2019-03-27 | End: 2019-03-27 | Stop reason: SURG

## 2019-03-27 RX ORDER — ONDANSETRON 2 MG/ML
4 INJECTION INTRAMUSCULAR; INTRAVENOUS EVERY 4 HOURS PRN
Status: DISCONTINUED | OUTPATIENT
Start: 2019-03-27 | End: 2019-03-28 | Stop reason: HOSPADM

## 2019-03-27 RX ORDER — OXYCODONE HYDROCHLORIDE AND ACETAMINOPHEN 5; 325 MG/1; MG/1
2 TABLET ORAL
Status: COMPLETED | OUTPATIENT
Start: 2019-03-27 | End: 2019-03-27

## 2019-03-27 RX ORDER — METOPROLOL SUCCINATE 50 MG/1
50 TABLET, EXTENDED RELEASE ORAL DAILY
Status: DISCONTINUED | OUTPATIENT
Start: 2019-03-28 | End: 2019-03-28 | Stop reason: HOSPADM

## 2019-03-27 RX ORDER — SODIUM CHLORIDE, SODIUM LACTATE, POTASSIUM CHLORIDE, CALCIUM CHLORIDE 600; 310; 30; 20 MG/100ML; MG/100ML; MG/100ML; MG/100ML
INJECTION, SOLUTION INTRAVENOUS CONTINUOUS
Status: ACTIVE | OUTPATIENT
Start: 2019-03-27 | End: 2019-03-28

## 2019-03-27 RX ORDER — DEXAMETHASONE SODIUM PHOSPHATE 4 MG/ML
INJECTION, SOLUTION INTRA-ARTICULAR; INTRALESIONAL; INTRAMUSCULAR; INTRAVENOUS; SOFT TISSUE PRN
Status: DISCONTINUED | OUTPATIENT
Start: 2019-03-27 | End: 2019-03-27 | Stop reason: SURG

## 2019-03-27 RX ORDER — OMEPRAZOLE 20 MG/1
40 CAPSULE, DELAYED RELEASE ORAL DAILY
Status: DISCONTINUED | OUTPATIENT
Start: 2019-03-28 | End: 2019-03-28 | Stop reason: HOSPADM

## 2019-03-27 RX ORDER — SODIUM CHLORIDE, SODIUM LACTATE, POTASSIUM CHLORIDE, CALCIUM CHLORIDE 600; 310; 30; 20 MG/100ML; MG/100ML; MG/100ML; MG/100ML
INJECTION, SOLUTION INTRAVENOUS
Status: DISCONTINUED | OUTPATIENT
Start: 2019-03-27 | End: 2019-03-27 | Stop reason: SURG

## 2019-03-27 RX ORDER — DEXAMETHASONE SODIUM PHOSPHATE 4 MG/ML
4 INJECTION, SOLUTION INTRA-ARTICULAR; INTRALESIONAL; INTRAMUSCULAR; INTRAVENOUS; SOFT TISSUE
Status: COMPLETED | OUTPATIENT
Start: 2019-03-27 | End: 2019-03-27

## 2019-03-27 RX ORDER — ONDANSETRON 2 MG/ML
INJECTION INTRAMUSCULAR; INTRAVENOUS PRN
Status: DISCONTINUED | OUTPATIENT
Start: 2019-03-27 | End: 2019-03-27 | Stop reason: SURG

## 2019-03-27 RX ORDER — BUPIVACAINE HYDROCHLORIDE AND EPINEPHRINE 5; 5 MG/ML; UG/ML
INJECTION, SOLUTION PERINEURAL
Status: DISCONTINUED | OUTPATIENT
Start: 2019-03-27 | End: 2019-03-27 | Stop reason: HOSPADM

## 2019-03-27 RX ORDER — SERTRALINE HYDROCHLORIDE 100 MG/1
100 TABLET, FILM COATED ORAL DAILY
Status: DISCONTINUED | OUTPATIENT
Start: 2019-03-28 | End: 2019-03-28 | Stop reason: HOSPADM

## 2019-03-27 RX ORDER — MONTELUKAST SODIUM 10 MG/1
10 TABLET ORAL
Status: DISCONTINUED | OUTPATIENT
Start: 2019-03-27 | End: 2019-03-28 | Stop reason: HOSPADM

## 2019-03-27 RX ORDER — ONDANSETRON 2 MG/ML
4 INJECTION INTRAMUSCULAR; INTRAVENOUS
Status: COMPLETED | OUTPATIENT
Start: 2019-03-27 | End: 2019-03-27

## 2019-03-27 RX ADMIN — LOSARTAN POTASSIUM 50 MG: 50 TABLET ORAL at 22:38

## 2019-03-27 RX ADMIN — OXYCODONE AND ACETAMINOPHEN 2 TABLET: 5; 325 TABLET ORAL at 16:15

## 2019-03-27 RX ADMIN — HYDRALAZINE HYDROCHLORIDE 10 MG: 20 INJECTION INTRAMUSCULAR; INTRAVENOUS at 13:12

## 2019-03-27 RX ADMIN — DEXAMETHASONE SODIUM PHOSPHATE 4 MG: 4 INJECTION, SOLUTION INTRAMUSCULAR; INTRAVENOUS at 22:33

## 2019-03-27 RX ADMIN — ROCURONIUM BROMIDE 50 MG: 10 INJECTION, SOLUTION INTRAVENOUS at 12:45

## 2019-03-27 RX ADMIN — CALCIUM CARBONATE-CHOLECALCIFEROL TAB 250 MG-125 UNIT 2 TABLET: 250-125 TAB at 21:36

## 2019-03-27 RX ADMIN — ACETAMINOPHEN 1000 MG: 500 TABLET ORAL at 23:50

## 2019-03-27 RX ADMIN — FENTANYL CITRATE 50 MCG: 50 INJECTION, SOLUTION INTRAMUSCULAR; INTRAVENOUS at 17:06

## 2019-03-27 RX ADMIN — SUGAMMADEX 200 MG: 100 INJECTION, SOLUTION INTRAVENOUS at 15:35

## 2019-03-27 RX ADMIN — MIDAZOLAM HYDROCHLORIDE 2 MG: 1 INJECTION, SOLUTION INTRAMUSCULAR; INTRAVENOUS at 13:27

## 2019-03-27 RX ADMIN — FENTANYL CITRATE 300 MCG: 50 INJECTION, SOLUTION INTRAMUSCULAR; INTRAVENOUS at 12:45

## 2019-03-27 RX ADMIN — MAGNESIUM OXIDE TAB 400 MG (241.3 MG ELEMENTAL MG) 400 MG: 400 (241.3 MG) TAB at 21:36

## 2019-03-27 RX ADMIN — FENTANYL CITRATE 50 MCG: 50 INJECTION, SOLUTION INTRAMUSCULAR; INTRAVENOUS at 15:59

## 2019-03-27 RX ADMIN — DEXAMETHASONE SODIUM PHOSPHATE 8 MG: 4 INJECTION, SOLUTION INTRAMUSCULAR; INTRAVENOUS at 12:45

## 2019-03-27 RX ADMIN — FENTANYL CITRATE 50 MCG: 50 INJECTION, SOLUTION INTRAMUSCULAR; INTRAVENOUS at 16:05

## 2019-03-27 RX ADMIN — SODIUM CHLORIDE, POTASSIUM CHLORIDE, SODIUM LACTATE AND CALCIUM CHLORIDE: 600; 310; 30; 20 INJECTION, SOLUTION INTRAVENOUS at 12:45

## 2019-03-27 RX ADMIN — ATORVASTATIN CALCIUM 40 MG: 20 TABLET, FILM COATED ORAL at 21:36

## 2019-03-27 RX ADMIN — ONDANSETRON 8 MG: 2 INJECTION INTRAMUSCULAR; INTRAVENOUS at 12:45

## 2019-03-27 RX ADMIN — ONDANSETRON 4 MG: 2 INJECTION INTRAMUSCULAR; INTRAVENOUS at 16:12

## 2019-03-27 RX ADMIN — MONTELUKAST SODIUM 10 MG: 10 TABLET, COATED ORAL at 21:36

## 2019-03-27 RX ADMIN — DIPHENHYDRAMINE HYDROCHLORIDE 12.5 MG: 50 INJECTION INTRAMUSCULAR; INTRAVENOUS at 19:55

## 2019-03-27 RX ADMIN — ONDANSETRON 4 MG: 2 INJECTION INTRAMUSCULAR; INTRAVENOUS at 21:33

## 2019-03-27 RX ADMIN — PROPOFOL 200 MG: 10 INJECTION, EMULSION INTRAVENOUS at 12:45

## 2019-03-27 RX ADMIN — HALOPERIDOL LACTATE 1 MG: 5 INJECTION, SOLUTION INTRAMUSCULAR at 15:51

## 2019-03-27 RX ADMIN — SODIUM CHLORIDE, SODIUM LACTATE, POTASSIUM CHLORIDE, CALCIUM CHLORIDE: 600; 310; 30; 20 INJECTION, SOLUTION INTRAVENOUS at 12:30

## 2019-03-27 RX ADMIN — FENTANYL CITRATE 50 MCG: 50 INJECTION, SOLUTION INTRAMUSCULAR; INTRAVENOUS at 19:00

## 2019-03-27 RX ADMIN — LIDOCAINE HYDROCHLORIDE 50 MG: 20 INJECTION, SOLUTION INFILTRATION; PERINEURAL at 12:45

## 2019-03-27 RX ADMIN — FENTANYL CITRATE 50 MCG: 50 INJECTION, SOLUTION INTRAMUSCULAR; INTRAVENOUS at 19:55

## 2019-03-27 RX ADMIN — OXYCODONE HYDROCHLORIDE 10 MG: 10 TABLET ORAL at 21:35

## 2019-03-27 RX ADMIN — FENTANYL CITRATE 50 MCG: 50 INJECTION, SOLUTION INTRAMUSCULAR; INTRAVENOUS at 16:58

## 2019-03-27 RX ADMIN — CETIRIZINE HYDROCHLORIDE 10 MG: 10 TABLET, FILM COATED ORAL at 21:35

## 2019-03-27 ASSESSMENT — LIFESTYLE VARIABLES
AVERAGE NUMBER OF DAYS PER WEEK YOU HAVE A DRINK CONTAINING ALCOHOL: 2
ON A TYPICAL DAY WHEN YOU DRINK ALCOHOL HOW MANY DRINKS DO YOU HAVE: 2
TOTAL SCORE: 0
TOTAL SCORE: 0
EVER FELT BAD OR GUILTY ABOUT YOUR DRINKING: NO
HOW MANY TIMES IN THE PAST YEAR HAVE YOU HAD 5 OR MORE DRINKS IN A DAY: 0
EVER_SMOKED: YES
HAVE YOU EVER FELT YOU SHOULD CUT DOWN ON YOUR DRINKING: NO
EVER HAD A DRINK FIRST THING IN THE MORNING TO STEADY YOUR NERVES TO GET RID OF A HANGOVER: NO
HAVE PEOPLE ANNOYED YOU BY CRITICIZING YOUR DRINKING: NO
ALCOHOL_USE: YES
CONSUMPTION TOTAL: NEGATIVE
TOTAL SCORE: 0

## 2019-03-27 ASSESSMENT — COGNITIVE AND FUNCTIONAL STATUS - GENERAL
STANDING UP FROM CHAIR USING ARMS: A LITTLE
SUGGESTED CMS G CODE MODIFIER MOBILITY: CK
EATING MEALS: A LITTLE
MOVING FROM LYING ON BACK TO SITTING ON SIDE OF FLAT BED: A LITTLE
TOILETING: A LITTLE
HELP NEEDED FOR BATHING: A LITTLE
DRESSING REGULAR LOWER BODY CLOTHING: A LITTLE
PERSONAL GROOMING: A LITTLE
MOVING TO AND FROM BED TO CHAIR: A LITTLE
WALKING IN HOSPITAL ROOM: A LITTLE
MOBILITY SCORE: 18
CLIMB 3 TO 5 STEPS WITH RAILING: A LITTLE
DAILY ACTIVITIY SCORE: 18
SUGGESTED CMS G CODE MODIFIER DAILY ACTIVITY: CK
DRESSING REGULAR UPPER BODY CLOTHING: A LITTLE
TURNING FROM BACK TO SIDE WHILE IN FLAT BAD: A LITTLE

## 2019-03-27 NOTE — ANESTHESIA PREPROCEDURE EVALUATION
Relevant Problems   (+) Arthritis   (+) Essential hypertension   (+) Gastroesophageal reflux disease without esophagitis   (+) Hyperthyroidism   (+) Mild intermittent asthma without complication   (+) Paroxysmal atrial fibrillation (HCC)       Physical Exam    Airway   Mallampati: II  TM distance: >3 FB  Neck ROM: full       Cardiovascular - normal exam  Rhythm: regular  Rate: normal  (-) murmur     Dental - normal exam           Pulmonary - normal exam  Breath sounds clear to auscultation     Abdominal    Neurological - normal exam                 Anesthesia Plan    ASA 3   ASA physical status 3 criteria: respiratory insufficiency or compromise    Plan - general       Airway plan will be ETT        Induction: intravenous    Postoperative Plan: Postoperative administration of opioids is intended.    Pertinent diagnostic labs and testing reviewed    Informed Consent:    Anesthetic plan and risks discussed with patient.    Use of blood products discussed with: patient whom consented to blood products.

## 2019-03-27 NOTE — TELEPHONE ENCOUNTER
Called pt. She states she had an episode of afib with a HR in the 120s on Monday. She took her extra metoprolol as instructed and the episode only lasted about an hour. Her BP did rise to the 160's at this time. She has had no recurrent episodes since and does endorse having a couple drinks prior to this episode. Advised pt that she should be OK to proceed, but that ultimately that is up to the surgeon. Instructed her to inform them of this episode at there pre op check in and they will speak with the surgeon. Pt states understanding.

## 2019-03-27 NOTE — TELEPHONE ENCOUNTER
----- Message from Janice Talley sent at 3/27/2019  9:48 AM PDT -----  Regarding: Racing heart, having surgery today  KEVIN/Escobar      Patient is having surgery today, and she stopped her Eliquis on Sunday. She said her heart rate has increased, and wants to make sure she is still good for surgery. Ph. #[phone], she has to leave at 10:00 a.m.to get to the hospital for an 11:00 a.m. Check-in.

## 2019-03-27 NOTE — ANESTHESIA PROCEDURE NOTES
Airway  Date/Time: 3/27/2019 12:46 PM  Performed by: RUDOLPH BENAVIDES  Authorized by: RUDOLPH BENAVIDES     Location:  OR  Urgency:  Elective  Indications for Airway Management:  Anesthesia  Spontaneous Ventilation: absent    Sedation Level:  Deep  Preoxygenated: Yes    Patient Position:  Sniffing  Final Airway Type:  Endotracheal airway  Final Endotracheal Airway:  ETT  Cuffed: Yes    Technique Used for Successful ETT Placement:  Video laryngoscopy  Insertion Site:  Oral  Blade Type:  Luong  Laryngoscope Blade/Videolaryngoscope Blade Size:  3  ETT Size (mm):  7.0  Measured from:  Teeth  ETT to Teeth (cm):  22  Placement Verified by: auscultation and capnometry    Cormack-Lehane Classification:  Grade I - full view of glottis  Number of Attempts at Approach:  1

## 2019-03-27 NOTE — ANESTHESIA QCDR
2019 [facility] Clinical Data Registry (for Quality Improvement)     Postoperative nausea/vomiting risk protocol (Adult = 18 yrs and Pediatric 3-17 yrs)- (430 and 463)  General inhalation anesthetic (NOT TIVA) with PONV risk factors: Yes  Provision of anti-emetic therapy with at least 2 different classes of agents: Yes   Patient DID NOT receive anti-emetic therapy and reason is documented in Medical Record:  N/A    Multimodal Pain Management- (AQI59)  Patient undergoing Elective Surgery (i.e. Outpatient, or ASC, or Prescheduled Surgery prior to Hospital Admission): Yes  Use of Multimodal Pain Management, two or more drugs and/or interventions, NOT including systemic opioids: Yes   Exception: Documented allergy to multiple classes of analgesics:  N/A    PACU assessment of acute postoperative pain prior to Anesthesia Care End- Applies to Patients Age = 18- (ABG7)  Initial PACU pain score is which of the following: < 7/10  Patient unable to report pain score: N/A    Post-anesthetic transfer of care checklist/protocol to PACU/ICU- (426 and 427)  Upon conclusion of case, patient transferred to which of the following locations: PACU/Non-ICU  Use of transfer checklist/protocol: Yes  Exclusion: Service Performed in Patient Hospital Room (and thus did not require transfer): N/A    PACU Reintubation- (AQI31)  General anesthesia requiring endotracheal intubation (ETT) along with subsequent extubation in OR or PACU: No  Required reintubation in the PACU: N/A  Extubation was a planned trial documented in the medical record prior to removal of the original airway device: N/A    Unplanned admission to ICU related to anesthesia service up through end of PACU care- (MD51)  Unplanned admission to ICU (not initially anticipated at anesthesia start time): No

## 2019-03-27 NOTE — OR SURGEON
Immediate Post OP Note    PreOp Diagnosis: recurrent toxic goiter    PostOp Diagnosis: same    Procedure(s):  THYROIDECTOMY - COMPLETION - Wound Class: Clean    Surgeon(s):  Vincent Patel M.D.    Anesthesiologist/Type of Anesthesia:  Anesthesiologist: Fred Lopez M.D./General    Surgical Staff:  Assistant: SOUTH Hernandez  Circulator: Ellis Briceno R.N.  Relief Circulator: Melinda Cardenas R.N.  Relief Scrub: Drew Michel; Mickey Morel  Scrub Person: Alexander Munoz    Specimens removed if any:  ID Type Source Tests Collected by Time Destination   A : RIGHT THYROID LOBE Tissue Thyroid PATHOLOGY SPECIMEN Vincent Patel M.D. 3/27/2019  1:50 PM    B : REMAINDER OF LEFT THYROID LOBE Tissue Thyroid PATHOLOGY SPECIMEN Vincent Patel M.D. 3/27/2019  2:24 PM        Estimated Blood Loss: 200 cc    Findings: large right thyroid nodule, small residual left thyroid remnant, dense scar tissue    Complications: none        3/27/2019 4:58 PM Vincent Patel M.D.

## 2019-03-27 NOTE — ANESTHESIA POSTPROCEDURE EVALUATION
Patient: Alfonso Posada    Procedure Summary     Date:  03/27/19 Room / Location:  [facility] ROOM 23 / SURGERY SAME DAY [facility] ORS    Anesthesia Start:  1243 Anesthesia Stop:  1546    Procedure:  THYROIDECTOMY - COMPLETION (Neck) Diagnosis:  (Same as Pre-op)    Surgeon:  Vincent Patel M.D. Responsible Provider:  Fred Lopez M.D.    Anesthesia Type:  general ASA Status:  3          Final Anesthesia Type: general  Last vitals  BP   Blood Pressure: 156/75    Temp   36.3 °C (97.4 °F)    Pulse   Pulse: 75   Resp   20    SpO2   93 %      Anesthesia Post Evaluation    Patient location during evaluation: PACU  Patient participation: complete - patient participated  Level of consciousness: awake and alert    Airway patency: patent  Anesthetic complications: no  Cardiovascular status: hemodynamically stable  Respiratory status: acceptable  Hydration status: euvolemic    PONV: none           Nurse Pain Score: 0 (NPRS)

## 2019-03-27 NOTE — ANESTHESIA TIME REPORT
Anesthesia Start and Stop Event Times     Date Time Event    3/27/2019 1243 Anesthesia Start     1546 Anesthesia Stop        Responsible Staff  03/27/19    Name Role Begin End    Fred Lopez M.D. Anesth 1243 1546        Preop Diagnosis (Free Text):  Pre-op Diagnosis     DOMINANT RIGHT THYROID MASS        Preop Diagnosis (Codes):  Diagnosis Information     Diagnosis Code(s):         Post op Diagnosis  Graves disease      Premium Reason  A. 3PM - 7AM    Comments:

## 2019-03-28 VITALS
TEMPERATURE: 98.3 F | OXYGEN SATURATION: 91 % | HEIGHT: 64 IN | WEIGHT: 293 LBS | BODY MASS INDEX: 50.02 KG/M2 | HEART RATE: 80 BPM | DIASTOLIC BLOOD PRESSURE: 87 MMHG | RESPIRATION RATE: 18 BRPM | SYSTOLIC BLOOD PRESSURE: 155 MMHG

## 2019-03-28 LAB
CALCIUM SERPL-MCNC: 8.9 MG/DL (ref 8.5–10.5)
EKG IMPRESSION: NORMAL
PTH-INTACT SERPL-MCNC: 58.7 PG/ML (ref 14–72)

## 2019-03-28 PROCEDURE — 96376 TX/PRO/DX INJ SAME DRUG ADON: CPT

## 2019-03-28 PROCEDURE — 700102 HCHG RX REV CODE 250 W/ 637 OVERRIDE(OP): Performed by: SURGERY

## 2019-03-28 PROCEDURE — A9270 NON-COVERED ITEM OR SERVICE: HCPCS | Performed by: SURGERY

## 2019-03-28 PROCEDURE — G0378 HOSPITAL OBSERVATION PER HR: HCPCS

## 2019-03-28 PROCEDURE — 93010 ELECTROCARDIOGRAM REPORT: CPT | Performed by: INTERNAL MEDICINE

## 2019-03-28 PROCEDURE — 83970 ASSAY OF PARATHORMONE: CPT

## 2019-03-28 PROCEDURE — 700111 HCHG RX REV CODE 636 W/ 250 OVERRIDE (IP): Performed by: SURGERY

## 2019-03-28 PROCEDURE — 36415 COLL VENOUS BLD VENIPUNCTURE: CPT

## 2019-03-28 RX ORDER — LEVOTHYROXINE SODIUM 175 UG/1
175 TABLET ORAL
Qty: 30 TAB | Refills: 1 | Status: SHIPPED | OUTPATIENT
Start: 2019-03-28 | End: 2019-05-20 | Stop reason: SDUPTHER

## 2019-03-28 RX ADMIN — OXYCODONE HYDROCHLORIDE 10 MG: 10 TABLET ORAL at 01:32

## 2019-03-28 RX ADMIN — ONDANSETRON 4 MG: 2 INJECTION INTRAMUSCULAR; INTRAVENOUS at 10:55

## 2019-03-28 RX ADMIN — LOSARTAN POTASSIUM 50 MG: 50 TABLET ORAL at 06:42

## 2019-03-28 RX ADMIN — CALCIUM CARBONATE-CHOLECALCIFEROL TAB 250 MG-125 UNIT 2 TABLET: 250-125 TAB at 11:20

## 2019-03-28 RX ADMIN — ACETAMINOPHEN 1000 MG: 500 TABLET ORAL at 11:19

## 2019-03-28 RX ADMIN — SERTRALINE HYDROCHLORIDE 100 MG: 100 TABLET ORAL at 06:42

## 2019-03-28 RX ADMIN — METOPROLOL SUCCINATE 50 MG: 50 TABLET, EXTENDED RELEASE ORAL at 06:42

## 2019-03-28 RX ADMIN — ACETAMINOPHEN 1000 MG: 500 TABLET ORAL at 06:42

## 2019-03-28 RX ADMIN — ONDANSETRON 4 MG: 2 INJECTION INTRAMUSCULAR; INTRAVENOUS at 06:51

## 2019-03-28 RX ADMIN — OXYCODONE HYDROCHLORIDE 10 MG: 10 TABLET ORAL at 10:04

## 2019-03-28 RX ADMIN — OXYCODONE HYDROCHLORIDE 10 MG: 10 TABLET ORAL at 06:43

## 2019-03-28 RX ADMIN — ONDANSETRON 4 MG: 2 INJECTION INTRAMUSCULAR; INTRAVENOUS at 01:33

## 2019-03-28 RX ADMIN — FUROSEMIDE 40 MG: 40 TABLET ORAL at 06:42

## 2019-03-28 RX ADMIN — OMEPRAZOLE 40 MG: 20 CAPSULE, DELAYED RELEASE ORAL at 06:42

## 2019-03-28 RX ADMIN — CALCIUM CARBONATE-CHOLECALCIFEROL TAB 250 MG-125 UNIT 2 TABLET: 250-125 TAB at 06:42

## 2019-03-28 NOTE — OP REPORT
DATE OF SERVICE:  03/27/2019    PREOPERATIVE DIAGNOSIS:  Recurrent toxic goiter.    POSTOPERATIVE DIAGNOSIS:  Recurrent toxic goiter.    PROCEDURES:  1.  Completion thyroidectomy.  2.  Bilateral recurrent laryngeal nerve monitoring.    SURGEON:  Vincent Patel MD    ASSISTANT:  CAMPBELL Gonzalez    ANESTHESIOLOGIST:  Fred Lopez MD    ANESTHESIA:  General endotracheal.    SPECIMENS:  1.  Right thyroid lobe.  2.  Remaining left thyroid lobe.    ESTIMATED BLOOD LOSS:  200 mL.    COMPLICATIONS:  None.    INDICATIONS:  This patient is a very pleasant 63-year-old female who presented   to me with hyperthyroidism and right thyroid nodule, which was found to be   atypia of unknown significance on FNA biopsy.  She has a history of remote,   which she was told was a total thyroidectomy in the past.  Preoperative   imaging revealed right thyroid nodule as well as some left thyroid tissue.  In   addition, uptake scan did show uptake in the thyroid region.  She was   clinically euthyroid and was maintained on methimazole therapy to control her   hyperthyroidism.  I discussed the risks including bleeding, infection, injury   to recurrent laryngeal nerves and permanent hypoparathyroidism.  We discussed   that these risks would be increased due to the reoperative nature of the   surgery.  She agreed and is willing to proceed.  Preoperative laryngoscopy   revealed normal vocal cord function bilaterally.    FINDINGS:  There was a large soft right thyroid nodule.  There was some   remnant thyroid tissue on the left.  The bilateral recurrent laryngeal nerves   were intact at the end of the case.  The parathyroid glands were not   definitively seen.    PROCEDURE IN DETAIL:  Informed consent was obtained.  The patient was brought   back to the operating room and placed in supine position on the operating   table.  General anesthesia was induced.  Patient's neck was prepped and draped   in usual sterile fashion.  Preincision  timeout procedure was performed.    After instillation of local anesthetic, a skin incision was made.  The patient   had been intubated with a NIM endotracheal tube and the surgical assistant   monitored the integrity of the bilateral recurrent laryngeal nerves throughout   the case.  We dissected down through subcutaneous tissues and platysma.    Subplatysmal flaps were raised in the cranial and caudal directions.  The   strap muscles were split in the median raphe.  We first turned our attention   to the right side.  The strap muscles were grasped and retracted laterally.    The paratracheal space was developed with great difficulty due to the previous   scar tissue.  We encountered a large thyroid nodule in the remnant thyroid.    We continued to develop the paratracheal space.  There was some adherent   muscle, which was very friable and bled easily making the dissection much more   difficult.  Eventually, we were able to come around the side of the nodule.    We confirmed the integrity of the recurrent laryngeal nerve by stimulation   through the vagus.  We continued to dissect circumferentially around the   thyroid nodule and retracted anteromedially.  Eventually, we were able to   dissect it free from the surrounding tissues and off of the trachea.  We began   to stimulate the vagus nerve and confirmed that the recurrent laryngeal nerve   was intact although we were unable to visualize it in the surgical field.    There was some continued hemorrhage, which was controlled temporarily with   clips as needed.  We then turned our attention to the left side.  Again, the   strap muscles were elevated and the paratracheal space was developed.  This   was slightly easier on this side.  There was only a small amount of residual   thyroid tissue near the cricothyroid interval.  We located the recurrent   laryngeal nerve and found it to be intact.  We dissected the remaining thyroid   tissue free of the surrounding  structures.  There was also a pyramidal lobe,   which was taken down.  Specimen was passed off.  We then inspected both sides   for hemostasis.  Finally, we were able to achieve this with clips and cautery   as needed.  Fibrillar was placed throughout the surgical field.  The integrity   of the bilateral recurrent laryngeal nerves was confirmed by stimulation to   the vagus nerves by the surgical assistant.  The strap muscles were   reapproximated in the median raphe with 3-0 Vicryl suture.  The platysma was   closed with interrupted 3-0 Vicryl sutures.  Skin was closed with a running   subcuticular 4-0 Prolene temporarily and dressed with Dermabond.  Once the   Dermabond was dried, the Prolene was removed.  All sponge and instrument   counts were correct.       ____________________________________     MD LAURENT Hilliard / JAVY    DD:  03/27/2019 17:05:21  DT:  03/27/2019 17:41:35    D#:  [id number]  Job#:  [id number]

## 2019-03-28 NOTE — DISCHARGE INSTRUCTIONS
Discharge Instructions    Discharged to home by car with relative. Discharged via wheelchair, hospital escort: Yes.  Special equipment needed: Not Applicable    Be sure to schedule a follow-up appointment with your primary care doctor or any specialists as instructed.     Discharge Plan:   Smoking Cessation Offered: Patient Refused  Influenza Vaccine Indication: Not indicated: Previously immunized this influenza season and > 8 years of age    I understand that a diet low in cholesterol, fat, and sodium is recommended for good health. Unless I have been given specific instructions below for another diet, I accept this instruction as my diet prescription.   Other diet: Diet as tolerated    Special Instructions:     Discharge instructions after thyroidectomy:     You had surgery called thyroidectomy. This means that part or all of your thyroid gland was removed. The main job of the thyroid gland is to make thyroid hormone. This hormone controls your body’s metabolism. This is the way your body creates and uses energy. Removing the thyroid gland removes your body’s source of thyroid hormone. So after the surgery, you will need to take thyroid hormone pills daily. This helps keep the level of thyroid hormone in your body steady. This sheet tells you more about how to care for yourself after surgery.     Recovering After Surgery:     Get plenty of rest.     Care for your incision as directed.     Avoid heavy lifting and strenuous activity for 2 weeks.     Walk a few times daily. But don’t push yourself too hard. Slowly increase your pace and distance, as you feel able.     Return to work when your doctor says it’s okay.     Pain Control:  Most patients do well after thyroid surgery. You may take ibuprofen or Tylenol as needed for pain. If you need stronger pain medication, call your doctor's office.      Taking Your Thyroid Medication:     Take your medication as directed.     Use a pillbox labeled with the days of the  week. This will help you remember whether you’ve taken your medication each day.     Take your medication with a liquid,  avoid taking it with soy milk. Soy milk can affect how well your body absorbs the medication. The pill needs to reach your stomach and not dissolve in your throat.     Try to take your medication with the same types and amounts of food and liquid each day. This helps control the amount of thyroid hormone in your system.     After taking your medication:     Wait 4 hours before eating or drinking anything that contains soy.     Wait 4 hours before taking certain medications. These include:                 Iron supplements                 Calcium supplements                 Antacids that contain either calcium or aluminum hydroxide                 Medications that lower your cholesterol     Do not stop taking your medication even if you become pregnant.     Never stop taking medications on your own.     Keeping Your Doctor’s Appointments:     See your doctor for regular visits. These are needed to monitor your health.     Have routine blood tests done. These check the level of thyroid hormone in your body. This helps your doctor know whether to adjust the dosage of your medication if needed.     Tell your doctor about any signs of further thyroid problems.     Signs that you may have too much thyroid hormone in your body include:                 Restlessness                 Rapid weight loss                 Sweating                 Signs that you may have too little thyroid hormone in your body include:                 Fatigue or sluggishness                 Puffy hands, face, or feet                 Hoarseness                 Muscle pain                 Slow pulse (less than 60 beats per minute)     When to Call Your Doctor:  Call your doctor right away if you have any of the following:  Fever above 101°F  Swelling or bleeding at the incision site  Choking  Trouble breathing  A sore throat that  lasts longer than 7 days  Tingling or cramps in your hands, feet, or lips     Calcium Supplementation  After thyroidectomy, you may have low calcium levels. Symptoms of low calcium levels include tingling or numbness in the fingers or lips, or muscle spasms.  . If you have symptoms of low calcium, take two extra tablets and call your doctor's office.            FOLLOW-UP:  · Please call my office at [phone] to make an appointment in 1 week     Office address:   [place] [address]     Vincent Patel M.D.  [facility]  [phone]       · Is patient discharged on Warfarin / Coumadin?   No     Depression / Suicide Risk    As you are discharged from this [facility] facility, it is important to learn how to keep safe from harming yourself.    Recognize the warning signs:  · Abrupt changes in personality, positive or negative- including increase in energy   · Giving away possessions  · Change in eating patterns- significant weight changes-  positive or negative  · Change in sleeping patterns- unable to sleep or sleeping all the time   · Unwillingness or inability to communicate  · Depression  · Unusual sadness, discouragement and loneliness  · Talk of wanting to die  · Neglect of personal appearance   · Rebelliousness- reckless behavior  · Withdrawal from people/activities they love  · Confusion- inability to concentrate     If you or a loved one observes any of these behaviors or has concerns about self-harm, here's what you can do:  · Talk about it- your feelings and reasons for harming yourself  · Remove any means that you might use to hurt yourself (examples: pills, rope, extension cords, firearm)  · Get professional help from the community (Mental Health, Substance Abuse, psychological counseling)  · Do not be alone:Call your Safe Contact- someone whom you trust who will be there for you.  · Call your local CRISIS HOTLINE 882-3471 or 010-987-4919  · Call your local Children's Mobile  Crisis Response Team [place] (939) 619-7087 or www.[employer].Spodly  · Call the toll free National Suicide Prevention Hotlines   · National Suicide Prevention Lifeline 330-197-XQNK (1303)  · National Hope Line Network 800-SUICIDE (482-6492)

## 2019-03-28 NOTE — CARE PLAN
Problem: Safety  Goal: Will remain free from injury  Outcome: PROGRESSING AS EXPECTED  Treaded socks in place, bed in the lowest position, bed alarm on, call light and belongings within reach, pt call for assistance appropriately    Problem: Pain Management  Goal: Pain level will decrease to patient's comfort goal  Outcome: PROGRESSING AS EXPECTED  Medicated with oxy 10 for pain per MAR with adequate pain control, hourly rounding in progress

## 2019-03-28 NOTE — DISCHARGE SUMMARY
Discharge Summary      DATE OF ADMISSION: 3/27/2019    DATE OF DISCHARGE: 3/28/19    ADMISSION DIAGNOSIS (ES):  ? Recurrent toxic goiter    DISCHARGE DIAGNOSIS (ES):  ? same    DISCHARGE CONDITION:  ? Good     CONSULTATIONS:  ? none    PROCEDURES:  ? Completion thyroidectomy    BRIEF HPI:  ? 64 yo female with remote history of thyroidectomy, presented with recurrent hyperthyroidism and thyroid nodules    HOSPITAL COURSE:  ? She was admitted and underwent the above procedure. She had an uneventful course. She was ambulating well, tolerating a diet, and her pain was controlled. She had no signs of hypocalcemia. She was discharged home.     MEDS:   Current Outpatient Prescriptions   Medication Sig Dispense Refill   • levothyroxine (SYNTHROID) 175 MCG Tab Take 1 Tab by mouth Every morning on an empty stomach. 30 Tab 1       FOLLOW-UP:  ? Please call my office at [phone] to make an appointment in 1 week(s)    DISCHARGE INSTRUCTIONS:  Discharge instructions after thyroidectomy:    You had surgery called thyroidectomy. This means that part or all of your thyroid gland was removed. The main job of the thyroid gland is to make thyroid hormone. This hormone controls your body’s metabolism. This is the way your body creates and uses energy. Removing the thyroid gland removes your body’s source of thyroid hormone. So after the surgery, you will need to take thyroid hormone pills daily. This helps keep the level of thyroid hormone in your body steady. This sheet tells you more about how to care for yourself after surgery.    Recovering After Surgery:    Get plenty of rest.    Care for your incision as directed.    Avoid heavy lifting and strenuous activity for 2 weeks.    Walk a few times daily. But don’t push yourself too hard. Slowly increase your pace and distance, as you feel able.    Return to work when your doctor says it’s okay.    Pain Control:  Most patients do well after thyroid surgery. You may take ibuprofen or  Tylenol as needed for pain. If you need stronger pain medication, call your doctor's office.     Taking Your Thyroid Medication:    Take your medication as directed.    Use a pillbox labeled with the days of the week. This will help you remember whether you’ve taken your medication each day.    Take your medication with a liquid,  avoid taking it with soy milk. Soy milk can affect how well your body absorbs the medication. The pill needs to reach your stomach and not dissolve in your throat.    Try to take your medication with the same types and amounts of food and liquid each day. This helps control the amount of thyroid hormone in your system.    After taking your medication:    Wait 4 hours before eating or drinking anything that contains soy.    Wait 4 hours before taking certain medications. These include:     Iron supplements     Calcium supplements     Antacids that contain either calcium or aluminum hydroxide     Medications that lower your cholesterol    Do not stop taking your medication even if you become pregnant.    Never stop taking medications on your own.    Keeping Your Doctor’s Appointments:    See your doctor for regular visits. These are needed to monitor your health.    Have routine blood tests done. These check the level of thyroid hormone in your body. This helps your doctor know whether to adjust the dosage of your medication if needed.    Tell your doctor about any signs of further thyroid problems.    Signs that you may have too much thyroid hormone in your body include:     Restlessness     Rapid weight loss     Sweating     Signs that you may have too little thyroid hormone in your body include:     Fatigue or sluggishness     Puffy hands, face, or feet     Hoarseness     Muscle pain     Slow pulse (less than 60 beats per minute)    When to Call Your Doctor:  Call your doctor right away if you have any of the following:  Fever above 101°F  Swelling or bleeding at the incision  site  Choking  Trouble breathing  A sore throat that lasts longer than 7 days  Tingling or cramps in your hands, feet, or lips    Calcium Supplementation  After thyroidectomy, you may have low calcium levels. Symptoms of low calcium levels include tingling or numbness in the fingers or lips, or muscle spasms.  . If you have symptoms of low calcium, take two extra tablets and call your doctor's office.         FOLLOW-UP:  ? Please call my office at [phone] to make an appointment in 1 week    Office address:  [place] [address]    Vincent Patel M.D.  [facility]  [phone]

## 2019-03-28 NOTE — DIETARY
NUTRITION SERVICES: BMI -     Pt with BMI >40 (=Body mass index is 55.63 kg/m².). Negative nursing nutrition screen. LOS 0.     Weight loss counseling not appropriate in acute care setting.     RECOMMEND - Referral to outpatient nutrition services for weight management after D/C.       RD available PRN.

## 2019-03-28 NOTE — CARE PLAN
Problem: Communication  Goal: The ability to communicate needs accurately and effectively will improve  Outcome: PROGRESSING AS EXPECTED  Patient oriented to the unit, uses call light and communicates needs    Problem: Pain Management  Goal: Pain level will decrease to patient's comfort goal  Outcome: PROGRESSING AS EXPECTED  PRNs in use for pain management

## 2019-03-28 NOTE — PROGRESS NOTES
Received report and assumed care at 2120    Pt is A&O x4  Pain 10/10, medicated per MAR  Complains of nausea, medicated per MAR  Full liquid diet  + Urine Output  - Flatus  - BM   Surgical incision to anterior neck  Up with x1 assistance and cane   Bed in lowest position and locked.  Reviewed plan of care with patient, pt resting comfortably now, call light within reach, all needs met at this time

## 2019-03-28 NOTE — OR NURSING
1542-Received from OR via [employer]. S/P total thyroidectomy. Anterior incision open to air, approximated with dermabond.   Bedside report received from RN. And Anesthesiologist.    1551-moaning, rolling back and forth, complaining of nausea, back pain, knee pain, shoulder pain, sore throat. Haldol given for nausea.    1600-Fent. Given for pain.    1612-zofran given for nausea.    1615-percocet given for pain.    1700-fent. Given for unrelieved pain. Assisted up to bathroom to void. Son at bedside.    1800-resting quietly.    1900-up to bathroom to void. Continues to have waves of nausea when getting up . Benadryl given.    2045- trans. To room in stable condition via [employer]. Personal belongings on bed with patient.

## 2019-03-28 NOTE — PROGRESS NOTES
D/C'd.  Discharge instructions provided to pt.  Pt states understanding.  Pt states all questions have been answered.  Copy of discharge provided to pt.  Signed copy in chart.  Prescriptions provided to pt. Pt states that all personal belongings are in possession. Pt escorted off unit with assistance from CNA via wheelchair at 1220 without incident.

## 2019-03-28 NOTE — PROGRESS NOTES
Report received. Assumed care. Pt in bed awake A/O x4. VSS. Responds appropriately. C/O pain, medicated per MAR, no SOB. Assessment complete. Noted surgical incision to the neck with dermaborlando leger, danny. Discussed POC, pain control, mobility, PO intake, safety, DC planning , pt verbalizes understanding.  Call light and belongings within reach. Pt refuses bed alarm despite education, pt is up self and steady on feet. Bed in the lowest position. Treaded socks in place. Hourly rounding in progress. Will continue to monitor .

## 2019-03-29 ENCOUNTER — TELEPHONE (OUTPATIENT)
Dept: MEDICAL GROUP | Facility: MEDICAL CENTER | Age: 64
End: 2019-03-29

## 2019-04-08 DIAGNOSIS — Z91.89 AT RISK FOR VENOUS THROMBOEMBOLISM (VTE): ICD-10-CM

## 2019-04-08 DIAGNOSIS — I47.10 SVT (SUPRAVENTRICULAR TACHYCARDIA): ICD-10-CM

## 2019-04-08 DIAGNOSIS — E07.89 THYROID MASS OF UNCLEAR ETIOLOGY: ICD-10-CM

## 2019-04-11 ENCOUNTER — OFFICE VISIT (OUTPATIENT)
Dept: MEDICAL GROUP | Facility: MEDICAL CENTER | Age: 64
End: 2019-04-11
Payer: COMMERCIAL

## 2019-04-11 VITALS
TEMPERATURE: 96.8 F | RESPIRATION RATE: 16 BRPM | WEIGHT: 293 LBS | HEIGHT: 64 IN | SYSTOLIC BLOOD PRESSURE: 138 MMHG | DIASTOLIC BLOOD PRESSURE: 86 MMHG | OXYGEN SATURATION: 95 % | BODY MASS INDEX: 50.02 KG/M2 | HEART RATE: 70 BPM

## 2019-04-11 DIAGNOSIS — I47.10 SVT (SUPRAVENTRICULAR TACHYCARDIA): ICD-10-CM

## 2019-04-11 DIAGNOSIS — B02.29 POSTHERPETIC NEURALGIA: ICD-10-CM

## 2019-04-11 DIAGNOSIS — E89.0 S/P THYROIDECTOMY: ICD-10-CM

## 2019-04-11 DIAGNOSIS — R21 RASH OF BACK: ICD-10-CM

## 2019-04-11 DIAGNOSIS — R22.1 MASS OF RIGHT SIDE OF NECK: ICD-10-CM

## 2019-04-11 PROCEDURE — 99214 OFFICE O/P EST MOD 30 MIN: CPT | Performed by: PHYSICIAN ASSISTANT

## 2019-04-11 RX ORDER — TRIAMCINOLONE ACETONIDE 1 MG/G
1 CREAM TOPICAL 2 TIMES DAILY
Qty: 60 G | Refills: 1 | Status: ON HOLD
Start: 2019-04-11 | End: 2019-12-30

## 2019-04-11 RX ORDER — ONDANSETRON 4 MG/1
TABLET, ORALLY DISINTEGRATING ORAL
Qty: 10 TAB | Refills: 4 | Status: SHIPPED | OUTPATIENT
Start: 2019-04-11 | End: 2019-05-20 | Stop reason: SDUPTHER

## 2019-04-11 RX ORDER — LEVOTHYROXINE SODIUM 175 UG/1
175 TABLET ORAL
COMMUNITY
End: 2019-04-11

## 2019-04-11 NOTE — ASSESSMENT & PLAN NOTE
States that cardiology wishes for her to continue Eliquis.  I sent over a prescription for a month supply but she would like a 3-month supply.  States it is the same cost as a month supply.  She states her cardiologist wants her to take the medication for at least 6 months.  She denies any chest pain today.  No recent A. fib.

## 2019-04-11 NOTE — ASSESSMENT & PLAN NOTE
Complains of a rash on her back.  States of itching.  Denies pain.  Symptoms occurred shortly after the surgery.

## 2019-04-11 NOTE — PROGRESS NOTES
Subjective:   Alfonso Posada is a 63 y.o. female here today for status post thyroidectomy, rash on her back, history of SVT and nauseousness secondary to surgery and postherpetic neuralgia.    S/P thyroidectomy  This is a 63-year-old female who had her complete thyroidectomy on the 27th.  She is doing fine.  Complains of swelling of the neck but saw her surgeon last week and he said it was normal.  She complains of some itching around the area.  Denies any pain.  Showed up at the appointment for the surgery and was told that it was a voluntary surgery.  She was concerned because she was under the assumption that she had a mass that need to be removed.  She had to sign and go through the surgery because she felt obligated to do so.    Rash of back  Complains of a rash on her back.  States of itching.  Denies pain.  Symptoms occurred shortly after the surgery.    SVT (supraventricular tachycardia) (HCC)  States that cardiology wishes for her to continue Eliquis.  I sent over a prescription for a month supply but she would like a 3-month supply.  States it is the same cost as a month supply.  She states her cardiologist wants her to take the medication for at least 6 months.  She denies any chest pain today.  No recent A. fib.    Postherpetic neuralgia  Has had some nauseousness after the surgery.  Requesting a refill of Zofran.       Current medicines (including changes today)  Current Outpatient Prescriptions   Medication Sig Dispense Refill   • [START ON 5/6/2019] apixaban (ELIQUIS) 5mg Tab Take 1 Tab by mouth 2 Times a Day. 180 Tab 1   • triamcinolone acetonide (KENALOG) 0.1 % Cream Apply 1 Application to affected area(s) 2 times a day. 60 g 1   • ondansetron (ZOFRAN ODT) 4 MG TABLET DISPERSIBLE DISSOLVE ONE TABLET BY MOUTH EVERY 8 HOURS AS NEEDED FOR NAUSEA AND VOMITING 10 Tab 4   • levothyroxine (SYNTHROID) 175 MCG Tab Take 1 Tab by mouth Every morning on an empty stomach. 30 Tab 1   • Menthol, Topical  Analgesic, (BENGAY EX) by Apply externally route as needed.     • Multiple Vitamins-Minerals (MULTIVITAMIN WOMEN 50+) Tab Take  by mouth every day.     • ascorbic acid (ASCORBIC ACID) 500 MG Tab Take 500 mg by mouth every day.     • Misc Natural Products (GLUCOSAMINE CHONDROITIN ADV PO) Take  by mouth every day.     • oxymetazoline (AFRIN) 0.05 % Solution Spray 2 Sprays in nose as needed for Congestion.     • Camphor-Eucalyptus-Menthol (VICKS VAPORUB EX) by Apply externally route every day.     • Metoprolol Succinate 50 MG Capsule ER 24 Hour Sprinkle Take  by mouth 2 Times a Day.     • oxyCODONE-acetaminophen (PERCOCET-10)  MG Tab Take 1 Tab by mouth every 6 hours as needed for Severe Pain for up to 30 days. 120 Tab 0   • potassium chloride (KLOR-CON) 8 MEQ tablet TAKE ONE TABLET BY MOUTH DAILY 90 Tab 0   • losartan (COZAAR) 50 MG Tab Take 1 Tab by mouth every day. 90 Tab 3   • furosemide (LASIX) 40 MG Tab TAKE ONE TABLET BY MOUTH DAILY 90 Tab 0   • fluticasone (FLONASE) 50 MCG/ACT nasal spray Spray 1 Spray in nose as needed.     • ALPRAZolam (XANAX) 0.5 MG Tab Take 0.5 mg by mouth 3 times a day as needed for Anxiety.     • montelukast (SINGULAIR) 10 MG Tab Take 10 mg by mouth every bedtime.     • atorvastatin (LIPITOR) 40 MG Tab Take 40 mg by mouth every evening.     • sertraline (ZOLOFT) 100 MG Tab TAKE ONE TABLET BY MOUTH ONE TIME A DAY 90 Tab 2   • MAGNESIUM OXIDE PO Take 3 Tabs by mouth every bedtime.     • ketoconazole (NIZORAL) 2 % Cream Apply 1 Application to affected area(s) every day. Apply to face     • cetirizine (ZYRTEC) 10 MG Tab Take 10 mg by mouth every evening.     • acetaminophen (TYLENOL) 500 MG Tab Take 1,000 mg by mouth 3 times a day as needed for Moderate Pain.     • omeprazole (PRILOSEC) 40 MG delayed-release capsule Take 1 Cap by mouth every day. 90 Cap 3     No current facility-administered medications for this visit.      She  has a past medical history of Anesthesia; Anxiety; Anxiety  "disorder; Arthritis; Asthma; Atrial fibrillation (HCC); Bartholin cyst; Cancer (HCC); Chronic knee pain; Depression; Graves disease; Heart burn; Hemorrhagic disorder (HCC); Hypertension; Morbid obesity (HCC); Postherpetic neuralgia; Psychiatric disorder; Sinus infection; SVT (supraventricular tachycardia) (HCC); and Thyroid disease.    Social History and Family History were reviewed and updated.    ROS   No chest pain, no shortness of breath, no abdominal pain and all other systems were reviewed and are negative.       Objective:     /86 (BP Location: Right arm, Patient Position: Sitting, BP Cuff Size: Small infant)   Pulse 70   Temp 36 °C (96.8 °F) (Temporal)   Resp 16   Ht 1.626 m (5' 4\")   Wt (!) 143.3 kg (316 lb)   SpO2 95%  Body mass index is 54.24 kg/m².   Physical Exam:  Constitutional: Alert, no distress.  Skin: Warm, dry, good turgor, no rashes in visible areas.  Eye: Equal, round and reactive, conjunctiva clear, lids normal.  ENMT: Lips without lesions, good dentition, oropharynx clear.  Neck: Trachea midline, no masses.   Lymph: No cervical or supraclavicular lymphadenopathy  Respiratory: Unlabored respiratory effort, lungs appear clear, no wheezes.  Cardiovascular: Normal S1, S2, no murmur, no edema.  Psych: Alert and oriented x3, normal affect and mood.        Assessment and Plan:   The following treatment plan was discussed    1. S/P thyroidectomy  Status post surgery.  Doing well.  Follow-up with surgeon as needed.  Ordered TSH value to be done in approximately 4 weeks.  Advised her to take her medication as directed.  Discussed scar guard use if needed.  - TSH WITH REFLEX TO FT4; Future    2. Mass of right side of neck  Chronic condition.  Removed status post surgery.  Will monitor thyroid levels.  Advised to make an appointment with her endocrinologist.    3. SVT (supraventricular tachycardia) (HCC)  Chronic condition.  Stable.  Prescribed Eliquis as directed.  Follow-up with " cardiology.  - apixaban (ELIQUIS) 5mg Tab; Take 1 Tab by mouth 2 Times a Day.  Dispense: 180 Tab; Refill: 1    4. Rash of back  Acute, new onset condition.  No significant rash noted.  Provided Kenalog cream as directed.  - triamcinolone acetonide (KENALOG) 0.1 % Cream; Apply 1 Application to affected area(s) 2 times a day.  Dispense: 60 g; Refill: 1    5. Postherpetic neuralgia  Chronic condition.  Stable.  Renewed Zofran as directed.  - ondansetron (ZOFRAN ODT) 4 MG TABLET DISPERSIBLE; DISSOLVE ONE TABLET BY MOUTH EVERY 8 HOURS AS NEEDED FOR NAUSEA AND VOMITING  Dispense: 10 Tab; Refill: 4    Patient was seen for 25 minutes face to face of which > 50% of appointment time was spent on counseling and coordination of care regarding the above.    Followup: Return in about 4 weeks (around 5/9/2019), or if symptoms worsen or fail to improve.    Please note that this dictation was created using voice recognition software. I have made every reasonable attempt to correct obvious errors, but I expect that there are errors of grammar and possibly content that I did not discover before finalizing the note.

## 2019-04-11 NOTE — ASSESSMENT & PLAN NOTE
This is a 63-year-old female who had her complete thyroidectomy on the 27th.  She is doing fine.  Complains of swelling of the neck but saw her surgeon last week and he said it was normal.  She complains of some itching around the area.  Denies any pain.  Showed up at the appointment for the surgery and was told that it was a voluntary surgery.  She was concerned because she was under the assumption that she had a mass that need to be removed.  She had to sign and go through the surgery because she felt obligated to do so.

## 2019-04-16 ENCOUNTER — TELEPHONE (OUTPATIENT)
Dept: ENDOCRINOLOGY | Facility: MEDICAL CENTER | Age: 64
End: 2019-04-16

## 2019-04-16 ENCOUNTER — OFFICE VISIT (OUTPATIENT)
Dept: ENDOCRINOLOGY | Facility: MEDICAL CENTER | Age: 64
End: 2019-04-16
Payer: COMMERCIAL

## 2019-04-16 VITALS
HEART RATE: 90 BPM | HEIGHT: 64 IN | SYSTOLIC BLOOD PRESSURE: 130 MMHG | WEIGHT: 293 LBS | OXYGEN SATURATION: 92 % | BODY MASS INDEX: 50.02 KG/M2 | DIASTOLIC BLOOD PRESSURE: 72 MMHG

## 2019-04-16 DIAGNOSIS — E07.89 THYROID MASS OF UNCLEAR ETIOLOGY: ICD-10-CM

## 2019-04-16 DIAGNOSIS — E89.0 HYPOTHYROIDISM, POSTSURGICAL: ICD-10-CM

## 2019-04-16 PROCEDURE — 99213 OFFICE O/P EST LOW 20 MIN: CPT | Performed by: INTERNAL MEDICINE

## 2019-04-16 NOTE — TELEPHONE ENCOUNTER
1. Caller Name: [facility]                                         Call Back Number: [phone]      Patient approves a detailed voicemail message: no    Patient let us know that her surgery is not being covered at 100% because its being claimed as elective not necessary. Called Dr Patel Nurse to see if there is anything we can submit to get it covered.

## 2019-04-17 ENCOUNTER — TELEPHONE (OUTPATIENT)
Dept: ENDOCRINOLOGY | Facility: MEDICAL CENTER | Age: 64
End: 2019-04-17

## 2019-04-17 NOTE — TELEPHONE ENCOUNTER
While patient was in office 4/16/19 she mentioned that she was being charged a larger amount for her surgery because it was considered elective.   Contacted Dr. Patel's office to see if we needed to give them any chart notes or lab results to prove that it was medically necessary for her to get her thyroid removed.   Spoke with Noé in that office. [phone] who needed a letter of denial from the insurance company or and EOB before he is able to submit an appeal.  Gave letter to patient from Dr nunez to take to her insurance company also.    Attempted to call patient to let her know x2 but vm was not set up.

## 2019-04-17 NOTE — PROGRESS NOTES
"     HPI:    Hypothyroidism post thyroidectomy March 27, 2019         Just slowly recovering from surgery.  Surgical wound appears to be healing nicely.  There will be a question about what her replacement thyroid hormone will be.  Currently she is taking levothyroxine 175 mcg/day.          We will overall she is feeling better since surgery.  One benefit she is aware of already is the soreness in her right neck has disappeared.  That has been present for a long time and she assumed it was her spine but now that her thyroid is out she knows it was thyroid related.  She had a large nodule on that side.             The 4 cm nodule that was in the right lobe had a previous biopsy report of \"atypia\".  The current surgical thyroid pathology also indicates cytologic atypia.  Consulting pathologists agree there is not carcinoma in that large  nodule or elsewhere in the gland             She does not appear to be having any calcium problems postop.  Her PTH a day after surgery was normal at 58 and calcium 8.9.    ROS:      She is feeling a weight off her shoulders from worrying about her thyroid.  Now she feels in a better position to proceed with bariatric surgery which she desperately wants to do.      Allergies:   Allergies   Allergen Reactions   • Latex Itching   • Penicillins Hives, Rash and Itching     Itching & Rash     • Sulfa Drugs Itching     Itching feeling on leg, prickily/need-like sensation on skin.       Current medicines including changes today:  Current Outpatient Prescriptions   Medication Sig Dispense Refill   • [START ON 5/6/2019] apixaban (ELIQUIS) 5mg Tab Take 1 Tab by mouth 2 Times a Day. 180 Tab 1   • triamcinolone acetonide (KENALOG) 0.1 % Cream Apply 1 Application to affected area(s) 2 times a day. 60 g 1   • ondansetron (ZOFRAN ODT) 4 MG TABLET DISPERSIBLE DISSOLVE ONE TABLET BY MOUTH EVERY 8 HOURS AS NEEDED FOR NAUSEA AND VOMITING 10 Tab 4   • levothyroxine (SYNTHROID) 175 MCG Tab Take 1 Tab by " mouth Every morning on an empty stomach. 30 Tab 1   • Menthol, Topical Analgesic, (BENGAY EX) by Apply externally route as needed.     • Multiple Vitamins-Minerals (MULTIVITAMIN WOMEN 50+) Tab Take  by mouth every day.     • ascorbic acid (ASCORBIC ACID) 500 MG Tab Take 500 mg by mouth every day.     • Misc Natural Products (GLUCOSAMINE CHONDROITIN ADV PO) Take  by mouth every day.     • oxymetazoline (AFRIN) 0.05 % Solution Spray 2 Sprays in nose as needed for Congestion.     • Camphor-Eucalyptus-Menthol (VICKS VAPORUB EX) by Apply externally route every day.     • Metoprolol Succinate 50 MG Capsule ER 24 Hour Sprinkle Take  by mouth 2 Times a Day.     • oxyCODONE-acetaminophen (PERCOCET-10)  MG Tab Take 1 Tab by mouth every 6 hours as needed for Severe Pain for up to 30 days. 120 Tab 0   • potassium chloride (KLOR-CON) 8 MEQ tablet TAKE ONE TABLET BY MOUTH DAILY 90 Tab 0   • losartan (COZAAR) 50 MG Tab Take 1 Tab by mouth every day. 90 Tab 3   • furosemide (LASIX) 40 MG Tab TAKE ONE TABLET BY MOUTH DAILY 90 Tab 0   • fluticasone (FLONASE) 50 MCG/ACT nasal spray Spray 1 Spray in nose as needed.     • ALPRAZolam (XANAX) 0.5 MG Tab Take 0.5 mg by mouth 3 times a day as needed for Anxiety.     • montelukast (SINGULAIR) 10 MG Tab Take 10 mg by mouth every bedtime.     • atorvastatin (LIPITOR) 40 MG Tab Take 40 mg by mouth every evening.     • sertraline (ZOLOFT) 100 MG Tab TAKE ONE TABLET BY MOUTH ONE TIME A DAY 90 Tab 2   • MAGNESIUM OXIDE PO Take 3 Tabs by mouth every bedtime.     • ketoconazole (NIZORAL) 2 % Cream Apply 1 Application to affected area(s) every day. Apply to face     • cetirizine (ZYRTEC) 10 MG Tab Take 10 mg by mouth every evening.     • acetaminophen (TYLENOL) 500 MG Tab Take 1,000 mg by mouth 3 times a day as needed for Moderate Pain.     • omeprazole (PRILOSEC) 40 MG delayed-release capsule Take 1 Cap by mouth every day. 90 Cap 3     No current facility-administered medications for this  "visit.         Past Medical History:   Diagnosis Date   • Anesthesia     states hard to take deep breath afterwards   • Anxiety    • Anxiety disorder    • Arthritis     knees, arms   • Asthma    • Atrial fibrillation (HCC)    • Bartholin cyst    • Cancer (HCC)     uterine, treated   • Chronic knee pain    • Depression    • Graves disease    • Heart burn    • Hemorrhagic disorder (HCC)     on eliquis   • Hypertension    • Morbid obesity (HCC)    • Postherpetic neuralgia    • Psychiatric disorder     anxiety   • Sinus infection     3/10/19   • SVT (supraventricular tachycardia) (HCC)    • Thyroid disease        PHYSICAL EXAM:    /72 (BP Location: Left arm, Patient Position: Sitting, BP Cuff Size: Adult)   Pulse 90   Ht 1.626 m (5' 4.02\")   Wt (!) 143.5 kg (316 lb 6.4 oz)   LMP 03/20/2016   SpO2 92%   BMI 54.28 kg/m²     Gen   . morbid obesity as before    Skin   appropriate for sex and age    HEENT  unremarkable    Neck   surgical wound is still fresh but healing well without evidence of infection             She is taking good care of the wound    Heart  regular    Extremities  no edema    Neuro  gait and station normal    Psych  appropriate, good spirits now that the surgery is behind her    ASSESSMENT AND RECOMMENDATIONS    1. Hypothyroidism, postsurgical                See HPI                Update thyroid levels in about 2 weeks and report by phone or my chart  - FREE THYROXINE; Future  - TSH; Future      DISPOSITION:   Follow-up current lab by telephone                              Return visit in 3 months      Kamran Hyman M.D.    Copies to: Sim Brownlee P.A.-C. [phone]  "

## 2019-05-06 ENCOUNTER — HOSPITAL ENCOUNTER (OUTPATIENT)
Dept: LAB | Facility: MEDICAL CENTER | Age: 64
End: 2019-05-06
Attending: INTERNAL MEDICINE
Payer: COMMERCIAL

## 2019-05-06 DIAGNOSIS — E89.0 HYPOTHYROIDISM, POSTSURGICAL: ICD-10-CM

## 2019-05-06 LAB
T4 FREE SERPL-MCNC: 1.58 NG/DL (ref 0.53–1.43)
TSH SERPL DL<=0.005 MIU/L-ACNC: <0.005 UIU/ML (ref 0.38–5.33)

## 2019-05-06 PROCEDURE — 84439 ASSAY OF FREE THYROXINE: CPT

## 2019-05-06 PROCEDURE — 84443 ASSAY THYROID STIM HORMONE: CPT

## 2019-05-06 PROCEDURE — 36415 COLL VENOUS BLD VENIPUNCTURE: CPT

## 2019-05-07 ENCOUNTER — TELEPHONE (OUTPATIENT)
Dept: ENDOCRINOLOGY | Facility: MEDICAL CENTER | Age: 64
End: 2019-05-07

## 2019-05-07 NOTE — TELEPHONE ENCOUNTER
Phone Number Called: [phone] (home)     Message: Spoke to patient about results relayed message.     Left Message for patient to call back: no

## 2019-05-07 NOTE — TELEPHONE ENCOUNTER
----- Message from Kamran Hyman M.D. sent at 5/6/2019  9:54 PM PDT -----  Please tell patient test is OK but needs an adjustment.Decrease dose by one half tablet one day per week. The other six days take a full tablet, Use a weekly pill organizer. Repeat blood test before the June appt, I put a new order in.    Dr BENNETT

## 2019-05-20 ENCOUNTER — APPOINTMENT (OUTPATIENT)
Dept: MEDICAL GROUP | Facility: MEDICAL CENTER | Age: 64
End: 2019-05-20
Payer: COMMERCIAL

## 2019-05-20 ENCOUNTER — OFFICE VISIT (OUTPATIENT)
Dept: MEDICAL GROUP | Facility: MEDICAL CENTER | Age: 64
End: 2019-05-20
Payer: COMMERCIAL

## 2019-05-20 VITALS
HEART RATE: 64 BPM | WEIGHT: 293 LBS | SYSTOLIC BLOOD PRESSURE: 128 MMHG | OXYGEN SATURATION: 95 % | TEMPERATURE: 97.6 F | RESPIRATION RATE: 16 BRPM | BODY MASS INDEX: 50.02 KG/M2 | DIASTOLIC BLOOD PRESSURE: 86 MMHG | HEIGHT: 64 IN

## 2019-05-20 DIAGNOSIS — M25.562 BILATERAL CHRONIC KNEE PAIN: ICD-10-CM

## 2019-05-20 DIAGNOSIS — G89.29 BILATERAL CHRONIC KNEE PAIN: ICD-10-CM

## 2019-05-20 DIAGNOSIS — Z12.11 COLON CANCER SCREENING: ICD-10-CM

## 2019-05-20 DIAGNOSIS — F41.9 ANXIETY: ICD-10-CM

## 2019-05-20 DIAGNOSIS — R11.0 NAUSEA: ICD-10-CM

## 2019-05-20 DIAGNOSIS — B02.29 POSTHERPETIC NEURALGIA: ICD-10-CM

## 2019-05-20 DIAGNOSIS — M25.561 BILATERAL CHRONIC KNEE PAIN: ICD-10-CM

## 2019-05-20 DIAGNOSIS — Z12.31 VISIT FOR SCREENING MAMMOGRAM: ICD-10-CM

## 2019-05-20 DIAGNOSIS — E89.0 HYPOTHYROIDISM, POSTSURGICAL: ICD-10-CM

## 2019-05-20 PROBLEM — R22.1 MASS OF RIGHT SIDE OF NECK: Status: RESOLVED | Noted: 2018-12-18 | Resolved: 2019-05-20

## 2019-05-20 PROBLEM — E05.90 HYPERTHYROIDISM: Status: RESOLVED | Noted: 2018-12-02 | Resolved: 2019-05-20

## 2019-05-20 PROBLEM — E07.89 THYROID MASS OF UNCLEAR ETIOLOGY: Status: RESOLVED | Noted: 2019-01-12 | Resolved: 2019-05-20

## 2019-05-20 PROBLEM — J01.00 ACUTE NON-RECURRENT MAXILLARY SINUSITIS: Status: RESOLVED | Noted: 2017-08-03 | Resolved: 2019-05-20

## 2019-05-20 PROCEDURE — 99214 OFFICE O/P EST MOD 30 MIN: CPT | Performed by: PHYSICIAN ASSISTANT

## 2019-05-20 RX ORDER — ONDANSETRON 4 MG/1
TABLET, ORALLY DISINTEGRATING ORAL
Qty: 10 TAB | Refills: 11 | Status: SHIPPED | OUTPATIENT
Start: 2019-05-20 | End: 2019-05-20 | Stop reason: SDUPTHER

## 2019-05-20 RX ORDER — OXYCODONE AND ACETAMINOPHEN 10; 325 MG/1; MG/1
1 TABLET ORAL EVERY 8 HOURS PRN
Qty: 90 TAB | Refills: 0 | Status: SHIPPED | OUTPATIENT
Start: 2019-08-19 | End: 2019-07-16 | Stop reason: SDUPTHER

## 2019-05-20 RX ORDER — OXYCODONE AND ACETAMINOPHEN 10; 325 MG/1; MG/1
1 TABLET ORAL EVERY 8 HOURS PRN
Qty: 90 TAB | Refills: 0 | Status: SHIPPED | OUTPATIENT
Start: 2019-06-20 | End: 2019-05-20 | Stop reason: SDUPTHER

## 2019-05-20 RX ORDER — ONDANSETRON 4 MG/1
TABLET, ORALLY DISINTEGRATING ORAL
Qty: 30 TAB | Refills: 5 | Status: SHIPPED
Start: 2019-05-20 | End: 2019-09-30 | Stop reason: SDUPTHER

## 2019-05-20 RX ORDER — LEVOTHYROXINE SODIUM 175 UG/1
175 TABLET ORAL
Qty: 30 TAB | Refills: 11 | Status: SHIPPED | OUTPATIENT
Start: 2019-05-20 | End: 2019-07-16

## 2019-05-20 RX ORDER — FUROSEMIDE 40 MG/1
TABLET ORAL
Qty: 90 TAB | Refills: 1 | Status: SHIPPED | OUTPATIENT
Start: 2019-05-20 | End: 2019-08-29

## 2019-05-20 RX ORDER — ONDANSETRON 4 MG/1
TABLET, ORALLY DISINTEGRATING ORAL
Qty: 30 TAB | Refills: 5 | Status: SHIPPED | OUTPATIENT
Start: 2019-05-20 | End: 2019-05-20 | Stop reason: SDUPTHER

## 2019-05-20 RX ORDER — OXYCODONE AND ACETAMINOPHEN 10; 325 MG/1; MG/1
TABLET ORAL
COMMUNITY
Start: 2019-04-24 | End: 2019-05-20

## 2019-05-20 RX ORDER — OXYCODONE AND ACETAMINOPHEN 10; 325 MG/1; MG/1
1 TABLET ORAL EVERY 8 HOURS PRN
Qty: 90 TAB | Refills: 0 | Status: SHIPPED | OUTPATIENT
Start: 2019-07-20 | End: 2019-05-20 | Stop reason: SDUPTHER

## 2019-05-20 RX ORDER — ALPRAZOLAM 0.5 MG/1
0.5 TABLET ORAL 2 TIMES DAILY
Qty: 60 TAB | Refills: 1 | Status: SHIPPED | OUTPATIENT
Start: 2019-05-20 | End: 2019-07-16 | Stop reason: SDUPTHER

## 2019-05-20 NOTE — ASSESSMENT & PLAN NOTE
Has chronic nauseousness related to postherpetic neuralgia.  Takes Zofran as needed.  Medication is effective.  Requesting dispersible tablet.

## 2019-05-20 NOTE — ASSESSMENT & PLAN NOTE
Recent labs showed her TSH value less than 0.0005.  She was suggested to cut one dose in half a week.  She takes currently 175 mcg.  She complains of sweating without any significant factors.  Does not exercise currently.  Will just sweat out of the blue.  She denies any issues with her heart.  The other weeks she was provided steroids for a sinus infection and it caused her heart to race.  Symptoms subsided after some Valsalva maneuvers.

## 2019-05-20 NOTE — PROGRESS NOTES
Subjective:   Alfonso Posada is a 63 y.o. female here today for chronic pain bilateral knees, anxiety, nauseousness secondary to postherpetic neuralgia and postsurgical hypothyroidism.    Bilateral chronic knee pain  This is a 63-year-old female who is here today to discuss several things.  Chronic pain of her knees and her knee pain has been slightly improved but is requesting a day refill of her oxycodone.  Takes 1 tablet 3 times a day.  Her last prescription will be filled on the 22nd.  She is requesting a refill then for June.    Anxiety  Chronic history.  Does not take Xanax with her oxycodone.  Understands that there is a significant risk associated with taking these medications together.  She does not take Xanax daily.  Her anxiety has been stable.  A little improved.    Nausea  Has chronic nauseousness related to postherpetic neuralgia.  Takes Zofran as needed.  Medication is effective.  Requesting dispersible tablet.    Hypothyroidism, postsurgical  Recent labs showed her TSH value less than 0.0005.  She was suggested to cut one dose in half a week.  She takes currently 175 mcg.  She complains of sweating without any significant factors.  Does not exercise currently.  Will just sweat out of the blue.  She denies any issues with her heart.  The other weeks she was provided steroids for a sinus infection and it caused her heart to race.  Symptoms subsided after some Valsalva maneuvers.       Current medicines (including changes today)  Current Outpatient Prescriptions   Medication Sig Dispense Refill   • [START ON 8/19/2019] oxyCODONE-acetaminophen (PERCOCET-10)  MG Tab Take 1 Tab by mouth every 8 hours as needed for Severe Pain for up to 30 days. 90 Tab 0   • ALPRAZolam (XANAX) 0.5 MG Tab Take 1 Tab by mouth 2 Times a Day for 30 days. 60 Tab 1   • ondansetron (ZOFRAN ODT) 4 MG TABLET DISPERSIBLE DISSOLVE ONE TABLET BY MOUTH EVERY 8 HOURS AS NEEDED FOR NAUSEA AND VOMITING 30 Tab 5   • levothyroxine  (SYNTHROID) 175 MCG Tab Take 1 Tab by mouth Every morning on an empty stomach. On Sunday take only 1/2 tablet. Other days take one full tablet. 30 Tab 11   • apixaban (ELIQUIS) 5mg Tab Take 1 Tab by mouth 2 Times a Day. 180 Tab 1   • triamcinolone acetonide (KENALOG) 0.1 % Cream Apply 1 Application to affected area(s) 2 times a day. 60 g 1   • Menthol, Topical Analgesic, (BENGAY EX) by Apply externally route as needed.     • Multiple Vitamins-Minerals (MULTIVITAMIN WOMEN 50+) Tab Take  by mouth every day.     • ascorbic acid (ASCORBIC ACID) 500 MG Tab Take 500 mg by mouth every day.     • Misc Natural Products (GLUCOSAMINE CHONDROITIN ADV PO) Take  by mouth every day.     • oxymetazoline (AFRIN) 0.05 % Solution Spray 2 Sprays in nose as needed for Congestion.     • Camphor-Eucalyptus-Menthol (VICKS VAPORUB EX) by Apply externally route every day.     • Metoprolol Succinate 50 MG Capsule ER 24 Hour Sprinkle Take  by mouth 2 Times a Day.     • potassium chloride (KLOR-CON) 8 MEQ tablet TAKE ONE TABLET BY MOUTH DAILY 90 Tab 0   • losartan (COZAAR) 50 MG Tab Take 1 Tab by mouth every day. 90 Tab 3   • furosemide (LASIX) 40 MG Tab TAKE ONE TABLET BY MOUTH DAILY 90 Tab 0   • fluticasone (FLONASE) 50 MCG/ACT nasal spray Spray 1 Spray in nose as needed.     • montelukast (SINGULAIR) 10 MG Tab Take 10 mg by mouth every bedtime.     • atorvastatin (LIPITOR) 40 MG Tab Take 40 mg by mouth every evening.     • sertraline (ZOLOFT) 100 MG Tab TAKE ONE TABLET BY MOUTH ONE TIME A DAY 90 Tab 2   • MAGNESIUM OXIDE PO Take 3 Tabs by mouth every bedtime.     • ketoconazole (NIZORAL) 2 % Cream Apply 1 Application to affected area(s) every day. Apply to face     • cetirizine (ZYRTEC) 10 MG Tab Take 10 mg by mouth every evening.     • acetaminophen (TYLENOL) 500 MG Tab Take 1,000 mg by mouth 3 times a day as needed for Moderate Pain.     • omeprazole (PRILOSEC) 40 MG delayed-release capsule Take 1 Cap by mouth every day. 90 Cap 3     No  "current facility-administered medications for this visit.      She  has a past medical history of Anesthesia; Anxiety; Anxiety disorder; Arthritis; Asthma; Atrial fibrillation (HCC); Bartholin cyst; Cancer (HCC); Chronic knee pain; Depression; Graves disease; Heart burn; Hemorrhagic disorder (HCC); Hypertension; Hypothyroidism, postsurgical; Morbid obesity (HCC); Postherpetic neuralgia; Psychiatric disorder; Sinus infection; and SVT (supraventricular tachycardia) (HCC).    Social History and Family History were reviewed and updated.    ROS   No chest pain, no shortness of breath, no abdominal pain and all other systems were reviewed and are negative.       Objective:     /86 (BP Location: Right arm, Patient Position: Sitting, BP Cuff Size: Adult)   Pulse 64   Temp 36.4 °C (97.6 °F) (Temporal)   Resp 16   Ht 1.626 m (5' 4\")   Wt (!) 141.5 kg (312 lb)   SpO2 95%  Body mass index is 53.55 kg/m².   Physical Exam:  Constitutional: Alert, no distress.  Skin: Warm, dry, good turgor, no rashes in visible areas.  Eye: Equal, round and reactive, conjunctiva clear, lids normal.  ENMT: Lips without lesions, good dentition, oropharynx clear.  Neck: Trachea midline, no masses.   Lymph: No cervical or supraclavicular lymphadenopathy  Respiratory: Unlabored respiratory effort, lungs appear clear, no wheezes.  Cardiovascular: Normal S1, S2, no murmur, no edema.  Psych: Alert and oriented x3, normal affect and mood.        Assessment and Plan:   The following treatment plan was discussed    1. Bilateral chronic knee pain  Chronic condition.  Continue to follow with orthopedics.  Prescribed oxycodone 10 mg 1 tablet 3 times daily.  Provided a 90-day supply.  Must follow-up to see me in 3 months for refills.  Discussed black box warning regarding taking benzos with narcotics.  She understands the risk.  - oxyCODONE-acetaminophen (PERCOCET-10)  MG Tab; Take 1 Tab by mouth every 8 hours as needed for Severe Pain for up " to 30 days.  Dispense: 90 Tab; Refill: 0    2. Hypothyroidism, postsurgical  Status post thyroidectomy.  Follow with endocrinology.  Reviewed recent labs.  Discussed taking Synthroid half a tablet one day a week.  Other days take the full tablet.  Repeat labs per endocrinology.    - levothyroxine (SYNTHROID) 175 MCG Tab; Take 1 Tab by mouth Every morning on an empty stomach. On Sunday take only 1/2 tablet. Other days take one full tablet.  Dispense: 30 Tab; Refill: 11    3. Anxiety  Chronic condition.  Stable.  Prescribe Xanax 0.5 mg 1 tablet twice a day provided a 1 month supply.  Must follow-up to see me for refills.  Discussed black box warning taking benzodiazepines with narcotics.  - ALPRAZolam (XANAX) 0.5 MG Tab; Take 1 Tab by mouth 2 Times a Day for 30 days.  Dispense: 60 Tab; Refill: 1    4. Nausea  Chronic condition.  Provided a prescription again for Zofran.  Take medication sparingly.  - ondansetron (ZOFRAN ODT) 4 MG TABLET DISPERSIBLE; DISSOLVE ONE TABLET BY MOUTH EVERY 8 HOURS AS NEEDED FOR NAUSEA AND VOMITING  Dispense: 30 Tab; Refill: 5    5. Postherpetic neuralgia  Chronic condition.  Stable.  Renewed Zofran as directed.  - ondansetron (ZOFRAN ODT) 4 MG TABLET DISPERSIBLE; DISSOLVE ONE TABLET BY MOUTH EVERY 8 HOURS AS NEEDED FOR NAUSEA AND VOMITING  Dispense: 30 Tab; Refill: 5    6. Colon cancer screening  Order to [facility].  Discussed benefits and process.  Contact insurance regarding cost.  - EMILY (FIT DNA)    7. Visit for screening mammogram  Ordered mammogram.  She is overdue.  She understands she is been putting this off for several visits.  - MA-SCREENING MAMMO BILAT W/TOMOSYNTHESIS W/CAD; Future      Followup: Return in about 3 months (around 8/20/2019), or if symptoms worsen or fail to improve.    Please note that this dictation was created using voice recognition software. I have made every reasonable attempt to correct obvious errors, but I expect that there are errors of grammar and  possibly content that I did not discover before finalizing the note.

## 2019-05-20 NOTE — ASSESSMENT & PLAN NOTE
This is a 63-year-old female who is here today to discuss several things.  Chronic pain of her knees and her knee pain has been slightly improved but is requesting a day refill of her oxycodone.  Takes 1 tablet 3 times a day.  Her last prescription will be filled on the 22nd.  She is requesting a refill then for June.

## 2019-05-20 NOTE — ASSESSMENT & PLAN NOTE
Chronic history.  Does not take Xanax with her oxycodone.  Understands that there is a significant risk associated with taking these medications together.  She does not take Xanax daily.  Her anxiety has been stable.  A little improved.

## 2019-05-28 DIAGNOSIS — K21.9 GASTROESOPHAGEAL REFLUX DISEASE WITHOUT ESOPHAGITIS: ICD-10-CM

## 2019-05-28 RX ORDER — POTASSIUM CHLORIDE 600 MG/1
TABLET, FILM COATED, EXTENDED RELEASE ORAL
Qty: 90 TAB | Refills: 2 | Status: ON HOLD
Start: 2019-05-28 | End: 2019-12-30

## 2019-05-28 RX ORDER — OMEPRAZOLE 40 MG/1
CAPSULE, DELAYED RELEASE ORAL
Qty: 90 CAP | Refills: 2 | Status: SHIPPED | OUTPATIENT
Start: 2019-05-28 | End: 2019-12-29

## 2019-06-07 DIAGNOSIS — E78.5 HYPERLIPIDEMIA, UNSPECIFIED HYPERLIPIDEMIA TYPE: ICD-10-CM

## 2019-06-09 RX ORDER — ATORVASTATIN CALCIUM 40 MG/1
TABLET, FILM COATED ORAL
Qty: 90 TAB | Refills: 2 | Status: SHIPPED | OUTPATIENT
Start: 2019-06-09 | End: 2019-11-05

## 2019-07-15 ENCOUNTER — HOSPITAL ENCOUNTER (OUTPATIENT)
Dept: LAB | Facility: MEDICAL CENTER | Age: 64
End: 2019-07-15
Attending: INTERNAL MEDICINE
Payer: COMMERCIAL

## 2019-07-15 DIAGNOSIS — E89.0 HYPOTHYROIDISM, POSTSURGICAL: ICD-10-CM

## 2019-07-15 PROCEDURE — 84443 ASSAY THYROID STIM HORMONE: CPT

## 2019-07-15 PROCEDURE — 84439 ASSAY OF FREE THYROXINE: CPT

## 2019-07-15 PROCEDURE — 36415 COLL VENOUS BLD VENIPUNCTURE: CPT

## 2019-07-16 ENCOUNTER — OFFICE VISIT (OUTPATIENT)
Dept: ENDOCRINOLOGY | Facility: MEDICAL CENTER | Age: 64
End: 2019-07-16
Payer: COMMERCIAL

## 2019-07-16 ENCOUNTER — OFFICE VISIT (OUTPATIENT)
Dept: MEDICAL GROUP | Facility: MEDICAL CENTER | Age: 64
End: 2019-07-16
Payer: COMMERCIAL

## 2019-07-16 VITALS
OXYGEN SATURATION: 94 % | HEART RATE: 93 BPM | DIASTOLIC BLOOD PRESSURE: 78 MMHG | SYSTOLIC BLOOD PRESSURE: 112 MMHG | WEIGHT: 293 LBS | HEIGHT: 64 IN | BODY MASS INDEX: 50.02 KG/M2

## 2019-07-16 VITALS
HEIGHT: 64 IN | RESPIRATION RATE: 16 BRPM | WEIGHT: 293 LBS | OXYGEN SATURATION: 93 % | SYSTOLIC BLOOD PRESSURE: 124 MMHG | HEART RATE: 68 BPM | DIASTOLIC BLOOD PRESSURE: 76 MMHG | TEMPERATURE: 96.4 F | BODY MASS INDEX: 50.02 KG/M2

## 2019-07-16 DIAGNOSIS — E89.0 HYPOTHYROIDISM, POSTSURGICAL: ICD-10-CM

## 2019-07-16 DIAGNOSIS — M25.561 BILATERAL CHRONIC KNEE PAIN: ICD-10-CM

## 2019-07-16 DIAGNOSIS — R30.0 DYSURIA: ICD-10-CM

## 2019-07-16 DIAGNOSIS — G89.29 BILATERAL CHRONIC KNEE PAIN: ICD-10-CM

## 2019-07-16 DIAGNOSIS — M25.562 BILATERAL CHRONIC KNEE PAIN: ICD-10-CM

## 2019-07-16 DIAGNOSIS — F41.9 ANXIETY: ICD-10-CM

## 2019-07-16 DIAGNOSIS — F32.A DEPRESSION, UNSPECIFIED DEPRESSION TYPE: ICD-10-CM

## 2019-07-16 LAB
APPEARANCE UR: CLEAR
BILIRUB UR STRIP-MCNC: NORMAL MG/DL
COLOR UR AUTO: YELLOW
GLUCOSE UR STRIP.AUTO-MCNC: NORMAL MG/DL
KETONES UR STRIP.AUTO-MCNC: NORMAL MG/DL
LEUKOCYTE ESTERASE UR QL STRIP.AUTO: NORMAL
NITRITE UR QL STRIP.AUTO: NORMAL
PH UR STRIP.AUTO: 5.5 [PH] (ref 5–8)
PROT UR QL STRIP: NORMAL MG/DL
RBC UR QL AUTO: NORMAL
SP GR UR STRIP.AUTO: 1.02
T4 FREE SERPL-MCNC: 1.64 NG/DL (ref 0.53–1.43)
TSH SERPL DL<=0.005 MIU/L-ACNC: <0.005 UIU/ML (ref 0.38–5.33)
UROBILINOGEN UR STRIP-MCNC: 0.2 MG/DL

## 2019-07-16 PROCEDURE — 99214 OFFICE O/P EST MOD 30 MIN: CPT | Performed by: PHYSICIAN ASSISTANT

## 2019-07-16 PROCEDURE — 99213 OFFICE O/P EST LOW 20 MIN: CPT | Performed by: INTERNAL MEDICINE

## 2019-07-16 PROCEDURE — 81002 URINALYSIS NONAUTO W/O SCOPE: CPT | Performed by: PHYSICIAN ASSISTANT

## 2019-07-16 RX ORDER — OXYCODONE AND ACETAMINOPHEN 10; 325 MG/1; MG/1
1 TABLET ORAL EVERY 8 HOURS PRN
Qty: 90 TAB | Refills: 0 | Status: SHIPPED | OUTPATIENT
Start: 2019-09-17 | End: 2019-09-30 | Stop reason: SDUPTHER

## 2019-07-16 RX ORDER — ALPRAZOLAM 0.5 MG/1
0.5 TABLET ORAL 2 TIMES DAILY
Qty: 60 TAB | Refills: 1 | Status: SHIPPED | OUTPATIENT
Start: 2019-07-16 | End: 2019-09-30 | Stop reason: SDUPTHER

## 2019-07-16 RX ORDER — METOPROLOL TARTRATE 50 MG/1
TABLET, FILM COATED ORAL
COMMUNITY
Start: 2019-05-28 | End: 2019-08-29 | Stop reason: SDUPTHER

## 2019-07-16 RX ORDER — LEVOTHYROXINE SODIUM 150 MCG
150 TABLET ORAL
Qty: 30 TAB | Refills: 5 | Status: SHIPPED | OUTPATIENT
Start: 2019-07-16 | End: 2019-08-23

## 2019-07-16 RX ORDER — LEVOTHYROXINE SODIUM 175 UG/1
CAPSULE ORAL
COMMUNITY
Start: 2019-05-20 | End: 2019-07-16

## 2019-07-16 RX ORDER — OXYCODONE AND ACETAMINOPHEN 10; 325 MG/1; MG/1
1 TABLET ORAL EVERY 8 HOURS PRN
Qty: 90 TAB | Refills: 0 | Status: SHIPPED | OUTPATIENT
Start: 2019-08-18 | End: 2019-07-16 | Stop reason: SDUPTHER

## 2019-07-16 RX ORDER — OXYCODONE AND ACETAMINOPHEN 10; 325 MG/1; MG/1
1 TABLET ORAL EVERY 8 HOURS PRN
Qty: 90 TAB | Refills: 0 | Status: SHIPPED | OUTPATIENT
Start: 2019-07-19 | End: 2019-07-16 | Stop reason: SDUPTHER

## 2019-07-16 RX ORDER — PREDNISONE 20 MG/1
TABLET ORAL
COMMUNITY
Start: 2019-05-01 | End: 2019-08-29

## 2019-07-16 RX ORDER — CEFDINIR 300 MG/1
CAPSULE ORAL
COMMUNITY
Start: 2019-05-01 | End: 2019-08-29

## 2019-07-16 NOTE — ASSESSMENT & PLAN NOTE
Recent TSH value still significantly low.  Although she has cut back on some of the medication per endocrinology there was no change in her levels.  She has an appointment today to follow-up with endocrinology.  She complains of difficulty sleeping.  Sweating.

## 2019-07-16 NOTE — ASSESSMENT & PLAN NOTE
This is a 63-year-old female is complaining of some back pain over the past few days.  Also some burning urination.  Concerned about a UTI.  Denies any fevers.  No hematuria.

## 2019-07-16 NOTE — ASSESSMENT & PLAN NOTE
Chronic condition.  Follows with orthopedics.  Getting injections into the knee.  They have not been very effective.  Takes oxycodone 10 mg 3 times daily.  The pain still persist.  Really wants to have gastric bypass as she believes that weight loss will help with her knee pain.

## 2019-07-16 NOTE — ASSESSMENT & PLAN NOTE
States that over the past couple months she has been depressed.  Crying all the time.  Spends most the time in her bedroom.  Does not get out of bed.  She has history of anxiety.  Currently on Zoloft 100 mg.  Still grieving for the loss of her .  She denies any homicidal or suicidal ideations.  She has been having some issues with her daughter.  Not communicating with her any longer.  She has issues that she does not want to deal with.

## 2019-07-16 NOTE — ASSESSMENT & PLAN NOTE
Chronic condition.  Anxiety is stable.  Takes Xanax as needed.  Typically provided 0.5 mg twice daily.  Requesting refill.

## 2019-07-16 NOTE — PROGRESS NOTES
Subjective:   Alfonso Posada is a 63 y.o. female here today for possible UTI, depression, anxiety, chronic bilateral knee pain and hypothyroidism.    Dysuria  This is a 63-year-old female is complaining of some back pain over the past few days.  Also some burning urination.  Concerned about a UTI.  Denies any fevers.  No hematuria.    Depression  States that over the past couple months she has been depressed.  Crying all the time.  Spends most the time in her bedroom.  Does not get out of bed.  She has history of anxiety.  Currently on Zoloft 100 mg.  Still grieving for the loss of her .  She denies any homicidal or suicidal ideations.  She has been having some issues with her daughter.  Not communicating with her any longer.  She has issues that she does not want to deal with.    Anxiety  Chronic condition.  Anxiety is stable.  Takes Xanax as needed.  Typically provided 0.5 mg twice daily.  Requesting refill.    Bilateral chronic knee pain  Chronic condition.  Follows with orthopedics.  Getting injections into the knee.  They have not been very effective.  Takes oxycodone 10 mg 3 times daily.  The pain still persist.  Really wants to have gastric bypass as she believes that weight loss will help with her knee pain.    Hypothyroidism, postsurgical  Recent TSH value still significantly low.  Although she has cut back on some of the medication per endocrinology there was no change in her levels.  She has an appointment today to follow-up with endocrinology.  She complains of difficulty sleeping.  Sweating.       Current medicines (including changes today)  Current Outpatient Prescriptions   Medication Sig Dispense Refill   • sertraline (ZOLOFT) 50 MG Tab Take 1 Tab by mouth every day. Add to 100 mg to total 150 mg daily. 90 Tab 1   • [START ON 9/17/2019] oxyCODONE-acetaminophen (PERCOCET-10)  MG Tab Take 1 Tab by mouth every 8 hours as needed for Severe Pain for up to 30 days. 90 Tab 0   • ALPRAZolam  (XANAX) 0.5 MG Tab Take 1 Tab by mouth 2 Times a Day for 30 days. 60 Tab 1   • atorvastatin (LIPITOR) 40 MG Tab TAKE ONE TABLET BY MOUTH DAILY 90 Tab 2   • omeprazole (PRILOSEC) 40 MG delayed-release capsule TAKE ONE CAPSULE BY MOUTH DAILY 90 Cap 2   • potassium chloride (KLOR-CON) 8 MEQ tablet TAKE ONE TABLET BY MOUTH DAILY 90 Tab 2   • ondansetron (ZOFRAN ODT) 4 MG TABLET DISPERSIBLE DISSOLVE ONE TABLET BY MOUTH EVERY 8 HOURS AS NEEDED FOR NAUSEA AND VOMITING 30 Tab 5   • levothyroxine (SYNTHROID) 175 MCG Tab Take 1 Tab by mouth Every morning on an empty stomach. On Sunday take only 1/2 tablet. Other days take one full tablet. 30 Tab 11   • furosemide (LASIX) 40 MG Tab TAKE ONE TABLET BY MOUTH DAILY 90 Tab 1   • apixaban (ELIQUIS) 5mg Tab Take 1 Tab by mouth 2 Times a Day. 180 Tab 1   • triamcinolone acetonide (KENALOG) 0.1 % Cream Apply 1 Application to affected area(s) 2 times a day. 60 g 1   • Menthol, Topical Analgesic, (BENGAY EX) by Apply externally route as needed.     • Multiple Vitamins-Minerals (MULTIVITAMIN WOMEN 50+) Tab Take  by mouth every day.     • ascorbic acid (ASCORBIC ACID) 500 MG Tab Take 500 mg by mouth every day.     • Misc Natural Products (GLUCOSAMINE CHONDROITIN ADV PO) Take  by mouth every day.     • oxymetazoline (AFRIN) 0.05 % Solution Spray 2 Sprays in nose as needed for Congestion.     • Camphor-Eucalyptus-Menthol (VICKS VAPORUB EX) by Apply externally route every day.     • Metoprolol Succinate 50 MG Capsule ER 24 Hour Sprinkle Take  by mouth 2 Times a Day.     • losartan (COZAAR) 50 MG Tab Take 1 Tab by mouth every day. 90 Tab 3   • fluticasone (FLONASE) 50 MCG/ACT nasal spray Spray 1 Spray in nose as needed.     • montelukast (SINGULAIR) 10 MG Tab Take 10 mg by mouth every bedtime.     • atorvastatin (LIPITOR) 40 MG Tab Take 40 mg by mouth every evening.     • sertraline (ZOLOFT) 100 MG Tab TAKE ONE TABLET BY MOUTH ONE TIME A DAY 90 Tab 2   • MAGNESIUM OXIDE PO Take 3 Tabs by  "mouth every bedtime.     • ketoconazole (NIZORAL) 2 % Cream Apply 1 Application to affected area(s) every day. Apply to face     • cetirizine (ZYRTEC) 10 MG Tab Take 10 mg by mouth every evening.     • acetaminophen (TYLENOL) 500 MG Tab Take 1,000 mg by mouth 3 times a day as needed for Moderate Pain.       No current facility-administered medications for this visit.      She  has a past medical history of Anesthesia; Anxiety; Anxiety disorder; Arthritis; Asthma; Atrial fibrillation (HCC); Bartholin cyst; Cancer (HCC); Chronic knee pain; Depression; Graves disease; Heart burn; Hemorrhagic disorder (HCC); Hypertension; Hypothyroidism, postsurgical; Morbid obesity (HCC); Postherpetic neuralgia; Psychiatric disorder; Sinus infection; and SVT (supraventricular tachycardia) ([facility]).    Social History and Family History were reviewed and updated.    ROS   No chest pain, no shortness of breath, no abdominal pain and all other systems were reviewed and are negative.       Objective:     /76 (BP Location: Right arm, Patient Position: Sitting, BP Cuff Size: Adult)   Pulse 68   Temp (!) 35.8 °C (96.4 °F) (Temporal)   Resp 16   Ht 1.626 m (5' 4\")   Wt (!) 143.8 kg (317 lb)   SpO2 93%  Body mass index is 54.41 kg/m².   Physical Exam:  Constitutional: Alert, no distress.  Skin: Warm, dry, good turgor, no rashes in visible areas.  Eye: Equal, round and reactive, conjunctiva clear, lids normal.  ENMT: Lips without lesions, good dentition, oropharynx clear.  Neck: Trachea midline, no masses.   Lymph: No cervical or supraclavicular lymphadenopathy  Respiratory: Unlabored respiratory effort, lungs appear clear, no wheezes.  Cardiovascular: Normal S1, S2, no murmur, no edema.  Psych: Alert and oriented x3, normal affect and mood.        Assessment and Plan:   The following treatment plan was discussed    1. Dysuria  Acute, new onset condition.  Urinalysis within normal limits.  She will contact me with any worsening concerns. "  Likely not a UTI.  - POCT Urinalysis    2. Depression, unspecified depression type  New condition noted but chronic.  Refer to behavioral health to establish care.  Will increase Zoloft to 150 mg daily.  Sent over 50 mg tablet at the 100 mg tablet.  - REFERRAL TO BEHAVIORAL HEALTH  - sertraline (ZOLOFT) 50 MG Tab; Take 1 Tab by mouth every day. Add to 100 mg to total 150 mg daily.  Dispense: 90 Tab; Refill: 1    3. Anxiety  Chronic condition.  Stable.   reviewed.  Discussed black box warning taking opiates with narcotics.  Referred to behavioral health.  Renewed alprazolam as directed.  Provided a 90-day supply.  - REFERRAL TO BEHAVIORAL HEALTH  - sertraline (ZOLOFT) 50 MG Tab; Take 1 Tab by mouth every day. Add to 100 mg to total 150 mg daily.  Dispense: 90 Tab; Refill: 1  - ALPRAZolam (XANAX) 0.5 MG Tab; Take 1 Tab by mouth 2 Times a Day for 30 days.  Dispense: 60 Tab; Refill: 1    4. Hypothyroidism, postsurgical  Hyperthyroid state.  Follow with Dr. Hyman today.  Medication needs to be adjusted.    5. Bilateral chronic knee pain  Chronic condition.  Stable.   reviewed.  Medication appropriate.  Follow with orthopedics.  Renewed Percocet 10 mg 3 times daily.  Provided a 90-day supply.  Follow-up to see me in 3 months for refills.  - oxyCODONE-acetaminophen (PERCOCET-10)  MG Tab; Take 1 Tab by mouth every 8 hours as needed for Severe Pain for up to 30 days.  Dispense: 90 Tab; Refill: 0      Followup: Return in about 3 months (around 10/16/2019), or if symptoms worsen or fail to improve.    Please note that this dictation was created using voice recognition software. I have made every reasonable attempt to correct obvious errors, but I expect that there are errors of grammar and possibly content that I did not discover before finalizing the note.

## 2019-07-17 NOTE — PROGRESS NOTES
Chief Complaint   Patient presents with   • Hypothyroidism     Post thyroidectomy        HPI:    Post thyroidectomy for thyrotoxicosis, March 2019            Patient is on replacement thyroid hormone following thyroidectomy.  She does not feel settled at all.  She feels emotional and nervous.  She is always had trouble sleeping but this is even more.  She cannot seem to get organized and carry on with life.             She currently is taking levothyroxine 175 mcg/day.  Her current TSH is totally suppressed and free thyroxine is elevated at 1.6 (0.5-1.4).    ROS:  All other systems reported as negative or unchanged since last exam except the thyroidectomy      Allergies:   Allergies   Allergen Reactions   • Latex Itching   • Penicillins Hives, Rash and Itching     Itching & Rash     • Sulfa Drugs Itching     Itching feeling on leg, prickily/need-like sensation on skin.       Current medicines including changes today:  Current Outpatient Prescriptions   Medication Sig Dispense Refill   • sertraline (ZOLOFT) 50 MG Tab Take 1 Tab by mouth every day. Add to 100 mg to total 150 mg daily. 90 Tab 1   • [START ON 9/17/2019] oxyCODONE-acetaminophen (PERCOCET-10)  MG Tab Take 1 Tab by mouth every 8 hours as needed for Severe Pain for up to 30 days. 90 Tab 0   • ALPRAZolam (XANAX) 0.5 MG Tab Take 1 Tab by mouth 2 Times a Day for 30 days. 60 Tab 1   • SYNTHROID 150 MCG Tab Take 1 Tab by mouth Every morning on an empty stomach. 30 Tab 5   • atorvastatin (LIPITOR) 40 MG Tab TAKE ONE TABLET BY MOUTH DAILY 90 Tab 2   • omeprazole (PRILOSEC) 40 MG delayed-release capsule TAKE ONE CAPSULE BY MOUTH DAILY 90 Cap 2   • potassium chloride (KLOR-CON) 8 MEQ tablet TAKE ONE TABLET BY MOUTH DAILY 90 Tab 2   • ondansetron (ZOFRAN ODT) 4 MG TABLET DISPERSIBLE DISSOLVE ONE TABLET BY MOUTH EVERY 8 HOURS AS NEEDED FOR NAUSEA AND VOMITING 30 Tab 5   • furosemide (LASIX) 40 MG Tab TAKE ONE TABLET BY MOUTH DAILY 90 Tab 1   • apixaban (ELIQUIS)  5mg Tab Take 1 Tab by mouth 2 Times a Day. 180 Tab 1   • triamcinolone acetonide (KENALOG) 0.1 % Cream Apply 1 Application to affected area(s) 2 times a day. 60 g 1   • Menthol, Topical Analgesic, (BENGAY EX) by Apply externally route as needed.     • Multiple Vitamins-Minerals (MULTIVITAMIN WOMEN 50+) Tab Take  by mouth every day.     • ascorbic acid (ASCORBIC ACID) 500 MG Tab Take 500 mg by mouth every day.     • Misc Natural Products (GLUCOSAMINE CHONDROITIN ADV PO) Take  by mouth every day.     • oxymetazoline (AFRIN) 0.05 % Solution Spray 2 Sprays in nose as needed for Congestion.     • Camphor-Eucalyptus-Menthol (VICKS VAPORUB EX) by Apply externally route every day.     • Metoprolol Succinate 50 MG Capsule ER 24 Hour Sprinkle Take  by mouth 2 Times a Day.     • losartan (COZAAR) 50 MG Tab Take 1 Tab by mouth every day. 90 Tab 3   • fluticasone (FLONASE) 50 MCG/ACT nasal spray Spray 1 Spray in nose as needed.     • montelukast (SINGULAIR) 10 MG Tab Take 10 mg by mouth every bedtime.     • atorvastatin (LIPITOR) 40 MG Tab Take 40 mg by mouth every evening.     • sertraline (ZOLOFT) 100 MG Tab TAKE ONE TABLET BY MOUTH ONE TIME A DAY 90 Tab 2   • MAGNESIUM OXIDE PO Take 3 Tabs by mouth every bedtime.     • ketoconazole (NIZORAL) 2 % Cream Apply 1 Application to affected area(s) every day. Apply to face     • cetirizine (ZYRTEC) 10 MG Tab Take 10 mg by mouth every evening.     • acetaminophen (TYLENOL) 500 MG Tab Take 1,000 mg by mouth 3 times a day as needed for Moderate Pain.     • Potassium (POTASSIMIN PO)      • cefdinir (OMNICEF) 300 MG Cap      • metoprolol (LOPRESSOR) 50 MG Tab      • predniSONE (DELTASONE) 20 MG Tab        No current facility-administered medications for this visit.         Past Medical History:   Diagnosis Date   • Anesthesia     states hard to take deep breath afterwards   • Anxiety    • Anxiety disorder    • Arthritis     knees, arms   • Asthma    • Atrial fibrillation (HCC)    •  "Bartholin cyst    • Cancer (HCC)     uterine, treated   • Chronic knee pain    • Depression    • Graves disease    • Heart burn    • Hemorrhagic disorder (HCC)     on eliquis   • Hypertension    • Hypothyroidism, postsurgical     March 2019   • Morbid obesity (HCC)    • Postherpetic neuralgia    • Psychiatric disorder     anxiety   • Sinus infection     3/10/19   • SVT (supraventricular tachycardia) (HCC)        PHYSICAL EXAM:    /78 (BP Location: Left arm, Patient Position: Sitting, BP Cuff Size: Adult)   Pulse 93   Ht 1.626 m (5' 4.02\")   Wt (!) 143.8 kg (317 lb)   LMP 03/20/2016   SpO2 94%   BMI 54.39 kg/m²     Gen.   appears healthy     Skin   appropriate for sex and age    HEENT  unremarkable    Neck   thyroidectomy wound is healing nicely    Heart  regular    Extremities  no edema    Neuro  gait and station normal, slight tremor of the hands    Psych  appropriate, a little bit agitated about how she feels      ASSESSMENT AND RECOMMENDATIONS    1. Hypothyroidism, postsurgical, March 20 19             See HPI.  Question of appropriate replacement dose    - FREE THYROXINE; Future  - TSH; Future  - SYNTHROID 150 MCG Tab; Take 1 Tab by mouth Every morning on an empty stomach.  Dispense: 30 Tab; Refill: 5      DISPOSITION: Decrease thyroid replacement to 150 mcg/day and update lab in 1 month.                           Communicate by phone.                            After this next adjustment I think we can turn her care back to her PCP Sim Hyman M.D.    Copies to: Sim Brownlee P.A.-C. [phone]  "

## 2019-08-22 ENCOUNTER — HOSPITAL ENCOUNTER (OUTPATIENT)
Dept: LAB | Facility: MEDICAL CENTER | Age: 64
End: 2019-08-22
Attending: INTERNAL MEDICINE
Payer: COMMERCIAL

## 2019-08-22 DIAGNOSIS — E89.0 HYPOTHYROIDISM, POSTSURGICAL: ICD-10-CM

## 2019-08-22 LAB
T4 FREE SERPL-MCNC: 1.75 NG/DL (ref 0.53–1.43)
TSH SERPL DL<=0.005 MIU/L-ACNC: 0.01 UIU/ML (ref 0.38–5.33)

## 2019-08-22 PROCEDURE — 84439 ASSAY OF FREE THYROXINE: CPT

## 2019-08-22 PROCEDURE — 84443 ASSAY THYROID STIM HORMONE: CPT

## 2019-08-22 PROCEDURE — 36415 COLL VENOUS BLD VENIPUNCTURE: CPT

## 2019-08-23 ENCOUNTER — TELEPHONE (OUTPATIENT)
Dept: ENDOCRINOLOGY | Facility: MEDICAL CENTER | Age: 64
End: 2019-08-23

## 2019-08-23 DIAGNOSIS — E89.0 HYPOTHYROIDISM, POSTSURGICAL: ICD-10-CM

## 2019-08-23 RX ORDER — LEVOTHYROXINE SODIUM 0.12 MG/1
125 TABLET ORAL
Qty: 90 TAB | Refills: 3 | Status: SHIPPED | OUTPATIENT
Start: 2019-08-23 | End: 2019-09-30

## 2019-08-23 NOTE — TELEPHONE ENCOUNTER
Phone Number Called: [phone] (home)     Call outcome: spoke to patient regarding message below    Message: spoke with Dr MARINA about pt labs he reviewed them and decreased Synthroid to 125mcg he will send over new rx

## 2019-08-23 NOTE — TELEPHONE ENCOUNTER
1. Caller Name: ten                                         Call Back Number: [phone] (home)       Patient approves a detailed voicemail message: yes    Patient would like a call about results per pt she will not take medication until she gets a call back since she knows she needs to change dosage

## 2019-08-23 NOTE — PROGRESS NOTES
Reviewed labs as per patient request.   She still has high free T4 and low TSH.  Currently she is on 150 mcg of Synthroid daily.  Will reduce the dose to 125 mcg daily.  Patient advised by medical assistant Jeanne on the plan above.  I have sent this prescription to the pharmacy for 90 days with 3 refills.

## 2019-08-29 ENCOUNTER — OFFICE VISIT (OUTPATIENT)
Dept: MEDICAL GROUP | Facility: MEDICAL CENTER | Age: 64
End: 2019-08-29
Payer: COMMERCIAL

## 2019-08-29 ENCOUNTER — APPOINTMENT (OUTPATIENT)
Dept: MEDICAL GROUP | Facility: MEDICAL CENTER | Age: 64
End: 2019-08-29
Payer: COMMERCIAL

## 2019-08-29 VITALS
BODY MASS INDEX: 50.02 KG/M2 | OXYGEN SATURATION: 94 % | TEMPERATURE: 97.5 F | SYSTOLIC BLOOD PRESSURE: 122 MMHG | DIASTOLIC BLOOD PRESSURE: 80 MMHG | HEIGHT: 64 IN | RESPIRATION RATE: 16 BRPM | HEART RATE: 74 BPM | WEIGHT: 293 LBS

## 2019-08-29 DIAGNOSIS — I10 ESSENTIAL HYPERTENSION: ICD-10-CM

## 2019-08-29 DIAGNOSIS — G89.29 BILATERAL CHRONIC KNEE PAIN: ICD-10-CM

## 2019-08-29 DIAGNOSIS — R11.0 NAUSEA: ICD-10-CM

## 2019-08-29 DIAGNOSIS — E66.01 MORBID OBESITY WITH BMI OF 50.0-59.9, ADULT (HCC): ICD-10-CM

## 2019-08-29 DIAGNOSIS — F41.9 ANXIETY: ICD-10-CM

## 2019-08-29 DIAGNOSIS — M25.562 BILATERAL CHRONIC KNEE PAIN: ICD-10-CM

## 2019-08-29 DIAGNOSIS — F32.A DEPRESSION, UNSPECIFIED DEPRESSION TYPE: ICD-10-CM

## 2019-08-29 DIAGNOSIS — E89.0 HYPOTHYROIDISM, POSTSURGICAL: ICD-10-CM

## 2019-08-29 DIAGNOSIS — M25.561 BILATERAL CHRONIC KNEE PAIN: ICD-10-CM

## 2019-08-29 DIAGNOSIS — I47.10 SVT (SUPRAVENTRICULAR TACHYCARDIA): ICD-10-CM

## 2019-08-29 PROCEDURE — 99214 OFFICE O/P EST MOD 30 MIN: CPT | Performed by: PHYSICIAN ASSISTANT

## 2019-08-29 RX ORDER — FUROSEMIDE 40 MG/1
TABLET ORAL
Qty: 90 TAB | Refills: 1 | Status: SHIPPED | OUTPATIENT
Start: 2019-08-29 | End: 2019-11-05 | Stop reason: SDUPTHER

## 2019-08-29 RX ORDER — SERTRALINE HYDROCHLORIDE 100 MG/1
TABLET, FILM COATED ORAL
Refills: 1 | OUTPATIENT
Start: 2019-08-29

## 2019-08-29 RX ORDER — SERTRALINE HYDROCHLORIDE 100 MG/1
TABLET, FILM COATED ORAL
Qty: 90 TAB | Refills: 1 | Status: SHIPPED | OUTPATIENT
Start: 2019-08-29 | End: 2019-11-05

## 2019-08-29 RX ORDER — METOPROLOL TARTRATE 50 MG/1
50 TABLET, FILM COATED ORAL 2 TIMES DAILY
Qty: 180 TAB | Refills: 1 | Status: SHIPPED | OUTPATIENT
Start: 2019-08-29 | End: 2019-11-20

## 2019-08-29 RX ORDER — METOPROLOL TARTRATE 50 MG/1
TABLET, FILM COATED ORAL
Qty: 180 TAB | Refills: 0 | Status: SHIPPED | OUTPATIENT
Start: 2019-08-29 | End: 2019-11-20 | Stop reason: SDUPTHER

## 2019-08-29 NOTE — ASSESSMENT & PLAN NOTE
No recent SVT noted.  Takes metoprolol 50 mg twice a day.  Requesting a refill.  Also has a history of A. fib.

## 2019-08-29 NOTE — ASSESSMENT & PLAN NOTE
Chronic history.  Stable.  Anxiety is stable 2.  Currently on 150 mg of sertraline daily.  Requesting a refill today of 100 mg tablets.

## 2019-08-29 NOTE — ASSESSMENT & PLAN NOTE
This is a 63-year-old female comes in today complaining of chronic bilateral knee pain.  She been followed at the [place] [facility].  Injections performed by a physician assistant.  She states that the injections have become more painful.  One recently in the left knee caused pain.  It hit the bone.  The injection in the hip which was previously cause pain for several days.  She has become uncomfortable with the injection process.  Looking to possibly follow with a new orthopedic specialist.  Does have chronic pain with the right worse than the left side.  She is waiting for bariatric surgery to lose weight and then may be performed the surgery.  She takes oxycodone for pain.

## 2019-08-29 NOTE — ASSESSMENT & PLAN NOTE
Chronic condition.  Eager to lose weight.  Will have the procedure performed by Dr. Ganser.  Recently saw psychology for testing.  Is soon going to be picking up her food supplements.

## 2019-08-29 NOTE — PROGRESS NOTES
Subjective:   Alfonso Posada is a 63 y.o. female here today for chronic bilateral knee pain, chronic nauseousness, history of SVT and A. fib, hypertension, depression with anxiety and morbid obesity.    Bilateral chronic knee pain  This is a 63-year-old female comes in today complaining of chronic bilateral knee pain.  She been followed at the [facility].  Injections performed by a physician assistant.  She states that the injections have become more painful.  One recently in the left knee caused pain.  It hit the bone.  The injection in the hip which was previously cause pain for several days.  She has become uncomfortable with the injection process.  Looking to possibly follow with a new orthopedic specialist.  Does have chronic pain with the right worse than the left side.  She is waiting for bariatric surgery to lose weight and then may be performed the surgery.  She takes oxycodone for pain.    Nausea  Chronic condition.  Believes that the nauseousness is secondary to her thyroid levels not being totally controlled.  Recent labs showed normal thyroid ranges.  She is on 125 mcg of levothyroxine.  Follows often with endocrinology.  Takes Zofran when needed for nauseousness.    SVT (supraventricular tachycardia) (HCC)  No recent SVT noted.  Takes metoprolol 50 mg twice a day.  Requesting a refill.  Also has a history of A. fib.    Essential hypertension  Chronic condition.  Takes metoprolol as well as on furosemide.  Complains of some lower extremity swelling recently.  Has gained some weight.    Depression  Chronic history.  Stable.  Anxiety is stable 2.  Currently on 150 mg of sertraline daily.  Requesting a refill today of 100 mg tablets.    Morbid obesity with BMI of 50.0-59.9, adult (HCC)  Chronic condition.  Eager to lose weight.  Will have the procedure performed by Dr. Ganser.  Recently saw psychology for testing.  Is soon going to be picking up her food supplements.       Current medicines  (including changes today)  Current Outpatient Medications   Medication Sig Dispense Refill   • metoprolol (LOPRESSOR) 50 MG Tab Take 1 Tab by mouth 2 times a day. 180 Tab 1   • sertraline (ZOLOFT) 100 MG Tab TAKE ONE TABLET BY MOUTH ONE TIME A DAY. ADD WITH 50 MG TO TOTAL 150 MG. 90 Tab 1   • furosemide (LASIX) 40 MG Tab TAKE ONE TABLET BY MOUTH DAILY 90 Tab 1   • levothyroxine (SYNTHROID) 125 MCG Tab Take 1 Tab by mouth Every morning on an empty stomach. Synthroid brand medically necessary. 90 Tab 3   • sertraline (ZOLOFT) 50 MG Tab Take 1 Tab by mouth every day. Add to 100 mg to total 150 mg daily. 90 Tab 1   • [START ON 9/17/2019] oxyCODONE-acetaminophen (PERCOCET-10)  MG Tab Take 1 Tab by mouth every 8 hours as needed for Severe Pain for up to 30 days. 90 Tab 0   • Potassium (POTASSIMIN PO)      • atorvastatin (LIPITOR) 40 MG Tab TAKE ONE TABLET BY MOUTH DAILY 90 Tab 2   • omeprazole (PRILOSEC) 40 MG delayed-release capsule TAKE ONE CAPSULE BY MOUTH DAILY 90 Cap 2   • potassium chloride (KLOR-CON) 8 MEQ tablet TAKE ONE TABLET BY MOUTH DAILY 90 Tab 2   • ondansetron (ZOFRAN ODT) 4 MG TABLET DISPERSIBLE DISSOLVE ONE TABLET BY MOUTH EVERY 8 HOURS AS NEEDED FOR NAUSEA AND VOMITING 30 Tab 5   • apixaban (ELIQUIS) 5mg Tab Take 1 Tab by mouth 2 Times a Day. 180 Tab 1   • triamcinolone acetonide (KENALOG) 0.1 % Cream Apply 1 Application to affected area(s) 2 times a day. 60 g 1   • Menthol, Topical Analgesic, (BENGAY EX) by Apply externally route as needed.     • Multiple Vitamins-Minerals (MULTIVITAMIN WOMEN 50+) Tab Take  by mouth every day.     • ascorbic acid (ASCORBIC ACID) 500 MG Tab Take 500 mg by mouth every day.     • Misc Natural Products (GLUCOSAMINE CHONDROITIN ADV PO) Take  by mouth every day.     • oxymetazoline (AFRIN) 0.05 % Solution Spray 2 Sprays in nose as needed for Congestion.     • Camphor-Eucalyptus-Menthol (VICKS VAPORUB EX) by Apply externally route every day.     • Metoprolol Succinate 50  "MG Capsule ER 24 Hour Sprinkle Take  by mouth 2 Times a Day.     • losartan (COZAAR) 50 MG Tab Take 1 Tab by mouth every day. 90 Tab 3   • fluticasone (FLONASE) 50 MCG/ACT nasal spray Spray 1 Spray in nose as needed.     • montelukast (SINGULAIR) 10 MG Tab Take 10 mg by mouth every bedtime.     • atorvastatin (LIPITOR) 40 MG Tab Take 40 mg by mouth every evening.     • MAGNESIUM OXIDE PO Take 3 Tabs by mouth every bedtime.     • ketoconazole (NIZORAL) 2 % Cream Apply 1 Application to affected area(s) every day. Apply to face     • cetirizine (ZYRTEC) 10 MG Tab Take 10 mg by mouth every evening.     • acetaminophen (TYLENOL) 500 MG Tab Take 1,000 mg by mouth 3 times a day as needed for Moderate Pain.       No current facility-administered medications for this visit.      She  has a past medical history of Anesthesia, Anxiety, Anxiety disorder, Arthritis, Asthma, Atrial fibrillation (HCC), Bartholin cyst, Cancer (HCC), Chronic knee pain, Depression, Graves disease, Heart burn, Hemorrhagic disorder (HCC), Hypertension, Hypothyroidism, postsurgical, Morbid obesity (HCC), Postherpetic neuralgia, Psychiatric disorder, Sinus infection, and SVT (supraventricular tachycardia) ([facility]).    Social History and Family History were reviewed and updated.    ROS   No chest pain, no shortness of breath, no abdominal pain and all other systems were reviewed and are negative.       Objective:     /80 (BP Location: Left arm, Patient Position: Sitting, BP Cuff Size: Adult)   Pulse 74   Temp 36.4 °C (97.5 °F) (Temporal)   Resp 16   Ht 1.626 m (5' 4\")   Wt (!) 147.9 kg (326 lb 1 oz)   SpO2 94%  Body mass index is 55.97 kg/m².   Physical Exam:  Constitutional: Alert, no distress.  Skin: Warm, dry, good turgor, no rashes in visible areas.  Eye: Equal, round and reactive, conjunctiva clear, lids normal.  ENMT: Lips without lesions, good dentition, oropharynx clear.  Neck: Trachea midline, no masses.   Lymph: No cervical or " supraclavicular lymphadenopathy  Respiratory: Unlabored respiratory effort, lungs appear clear, no wheezes.  Cardiovascular: Normal S1, S2, no murmur, no edema.  Musculoskeletal: Uses a single-point cane for her gait.  Psych: Alert and oriented x3, normal affect and mood.        Assessment and Plan:   The following treatment plan was discussed    1. Bilateral chronic knee pain  Chronic condition.  Urged her to contact [facility] to find another provider with in the especially group.  Suggested Dr. Peck.  I am not sure who is in [place].    2. Morbid obesity with BMI of 50.0-59.9, adult (HCC)  Chronic condition.  Follow with Dr. Ganser.  Follow with psychology.    3. Hypothyroidism, postsurgical  Chronic condition.  Follow with endocrinology.  Stable.  Continue current dose of levothyroxine.    4. SVT (supraventricular tachycardia) (HCC)  Chronic condition.  In remission.  Continue metoprolol.  - metoprolol (LOPRESSOR) 50 MG Tab; Take 1 Tab by mouth 2 times a day.  Dispense: 180 Tab; Refill: 1    5. Essential hypertension  Chronic condition.  Stable.  Continue blood pressure medications as directed.  Renew metoprolol and furosemide.  - metoprolol (LOPRESSOR) 50 MG Tab; Take 1 Tab by mouth 2 times a day.  Dispense: 180 Tab; Refill: 1  - furosemide (LASIX) 40 MG Tab; TAKE ONE TABLET BY MOUTH DAILY  Dispense: 90 Tab; Refill: 1    6. Nausea  Chronic condition.  Differential includes medication, thyroid or GI tract.  We will continue to monitor.  Continue Zofran as needed.    7. Depression, unspecified depression type  Chronic condition.  Stable.  Renew Zoloft 100 mg daily.  Add to the 50 mg daily.  - sertraline (ZOLOFT) 100 MG Tab; TAKE ONE TABLET BY MOUTH ONE TIME A DAY. ADD WITH 50 MG TO TOTAL 150 MG.  Dispense: 90 Tab; Refill: 1      Followup: Return in about 3 months (around 11/29/2019).    Please note that this dictation was created using voice recognition software. I have made every reasonable  attempt to correct obvious errors, but I expect that there are errors of grammar and possibly content that I did not discover before finalizing the note.

## 2019-08-29 NOTE — ASSESSMENT & PLAN NOTE
Chronic condition.  Takes metoprolol as well as on furosemide.  Complains of some lower extremity swelling recently.  Has gained some weight.

## 2019-08-29 NOTE — ASSESSMENT & PLAN NOTE
Chronic condition.  Believes that the nauseousness is secondary to her thyroid levels not being totally controlled.  Recent labs showed normal thyroid ranges.  She is on 125 mcg of levothyroxine.  Follows often with endocrinology.  Takes Zofran when needed for nauseousness.

## 2019-08-31 DIAGNOSIS — E89.0 HYPOTHYROIDISM, POSTSURGICAL: ICD-10-CM

## 2019-09-24 ENCOUNTER — TELEPHONE (OUTPATIENT)
Dept: ENDOCRINOLOGY | Facility: MEDICAL CENTER | Age: 64
End: 2019-09-24

## 2019-09-24 NOTE — TELEPHONE ENCOUNTER
1. Caller Name: Alfonso Posada                                               Call Back Number: [phone] (home)           Patient approves a detailed voicemail message: yes      Patient called stating one of her tooth fillings fell out and will be having that repaired tomorrow at 1pm. She wants to make sure this is ok to have done because of her thyroid condition. She is currently taking Levothyroxine 125 mcg daily and is planning on having her labs drawn also tomorrow at 2:30pm.    Please advise

## 2019-09-25 ENCOUNTER — HOSPITAL ENCOUNTER (OUTPATIENT)
Dept: LAB | Facility: MEDICAL CENTER | Age: 64
End: 2019-09-25
Attending: INTERNAL MEDICINE
Payer: COMMERCIAL

## 2019-09-25 DIAGNOSIS — E89.0 HYPOTHYROIDISM, POSTSURGICAL: ICD-10-CM

## 2019-09-25 LAB
T4 FREE SERPL-MCNC: 1.33 NG/DL (ref 0.53–1.43)
TSH SERPL DL<=0.005 MIU/L-ACNC: <0.005 UIU/ML (ref 0.38–5.33)

## 2019-09-25 PROCEDURE — 84439 ASSAY OF FREE THYROXINE: CPT

## 2019-09-25 PROCEDURE — 84443 ASSAY THYROID STIM HORMONE: CPT

## 2019-09-25 PROCEDURE — 36415 COLL VENOUS BLD VENIPUNCTURE: CPT

## 2019-09-30 ENCOUNTER — OFFICE VISIT (OUTPATIENT)
Dept: MEDICAL GROUP | Facility: MEDICAL CENTER | Age: 64
End: 2019-09-30
Payer: COMMERCIAL

## 2019-09-30 ENCOUNTER — HOSPITAL ENCOUNTER (OUTPATIENT)
Facility: MEDICAL CENTER | Age: 64
End: 2019-09-30
Attending: PHYSICIAN ASSISTANT
Payer: COMMERCIAL

## 2019-09-30 ENCOUNTER — OFFICE VISIT (OUTPATIENT)
Dept: ENDOCRINOLOGY | Facility: MEDICAL CENTER | Age: 64
End: 2019-09-30
Payer: COMMERCIAL

## 2019-09-30 VITALS
OXYGEN SATURATION: 94 % | BODY MASS INDEX: 50.02 KG/M2 | HEART RATE: 90 BPM | WEIGHT: 293 LBS | RESPIRATION RATE: 14 BRPM | SYSTOLIC BLOOD PRESSURE: 118 MMHG | HEIGHT: 64 IN | DIASTOLIC BLOOD PRESSURE: 80 MMHG

## 2019-09-30 VITALS
DIASTOLIC BLOOD PRESSURE: 80 MMHG | RESPIRATION RATE: 16 BRPM | OXYGEN SATURATION: 95 % | BODY MASS INDEX: 50.02 KG/M2 | WEIGHT: 293 LBS | TEMPERATURE: 97.8 F | SYSTOLIC BLOOD PRESSURE: 118 MMHG | HEIGHT: 64 IN | HEART RATE: 78 BPM

## 2019-09-30 DIAGNOSIS — B02.29 POSTHERPETIC NEURALGIA: ICD-10-CM

## 2019-09-30 DIAGNOSIS — Z79.891 CHRONIC USE OF OPIATE FOR THERAPEUTIC PURPOSE: ICD-10-CM

## 2019-09-30 DIAGNOSIS — F41.9 ANXIETY: ICD-10-CM

## 2019-09-30 DIAGNOSIS — Z23 INFLUENZA VACCINE NEEDED: ICD-10-CM

## 2019-09-30 DIAGNOSIS — R11.0 NAUSEA: ICD-10-CM

## 2019-09-30 DIAGNOSIS — G89.29 BILATERAL CHRONIC KNEE PAIN: ICD-10-CM

## 2019-09-30 DIAGNOSIS — M25.561 BILATERAL CHRONIC KNEE PAIN: ICD-10-CM

## 2019-09-30 DIAGNOSIS — E89.0 HYPOTHYROIDISM, POSTSURGICAL: ICD-10-CM

## 2019-09-30 DIAGNOSIS — E66.01 MORBID OBESITY WITH BMI OF 50.0-59.9, ADULT (HCC): ICD-10-CM

## 2019-09-30 DIAGNOSIS — M25.562 BILATERAL CHRONIC KNEE PAIN: ICD-10-CM

## 2019-09-30 PROBLEM — R30.0 DYSURIA: Status: RESOLVED | Noted: 2019-07-16 | Resolved: 2019-09-30

## 2019-09-30 PROCEDURE — 90686 IIV4 VACC NO PRSV 0.5 ML IM: CPT | Performed by: PHYSICIAN ASSISTANT

## 2019-09-30 PROCEDURE — 99213 OFFICE O/P EST LOW 20 MIN: CPT | Performed by: INTERNAL MEDICINE

## 2019-09-30 PROCEDURE — 80307 DRUG TEST PRSMV CHEM ANLYZR: CPT

## 2019-09-30 PROCEDURE — 90471 IMMUNIZATION ADMIN: CPT | Performed by: PHYSICIAN ASSISTANT

## 2019-09-30 PROCEDURE — 99214 OFFICE O/P EST MOD 30 MIN: CPT | Mod: 25 | Performed by: PHYSICIAN ASSISTANT

## 2019-09-30 RX ORDER — ALPRAZOLAM 0.5 MG/1
0.5 TABLET ORAL 2 TIMES DAILY
Qty: 60 TAB | Refills: 1 | Status: SHIPPED | OUTPATIENT
Start: 2019-09-30 | End: 2019-10-30

## 2019-09-30 RX ORDER — LEVOTHYROXINE SODIUM 112 MCG
112 TABLET ORAL
Qty: 30 TAB | Refills: 5 | Status: SHIPPED | OUTPATIENT
Start: 2019-09-30 | End: 2019-11-13

## 2019-09-30 RX ORDER — OXYCODONE AND ACETAMINOPHEN 10; 325 MG/1; MG/1
1 TABLET ORAL EVERY 8 HOURS PRN
Qty: 90 TAB | Refills: 0 | Status: SHIPPED | OUTPATIENT
Start: 2019-12-15 | End: 2019-11-05 | Stop reason: SDUPTHER

## 2019-09-30 RX ORDER — OXYCODONE AND ACETAMINOPHEN 10; 325 MG/1; MG/1
1 TABLET ORAL EVERY 8 HOURS PRN
Qty: 90 TAB | Refills: 0 | Status: SHIPPED | OUTPATIENT
Start: 2019-10-16 | End: 2019-09-30 | Stop reason: SDUPTHER

## 2019-09-30 RX ORDER — ONDANSETRON 4 MG/1
TABLET, ORALLY DISINTEGRATING ORAL
Qty: 30 TAB | Refills: 5 | Status: ON HOLD
Start: 2019-09-30 | End: 2019-12-30

## 2019-09-30 RX ORDER — OXYCODONE AND ACETAMINOPHEN 10; 325 MG/1; MG/1
1 TABLET ORAL EVERY 8 HOURS PRN
Qty: 90 TAB | Refills: 0 | Status: SHIPPED | OUTPATIENT
Start: 2019-11-15 | End: 2019-09-30 | Stop reason: SDUPTHER

## 2019-09-30 NOTE — ASSESSMENT & PLAN NOTE
This is a 63-year-old female who is here today to discuss a few concerns.  Recent TSH value at less than 0.005.  She is been taking levothyroxine 125 mcg.  She states over the past week she has been feeling really good.  Sleeping well.  Stools are regular.  No complaints.  She has an appointment today with Dr. Blandon.

## 2019-09-30 NOTE — ASSESSMENT & PLAN NOTE
Chronic condition.  Has an appointment soon with a new provider down in [place] for her chronic knee pain.  It is a female.  She is looking forward to her office visit.  Requesting refill today of oxycodone.  Takes 10 mg 3 times a day.  Medication is due to be provided on the 16th of next month.

## 2019-09-30 NOTE — ASSESSMENT & PLAN NOTE
Chronic condition.  She has lost 8 pounds since her last office visit.  She is looking forward to surgery with Dr. Sweet.  She is requesting some kind of letter of acknowledgment that she is a good candidate for weight loss as well as she is been working hard at trying to change her diet.  She has no forms for me to complete.

## 2019-09-30 NOTE — ASSESSMENT & PLAN NOTE
Chronic condition of nausea.  Nausea and intermittent.  Unknown etiology.  She has reflux symptoms with those are well controlled.  Also has a history of postherpetic neuralgia but that is stable.  Requesting refill today of Zofran.

## 2019-09-30 NOTE — PROGRESS NOTES
Chief Complaint   Patient presents with   • Hypothyroidism     Post thyroidectomy        HPI:        1. Hypothyroidism, post thyroidectomy.    The patient has had an unusual thyroid history.  Many years ago, she had a partial thyroidectomy for thyrotoxicosis and then was treated for hypothyroidism for many years.  About a year ago when she was seeing Dr. Kang she gradually became thyrotoxic and weaned off of thyroid hormone replacement. When she was down to 25 mcg of levothyroxine in September, she presented with a tachycardia, SVT and atrial fibrillation. At that point, the thyroid supplement was stopped completely.    Subsequently her TSH was completely suppressed at .06 and methimazole 5 mg was started.  She was also found to have the Grave’s antibody. An ultrasound in February showed a mass in the right thyroid lobe.  Biopsy showed atypia of undetermined significance.  The plan was to ablate with I-131 but her uptake was only 15% and there was a large 4 cm cold nodule biopsy atypia.  Therefore it was felt best to do a completion thyroidectomy.  That was accomplished in March of this year.  Since then, we have been adjusting her thyroid replacement down based on symptoms and completely suppressed TSH.  Most recent adjustment was August 23 when we dropped her dose to Synthroid 125 mcg.  She has felt much better but still has some lingering symptoms of excessive thyroid.  Her current free T4 is 1.3 but her TSH is still completely suppressed.      She is very anxious to get on with bariatric surgery but that won’t be done until her thyroid levels are normal and stabilized.  Therefore we will reduce her dose to 112 and in one month, repeat her T4 and TSH.  Part of the TSH problem might be that she was suppressed for a time and it was lagging behind the T4 before it comes back up.  Although the T4 just now is normal this month.  So I am hopeful to see more normal looking levels at the end of this month and I can talk  with Dr. Ganser about planning to go ahead with her bariatric surgery.  She needs to have this done this year because her high deductible is almost paid and she does not want to start a new one that will include the surgical bill.      So I will see her the end of October and we will anticipate hopefully to proceed to surgery shortly thereafter.     ROS:  Having arthritic problems in a knee.  Steroid injection is pending      Allergies:   Allergies   Allergen Reactions   • Latex Itching   • Penicillins Hives, Rash and Itching     Itching & Rash     • Sulfa Drugs Itching     Itching feeling on leg, prickily/need-like sensation on skin.       Current medicines including changes today:  Current Outpatient Medications   Medication Sig Dispense Refill   • SYNTHROID 112 MCG Tab Take 1 Tab by mouth Every morning on an empty stomach. 30 Tab 5   • [START ON 12/15/2019] oxyCODONE-acetaminophen (PERCOCET-10)  MG Tab Take 1 Tab by mouth every 8 hours as needed for Severe Pain for up to 30 days. 90 Tab 0   • ALPRAZolam (XANAX) 0.5 MG Tab Take 1 Tab by mouth 2 Times a Day for 30 days. 60 Tab 1   • ondansetron (ZOFRAN ODT) 4 MG TABLET DISPERSIBLE DISSOLVE ONE TABLET BY MOUTH EVERY 8 HOURS AS NEEDED FOR NAUSEA AND VOMITING 30 Tab 5   • ALPRAZolam (XANAX) 0.5 MG Tab TAKE ONE TABLET BY MOUTH TWICE A DAY FOR 30 DAYS 60 Tab 0   • metoprolol (LOPRESSOR) 50 MG Tab TAKE ONE TABLET BY MOUTH TWICE A  Tab 0   • metoprolol (LOPRESSOR) 50 MG Tab Take 1 Tab by mouth 2 times a day. 180 Tab 1   • sertraline (ZOLOFT) 100 MG Tab TAKE ONE TABLET BY MOUTH ONE TIME A DAY. ADD WITH 50 MG TO TOTAL 150 MG. 90 Tab 1   • furosemide (LASIX) 40 MG Tab TAKE ONE TABLET BY MOUTH DAILY 90 Tab 1   • sertraline (ZOLOFT) 50 MG Tab Take 1 Tab by mouth every day. Add to 100 mg to total 150 mg daily. 90 Tab 1   • Potassium (POTASSIMIN PO)      • atorvastatin (LIPITOR) 40 MG Tab TAKE ONE TABLET BY MOUTH DAILY 90 Tab 2   • omeprazole (PRILOSEC) 40 MG  delayed-release capsule TAKE ONE CAPSULE BY MOUTH DAILY 90 Cap 2   • potassium chloride (KLOR-CON) 8 MEQ tablet TAKE ONE TABLET BY MOUTH DAILY 90 Tab 2   • apixaban (ELIQUIS) 5mg Tab Take 1 Tab by mouth 2 Times a Day. 180 Tab 1   • triamcinolone acetonide (KENALOG) 0.1 % Cream Apply 1 Application to affected area(s) 2 times a day. 60 g 1   • Menthol, Topical Analgesic, (BENGAY EX) by Apply externally route as needed.     • Multiple Vitamins-Minerals (MULTIVITAMIN WOMEN 50+) Tab Take  by mouth every day.     • ascorbic acid (ASCORBIC ACID) 500 MG Tab Take 500 mg by mouth every day.     • Misc Natural Products (GLUCOSAMINE CHONDROITIN ADV PO) Take  by mouth every day.     • oxymetazoline (AFRIN) 0.05 % Solution Spray 2 Sprays in nose as needed for Congestion.     • Camphor-Eucalyptus-Menthol (VICKS VAPORUB EX) by Apply externally route every day.     • Metoprolol Succinate 50 MG Capsule ER 24 Hour Sprinkle Take  by mouth 2 Times a Day.     • losartan (COZAAR) 50 MG Tab Take 1 Tab by mouth every day. 90 Tab 3   • fluticasone (FLONASE) 50 MCG/ACT nasal spray Spray 1 Spray in nose as needed.     • montelukast (SINGULAIR) 10 MG Tab Take 10 mg by mouth every bedtime.     • atorvastatin (LIPITOR) 40 MG Tab Take 40 mg by mouth every evening.     • MAGNESIUM OXIDE PO Take 3 Tabs by mouth every bedtime.     • ketoconazole (NIZORAL) 2 % Cream Apply 1 Application to affected area(s) every day. Apply to face     • cetirizine (ZYRTEC) 10 MG Tab Take 10 mg by mouth every evening.     • acetaminophen (TYLENOL) 500 MG Tab Take 1,000 mg by mouth 3 times a day as needed for Moderate Pain.       No current facility-administered medications for this visit.         Past Medical History:   Diagnosis Date   • Anesthesia     states hard to take deep breath afterwards   • Anxiety    • Anxiety disorder    • Arthritis     knees, arms   • Asthma    • Atrial fibrillation (HCC)    • Bartholin cyst    • Cancer (HCC)     uterine, treated   • Chronic  "knee pain    • Depression    • Graves disease    • Heart burn    • Hemorrhagic disorder (HCC)     on eliquis   • Hypertension    • Hypothyroidism, postsurgical     March 2019   • Morbid obesity (HCC)    • Postherpetic neuralgia    • Psychiatric disorder     anxiety   • Sinus infection     3/10/19   • SVT (supraventricular tachycardia) (HCC)        PHYSICAL EXAM:    /80 (BP Location: Left arm, Patient Position: Sitting, BP Cuff Size: Large adult)   Pulse 90   Resp 14   Ht 1.626 m (5' 4\")   Wt (!) 144.9 kg (319 lb 6.4 oz)   LMP 03/20/2016   SpO2 94%   BMI 54.82 kg/m²     Gen. obese appears healthy     Skin   appropriate for sex and age    HEENT  unremarkable    Neck      surgical wound is well-healed.  No palpable residual thyroid or nodules    Heart  regular    Extremities  no edema    Neuro  gait is impaired by knee pain.  Uses a cane.   No tremor      Psych  appropriate    ASSESSMENT AND RECOMMENDATIONS    1. Hypothyroidism, postsurgical               See HPI  - FREE THYROXINE; Future  - T3 FREE; Future  - TSH; Future  - SYNTHROID 112 MCG Tab; Take 1 Tab by mouth Every morning on an empty stomach.  Dispense: 30 Tab; Refill: 5      DISPOSITION:   Follow-up in 1 month and discuss possible bariatric surgery      Kamran Hyman M.D.    Copies to: Sim Brownlee, P.A.-C. [phone]                   Dr. John Ganser  "

## 2019-09-30 NOTE — ASSESSMENT & PLAN NOTE
Chronic anxiety.  Anxiety has been improved.  Last prescription for alprazolam was written at the end of August.  She was given 60 tablets.  Takes medication as needed.  No recent exacerbation of anxiety.

## 2019-09-30 NOTE — PROGRESS NOTES
Subjective:   Alfonso Posada is a 63 y.o. female here today for hypothyroid status post surgery, morbid obesity, chronic knee pain, anxiety and nauseousness.    Hypothyroidism, postsurgical  This is a 63-year-old female who is here today to discuss a few concerns.  Recent TSH value at less than 0.005.  She is been taking levothyroxine 125 mcg.  She states over the past week she has been feeling really good.  Sleeping well.  Stools are regular.  No complaints.  She has an appointment today with Dr. Blandon.    Morbid obesity with BMI of 50.0-59.9, adult (HCC)  Chronic condition.  She has lost 8 pounds since her last office visit.  She is looking forward to surgery with Dr. Sweet.  She is requesting some kind of letter of acknowledgment that she is a good candidate for weight loss as well as she is been working hard at trying to change her diet.  She has no forms for me to complete.    Bilateral chronic knee pain  Chronic condition.  Has an appointment soon with a new provider down in [place] for her chronic knee pain.  It is a female.  She is looking forward to her office visit.  Requesting refill today of oxycodone.  Takes 10 mg 3 times a day.  Medication is due to be provided on the 16th of next month.    Anxiety  Chronic anxiety.  Anxiety has been improved.  Last prescription for alprazolam was written at the end of August.  She was given 60 tablets.  Takes medication as needed.  No recent exacerbation of anxiety.    Nausea  Chronic condition of nausea.  Nausea and intermittent.  Unknown etiology.  She has reflux symptoms with those are well controlled.  Also has a history of postherpetic neuralgia but that is stable.  Requesting refill today of Zofran.       Current medicines (including changes today)  Current Outpatient Medications   Medication Sig Dispense Refill   • [START ON 12/15/2019] oxyCODONE-acetaminophen (PERCOCET-10)  MG Tab Take 1 Tab by mouth every 8 hours as needed for Severe Pain for up  to 30 days. 90 Tab 0   • ALPRAZolam (XANAX) 0.5 MG Tab Take 1 Tab by mouth 2 Times a Day for 30 days. 60 Tab 1   • ondansetron (ZOFRAN ODT) 4 MG TABLET DISPERSIBLE DISSOLVE ONE TABLET BY MOUTH EVERY 8 HOURS AS NEEDED FOR NAUSEA AND VOMITING 30 Tab 5   • ALPRAZolam (XANAX) 0.5 MG Tab TAKE ONE TABLET BY MOUTH TWICE A DAY FOR 30 DAYS 60 Tab 0   • metoprolol (LOPRESSOR) 50 MG Tab TAKE ONE TABLET BY MOUTH TWICE A  Tab 0   • metoprolol (LOPRESSOR) 50 MG Tab Take 1 Tab by mouth 2 times a day. 180 Tab 1   • sertraline (ZOLOFT) 100 MG Tab TAKE ONE TABLET BY MOUTH ONE TIME A DAY. ADD WITH 50 MG TO TOTAL 150 MG. 90 Tab 1   • furosemide (LASIX) 40 MG Tab TAKE ONE TABLET BY MOUTH DAILY 90 Tab 1   • levothyroxine (SYNTHROID) 125 MCG Tab Take 1 Tab by mouth Every morning on an empty stomach. Synthroid brand medically necessary. 90 Tab 3   • sertraline (ZOLOFT) 50 MG Tab Take 1 Tab by mouth every day. Add to 100 mg to total 150 mg daily. 90 Tab 1   • Potassium (POTASSIMIN PO)      • atorvastatin (LIPITOR) 40 MG Tab TAKE ONE TABLET BY MOUTH DAILY 90 Tab 2   • omeprazole (PRILOSEC) 40 MG delayed-release capsule TAKE ONE CAPSULE BY MOUTH DAILY 90 Cap 2   • potassium chloride (KLOR-CON) 8 MEQ tablet TAKE ONE TABLET BY MOUTH DAILY 90 Tab 2   • apixaban (ELIQUIS) 5mg Tab Take 1 Tab by mouth 2 Times a Day. 180 Tab 1   • triamcinolone acetonide (KENALOG) 0.1 % Cream Apply 1 Application to affected area(s) 2 times a day. 60 g 1   • Menthol, Topical Analgesic, (BENGAY EX) by Apply externally route as needed.     • Multiple Vitamins-Minerals (MULTIVITAMIN WOMEN 50+) Tab Take  by mouth every day.     • ascorbic acid (ASCORBIC ACID) 500 MG Tab Take 500 mg by mouth every day.     • Misc Natural Products (GLUCOSAMINE CHONDROITIN ADV PO) Take  by mouth every day.     • oxymetazoline (AFRIN) 0.05 % Solution Spray 2 Sprays in nose as needed for Congestion.     • Camphor-Eucalyptus-Menthol (VICKS VAPORUB EX) by Apply externally route every  "day.     • Metoprolol Succinate 50 MG Capsule ER 24 Hour Sprinkle Take  by mouth 2 Times a Day.     • losartan (COZAAR) 50 MG Tab Take 1 Tab by mouth every day. 90 Tab 3   • fluticasone (FLONASE) 50 MCG/ACT nasal spray Spray 1 Spray in nose as needed.     • montelukast (SINGULAIR) 10 MG Tab Take 10 mg by mouth every bedtime.     • atorvastatin (LIPITOR) 40 MG Tab Take 40 mg by mouth every evening.     • MAGNESIUM OXIDE PO Take 3 Tabs by mouth every bedtime.     • ketoconazole (NIZORAL) 2 % Cream Apply 1 Application to affected area(s) every day. Apply to face     • cetirizine (ZYRTEC) 10 MG Tab Take 10 mg by mouth every evening.     • acetaminophen (TYLENOL) 500 MG Tab Take 1,000 mg by mouth 3 times a day as needed for Moderate Pain.       No current facility-administered medications for this visit.      She  has a past medical history of Anesthesia, Anxiety, Anxiety disorder, Arthritis, Asthma, Atrial fibrillation (HCC), Bartholin cyst, Cancer (HCC), Chronic knee pain, Depression, Graves disease, Heart burn, Hemorrhagic disorder (HCC), Hypertension, Hypothyroidism, postsurgical, Morbid obesity (HCC), Postherpetic neuralgia, Psychiatric disorder, Sinus infection, and SVT (supraventricular tachycardia) ([facility]).    Social History and Family History were reviewed and updated.    ROS   No chest pain, no shortness of breath, no abdominal pain and all other systems were reviewed and are negative.       Objective:     /80 (BP Location: Left arm, Patient Position: Sitting, BP Cuff Size: Adult)   Pulse 78   Temp 36.6 °C (97.8 °F) (Temporal)   Resp 16   Ht 1.626 m (5' 4\")   Wt (!) 145 kg (319 lb 10.7 oz)   SpO2 95%  Body mass index is 54.87 kg/m².   Physical Exam:  Constitutional: Alert, no distress.  Skin: Warm, dry, good turgor, no rashes in visible areas.  Eye: Equal, round and reactive, conjunctiva clear, lids normal.  ENMT: Lips without lesions, good dentition, oropharynx clear.  Neck: Trachea midline, no " masses.   Lymph: No cervical or supraclavicular lymphadenopathy  Respiratory: Unlabored respiratory effort, lungs appear clear, no wheezes.  Cardiovascular: Normal S1, S2, no murmur, no edema.  Psych: Alert and oriented x3, normal affect and mood.        Assessment and Plan:   The following treatment plan was discussed    1. Hypothyroidism, postsurgical  Chronic condition.  Currently at a hyperthyroid state.  She is asymptomatic though.  She has an appointment today with endocrinology.  Follow-up to discuss.    2. Morbid obesity with BMI of 50.0-59.9, adult (HCC)  Chronic condition.  Follow with bariatric surgery.  Advised to provide me forms as needed to complete.    3. Bilateral chronic knee pain  Chronic condition.  Stable.   reviewed.  Medication appropriate.  Renewed oxycodone 10 mg 3 times a day and provided a 90-day supply.  Must follow-up to see me for refills.  - oxyCODONE-acetaminophen (PERCOCET-10)  MG Tab; Take 1 Tab by mouth every 8 hours as needed for Severe Pain for up to 30 days.  Dispense: 90 Tab; Refill: 0    4. Chronic use of opiate for therapeutic purpose  Chronic use.  Urine drug screen ordered.  - Pain Management Screen; Future    5. Anxiety  Chronic condition.  Stable.  Renewed Xanax as directed.   reviewed.  Discussed black box warning taking narcotics with benzos.  - ALPRAZolam (XANAX) 0.5 MG Tab; Take 1 Tab by mouth 2 Times a Day for 30 days.  Dispense: 60 Tab; Refill: 1    6. Nausea  Chronic condition.  Unknown etiology.  Could be secondary to postherpetic neuralgia versus GERD versus multiple drugs.  Renewed Zofran as directed.  - ondansetron (ZOFRAN ODT) 4 MG TABLET DISPERSIBLE; DISSOLVE ONE TABLET BY MOUTH EVERY 8 HOURS AS NEEDED FOR NAUSEA AND VOMITING  Dispense: 30 Tab; Refill: 5    7. Influenza vaccine needed  Administered without complaints.  - Influenza Vaccine Quad Injection (PF)    In follow-up will order labs.    Followup: Return in about 4 weeks (around  10/28/2019).    Please note that this dictation was created using voice recognition software. I have made every reasonable attempt to correct obvious errors, but I expect that there are errors of grammar and possibly content that I did not discover before finalizing the note.

## 2019-10-04 LAB
6MAM UR QL: NOT DETECTED
7AMINOCLONAZEPAM UR QL: NOT DETECTED
A-OH ALPRAZ UR QL: PRESENT
ALPRAZ UR QL: NOT DETECTED
AMPHET UR QL SCN: NOT DETECTED
ANNOTATION COMMENT IMP: NORMAL
ANNOTATION COMMENT IMP: NORMAL
BARBITURATES UR QL: NOT DETECTED
BUPRENORPHINE UR QL: NOT DETECTED
BZE UR QL: NOT DETECTED
CARBOXYTHC UR QL: NOT DETECTED
CARISOPRODOL UR QL: NOT DETECTED
CLONAZEPAM UR QL: NOT DETECTED
CODEINE UR QL: NOT DETECTED
DIAZEPAM UR QL: NOT DETECTED
ETHYL GLUCURONIDE UR QL: NOT DETECTED
FENTANYL UR QL: NOT DETECTED
HYDROCODONE UR QL: NOT DETECTED
HYDROMORPHONE UR QL: NOT DETECTED
LORAZEPAM UR QL: NOT DETECTED
MDA UR QL: NOT DETECTED
MDEA UR QL: NOT DETECTED
MDMA UR QL: NOT DETECTED
MEPERIDINE UR QL: NOT DETECTED
METHADONE UR QL: NOT DETECTED
METHAMPHET UR QL: NOT DETECTED
MIDAZOLAM UR QL SCN: NOT DETECTED
MORPHINE UR QL: NOT DETECTED
NORBUPRENORPHINE UR QL CFM: NOT DETECTED
NORDIAZEPAM UR QL: NOT DETECTED
NORFENTANYL UR QL: NOT DETECTED
NORHYDROCODONE UR QL CFM: NOT DETECTED
NOROXYCODONE UR QL CFM: PRESENT
NOROXYMORPH CO100 Q0458: PRESENT
OXAZEPAM UR QL: NOT DETECTED
OXYCODONE UR QL: PRESENT
OXYMORPHONE UR QL: PRESENT
PATHOLOGY STUDY: NORMAL
PCP UR QL: NOT DETECTED
PHENTERMINE UR QL: NOT DETECTED
PPAA UR QL: NOT DETECTED
PROPOXYPH UR QL: NOT DETECTED
SERVICE CMNT-IMP: NORMAL
TAPENADOL OSULF CO200 Q0473: NOT DETECTED
TAPENTADOL UR QL SCN: NOT DETECTED
TEMAZEPAM UR QL: NOT DETECTED
TRAMADOL UR QL: NOT DETECTED
ZOLPIDEM UR QL: NOT DETECTED

## 2019-10-13 RX ORDER — MONTELUKAST SODIUM 10 MG/1
TABLET ORAL
Qty: 90 TAB | Refills: 1 | Status: ON HOLD
Start: 2019-10-13 | End: 2019-12-30

## 2019-10-28 ENCOUNTER — APPOINTMENT (OUTPATIENT)
Dept: MEDICAL GROUP | Facility: MEDICAL CENTER | Age: 64
End: 2019-10-28
Payer: COMMERCIAL

## 2019-10-28 ENCOUNTER — APPOINTMENT (OUTPATIENT)
Dept: ENDOCRINOLOGY | Facility: MEDICAL CENTER | Age: 64
End: 2019-10-28
Payer: COMMERCIAL

## 2019-10-29 ENCOUNTER — HOSPITAL ENCOUNTER (OUTPATIENT)
Dept: LAB | Facility: MEDICAL CENTER | Age: 64
End: 2019-10-29
Attending: INTERNAL MEDICINE
Payer: COMMERCIAL

## 2019-10-29 DIAGNOSIS — E89.0 HYPOTHYROIDISM, POSTSURGICAL: ICD-10-CM

## 2019-10-29 LAB
T3FREE SERPL-MCNC: 3.68 PG/ML (ref 2.4–4.2)
T4 FREE SERPL-MCNC: 1.35 NG/DL (ref 0.53–1.43)
TSH SERPL DL<=0.005 MIU/L-ACNC: <0.005 UIU/ML (ref 0.38–5.33)

## 2019-10-29 PROCEDURE — 36415 COLL VENOUS BLD VENIPUNCTURE: CPT

## 2019-10-29 PROCEDURE — 84439 ASSAY OF FREE THYROXINE: CPT

## 2019-10-29 PROCEDURE — 84481 FREE ASSAY (FT-3): CPT

## 2019-10-29 PROCEDURE — 84443 ASSAY THYROID STIM HORMONE: CPT

## 2019-11-05 ENCOUNTER — APPOINTMENT (OUTPATIENT)
Dept: ENDOCRINOLOGY | Facility: MEDICAL CENTER | Age: 64
End: 2019-11-05
Payer: COMMERCIAL

## 2019-11-05 ENCOUNTER — OFFICE VISIT (OUTPATIENT)
Dept: MEDICAL GROUP | Facility: MEDICAL CENTER | Age: 64
End: 2019-11-05
Payer: COMMERCIAL

## 2019-11-05 VITALS
TEMPERATURE: 97.5 F | HEIGHT: 64 IN | OXYGEN SATURATION: 95 % | RESPIRATION RATE: 16 BRPM | HEART RATE: 66 BPM | WEIGHT: 293 LBS | DIASTOLIC BLOOD PRESSURE: 78 MMHG | BODY MASS INDEX: 50.02 KG/M2 | SYSTOLIC BLOOD PRESSURE: 124 MMHG

## 2019-11-05 DIAGNOSIS — E66.01 MORBID OBESITY WITH BMI OF 50.0-59.9, ADULT (HCC): ICD-10-CM

## 2019-11-05 DIAGNOSIS — F32.A DEPRESSION, UNSPECIFIED DEPRESSION TYPE: ICD-10-CM

## 2019-11-05 DIAGNOSIS — G89.29 BILATERAL CHRONIC KNEE PAIN: ICD-10-CM

## 2019-11-05 DIAGNOSIS — F41.9 ANXIETY: ICD-10-CM

## 2019-11-05 DIAGNOSIS — K21.9 GASTROESOPHAGEAL REFLUX DISEASE WITHOUT ESOPHAGITIS: ICD-10-CM

## 2019-11-05 DIAGNOSIS — J30.9 ALLERGIC RHINITIS, UNSPECIFIED SEASONALITY, UNSPECIFIED TRIGGER: ICD-10-CM

## 2019-11-05 DIAGNOSIS — M25.561 BILATERAL CHRONIC KNEE PAIN: ICD-10-CM

## 2019-11-05 DIAGNOSIS — M25.562 BILATERAL CHRONIC KNEE PAIN: ICD-10-CM

## 2019-11-05 DIAGNOSIS — I47.10 SVT (SUPRAVENTRICULAR TACHYCARDIA): ICD-10-CM

## 2019-11-05 DIAGNOSIS — J32.4 CHRONIC PANSINUSITIS: ICD-10-CM

## 2019-11-05 DIAGNOSIS — M25.551 RIGHT HIP PAIN: ICD-10-CM

## 2019-11-05 DIAGNOSIS — I10 ESSENTIAL HYPERTENSION: ICD-10-CM

## 2019-11-05 DIAGNOSIS — E89.0 HYPOTHYROIDISM, POSTSURGICAL: ICD-10-CM

## 2019-11-05 DIAGNOSIS — I48.0 PAROXYSMAL ATRIAL FIBRILLATION (HCC): ICD-10-CM

## 2019-11-05 PROCEDURE — 99214 OFFICE O/P EST MOD 30 MIN: CPT | Performed by: PHYSICIAN ASSISTANT

## 2019-11-05 RX ORDER — OXYCODONE AND ACETAMINOPHEN 10; 325 MG/1; MG/1
1 TABLET ORAL EVERY 8 HOURS PRN
Qty: 90 TAB | Refills: 0 | Status: SHIPPED | OUTPATIENT
Start: 2020-01-14 | End: 2019-11-05 | Stop reason: SDUPTHER

## 2019-11-05 RX ORDER — LOSARTAN POTASSIUM 25 MG/1
TABLET ORAL
COMMUNITY
Start: 2016-11-07 | End: 2019-11-20

## 2019-11-05 RX ORDER — ATORVASTATIN CALCIUM 40 MG/1
40 TABLET, FILM COATED ORAL EVERY EVENING
COMMUNITY
Start: 2017-01-06 | End: 2020-03-18

## 2019-11-05 RX ORDER — INDOMETHACIN 25 MG/1
CAPSULE ORAL
COMMUNITY
Start: 2017-01-06 | End: 2019-12-23

## 2019-11-05 RX ORDER — MONTELUKAST SODIUM 10 MG/1
10 TABLET ORAL
COMMUNITY
Start: 2017-01-06 | End: 2019-11-05

## 2019-11-05 RX ORDER — TRIAMCINOLONE ACETONIDE 1 MG/G
CREAM TOPICAL
COMMUNITY
Start: 2016-04-07 | End: 2019-11-05

## 2019-11-05 RX ORDER — OXYCODONE AND ACETAMINOPHEN 10; 325 MG/1; MG/1
1 TABLET ORAL EVERY 8 HOURS PRN
Qty: 90 TAB | Refills: 0 | Status: SHIPPED | OUTPATIENT
Start: 2019-12-15 | End: 2019-11-05 | Stop reason: SDUPTHER

## 2019-11-05 RX ORDER — OMEPRAZOLE 40 MG/1
CAPSULE, DELAYED RELEASE ORAL
COMMUNITY
End: 2019-11-05

## 2019-11-05 RX ORDER — SERTRALINE HYDROCHLORIDE 100 MG/1
100 TABLET, FILM COATED ORAL DAILY
Qty: 90 TAB | Refills: 2 | Status: ON HOLD
Start: 2019-11-05 | End: 2019-12-30

## 2019-11-05 RX ORDER — ONDANSETRON 4 MG/1
TABLET, ORALLY DISINTEGRATING ORAL
COMMUNITY
Start: 2017-02-03 | End: 2019-11-05

## 2019-11-05 RX ORDER — OXYCODONE AND ACETAMINOPHEN 10; 325 MG/1; MG/1
1 TABLET ORAL EVERY 8 HOURS PRN
Qty: 90 TAB | Refills: 0 | Status: ON HOLD | OUTPATIENT
Start: 2020-02-13 | End: 2019-12-30

## 2019-11-05 RX ORDER — GABAPENTIN 300 MG/1
600 CAPSULE ORAL
COMMUNITY
Start: 2017-01-06 | End: 2019-11-20

## 2019-11-05 RX ORDER — ALPRAZOLAM 0.5 MG/1
TABLET ORAL
COMMUNITY
Start: 2017-01-06 | End: 2019-12-09 | Stop reason: SDUPTHER

## 2019-11-05 RX ORDER — FUROSEMIDE 40 MG/1
TABLET ORAL
Qty: 90 TAB | Refills: 2 | Status: ON HOLD
Start: 2019-11-05 | End: 2019-12-30

## 2019-11-05 RX ORDER — SERTRALINE HYDROCHLORIDE 100 MG/1
100 TABLET, FILM COATED ORAL
COMMUNITY
Start: 2017-01-06 | End: 2019-11-05

## 2019-11-05 RX ORDER — FLUTICASONE PROPIONATE 50 MCG
1 SPRAY, SUSPENSION (ML) NASAL DAILY
Qty: 16 G | Refills: 5 | Status: ON HOLD
Start: 2019-11-05 | End: 2019-12-30

## 2019-11-05 RX ORDER — DOXYCYCLINE HYCLATE 100 MG
100 TABLET ORAL 2 TIMES DAILY
Qty: 20 TAB | Refills: 0 | Status: SHIPPED | OUTPATIENT
Start: 2019-11-05 | End: 2019-12-23

## 2019-11-05 RX ORDER — POTASSIUM CHLORIDE 600 MG/1
CAPSULE, EXTENDED RELEASE ORAL
COMMUNITY
Start: 2016-11-27 | End: 2019-11-05

## 2019-11-05 RX ORDER — OXYCODONE AND ACETAMINOPHEN 10; 325 MG/1; MG/1
1 TABLET ORAL
COMMUNITY
Start: 2017-01-06 | End: 2019-11-05

## 2019-11-05 RX ORDER — FUROSEMIDE 40 MG/1
40 TABLET ORAL
COMMUNITY
Start: 2017-01-06 | End: 2019-11-05

## 2019-11-05 ASSESSMENT — PATIENT HEALTH QUESTIONNAIRE - PHQ9: CLINICAL INTERPRETATION OF PHQ2 SCORE: 0

## 2019-11-05 NOTE — ASSESSMENT & PLAN NOTE
Chronic condition.  Stable.  Taking omeprazole.  Has a tea that 1 of her friends offered for her to take.  States that the tea is from [place].  She has pictures of it.  Has no caffeine.  Helped her friend with her reflux symptoms.

## 2019-11-05 NOTE — ASSESSMENT & PLAN NOTE
Currently on 112 mcg of levothyroxine daily.  Could not make her appointment today with endocrinology.  Recent labs showed continued depression of TSH value at less than 0.0005.  Free T3 and free thyroxine values were normal range.

## 2019-11-05 NOTE — ASSESSMENT & PLAN NOTE
Chronic condition.  Takes Singulair.  Also requesting refill today of Flonase.  Uses 1 spray per nostril twice a day when needed.  Recently developed worsening sinus pain and pressure with associated color discharge.  Denies fevers.

## 2019-11-05 NOTE — PROGRESS NOTES
Subjective:   Alfonso Posada is a 63 y.o. female here today for chronic bilateral knee pain, right hip pain, morbid obesity, GERD, allergic rhinitis, chronic pansinusitis, atrial fibrillation, anxiety and depression and postsurgical hypothyroidism.    Bilateral chronic knee pain  This is a 63-year-old female who is here today to discuss several chronic medical conditions.  The pain has improved a little bit.  She is now seeing a new specialist it within the same group that is providing the injections into the knees.  She has developed right hip pain she believes secondary to multiple injections into that joint provided by [facility].  She is very concerned that she may have some chronic condition developing.  She will be seeing a specialist soon for the hip pain.    Morbid obesity with BMI of 50.0-59.9, adult ([facility])  She has an appointment on 30 December for a gastric sleeve with Dr. Ganser.  She is very excited about the potential weight loss.    Gastroesophageal reflux disease without esophagitis  Chronic condition.  Stable.  Taking omeprazole.  Has a tea that 1 of her friends offered for her to take.  States that the tea is from [place].  She has pictures of it.  Has no caffeine.  Helped her friend with her reflux symptoms.    Allergic rhinitis  Chronic condition.  Takes Singulair.  Also requesting refill today of Flonase.  Uses 1 spray per nostril twice a day when needed.  Recently developed worsening sinus pain and pressure with associated color discharge.  Denies fevers.    Paroxysmal atrial fibrillation (HCC)  Chronic condition.  Stable.  Requesting refill today of Eliquis.  Takes 5 mg twice a day.  No appointment scheduled with cardiology.    Depression  Chronic history of depression as well as anxiety.  Requesting refill today of Zoloft.  Takes 100 mg daily.    Hypothyroidism, postsurgical  Currently on 112 mcg of levothyroxine daily.  Could not make her appointment today with endocrinology.  Recent  labs showed continued depression of TSH value at less than 0.0005.  Free T3 and free thyroxine values were normal range.       Current medicines (including changes today)  Current Outpatient Medications   Medication Sig Dispense Refill   • ALPRAZolam (XANAX) 0.5 MG Tab TAKE 1 TABLET BY MOUTH TWICE DAILY AS NEEDED FOR ANXIETY REFILL NO SOONER THAN 30 DAYS.     • indomethacin (INDOCIN) 25 MG Cap TAKE 1 CAPSULE BY MOUTH THREE TIMES DAILY AS NEEDED FOR PAIN`.     • atorvastatin (LIPITOR) 40 MG Tab Take 40 mg by mouth.     • losartan (COZAAR) 25 MG Tab      • sertraline (ZOLOFT) 100 MG Tab Take 1 Tab by mouth every day. Add to 100 mg to total 150 mg daily. 90 Tab 2   • furosemide (LASIX) 40 MG Tab TAKE ONE TABLET BY MOUTH DAILY 90 Tab 2   • apixaban (ELIQUIS) 5mg Tab Take 1 Tab by mouth 2 Times a Day. 180 Tab 1   • [START ON 2/13/2020] oxyCODONE-acetaminophen (PERCOCET-10)  MG Tab Take 1 Tab by mouth every 8 hours as needed for Severe Pain for up to 30 days. 90 Tab 0   • fluticasone (FLONASE) 50 MCG/ACT nasal spray Spray 1 Spray in nose every day. Indications: Signs and Symptoms of Nose Diseases 16 g 5   • doxycycline (VIBRAMYCIN) 100 MG Tab Take 1 Tab by mouth 2 times a day. 20 Tab 0   • montelukast (SINGULAIR) 10 MG Tab TAKE ONE TABLET BY MOUTH DAILY 90 Tab 1   • ondansetron (ZOFRAN ODT) 4 MG TABLET DISPERSIBLE DISSOLVE ONE TABLET BY MOUTH EVERY 8 HOURS AS NEEDED FOR NAUSEA AND VOMITING 30 Tab 5   • SYNTHROID 112 MCG Tab Take 1 Tab by mouth Every morning on an empty stomach. 30 Tab 5   • metoprolol (LOPRESSOR) 50 MG Tab Take 1 Tab by mouth 2 times a day. 180 Tab 1   • Potassium (POTASSIMIN PO)      • omeprazole (PRILOSEC) 40 MG delayed-release capsule TAKE ONE CAPSULE BY MOUTH DAILY 90 Cap 2   • potassium chloride (KLOR-CON) 8 MEQ tablet TAKE ONE TABLET BY MOUTH DAILY 90 Tab 2   • triamcinolone acetonide (KENALOG) 0.1 % Cream Apply 1 Application to affected area(s) 2 times a day. 60 g 1   • ascorbic acid  "(ASCORBIC ACID) 500 MG Tab Take 500 mg by mouth every day.     • Misc Natural Products (GLUCOSAMINE CHONDROITIN ADV PO) Take  by mouth every day.     • losartan (COZAAR) 50 MG Tab Take 1 Tab by mouth every day. 90 Tab 3   • cetirizine (ZYRTEC) 10 MG Tab Take 10 mg by mouth every evening.     • acetaminophen (TYLENOL) 500 MG Tab Take 1,000 mg by mouth 3 times a day as needed for Moderate Pain.     • gabapentin (NEURONTIN) 300 MG Cap Take 600 mg by mouth.     • metoprolol (LOPRESSOR) 50 MG Tab TAKE ONE TABLET BY MOUTH TWICE A  Tab 0   • Menthol, Topical Analgesic, (BENGAY EX) by Apply externally route as needed.     • Multiple Vitamins-Minerals (MULTIVITAMIN WOMEN 50+) Tab Take  by mouth every day.     • MAGNESIUM OXIDE PO Take 3 Tabs by mouth every bedtime.     • ketoconazole (NIZORAL) 2 % Cream Apply 1 Application to affected area(s) every day. Apply to face       No current facility-administered medications for this visit.      She  has a past medical history of Anesthesia, Anxiety, Anxiety disorder, Arthritis, Asthma, Atrial fibrillation (HCC), Bartholin cyst, Cancer (HCC), Chronic knee pain, Depression, Graves disease, Heart burn, Hemorrhagic disorder (HCC), Hypertension, Hypothyroidism, postsurgical, Morbid obesity (HCC), Postherpetic neuralgia, Psychiatric disorder, Sinus infection, and SVT (supraventricular tachycardia) ([facility]    Social History and Family History were reviewed and updated.    ROS   No chest pain, no shortness of breath, no abdominal pain and all other systems were reviewed and are negative.       Objective:     /78 (BP Location: Left arm, Patient Position: Sitting, BP Cuff Size: Adult)   Pulse 66   Temp 36.4 °C (97.5 °F) (Temporal)   Resp 16   Ht 1.626 m (5' 4\")   Wt (!) 146 kg (321 lb 14 oz)   SpO2 95%  Body mass index is 55.25 kg/m².   Physical Exam:  Constitutional: Alert, no distress.  Skin: Warm, dry, good turgor, no rashes in visible areas.  Eye: Equal, round and " reactive, conjunctiva clear, lids normal.  ENMT: Lips without lesions, good dentition, oropharynx clear.  Neck: Trachea midline, no masses.   Lymph: No cervical or supraclavicular lymphadenopathy  Respiratory: Unlabored respiratory effort, lungs appear clear, no wheezes.  Cardiovascular: Regular rate and rhythm, no edema noted.  Psych: Alert and oriented x3, normal affect and mood.        Assessment and Plan:   The following treatment plan was discussed    1. Bilateral chronic knee pain  Chronic condition.  Did not provide any narcotics today.  Medication was provided during last month's visits.  Follow with orthopedics.    2. Right hip pain  Chronic condition.  Stable.  Continue to follow with orthopedics.    3. Morbid obesity with BMI of 50.0-59.9, adult (HCC)  Chronic condition.  Follow with surgery at the end of December.  Preop appointment on 13 December with [facility].    4. Depression, unspecified depression type  Chronic condition.  Stable.  Renewed Zoloft as directed.  - sertraline (ZOLOFT) 100 MG Tab; Take 1 Tab by mouth every day. Add to 100 mg to total 150 mg daily.  Dispense: 90 Tab; Refill: 2    5. Anxiety  Chronic condition.  Stable.  Renewed Zoloft 100 mg daily.  - sertraline (ZOLOFT) 100 MG Tab; Take 1 Tab by mouth every day. Add to 100 mg to total 150 mg daily.  Dispense: 90 Tab; Refill: 2    6. Essential hypertension  Chronic condition.  Stable.  Renew Lasix as directed.  - furosemide (LASIX) 40 MG Tab; TAKE ONE TABLET BY MOUTH DAILY  Dispense: 90 Tab; Refill: 2    7. SVT (supraventricular tachycardia) (HCC)  Chronic condition.  Stable.  Renewed Eliquis as directed.  Follow with cardiology.  - apixaban (ELIQUIS) 5mg Tab; Take 1 Tab by mouth 2 Times a Day.  Dispense: 180 Tab; Refill: 1    8. Hypothyroidism, postsurgical  Chronic condition.  Stable.  Continue medication as directed by endocrinology.  Make an appointment to see them soon.    9. Paroxysmal atrial fibrillation (HCC)  Chronic  condition.  Renewed Eliquis as directed.    10. Allergic rhinitis, unspecified seasonality, unspecified trigger  Chronic condition.  Stable.  Renewed Flonase.  - fluticasone (FLONASE) 50 MCG/ACT nasal spray; Spray 1 Spray in nose every day. Indications: Signs and Symptoms of Nose Diseases  Dispense: 16 g; Refill: 5    11. Chronic pansinusitis  Chronic condition with recent exacerbation.  Provided antibiotic as directed.  May hold off on antibiotic until she develops worsening symptoms such as fever or worsening head pain.  - doxycycline (VIBRAMYCIN) 100 MG Tab; Take 1 Tab by mouth 2 times a day.  Dispense: 20 Tab; Refill: 0    12. Gastroesophageal reflux disease without esophagitis  Chronic condition.  Stable.  Will continue omeprazole.  May try over-the-counter tea but likely will actually improve reflux chronically.      Followup: Return in about 4 weeks (around 12/3/2019), or if symptoms worsen or fail to improve.    Please note that this dictation was created using voice recognition software. I have made every reasonable attempt to correct obvious errors, but I expect that there are errors of grammar and possibly content that I did not discover before finalizing the note.

## 2019-11-05 NOTE — ASSESSMENT & PLAN NOTE
Chronic condition.  Stable.  Requesting refill today of Eliquis.  Takes 5 mg twice a day.  No appointment scheduled with cardiology.

## 2019-11-05 NOTE — ASSESSMENT & PLAN NOTE
This is a 63-year-old female who is here today to discuss several chronic medical conditions.  The pain has improved a little bit.  She is now seeing a new specialist it within the same group that is providing the injections into the knees.  She has developed right hip pain she believes secondary to multiple injections into that joint provided by [facility].  She is very concerned that she may have some chronic condition developing.  She will be seeing a specialist soon for the hip pain.

## 2019-11-05 NOTE — ASSESSMENT & PLAN NOTE
She has an appointment on 30 December for a gastric sleeve with Dr. Ganser.  She is very excited about the potential weight loss.

## 2019-11-05 NOTE — ASSESSMENT & PLAN NOTE
Chronic history of depression as well as anxiety.  Requesting refill today of Zoloft.  Takes 100 mg daily.

## 2019-11-12 ENCOUNTER — TELEPHONE (OUTPATIENT)
Dept: ENDOCRINOLOGY | Facility: MEDICAL CENTER | Age: 64
End: 2019-11-12

## 2019-11-12 NOTE — TELEPHONE ENCOUNTER
Patient is having surgery soon and would like a call in regards to her thyroid labs and if she should stay on the same dosage for her thyroid medications.

## 2019-11-13 ENCOUNTER — TELEPHONE (OUTPATIENT)
Dept: ENDOCRINOLOGY | Facility: MEDICAL CENTER | Age: 64
End: 2019-11-13

## 2019-11-13 DIAGNOSIS — E89.0 HYPOTHYROIDISM, POSTSURGICAL: ICD-10-CM

## 2019-11-13 RX ORDER — LEVOTHYROXINE SODIUM 100 MCG
100 TABLET ORAL
Qty: 30 TAB | Refills: 3 | Status: SHIPPED
Start: 2019-11-13 | End: 2019-12-23

## 2019-11-14 NOTE — TELEPHONE ENCOUNTER
Telephone conversation with patient    The patient is scheduled to get to bariatric surgery the end of December.  She is concerned about her thyroid dosing.  She has been on Synthroid 112 mcg for about a month and her TSH is still suppressed but her free T4 and free T3 are in the normal range.  She had one brief episode of tachycardia that came and went in a few seconds.    We are going to lower her dose down to 100 mcg Synthroid and repeat her lab again in 3 or 4 weeks.    Kamran Hyman M.D.

## 2019-11-15 ENCOUNTER — TELEPHONE (OUTPATIENT)
Dept: ENDOCRINOLOGY | Facility: MEDICAL CENTER | Age: 64
End: 2019-11-15

## 2019-11-15 DIAGNOSIS — E89.0 HYPOTHYROIDISM, POSTSURGICAL: ICD-10-CM

## 2019-11-20 ENCOUNTER — OFFICE VISIT (OUTPATIENT)
Dept: CARDIOLOGY | Facility: PHYSICIAN GROUP | Age: 64
End: 2019-11-20
Payer: COMMERCIAL

## 2019-11-20 ENCOUNTER — HOSPITAL ENCOUNTER (OUTPATIENT)
Dept: LAB | Facility: MEDICAL CENTER | Age: 64
End: 2019-11-20
Attending: INTERNAL MEDICINE
Payer: COMMERCIAL

## 2019-11-20 VITALS
DIASTOLIC BLOOD PRESSURE: 80 MMHG | OXYGEN SATURATION: 91 % | SYSTOLIC BLOOD PRESSURE: 120 MMHG | BODY MASS INDEX: 50.02 KG/M2 | WEIGHT: 293 LBS | HEIGHT: 64 IN | HEART RATE: 62 BPM

## 2019-11-20 DIAGNOSIS — E89.0 S/P THYROIDECTOMY: ICD-10-CM

## 2019-11-20 DIAGNOSIS — K21.9 GASTROESOPHAGEAL REFLUX DISEASE WITHOUT ESOPHAGITIS: ICD-10-CM

## 2019-11-20 DIAGNOSIS — I47.10 SVT (SUPRAVENTRICULAR TACHYCARDIA): ICD-10-CM

## 2019-11-20 DIAGNOSIS — E66.01 MORBID OBESITY WITH BMI OF 50.0-59.9, ADULT (HCC): ICD-10-CM

## 2019-11-20 DIAGNOSIS — I10 ESSENTIAL HYPERTENSION: ICD-10-CM

## 2019-11-20 DIAGNOSIS — E89.0 HYPOTHYROIDISM, POSTSURGICAL: ICD-10-CM

## 2019-11-20 DIAGNOSIS — Z79.01 CHRONIC ANTICOAGULATION: ICD-10-CM

## 2019-11-20 DIAGNOSIS — E78.5 HYPERLIPIDEMIA, UNSPECIFIED HYPERLIPIDEMIA TYPE: ICD-10-CM

## 2019-11-20 DIAGNOSIS — I48.0 PAROXYSMAL ATRIAL FIBRILLATION (HCC): ICD-10-CM

## 2019-11-20 PROBLEM — R21 RASH OF FACE: Status: RESOLVED | Noted: 2017-10-26 | Resolved: 2019-11-20

## 2019-11-20 PROBLEM — R21 RASH OF BACK: Status: RESOLVED | Noted: 2019-04-11 | Resolved: 2019-11-20

## 2019-11-20 PROBLEM — J32.4 CHRONIC PANSINUSITIS: Status: RESOLVED | Noted: 2019-11-05 | Resolved: 2019-11-20

## 2019-11-20 PROBLEM — R11.0 NAUSEA: Status: RESOLVED | Noted: 2017-04-03 | Resolved: 2019-11-20

## 2019-11-20 LAB
T3FREE SERPL-MCNC: 3.51 PG/ML (ref 2.4–4.2)
T4 FREE SERPL-MCNC: 1.07 NG/DL (ref 0.53–1.43)
TSH SERPL DL<=0.005 MIU/L-ACNC: 0.01 UIU/ML (ref 0.38–5.33)

## 2019-11-20 PROCEDURE — 36415 COLL VENOUS BLD VENIPUNCTURE: CPT

## 2019-11-20 PROCEDURE — 84443 ASSAY THYROID STIM HORMONE: CPT

## 2019-11-20 PROCEDURE — 99214 OFFICE O/P EST MOD 30 MIN: CPT | Performed by: NURSE PRACTITIONER

## 2019-11-20 PROCEDURE — 84481 FREE ASSAY (FT-3): CPT

## 2019-11-20 PROCEDURE — 84439 ASSAY OF FREE THYROXINE: CPT

## 2019-11-20 RX ORDER — METOPROLOL TARTRATE 50 MG/1
TABLET, FILM COATED ORAL
Qty: 180 TAB | Refills: 1 | Status: ON HOLD
Start: 2019-11-20 | End: 2019-12-30

## 2019-11-20 ASSESSMENT — ENCOUNTER SYMPTOMS
CHILLS: 0
FEVER: 0
DIZZINESS: 0
MYALGIAS: 0
PALPITATIONS: 0
ABDOMINAL PAIN: 0
ORTHOPNEA: 0
BRUISES/BLEEDS EASILY: 0
COUGH: 0
HEADACHES: 0
HEARTBURN: 1
SHORTNESS OF BREATH: 0
INSOMNIA: 0
LOSS OF CONSCIOUSNESS: 0
PND: 0
NAUSEA: 0

## 2019-11-20 NOTE — PROGRESS NOTES
Chief Complaint   Patient presents with   • Follow-Up   • Atrial Fibrillation   • Supraventricular Tachycardia (SVT)   • Anticoagulation   • HTN (Controlled)   • Hyperlipidemia       Subjective:   Alfonso Posada is a 64 y.o. female who presents today for six month follow-up of AFib, SVT, anticoagulation, hypertension and hyperlipidemia.    Alfonso is a 64 year old female with history of paroxysmal atrial fibrillation in the setting of thyroid disease. She had a thyroidectomy at age 30, and then again (due to tissue regrowth) in March 2019; she was having some palpitations and fluttering of her heart prior to surgery in March 2019, and is on Toprol XL and Eliquis for anticoagulation. She also has hypertension, hyperlipidemia, asthma, GERD and anxiety.  She was previously followed by Dr. Casey, and last seen in March 2019.    She is scheduled to have bariatric surgery at the end of December 2019, and she really wants to get this done.  She is still trying to get her thyroid levels regulated. She has not had any symptomatic palpitations or fluttering of heart; no chest pain, pressure or discomfort; no shortness of breath, orthopnea or PND; no dizziness or syncope; no LE edema. She did start smoking again for about 1-2 weeks; she has since stopped.    Past Medical History:   Diagnosis Date   • Anesthesia     States hard to take deep breath afterwards   • Anxiety    • Anxiety disorder    • Arthritis     knees, arms   • Asthma    • Atrial fibrillation (HCC) 12/2018    Echocardiogram with normal LV size, LVEF 55%. Normal RA and RV. Moderately dilated LA. Trace TR. RVSP 25-30mmHg.   • Bartholin cyst    • Chronic knee pain    • Depression    • Graves disease    • Heart burn    • Hemorrhagic disorder (HCC)     On Eliquis   • Hyperlipidemia    • Hypertension    • Hypothyroidism, postsurgical 03/2019   • Morbid obesity (HCC)    • Postherpetic neuralgia    • Psychiatric disorder     Anxiety/depression   • SVT (supraventricular  tachycardia) (HCC)    • Uterine cancer (HCC)     Treated     Past Surgical History:   Procedure Laterality Date   • THYROIDECTOMY  3/27/2019    Procedure: THYROIDECTOMY - COMPLETION;  Surgeon: Vincent Patel M.D.;  Location: SURGERY SAME DAY [facility];  Service: General   • THYROIDECTOMY TOTAL     • HYSTERECTOMY LAPAROSCOPY     • THYROIDECTOMY      still has parathyroid     History reviewed. No pertinent family history.  Social History     Socioeconomic History   • Marital status:      Spouse name: Not on file   • Number of children: Not on file   • Years of education: Not on file   • Highest education level: Not on file   Occupational History   • Not on file   Social Needs   • Financial resource strain: Not on file   • Food insecurity:     Worry: Not on file     Inability: Not on file   • Transportation needs:     Medical: Not on file     Non-medical: Not on file   Tobacco Use   • Smoking status: Former Smoker     Packs/day: 0.50     Start date: 3/4/2016     Last attempt to quit: 2018     Years since quittin.1   • Smokeless tobacco: Never Used   • Tobacco comment: 10 cigs   Substance and Sexual Activity   • Alcohol use: Yes     Alcohol/week: 1.8 - 3.0 oz     Types: 3 - 5 Glasses of wine per week   • Drug use: No   • Sexual activity: Not Currently     Partners: Male     Comment:  (Bill)   Lifestyle   • Physical activity:     Days per week: Not on file     Minutes per session: Not on file   • Stress: Not on file   Relationships   • Social connections:     Talks on phone: Not on file     Gets together: Not on file     Attends [employer] service: Not on file     Active member of club or organization: Not on file     Attends meetings of clubs or organizations: Not on file     Relationship status: Not on file   • Intimate partner violence:     Fear of current or ex partner: Not on file     Emotionally abused: Not on file     Physically abused: Not on file     Forced sexual activity: Not on  file   Other Topics Concern   • Not on file   Social History Narrative    ** Merged History Encounter **          Allergies   Allergen Reactions   • Latex Itching   • Penicillins Hives, Rash and Itching     Itching & Rash     • Sulfa Drugs Itching     Itching feeling on leg, prickily/need-like sensation on skin.     Outpatient Encounter Medications as of 11/20/2019   Medication Sig Dispense Refill   • metoprolol (LOPRESSOR) 50 MG Tab TAKE ONE TABLET BY MOUTH TWICE A  Tab 1   • apixaban (ELIQUIS) 5mg Tab Take 1 Tab by mouth 2 Times a Day. 180 Tab 1   • SYNTHROID 100 MCG Tab Take 1 Tab by mouth Every morning on an empty stomach. 30 Tab 3   • ALPRAZolam (XANAX) 0.5 MG Tab TAKE 1 TABLET BY MOUTH TWICE DAILY AS NEEDED FOR ANXIETY REFILL NO SOONER THAN 30 DAYS.     • atorvastatin (LIPITOR) 40 MG Tab Take 40 mg by mouth.     • sertraline (ZOLOFT) 100 MG Tab Take 1 Tab by mouth every day. Add to 100 mg to total 150 mg daily. 90 Tab 2   • furosemide (LASIX) 40 MG Tab TAKE ONE TABLET BY MOUTH DAILY 90 Tab 2   • [START ON 2/13/2020] oxyCODONE-acetaminophen (PERCOCET-10)  MG Tab Take 1 Tab by mouth every 8 hours as needed for Severe Pain for up to 30 days. 90 Tab 0   • fluticasone (FLONASE) 50 MCG/ACT nasal spray Spray 1 Spray in nose every day. Indications: Signs and Symptoms of Nose Diseases 16 g 5   • doxycycline (VIBRAMYCIN) 100 MG Tab Take 1 Tab by mouth 2 times a day. 20 Tab 0   • montelukast (SINGULAIR) 10 MG Tab TAKE ONE TABLET BY MOUTH DAILY 90 Tab 1   • ondansetron (ZOFRAN ODT) 4 MG TABLET DISPERSIBLE DISSOLVE ONE TABLET BY MOUTH EVERY 8 HOURS AS NEEDED FOR NAUSEA AND VOMITING 30 Tab 5   • omeprazole (PRILOSEC) 40 MG delayed-release capsule TAKE ONE CAPSULE BY MOUTH DAILY 90 Cap 2   • potassium chloride (KLOR-CON) 8 MEQ tablet TAKE ONE TABLET BY MOUTH DAILY 90 Tab 2   • triamcinolone acetonide (KENALOG) 0.1 % Cream Apply 1 Application to affected area(s) 2 times a day. 60 g 1   • Menthol, Topical  Analgesic, (BENGAY EX) by Apply externally route as needed.     • Multiple Vitamins-Minerals (MULTIVITAMIN WOMEN 50+) Tab Take  by mouth every day.     • ascorbic acid (ASCORBIC ACID) 500 MG Tab Take 500 mg by mouth every day.     • Misc Natural Products (GLUCOSAMINE CHONDROITIN ADV PO) Take  by mouth every day.     • losartan (COZAAR) 50 MG Tab Take 1 Tab by mouth every day. 90 Tab 3   • MAGNESIUM OXIDE PO Take 3 Tabs by mouth every bedtime.     • ketoconazole (NIZORAL) 2 % Cream Apply 1 Application to affected area(s) every day. Apply to face     • cetirizine (ZYRTEC) 10 MG Tab Take 10 mg by mouth every evening.     • acetaminophen (TYLENOL) 500 MG Tab Take 1,000 mg by mouth 3 times a day as needed for Moderate Pain.     • indomethacin (INDOCIN) 25 MG Cap TAKE 1 CAPSULE BY MOUTH THREE TIMES DAILY AS NEEDED FOR PAIN`.     • [DISCONTINUED] gabapentin (NEURONTIN) 300 MG Cap Take 600 mg by mouth.     • [DISCONTINUED] losartan (COZAAR) 25 MG Tab      • [DISCONTINUED] apixaban (ELIQUIS) 5mg Tab Take 1 Tab by mouth 2 Times a Day. 180 Tab 1   • [DISCONTINUED] metoprolol (LOPRESSOR) 50 MG Tab TAKE ONE TABLET BY MOUTH TWICE A  Tab 0   • [DISCONTINUED] metoprolol (LOPRESSOR) 50 MG Tab Take 1 Tab by mouth 2 times a day. 180 Tab 1   • [DISCONTINUED] Potassium (POTASSIMIN PO)        No facility-administered encounter medications on file as of 11/20/2019.      Review of Systems   Constitutional: Positive for malaise/fatigue. Negative for chills and fever.   HENT: Negative for congestion.    Respiratory: Negative for cough and shortness of breath.    Cardiovascular: Negative for chest pain, palpitations, orthopnea, leg swelling and PND.   Gastrointestinal: Positive for heartburn. Negative for abdominal pain and nausea.   Musculoskeletal: Negative for myalgias.   Skin: Negative for rash.   Neurological: Negative for dizziness, loss of consciousness and headaches.   Endo/Heme/Allergies: Does not bruise/bleed easily.  "  Psychiatric/Behavioral: The patient does not have insomnia.         Objective:   /80 (BP Location: Left arm, Patient Position: Sitting)   Pulse 62   Ht 1.626 m (5' 4\")   Wt (!) 145.6 kg (321 lb)   LMP 03/20/2016   SpO2 91%   BMI 55.10 kg/m²     Physical Exam   Constitutional: She is oriented to person, place, and time. She appears well-developed and well-nourished.   BMI 55.10   HENT:   Head: Normocephalic.   Eyes: EOM are normal.   Neck: Normal range of motion. Neck supple. No JVD present.   Cardiovascular: Normal rate, regular rhythm and normal heart sounds.   Pulmonary/Chest: Effort normal and breath sounds normal. No respiratory distress. She has no wheezes. She has no rales.   Abdominal: Soft. Bowel sounds are normal. She exhibits no distension. There is no tenderness.   Musculoskeletal: Normal range of motion.         General: No edema.   Neurological: She is alert and oriented to person, place, and time.   Skin: Skin is warm and dry. No rash noted.   Psychiatric: She has a normal mood and affect.     CONCLUSIONS OF ECHOCARDIOGRAM OF 12/1/2018:  TDS - Supine, Body Habitus.  No prior study is available for comparison.   Normal left ventricular chamber size.  Left ventricular systolic function is low normal.  Left ventricular ejection fraction is visually estimated to be 55%.  Moderately dilated left atrium.  Trace tricuspid regurgitation.  Estimated right ventricular systolic pressure  is 25-30 mmHg.    Component      Latest Ref Rng & Units 7/15/2019 7/15/2019 8/22/2019 8/22/2019          10:52 AM 10:52 AM  2:40 PM  2:40 PM   Free T-4      0.53 - 1.43 ng/dL  1.64 (H)  1.75 (H)   TSH      0.380 - 5.330 uIU/mL <0.005 (L)  0.010 (L)    T3,Free      2.40 - 4.20 pg/mL         Component      Latest Ref Rng & Units 9/25/2019 9/25/2019 10/29/2019 10/29/2019           2:26 PM  2:26 PM  2:20 PM  2:20 PM   Free T-4      0.53 - 1.43 ng/dL  1.33     TSH      0.380 - 5.330 uIU/mL <0.005 (L)  <0.005 (L)  "   T3,Free      2.40 - 4.20 pg/mL    3.68     Component      Latest Ref Rng & Units 10/29/2019           2:20 PM   Free T-4      0.53 - 1.43 ng/dL 1.35   TSH      0.380 - 5.330 uIU/mL    T3,Free      2.40 - 4.20 pg/mL        Assessment:     1. Paroxysmal atrial fibrillation (HCC)  metoprolol (LOPRESSOR) 50 MG Tab   2. Chronic anticoagulation  apixaban (ELIQUIS) 5mg Tab   3. SVT (supraventricular tachycardia) (HCC)  metoprolol (LOPRESSOR) 50 MG Tab   4. Essential hypertension     5. Hyperlipidemia, unspecified hyperlipidemia type     6. Hypothyroidism, postsurgical     7. S/P thyroidectomy     8. Morbid obesity with BMI of 50.0-59.9, adult (HCC)     9. Gastroesophageal reflux disease without esophagitis         Medical Decision Making:  Today's Assessment / Status / Plan:     1. Paroxysmal atrial fibrillation, and SVT, in the setting of thyroid disease, currently in sinus rhythm on Metoprolol. Urged to continue take this until thyroid levels are more stable, and after she has her bariatric surgery. She can proceed with surgery as a MODERATE risk. She will need to stop her Eliquis 2 days prior to surgery, and then resume at the discretion of the surgeon.    2. Chronic anticoagulation with Eliquis.    3. Hypertension, treated and stable.    4. Hyperlipidemia, not currently on any therapy.    5. Postsurgical hypothyroidism, followed by endocrinology.    6. Obesity, with bariatric surgery pending on 12/30/2018.    7. GERD, treated.    Plan as above: she can proceed with surgery. Continue same medications, including Toprol and Eliquis. After surgery and weight loss, we can monitor for any AFib/SVT and determine whether we can titrate medications down. For now, same medications and doses. FU in early February 2020.    Collaborating MD: Juan

## 2019-11-29 ENCOUNTER — TELEPHONE (OUTPATIENT)
Dept: ENDOCRINOLOGY | Facility: MEDICAL CENTER | Age: 64
End: 2019-11-29

## 2019-11-29 NOTE — TELEPHONE ENCOUNTER
Telephone conversation with patient    She had a completion thyroidectomy done in March of this year after being thyrotoxic.    Thyroid results from November 20 reviewed.  She had only been on Synthroid down to 100 mcg about 2 weeks.  Free T4 is come down to mid normal at 1.0 and free T3 about mid range at 3.5.  The TSH is still suppressed but improved at 0.01.  It will take longer for the TSH to come up as the T4 and T3 decline.    She is scheduled for bariatric surgery December 30.  I think we can have her thyroid adequately adjusted by then.  Symptomatically she is euthyroid right now.  She is a large person so I do not think a dose of 100 mcg is going to be excessive.    I will further discuss with Dr. Ganser.    Kamran Hyman M.D.    Copies to Dr. John Ganser

## 2019-12-02 ENCOUNTER — TELEPHONE (OUTPATIENT)
Dept: CARDIOLOGY | Facility: MEDICAL CENTER | Age: 64
End: 2019-12-02

## 2019-12-02 ENCOUNTER — TELEPHONE (OUTPATIENT)
Dept: ENDOCRINOLOGY | Facility: MEDICAL CENTER | Age: 64
End: 2019-12-02

## 2019-12-02 DIAGNOSIS — I10 ESSENTIAL HYPERTENSION: ICD-10-CM

## 2019-12-02 RX ORDER — IRBESARTAN 150 MG/1
150 TABLET ORAL DAILY
Qty: 90 TAB | Refills: 1 | Status: SHIPPED
Start: 2019-12-02 | End: 2020-02-12

## 2019-12-02 RX ORDER — LOSARTAN POTASSIUM 50 MG/1
50 TABLET ORAL DAILY
Qty: 90 TAB | Refills: 3 | Status: ON HOLD
Start: 2019-12-02 | End: 2019-12-30

## 2019-12-02 NOTE — TELEPHONE ENCOUNTER
----- Message from SOUTH Peres sent at 12/2/2019 11:05 AM PST -----  Regarding: RE: new RX  Can change to Avapro (Irbesartan) 150mg once daily.    Thanks, AB  ----- Message -----  From: Keren Smith L.P.N.  Sent: 12/2/2019  10:24 AM PST  To: SOUTH Peres  Subject: RE: new RX                                       Losartan is unavailable due to recall...  ----- Message -----  From: SOUTH Peres  Sent: 12/2/2019   9:21 AM PST  To: Keren Smith L.P.N.  Subject: FW: new RX                                       I sent this again to [facility] in [place], to see if they can fill it. (Hard to believe they don't have it).    IF not, let me know.    Thanks, AB  ----- Message -----  From: Jessica Ramirez R.N.  Sent: 11/27/2019   5:34 PM PST  To: SOUTH Peres  Subject: FW: new RX                                       Please send to your nurse on Monday- Thank you!  ----- Message -----  From: Paige Killian, Med Ass't  Sent: 11/27/2019   3:06 PM PST  To: Jessica Ramirez R.N.  Subject: new RX                                           [id number] - [place], [place] - [address]  The pharmacy is not able to fill:  Losartan    Medication is not available and patient is requesting a replacement therapy please   Thank you

## 2019-12-07 DIAGNOSIS — E89.0 HYPOTHYROIDISM, POSTSURGICAL: ICD-10-CM

## 2019-12-09 NOTE — TELEPHONE ENCOUNTER
"I spoke with patient to let her know. She is going to get labs done this week for her pre-op. She wanted to make sure you had spoken with Dr. Charles. She is requesting a \"phone appointment\" with you.  "

## 2019-12-11 ENCOUNTER — HOSPITAL ENCOUNTER (OUTPATIENT)
Dept: LAB | Facility: MEDICAL CENTER | Age: 64
End: 2019-12-11
Attending: INTERNAL MEDICINE
Payer: COMMERCIAL

## 2019-12-11 DIAGNOSIS — E89.0 HYPOTHYROIDISM, POSTSURGICAL: ICD-10-CM

## 2019-12-11 LAB
T3FREE SERPL-MCNC: 3.5 PG/ML (ref 2.4–4.2)
T4 FREE SERPL-MCNC: 1.19 NG/DL (ref 0.53–1.43)
TSH SERPL DL<=0.005 MIU/L-ACNC: 0.01 UIU/ML (ref 0.38–5.33)

## 2019-12-11 PROCEDURE — 84443 ASSAY THYROID STIM HORMONE: CPT

## 2019-12-11 PROCEDURE — 84439 ASSAY OF FREE THYROXINE: CPT

## 2019-12-11 PROCEDURE — 36415 COLL VENOUS BLD VENIPUNCTURE: CPT

## 2019-12-11 PROCEDURE — 84481 FREE ASSAY (FT-3): CPT

## 2019-12-14 ENCOUNTER — TELEPHONE (OUTPATIENT)
Dept: ENDOCRINOLOGY | Facility: MEDICAL CENTER | Age: 64
End: 2019-12-14

## 2019-12-14 DIAGNOSIS — E89.0 HYPOTHYROIDISM, POSTSURGICAL: ICD-10-CM

## 2019-12-14 NOTE — TELEPHONE ENCOUNTER
Telephone conversation with patient.    TSH remains suppressed even though the T4 and T3 levels are normal.  Now she is going to skip 1 full tablet per week.  I do not want her levels to come down to low because she is going to surgery and also she is trying to lose weight and this is slowing her metabolism.  I have tried to contact Dr. Ganser and I will see if I can get him next week to go over this with him.  We will do another thyroid blood level in about 2 weeks.    Kamran Hyman M.D.

## 2019-12-18 ENCOUNTER — TELEPHONE (OUTPATIENT)
Dept: ENDOCRINOLOGY | Facility: MEDICAL CENTER | Age: 64
End: 2019-12-18

## 2019-12-19 ENCOUNTER — HOSPITAL ENCOUNTER (OUTPATIENT)
Dept: LAB | Facility: MEDICAL CENTER | Age: 64
End: 2019-12-19
Attending: INTERNAL MEDICINE
Payer: COMMERCIAL

## 2019-12-19 DIAGNOSIS — E89.0 HYPOTHYROIDISM, POSTSURGICAL: ICD-10-CM

## 2019-12-19 LAB
T3FREE SERPL-MCNC: 3.86 PG/ML (ref 2.4–4.2)
T4 FREE SERPL-MCNC: 1.23 NG/DL (ref 0.53–1.43)
TSH SERPL DL<=0.005 MIU/L-ACNC: 0.01 UIU/ML (ref 0.38–5.33)

## 2019-12-19 PROCEDURE — 36415 COLL VENOUS BLD VENIPUNCTURE: CPT

## 2019-12-19 PROCEDURE — 84481 FREE ASSAY (FT-3): CPT

## 2019-12-19 PROCEDURE — 84439 ASSAY OF FREE THYROXINE: CPT

## 2019-12-19 PROCEDURE — 84443 ASSAY THYROID STIM HORMONE: CPT

## 2019-12-19 NOTE — TELEPHONE ENCOUNTER
Telephone conversation with patient    She has been concerned about irregular heart rhythm and rate.  She knows she has had SVT in the past and atrial fibs.  Occasionally she has a relatively slow pulse probably because she takes metoprolol.    I spoke with Dr. Ganser and he is not particularly concerned about her thyroid status being off a little bit for surgery.  His main concern is that she lose a few pounds just prior to surgery to try to shrink a fatty liver.  She is working on that with a very strict diet.    For my part I want to avoid under treating her with thyroid.  We will be walking a tight line between giving her adequate thyroid and avoiding cardiac arrhythmias.    She will do another blood test in a day or 2.    Kamran Hyman M.D.     copy Dr. John Ganser

## 2019-12-23 RX ORDER — LEVOTHYROXINE SODIUM 0.12 MG/1
125 TABLET ORAL
Status: ON HOLD | COMMUNITY
End: 2019-12-30

## 2019-12-23 NOTE — OR NURSING
Pre admit Tele appointment: patient instructed to call Dr Ganser's office for Eliquis instructions, pt verbalized understanding.

## 2019-12-24 ENCOUNTER — TELEPHONE (OUTPATIENT)
Dept: ENDOCRINOLOGY | Facility: MEDICAL CENTER | Age: 64
End: 2019-12-24

## 2019-12-25 NOTE — TELEPHONE ENCOUNTER
Telephone conversation with patient    The patient is on a very strict diet of water and shakes only.  Over the past week or so she has lost about 7 pounds.  She needs to lose a total of 10 pounds to be able to go to surgery.  The concern here is a large fatty liver that is in the way.  Right now she has no symptoms that suggest her liver is enlarged.    As a result of this requirement to go to surgery she is very concerned about her thyroid level since she equates thyroid level with ability to lose weight    Her TSH has been suppressed for a few months related to her prior thyroid replacement dose.  We have adjusted her dose down such that her free T4 and free T3 have been normal since September but her TSH has not yet recovered its responsiveness.  Sometimes after being suppressed it does not not come up right away as the T4 and T3 levels go down.    As of December 19 her TSH is measurable but still low at 0.01 and her free T3 is upper normal at 3.8 (2.4-4.2) and free T4 also normal at 1.2 (0.5-1.4).  Over the past 4 days she has taken an extra 25 mcg of levothyroxine on top of her 100 mcg to help her lose the weight.  This is not an excessive dose for someone her size.    However, she does have a background history of cardiac arrhythmia both atrial fib and other which she has not been feeling currently.  I explained to her that we do not want her going into an anesthetic with excessive thyroid.  She will do her last weigh in on Friday, December 27 prior to her surgery December 30.  After that on Saturday, December 28 she will take half dose and on Sunday she will take no thyroid and none Monday morning prior to her surgery.    I will be available in the office December 30 if her anesthesiologist needs to talk to me.  Office phone number [phone].  Cell phone number [phone].    Kamran Hyman M.D.

## 2019-12-27 ENCOUNTER — HOSPITAL ENCOUNTER (OUTPATIENT)
Dept: RADIOLOGY | Facility: MEDICAL CENTER | Age: 64
DRG: 621 | End: 2019-12-27
Attending: SURGERY | Admitting: SURGERY
Payer: COMMERCIAL

## 2019-12-27 DIAGNOSIS — Z01.811 PRE-OPERATIVE RESPIRATORY EXAMINATION: ICD-10-CM

## 2019-12-27 DIAGNOSIS — Z01.810 PRE-OPERATIVE CARDIOVASCULAR EXAMINATION: ICD-10-CM

## 2019-12-27 DIAGNOSIS — Z01.812 PRE-OPERATIVE LABORATORY EXAMINATION: ICD-10-CM

## 2019-12-27 LAB
25(OH)D3 SERPL-MCNC: 17 NG/ML (ref 30–100)
ALBUMIN SERPL BCP-MCNC: 4.3 G/DL (ref 3.2–4.9)
ALBUMIN/GLOB SERPL: 1.3 G/DL
ALP SERPL-CCNC: 88 U/L (ref 30–99)
ALT SERPL-CCNC: 24 U/L (ref 2–50)
ANION GAP SERPL CALC-SCNC: 10 MMOL/L (ref 0–11.9)
AST SERPL-CCNC: 24 U/L (ref 12–45)
BASOPHILS # BLD AUTO: 0.6 % (ref 0–1.8)
BASOPHILS # BLD: 0.04 K/UL (ref 0–0.12)
BILIRUB SERPL-MCNC: 0.4 MG/DL (ref 0.1–1.5)
BUN SERPL-MCNC: 15 MG/DL (ref 8–22)
CALCIUM SERPL-MCNC: 9.4 MG/DL (ref 8.5–10.5)
CHLORIDE SERPL-SCNC: 104 MMOL/L (ref 96–112)
CO2 SERPL-SCNC: 24 MMOL/L (ref 20–33)
CREAT SERPL-MCNC: 0.53 MG/DL (ref 0.5–1.4)
EKG IMPRESSION: NORMAL
EOSINOPHIL # BLD AUTO: 0.1 K/UL (ref 0–0.51)
EOSINOPHIL NFR BLD: 1.5 % (ref 0–6.9)
ERYTHROCYTE [DISTWIDTH] IN BLOOD BY AUTOMATED COUNT: 49.3 FL (ref 35.9–50)
FERRITIN SERPL-MCNC: 40.4 NG/ML (ref 10–291)
FOLATE SERPL-MCNC: >24 NG/ML
GLOBULIN SER CALC-MCNC: 3.3 G/DL (ref 1.9–3.5)
GLUCOSE SERPL-MCNC: 93 MG/DL (ref 65–99)
HCT VFR BLD AUTO: 48.1 % (ref 37–47)
HGB BLD-MCNC: 15.5 G/DL (ref 12–16)
IMM GRANULOCYTES # BLD AUTO: 0.01 K/UL (ref 0–0.11)
IMM GRANULOCYTES NFR BLD AUTO: 0.1 % (ref 0–0.9)
IRON SATN MFR SERPL: 19 % (ref 15–55)
IRON SERPL-MCNC: 77 UG/DL (ref 40–170)
LYMPHOCYTES # BLD AUTO: 2.41 K/UL (ref 1–4.8)
LYMPHOCYTES NFR BLD: 35 % (ref 22–41)
MCH RBC QN AUTO: 30 PG (ref 27–33)
MCHC RBC AUTO-ENTMCNC: 32.2 G/DL (ref 33.6–35)
MCV RBC AUTO: 93.2 FL (ref 81.4–97.8)
MONOCYTES # BLD AUTO: 0.69 K/UL (ref 0–0.85)
MONOCYTES NFR BLD AUTO: 10 % (ref 0–13.4)
NEUTROPHILS # BLD AUTO: 3.63 K/UL (ref 2–7.15)
NEUTROPHILS NFR BLD: 52.8 % (ref 44–72)
NRBC # BLD AUTO: 0 K/UL
NRBC BLD-RTO: 0 /100 WBC
PLATELET # BLD AUTO: 263 K/UL (ref 164–446)
PMV BLD AUTO: 10.6 FL (ref 9–12.9)
POTASSIUM SERPL-SCNC: 4 MMOL/L (ref 3.6–5.5)
PREALB SERPL-MCNC: 18 MG/DL (ref 18–38)
PROT SERPL-MCNC: 7.6 G/DL (ref 6–8.2)
PTH-INTACT SERPL-MCNC: 80.1 PG/ML (ref 14–72)
RBC # BLD AUTO: 5.16 M/UL (ref 4.2–5.4)
SODIUM SERPL-SCNC: 138 MMOL/L (ref 135–145)
TIBC SERPL-MCNC: 409 UG/DL (ref 250–450)
VIT B12 SERPL-MCNC: 641 PG/ML (ref 211–911)
WBC # BLD AUTO: 6.9 K/UL (ref 4.8–10.8)

## 2019-12-27 PROCEDURE — 82728 ASSAY OF FERRITIN: CPT

## 2019-12-27 PROCEDURE — 93005 ELECTROCARDIOGRAM TRACING: CPT

## 2019-12-27 PROCEDURE — 83540 ASSAY OF IRON: CPT

## 2019-12-27 PROCEDURE — 83550 IRON BINDING TEST: CPT

## 2019-12-27 PROCEDURE — 80053 COMPREHEN METABOLIC PANEL: CPT

## 2019-12-27 PROCEDURE — 84425 ASSAY OF VITAMIN B-1: CPT

## 2019-12-27 PROCEDURE — 82306 VITAMIN D 25 HYDROXY: CPT

## 2019-12-27 PROCEDURE — 71046 X-RAY EXAM CHEST 2 VIEWS: CPT

## 2019-12-27 PROCEDURE — 85025 COMPLETE CBC W/AUTO DIFF WBC: CPT

## 2019-12-27 PROCEDURE — 36415 COLL VENOUS BLD VENIPUNCTURE: CPT

## 2019-12-27 PROCEDURE — 83970 ASSAY OF PARATHORMONE: CPT

## 2019-12-27 PROCEDURE — 82607 VITAMIN B-12: CPT

## 2019-12-27 PROCEDURE — 93010 ELECTROCARDIOGRAM REPORT: CPT | Performed by: INTERNAL MEDICINE

## 2019-12-27 PROCEDURE — 82746 ASSAY OF FOLIC ACID SERUM: CPT

## 2019-12-27 PROCEDURE — 84134 ASSAY OF PREALBUMIN: CPT

## 2019-12-27 RX ORDER — ACETAMINOPHEN 10 MG/ML
1 INJECTION, SOLUTION INTRAVENOUS
Status: COMPLETED | OUTPATIENT
Start: 2019-12-30 | End: 2019-12-30

## 2019-12-28 DIAGNOSIS — K21.9 GASTROESOPHAGEAL REFLUX DISEASE WITHOUT ESOPHAGITIS: ICD-10-CM

## 2019-12-29 LAB — VIT B1 BLD-MCNC: 138 NMOL/L (ref 70–180)

## 2019-12-29 RX ORDER — OMEPRAZOLE 40 MG/1
CAPSULE, DELAYED RELEASE ORAL
Qty: 90 CAP | Refills: 1 | Status: ON HOLD
Start: 2019-12-29 | End: 2019-12-30

## 2019-12-30 ENCOUNTER — ANESTHESIA EVENT (OUTPATIENT)
Dept: SURGERY | Facility: MEDICAL CENTER | Age: 64
DRG: 621 | End: 2019-12-30
Payer: COMMERCIAL

## 2019-12-30 ENCOUNTER — HOSPITAL ENCOUNTER (INPATIENT)
Facility: MEDICAL CENTER | Age: 64
LOS: 1 days | DRG: 621 | End: 2019-12-31
Attending: SURGERY | Admitting: SURGERY
Payer: COMMERCIAL

## 2019-12-30 ENCOUNTER — ANESTHESIA (OUTPATIENT)
Dept: SURGERY | Facility: MEDICAL CENTER | Age: 64
DRG: 621 | End: 2019-12-30
Payer: COMMERCIAL

## 2019-12-30 DIAGNOSIS — Z01.811 PRE-OPERATIVE RESPIRATORY EXAMINATION: ICD-10-CM

## 2019-12-30 DIAGNOSIS — Z01.812 PRE-OPERATIVE LABORATORY EXAMINATION: ICD-10-CM

## 2019-12-30 DIAGNOSIS — G89.18 POSTOPERATIVE PAIN: ICD-10-CM

## 2019-12-30 DIAGNOSIS — Z01.810 PRE-OPERATIVE CARDIOVASCULAR EXAMINATION: ICD-10-CM

## 2019-12-30 LAB — PATHOLOGY CONSULT NOTE: NORMAL

## 2019-12-30 PROCEDURE — A9270 NON-COVERED ITEM OR SERVICE: HCPCS | Performed by: PHYSICIAN ASSISTANT

## 2019-12-30 PROCEDURE — 700111 HCHG RX REV CODE 636 W/ 250 OVERRIDE (IP): Performed by: ANESTHESIOLOGY

## 2019-12-30 PROCEDURE — 160035 HCHG PACU - 1ST 60 MINS PHASE I: Performed by: SURGERY

## 2019-12-30 PROCEDURE — 160036 HCHG PACU - EA ADDL 30 MINS PHASE I: Performed by: SURGERY

## 2019-12-30 PROCEDURE — 700105 HCHG RX REV CODE 258: Performed by: SURGERY

## 2019-12-30 PROCEDURE — 501838 HCHG SUTURE GENERAL: Performed by: SURGERY

## 2019-12-30 PROCEDURE — 160009 HCHG ANES TIME/MIN: Performed by: SURGERY

## 2019-12-30 PROCEDURE — 700101 HCHG RX REV CODE 250: Performed by: ANESTHESIOLOGY

## 2019-12-30 PROCEDURE — 501570 HCHG TROCAR, SEPARATOR: Performed by: SURGERY

## 2019-12-30 PROCEDURE — 700102 HCHG RX REV CODE 250 W/ 637 OVERRIDE(OP): Performed by: SURGERY

## 2019-12-30 PROCEDURE — 500800 HCHG LAPAROSCOPIC J/L HOOK: Performed by: SURGERY

## 2019-12-30 PROCEDURE — 700102 HCHG RX REV CODE 250 W/ 637 OVERRIDE(OP): Performed by: ANESTHESIOLOGY

## 2019-12-30 PROCEDURE — 160048 HCHG OR STATISTICAL LEVEL 1-5: Performed by: SURGERY

## 2019-12-30 PROCEDURE — 160041 HCHG SURGERY MINUTES - EA ADDL 1 MIN LEVEL 4: Performed by: SURGERY

## 2019-12-30 PROCEDURE — 0DB64Z3 EXCISION OF STOMACH, PERCUTANEOUS ENDOSCOPIC APPROACH, VERTICAL: ICD-10-PCS | Performed by: SURGERY

## 2019-12-30 PROCEDURE — 502570 HCHG PACK, GASTRIC BANDING: Performed by: SURGERY

## 2019-12-30 PROCEDURE — 501664 HCHG TUBING, FILTER STRYKER: Performed by: SURGERY

## 2019-12-30 PROCEDURE — 700101 HCHG RX REV CODE 250: Performed by: SURGERY

## 2019-12-30 PROCEDURE — 94760 N-INVAS EAR/PLS OXIMETRY 1: CPT

## 2019-12-30 PROCEDURE — 160029 HCHG SURGERY MINUTES - 1ST 30 MINS LEVEL 4: Performed by: SURGERY

## 2019-12-30 PROCEDURE — A6402 STERILE GAUZE <= 16 SQ IN: HCPCS | Performed by: SURGERY

## 2019-12-30 PROCEDURE — 700111 HCHG RX REV CODE 636 W/ 250 OVERRIDE (IP): Performed by: SURGERY

## 2019-12-30 PROCEDURE — 770006 HCHG ROOM/CARE - MED/SURG/GYN SEMI*

## 2019-12-30 PROCEDURE — 700111 HCHG RX REV CODE 636 W/ 250 OVERRIDE (IP): Performed by: PHYSICIAN ASSISTANT

## 2019-12-30 PROCEDURE — 501583 HCHG TROCAR, THRD CAN&SEAL 5X100: Performed by: SURGERY

## 2019-12-30 PROCEDURE — A9270 NON-COVERED ITEM OR SERVICE: HCPCS | Performed by: ANESTHESIOLOGY

## 2019-12-30 PROCEDURE — 500522 HCHG ENDOSTITCH SUTURING DEVICE: Performed by: SURGERY

## 2019-12-30 PROCEDURE — 700105 HCHG RX REV CODE 258: Performed by: PHYSICIAN ASSISTANT

## 2019-12-30 PROCEDURE — 501571 HCHG TROCAR, SEPARATOR 12X100: Performed by: SURGERY

## 2019-12-30 PROCEDURE — 160002 HCHG RECOVERY MINUTES (STAT): Performed by: SURGERY

## 2019-12-30 PROCEDURE — 88305 TISSUE EXAM BY PATHOLOGIST: CPT

## 2019-12-30 PROCEDURE — 500868 HCHG NEEDLE, SURGI(VARES): Performed by: SURGERY

## 2019-12-30 PROCEDURE — A9270 NON-COVERED ITEM OR SERVICE: HCPCS | Performed by: SURGERY

## 2019-12-30 PROCEDURE — 700102 HCHG RX REV CODE 250 W/ 637 OVERRIDE(OP): Performed by: PHYSICIAN ASSISTANT

## 2019-12-30 RX ORDER — TRIAMCINOLONE ACETONIDE 1 MG/G
1 CREAM TOPICAL PRN
COMMUNITY
End: 2020-08-04

## 2019-12-30 RX ORDER — ENALAPRILAT 1.25 MG/ML
2.5 INJECTION INTRAVENOUS EVERY 6 HOURS PRN
Status: DISCONTINUED | OUTPATIENT
Start: 2019-12-30 | End: 2019-12-31 | Stop reason: HOSPADM

## 2019-12-30 RX ORDER — IRBESARTAN 150 MG/1
150 TABLET ORAL DAILY
Status: DISCONTINUED | OUTPATIENT
Start: 2019-12-31 | End: 2019-12-31 | Stop reason: HOSPADM

## 2019-12-30 RX ORDER — HALOPERIDOL 5 MG/ML
1 INJECTION INTRAMUSCULAR
Status: COMPLETED | OUTPATIENT
Start: 2019-12-30 | End: 2019-12-30

## 2019-12-30 RX ORDER — ONDANSETRON 2 MG/ML
4 INJECTION INTRAMUSCULAR; INTRAVENOUS EVERY 4 HOURS PRN
Status: DISCONTINUED | OUTPATIENT
Start: 2019-12-30 | End: 2019-12-31 | Stop reason: HOSPADM

## 2019-12-30 RX ORDER — OXYCODONE HYDROCHLORIDE 5 MG/1
5-10 TABLET ORAL EVERY 6 HOURS PRN
Qty: 20 TAB | Refills: 0 | Status: SHIPPED | OUTPATIENT
Start: 2019-12-30 | End: 2020-01-03

## 2019-12-30 RX ORDER — BUPIVACAINE HYDROCHLORIDE AND EPINEPHRINE 5; 5 MG/ML; UG/ML
INJECTION, SOLUTION EPIDURAL; INTRACAUDAL; PERINEURAL
Status: DISCONTINUED | OUTPATIENT
Start: 2019-12-30 | End: 2019-12-30 | Stop reason: HOSPADM

## 2019-12-30 RX ORDER — OXYCODONE HYDROCHLORIDE 5 MG/1
5-10 TABLET ORAL EVERY 4 HOURS PRN
Status: DISCONTINUED | OUTPATIENT
Start: 2019-12-31 | End: 2019-12-31 | Stop reason: HOSPADM

## 2019-12-30 RX ORDER — DIPHENHYDRAMINE HYDROCHLORIDE 50 MG/ML
12.5 INJECTION INTRAMUSCULAR; INTRAVENOUS
Status: DISCONTINUED | OUTPATIENT
Start: 2019-12-30 | End: 2019-12-30 | Stop reason: HOSPADM

## 2019-12-30 RX ORDER — FLUTICASONE PROPIONATE 50 MCG
1 SPRAY, SUSPENSION (ML) NASAL PRN
COMMUNITY
End: 2020-08-04

## 2019-12-30 RX ORDER — LEVOTHYROXINE SODIUM 0.1 MG/1
100 TABLET ORAL
COMMUNITY
End: 2020-03-18

## 2019-12-30 RX ORDER — LOSARTAN POTASSIUM 50 MG/1
50 TABLET ORAL DAILY
Status: DISCONTINUED | OUTPATIENT
Start: 2019-12-30 | End: 2019-12-31 | Stop reason: HOSPADM

## 2019-12-30 RX ORDER — SODIUM CHLORIDE, SODIUM LACTATE, POTASSIUM CHLORIDE, CALCIUM CHLORIDE 600; 310; 30; 20 MG/100ML; MG/100ML; MG/100ML; MG/100ML
INJECTION, SOLUTION INTRAVENOUS CONTINUOUS
Status: DISPENSED | OUTPATIENT
Start: 2019-12-30 | End: 2019-12-31

## 2019-12-30 RX ORDER — POTASSIUM CHLORIDE 600 MG/1
8 TABLET, FILM COATED, EXTENDED RELEASE ORAL DAILY
Status: ON HOLD | COMMUNITY
End: 2019-12-30

## 2019-12-30 RX ORDER — SERTRALINE HYDROCHLORIDE 100 MG/1
100 TABLET, FILM COATED ORAL DAILY
Status: DISCONTINUED | OUTPATIENT
Start: 2019-12-31 | End: 2019-12-31 | Stop reason: HOSPADM

## 2019-12-30 RX ORDER — OXYCODONE AND ACETAMINOPHEN 10; 325 MG/1; MG/1
1 TABLET ORAL 4 TIMES DAILY
Status: ON HOLD | COMMUNITY
End: 2019-12-30

## 2019-12-30 RX ORDER — LORAZEPAM 2 MG/ML
0.5 INJECTION INTRAMUSCULAR
Status: COMPLETED | OUTPATIENT
Start: 2019-12-30 | End: 2019-12-30

## 2019-12-30 RX ORDER — ONDANSETRON 2 MG/ML
INJECTION INTRAMUSCULAR; INTRAVENOUS PRN
Status: DISCONTINUED | OUTPATIENT
Start: 2019-12-30 | End: 2019-12-30 | Stop reason: SURG

## 2019-12-30 RX ORDER — PROMETHAZINE HYDROCHLORIDE 25 MG/1
25 SUPPOSITORY RECTAL EVERY 6 HOURS PRN
Status: DISCONTINUED | OUTPATIENT
Start: 2019-12-30 | End: 2019-12-31 | Stop reason: HOSPADM

## 2019-12-30 RX ORDER — SERTRALINE HYDROCHLORIDE 100 MG/1
100 TABLET, FILM COATED ORAL DAILY
COMMUNITY
End: 2020-05-28 | Stop reason: SDUPTHER

## 2019-12-30 RX ORDER — LABETALOL HYDROCHLORIDE 5 MG/ML
10 INJECTION, SOLUTION INTRAVENOUS ONCE
Status: COMPLETED | OUTPATIENT
Start: 2019-12-30 | End: 2019-12-30

## 2019-12-30 RX ORDER — DIPHENHYDRAMINE HCL 25 MG
25 TABLET ORAL EVERY 6 HOURS PRN
COMMUNITY
End: 2020-08-04

## 2019-12-30 RX ORDER — HYDROMORPHONE HYDROCHLORIDE 1 MG/ML
0.1 INJECTION, SOLUTION INTRAMUSCULAR; INTRAVENOUS; SUBCUTANEOUS
Status: DISCONTINUED | OUTPATIENT
Start: 2019-12-30 | End: 2019-12-30 | Stop reason: HOSPADM

## 2019-12-30 RX ORDER — OXYCODONE HCL 5 MG/5 ML
10 SOLUTION, ORAL ORAL
Status: COMPLETED | OUTPATIENT
Start: 2019-12-30 | End: 2019-12-30

## 2019-12-30 RX ORDER — METOPROLOL TARTRATE 50 MG/1
50 TABLET, FILM COATED ORAL 2 TIMES DAILY
Status: DISCONTINUED | OUTPATIENT
Start: 2019-12-30 | End: 2019-12-31 | Stop reason: HOSPADM

## 2019-12-30 RX ORDER — LABETALOL HYDROCHLORIDE 5 MG/ML
INJECTION, SOLUTION INTRAVENOUS PRN
Status: DISCONTINUED | OUTPATIENT
Start: 2019-12-30 | End: 2019-12-30 | Stop reason: SURG

## 2019-12-30 RX ORDER — LORAZEPAM 2 MG/ML
.5-1 INJECTION INTRAMUSCULAR EVERY 4 HOURS PRN
Status: DISCONTINUED | OUTPATIENT
Start: 2019-12-30 | End: 2019-12-31 | Stop reason: HOSPADM

## 2019-12-30 RX ORDER — ONDANSETRON 4 MG/1
4 TABLET, ORALLY DISINTEGRATING ORAL EVERY 6 HOURS PRN
COMMUNITY
End: 2020-04-22

## 2019-12-30 RX ORDER — FUROSEMIDE 40 MG/1
40 TABLET ORAL DAILY
Status: ON HOLD | COMMUNITY
End: 2019-12-30

## 2019-12-30 RX ORDER — METOPROLOL TARTRATE 50 MG/1
75 TABLET, FILM COATED ORAL 2 TIMES DAILY
COMMUNITY
End: 2020-09-03 | Stop reason: SDUPTHER

## 2019-12-30 RX ORDER — SODIUM CHLORIDE, SODIUM LACTATE, POTASSIUM CHLORIDE, CALCIUM CHLORIDE 600; 310; 30; 20 MG/100ML; MG/100ML; MG/100ML; MG/100ML
INJECTION, SOLUTION INTRAVENOUS CONTINUOUS
Status: DISCONTINUED | OUTPATIENT
Start: 2019-12-30 | End: 2019-12-31 | Stop reason: HOSPADM

## 2019-12-30 RX ORDER — MONTELUKAST SODIUM 10 MG/1
10 TABLET ORAL DAILY
COMMUNITY
End: 2020-04-06

## 2019-12-30 RX ORDER — OXYCODONE HCL 5 MG/5 ML
5 SOLUTION, ORAL ORAL
Status: COMPLETED | OUTPATIENT
Start: 2019-12-30 | End: 2019-12-30

## 2019-12-30 RX ORDER — LOSARTAN POTASSIUM 50 MG/1
50 TABLET ORAL DAILY
COMMUNITY
End: 2020-02-19

## 2019-12-30 RX ORDER — ONDANSETRON 4 MG/1
4 TABLET, ORALLY DISINTEGRATING ORAL EVERY 4 HOURS PRN
Status: DISCONTINUED | OUTPATIENT
Start: 2019-12-30 | End: 2019-12-31 | Stop reason: HOSPADM

## 2019-12-30 RX ORDER — OMEPRAZOLE 40 MG/1
40 CAPSULE, DELAYED RELEASE ORAL DAILY
COMMUNITY
End: 2020-06-25

## 2019-12-30 RX ORDER — MORPHINE SULFATE 4 MG/ML
1-5 INJECTION, SOLUTION INTRAMUSCULAR; INTRAVENOUS
Status: DISCONTINUED | OUTPATIENT
Start: 2019-12-30 | End: 2019-12-31 | Stop reason: HOSPADM

## 2019-12-30 RX ORDER — DIPHENHYDRAMINE HYDROCHLORIDE 50 MG/ML
12.5 INJECTION INTRAMUSCULAR; INTRAVENOUS EVERY 6 HOURS PRN
Status: DISCONTINUED | OUTPATIENT
Start: 2019-12-30 | End: 2019-12-31 | Stop reason: HOSPADM

## 2019-12-30 RX ORDER — DILTIAZEM HYDROCHLORIDE 5 MG/ML
25 INJECTION INTRAVENOUS PRN
Status: DISCONTINUED | OUTPATIENT
Start: 2019-12-30 | End: 2019-12-30 | Stop reason: HOSPADM

## 2019-12-30 RX ORDER — LEVOTHYROXINE SODIUM 0.1 MG/1
100 TABLET ORAL
Status: DISCONTINUED | OUTPATIENT
Start: 2019-12-31 | End: 2019-12-31 | Stop reason: HOSPADM

## 2019-12-30 RX ORDER — ACETAMINOPHEN 10 MG/ML
1000 INJECTION, SOLUTION INTRAVENOUS EVERY 6 HOURS
Status: COMPLETED | OUTPATIENT
Start: 2019-12-30 | End: 2019-12-31

## 2019-12-30 RX ORDER — ONDANSETRON 2 MG/ML
4 INJECTION INTRAMUSCULAR; INTRAVENOUS
Status: COMPLETED | OUTPATIENT
Start: 2019-12-30 | End: 2019-12-30

## 2019-12-30 RX ORDER — LABETALOL HYDROCHLORIDE 5 MG/ML
5 INJECTION, SOLUTION INTRAVENOUS
Status: DISCONTINUED | OUTPATIENT
Start: 2019-12-30 | End: 2019-12-30 | Stop reason: HOSPADM

## 2019-12-30 RX ORDER — ALPRAZOLAM 0.5 MG/1
0.5 TABLET ORAL 2 TIMES DAILY
COMMUNITY
End: 2020-02-12

## 2019-12-30 RX ORDER — DEXAMETHASONE SODIUM PHOSPHATE 4 MG/ML
INJECTION, SOLUTION INTRA-ARTICULAR; INTRALESIONAL; INTRAMUSCULAR; INTRAVENOUS; SOFT TISSUE PRN
Status: DISCONTINUED | OUTPATIENT
Start: 2019-12-30 | End: 2019-12-30 | Stop reason: SURG

## 2019-12-30 RX ORDER — HYDROMORPHONE HYDROCHLORIDE 1 MG/ML
0.4 INJECTION, SOLUTION INTRAMUSCULAR; INTRAVENOUS; SUBCUTANEOUS
Status: DISCONTINUED | OUTPATIENT
Start: 2019-12-30 | End: 2019-12-30 | Stop reason: HOSPADM

## 2019-12-30 RX ORDER — CEFAZOLIN SODIUM 1 G/3ML
INJECTION, POWDER, FOR SOLUTION INTRAMUSCULAR; INTRAVENOUS PRN
Status: DISCONTINUED | OUTPATIENT
Start: 2019-12-30 | End: 2019-12-30 | Stop reason: SURG

## 2019-12-30 RX ORDER — OXYCODONE HYDROCHLORIDE 5 MG/1
10 TABLET ORAL EVERY 4 HOURS PRN
Status: DISCONTINUED | OUTPATIENT
Start: 2019-12-31 | End: 2019-12-31 | Stop reason: HOSPADM

## 2019-12-30 RX ORDER — PROMETHAZINE HYDROCHLORIDE 25 MG/1
25 SUPPOSITORY RECTAL EVERY 4 HOURS PRN
Status: DISCONTINUED | OUTPATIENT
Start: 2019-12-30 | End: 2019-12-31 | Stop reason: HOSPADM

## 2019-12-30 RX ORDER — HYDRALAZINE HYDROCHLORIDE 20 MG/ML
10 INJECTION INTRAMUSCULAR; INTRAVENOUS EVERY 6 HOURS PRN
Status: DISCONTINUED | OUTPATIENT
Start: 2019-12-30 | End: 2019-12-31 | Stop reason: HOSPADM

## 2019-12-30 RX ORDER — SCOLOPAMINE TRANSDERMAL SYSTEM 1 MG/1
1 PATCH, EXTENDED RELEASE TRANSDERMAL
Status: DISCONTINUED | OUTPATIENT
Start: 2019-12-30 | End: 2019-12-31 | Stop reason: HOSPADM

## 2019-12-30 RX ORDER — SODIUM CHLORIDE, SODIUM LACTATE, POTASSIUM CHLORIDE, AND CALCIUM CHLORIDE .6; .31; .03; .02 G/100ML; G/100ML; G/100ML; G/100ML
500 INJECTION, SOLUTION INTRAVENOUS
Status: DISCONTINUED | OUTPATIENT
Start: 2019-12-30 | End: 2019-12-31 | Stop reason: HOSPADM

## 2019-12-30 RX ORDER — SODIUM CHLORIDE, SODIUM LACTATE, POTASSIUM CHLORIDE, CALCIUM CHLORIDE 600; 310; 30; 20 MG/100ML; MG/100ML; MG/100ML; MG/100ML
INJECTION, SOLUTION INTRAVENOUS CONTINUOUS
Status: DISCONTINUED | OUTPATIENT
Start: 2019-12-30 | End: 2019-12-30 | Stop reason: HOSPADM

## 2019-12-30 RX ORDER — HYDROMORPHONE HYDROCHLORIDE 1 MG/ML
0.2 INJECTION, SOLUTION INTRAMUSCULAR; INTRAVENOUS; SUBCUTANEOUS
Status: DISCONTINUED | OUTPATIENT
Start: 2019-12-30 | End: 2019-12-30 | Stop reason: HOSPADM

## 2019-12-30 RX ORDER — KETOROLAC TROMETHAMINE 30 MG/ML
30 INJECTION, SOLUTION INTRAMUSCULAR; INTRAVENOUS EVERY 6 HOURS PRN
Status: DISCONTINUED | OUTPATIENT
Start: 2019-12-30 | End: 2019-12-31 | Stop reason: HOSPADM

## 2019-12-30 RX ADMIN — ONDANSETRON 8 MG: 2 INJECTION INTRAMUSCULAR; INTRAVENOUS at 15:21

## 2019-12-30 RX ADMIN — ROCURONIUM BROMIDE 50 MG: 10 INJECTION, SOLUTION INTRAVENOUS at 15:21

## 2019-12-30 RX ADMIN — HALOPERIDOL LACTATE 1 MG: 5 INJECTION, SOLUTION INTRAMUSCULAR at 16:34

## 2019-12-30 RX ADMIN — FENTANYL CITRATE 50 MCG: 0.05 INJECTION, SOLUTION INTRAMUSCULAR; INTRAVENOUS at 16:29

## 2019-12-30 RX ADMIN — KETOROLAC TROMETHAMINE 30 MG: 30 INJECTION, SOLUTION INTRAMUSCULAR; INTRAVENOUS at 21:06

## 2019-12-30 RX ADMIN — FENTANYL CITRATE 50 MCG: 50 INJECTION, SOLUTION INTRAMUSCULAR; INTRAVENOUS at 15:05

## 2019-12-30 RX ADMIN — FENTANYL CITRATE 50 MCG: 0.05 INJECTION, SOLUTION INTRAMUSCULAR; INTRAVENOUS at 16:22

## 2019-12-30 RX ADMIN — ONDANSETRON 4 MG: 2 INJECTION INTRAMUSCULAR; INTRAVENOUS at 21:11

## 2019-12-30 RX ADMIN — ONDANSETRON 4 MG: 2 INJECTION INTRAMUSCULAR; INTRAVENOUS at 16:21

## 2019-12-30 RX ADMIN — SODIUM CHLORIDE, POTASSIUM CHLORIDE, SODIUM LACTATE AND CALCIUM CHLORIDE: 600; 310; 30; 20 INJECTION, SOLUTION INTRAVENOUS at 21:14

## 2019-12-30 RX ADMIN — LIDOCAINE HYDROCHLORIDE 100 MG: 20 INJECTION, SOLUTION INTRAVENOUS at 15:21

## 2019-12-30 RX ADMIN — LABETALOL HYDROCHLORIDE 5 MG: 5 INJECTION, SOLUTION INTRAVENOUS at 17:47

## 2019-12-30 RX ADMIN — SCOPALAMINE 1 PATCH: 1 PATCH, EXTENDED RELEASE TRANSDERMAL at 13:15

## 2019-12-30 RX ADMIN — LABETALOL HYDROCHLORIDE 5 MG: 5 INJECTION, SOLUTION INTRAVENOUS at 17:33

## 2019-12-30 RX ADMIN — FENTANYL CITRATE 50 MCG: 50 INJECTION, SOLUTION INTRAMUSCULAR; INTRAVENOUS at 16:01

## 2019-12-30 RX ADMIN — LORAZEPAM 0.5 MG: 2 INJECTION INTRAMUSCULAR; INTRAVENOUS at 18:00

## 2019-12-30 RX ADMIN — LOSARTAN POTASSIUM 50 MG: 50 TABLET, FILM COATED ORAL at 21:18

## 2019-12-30 RX ADMIN — LABETALOL HYDROCHLORIDE 5 MG: 5 INJECTION, SOLUTION INTRAVENOUS at 17:16

## 2019-12-30 RX ADMIN — LABETALOL HYDROCHLORIDE 10 MG: 5 INJECTION, SOLUTION INTRAVENOUS at 15:22

## 2019-12-30 RX ADMIN — LABETALOL HYDROCHLORIDE 10 MG: 5 INJECTION, SOLUTION INTRAVENOUS at 15:30

## 2019-12-30 RX ADMIN — METOPROLOL TARTRATE 50 MG: 50 TABLET, FILM COATED ORAL at 21:18

## 2019-12-30 RX ADMIN — FENTANYL CITRATE 50 MCG: 50 INJECTION, SOLUTION INTRAMUSCULAR; INTRAVENOUS at 15:26

## 2019-12-30 RX ADMIN — HYDROMORPHONE HYDROCHLORIDE 0.4 MG: 1 INJECTION, SOLUTION INTRAMUSCULAR; INTRAVENOUS; SUBCUTANEOUS at 16:51

## 2019-12-30 RX ADMIN — MORPHINE SULFATE 4 MG: 4 INJECTION INTRAVENOUS at 21:06

## 2019-12-30 RX ADMIN — FENTANYL CITRATE 50 MCG: 50 INJECTION, SOLUTION INTRAMUSCULAR; INTRAVENOUS at 15:31

## 2019-12-30 RX ADMIN — ACETAMINOPHEN 1 G: 10 INJECTION, SOLUTION INTRAVENOUS at 13:16

## 2019-12-30 RX ADMIN — HYDROMORPHONE HYDROCHLORIDE 0.4 MG: 1 INJECTION, SOLUTION INTRAMUSCULAR; INTRAVENOUS; SUBCUTANEOUS at 17:11

## 2019-12-30 RX ADMIN — DEXAMETHASONE SODIUM PHOSPHATE 8 MG: 4 INJECTION, SOLUTION INTRA-ARTICULAR; INTRALESIONAL; INTRAMUSCULAR; INTRAVENOUS; SOFT TISSUE at 15:21

## 2019-12-30 RX ADMIN — LORAZEPAM 1 MG: 2 INJECTION INTRAMUSCULAR; INTRAVENOUS at 21:06

## 2019-12-30 RX ADMIN — LABETALOL HYDROCHLORIDE 10 MG: 5 INJECTION, SOLUTION INTRAVENOUS at 17:58

## 2019-12-30 RX ADMIN — HYDROMORPHONE HYDROCHLORIDE 0.2 MG: 1 INJECTION, SOLUTION INTRAMUSCULAR; INTRAVENOUS; SUBCUTANEOUS at 17:30

## 2019-12-30 RX ADMIN — ACETAMINOPHEN 1000 MG: 10 INJECTION, SOLUTION INTRAVENOUS at 21:41

## 2019-12-30 RX ADMIN — OXYCODONE HYDROCHLORIDE 10 MG: 5 SOLUTION ORAL at 16:22

## 2019-12-30 RX ADMIN — PROPOFOL 200 MG: 10 INJECTION, EMULSION INTRAVENOUS at 15:21

## 2019-12-30 RX ADMIN — LORAZEPAM 0.5 MG: 2 INJECTION INTRAMUSCULAR; INTRAVENOUS at 18:52

## 2019-12-30 RX ADMIN — LORAZEPAM 0.5 MG: 2 INJECTION INTRAMUSCULAR; INTRAVENOUS at 18:39

## 2019-12-30 RX ADMIN — SODIUM CHLORIDE, POTASSIUM CHLORIDE, SODIUM LACTATE AND CALCIUM CHLORIDE: 600; 310; 30; 20 INJECTION, SOLUTION INTRAVENOUS at 13:15

## 2019-12-30 RX ADMIN — FAMOTIDINE 20 MG: 10 INJECTION INTRAVENOUS at 21:18

## 2019-12-30 RX ADMIN — LORAZEPAM 0.5 MG: 2 INJECTION INTRAMUSCULAR; INTRAVENOUS at 16:39

## 2019-12-30 RX ADMIN — DILTIAZEM HYDROCHLORIDE 25 MG: 5 INJECTION INTRAVENOUS at 19:26

## 2019-12-30 RX ADMIN — CEFAZOLIN 3 G: 330 INJECTION, POWDER, FOR SOLUTION INTRAMUSCULAR; INTRAVENOUS at 15:05

## 2019-12-30 RX ADMIN — HALOPERIDOL LACTATE 1 MG: 5 INJECTION, SOLUTION INTRAMUSCULAR at 18:00

## 2019-12-30 RX ADMIN — SODIUM CHLORIDE, POTASSIUM CHLORIDE, SODIUM LACTATE AND CALCIUM CHLORIDE: 600; 310; 30; 20 INJECTION, SOLUTION INTRAVENOUS at 15:05

## 2019-12-30 RX ADMIN — DIPHENHYDRAMINE HYDROCHLORIDE 12.5 MG: 50 INJECTION INTRAMUSCULAR; INTRAVENOUS at 17:09

## 2019-12-30 RX ADMIN — FENTANYL CITRATE 50 MCG: 50 INJECTION, SOLUTION INTRAMUSCULAR; INTRAVENOUS at 15:52

## 2019-12-30 ASSESSMENT — PATIENT HEALTH QUESTIONNAIRE - PHQ9
SUM OF ALL RESPONSES TO PHQ9 QUESTIONS 1 AND 2: 0
2. FEELING DOWN, DEPRESSED, IRRITABLE, OR HOPELESS: NOT AT ALL
1. LITTLE INTEREST OR PLEASURE IN DOING THINGS: NOT AT ALL

## 2019-12-30 ASSESSMENT — COGNITIVE AND FUNCTIONAL STATUS - GENERAL
MOVING TO AND FROM BED TO CHAIR: A LITTLE
DRESSING REGULAR UPPER BODY CLOTHING: A LITTLE
STANDING UP FROM CHAIR USING ARMS: A LITTLE
MOVING FROM LYING ON BACK TO SITTING ON SIDE OF FLAT BED: A LITTLE
HELP NEEDED FOR BATHING: A LITTLE
SUGGESTED CMS G CODE MODIFIER MOBILITY: CK
TURNING FROM BACK TO SIDE WHILE IN FLAT BAD: A LITTLE
SUGGESTED CMS G CODE MODIFIER DAILY ACTIVITY: CK
DRESSING REGULAR LOWER BODY CLOTHING: A LOT
WALKING IN HOSPITAL ROOM: A LOT
MOBILITY SCORE: 16
DAILY ACTIVITIY SCORE: 19
CLIMB 3 TO 5 STEPS WITH RAILING: A LOT
TOILETING: A LITTLE

## 2019-12-30 ASSESSMENT — LIFESTYLE VARIABLES
DOES PATIENT WANT TO STOP DRINKING: NO
TOTAL SCORE: 0
EVER FELT BAD OR GUILTY ABOUT YOUR DRINKING: NO
AVERAGE NUMBER OF DAYS PER WEEK YOU HAVE A DRINK CONTAINING ALCOHOL: 1
ALCOHOL_USE: YES
EVER_SMOKED: NEVER
TOTAL SCORE: 0
HAVE YOU EVER FELT YOU SHOULD CUT DOWN ON YOUR DRINKING: NO
HAVE PEOPLE ANNOYED YOU BY CRITICIZING YOUR DRINKING: NO
CONSUMPTION TOTAL: POSITIVE
TOTAL SCORE: 0
EVER_SMOKED: YES
HOW MANY TIMES IN THE PAST YEAR HAVE YOU HAD 5 OR MORE DRINKS IN A DAY: 1
EVER HAD A DRINK FIRST THING IN THE MORNING TO STEADY YOUR NERVES TO GET RID OF A HANGOVER: NO
ON A TYPICAL DAY WHEN YOU DRINK ALCOHOL HOW MANY DRINKS DO YOU HAVE: 1

## 2019-12-30 ASSESSMENT — COPD QUESTIONNAIRES
HAVE YOU SMOKED AT LEAST 100 CIGARETTES IN YOUR ENTIRE LIFE: YES
DURING THE PAST 4 WEEKS HOW MUCH DID YOU FEEL SHORT OF BREATH: NONE/LITTLE OF THE TIME
COPD SCREENING SCORE: 4
DO YOU EVER COUGH UP ANY MUCUS OR PHLEGM?: NO/ONLY WITH OCCASIONAL COLDS OR INFECTIONS

## 2019-12-30 ASSESSMENT — PAIN SCALES - GENERAL: PAIN_LEVEL: 3

## 2019-12-30 NOTE — ANESTHESIA PROCEDURE NOTES
Airway  Date/Time: 12/30/2019 3:21 PM  Performed by: Jared Batista M.D.  Authorized by: Jared Batista M.D.     Location:  OR  Urgency:  Elective  Indications for Airway Management:  Anesthesia  Spontaneous Ventilation: absent    Sedation Level:  Deep  Preoxygenated: Yes    Patient Position:  Sniffing  Mask Difficulty Assessment:  1 - vent by mask  Final Airway Type:  Endotracheal airway  Final Endotracheal Airway:  ETT  Cuffed: Yes    Technique Used for Successful ETT Placement:  Direct laryngoscopy  Insertion Site:  Oral  Blade Type:  Luong  Laryngoscope Blade/Videolaryngoscope Blade Size:  2  ETT Size (mm):  7.5  Measured from:  Teeth  ETT to Teeth (cm):  22  Placement Verified by: auscultation and capnometry    Cormack-Lehane Classification:  Grade IIa - partial view of glottis  Number of Attempts at Approach:  1

## 2019-12-30 NOTE — ANESTHESIA PREPROCEDURE EVALUATION
Relevant Problems   PULMONARY   (+) Mild intermittent asthma without complication      CARDIAC   (+) Essential hypertension   (+) Mild pulmonary hypertension (HCC)   (+) Paroxysmal atrial fibrillation (HCC)   (+) SVT (supraventricular tachycardia) (HCC)      GI   (+) Gastroesophageal reflux disease without esophagitis      ENDO   (+) Hypothyroidism, postsurgical      Other   (+) Arthritis       Physical Exam    Airway   Mallampati: II  TM distance: >3 FB  Neck ROM: full       Cardiovascular - normal exam  Rhythm: regular  Rate: normal  (-) murmur     Dental - normal exam         Pulmonary - normal exam  Breath sounds clear to auscultation     Abdominal    Neurological - normal exam                 Anesthesia Plan    ASA 3   ASA physical status 3 criteria: hypertension - poorly controlled    Plan - general       Airway plan will be ETT        Induction: intravenous    Postoperative Plan: Postoperative administration of opioids is intended.    Pertinent diagnostic labs and testing reviewed    Informed Consent:    Anesthetic plan and risks discussed with patient.    Use of blood products discussed with: patient whom consented to blood products.

## 2019-12-30 NOTE — PROGRESS NOTES
Med rec updated and complete  Allergies reviewed  Interviewed pt with son at bedside with permission from pt.  Pt reports no antibiotics in the last 2 weeks  Pt reports that she has not started her IRBESARTAN 150MG.

## 2019-12-31 VITALS
WEIGHT: 293 LBS | SYSTOLIC BLOOD PRESSURE: 105 MMHG | HEIGHT: 64 IN | DIASTOLIC BLOOD PRESSURE: 55 MMHG | RESPIRATION RATE: 16 BRPM | TEMPERATURE: 98.2 F | BODY MASS INDEX: 50.02 KG/M2 | OXYGEN SATURATION: 95 % | HEART RATE: 77 BPM

## 2019-12-31 LAB
ANION GAP SERPL CALC-SCNC: 8 MMOL/L (ref 0–11.9)
BUN SERPL-MCNC: 10 MG/DL (ref 8–22)
CALCIUM SERPL-MCNC: 8.6 MG/DL (ref 8.5–10.5)
CHLORIDE SERPL-SCNC: 108 MMOL/L (ref 96–112)
CO2 SERPL-SCNC: 27 MMOL/L (ref 20–33)
CREAT SERPL-MCNC: 0.45 MG/DL (ref 0.5–1.4)
ERYTHROCYTE [DISTWIDTH] IN BLOOD BY AUTOMATED COUNT: 49.2 FL (ref 35.9–50)
GLUCOSE SERPL-MCNC: 114 MG/DL (ref 65–99)
HCT VFR BLD AUTO: 43.6 % (ref 37–47)
HGB BLD-MCNC: 13.9 G/DL (ref 12–16)
MCH RBC QN AUTO: 29.9 PG (ref 27–33)
MCHC RBC AUTO-ENTMCNC: 31.9 G/DL (ref 33.6–35)
MCV RBC AUTO: 93.8 FL (ref 81.4–97.8)
PLATELET # BLD AUTO: 227 K/UL (ref 164–446)
PMV BLD AUTO: 10.2 FL (ref 9–12.9)
POTASSIUM SERPL-SCNC: 4.2 MMOL/L (ref 3.6–5.5)
RBC # BLD AUTO: 4.65 M/UL (ref 4.2–5.4)
SODIUM SERPL-SCNC: 143 MMOL/L (ref 135–145)
WBC # BLD AUTO: 6.5 K/UL (ref 4.8–10.8)

## 2019-12-31 PROCEDURE — A9270 NON-COVERED ITEM OR SERVICE: HCPCS | Performed by: PHYSICIAN ASSISTANT

## 2019-12-31 PROCEDURE — 700105 HCHG RX REV CODE 258: Performed by: PHYSICIAN ASSISTANT

## 2019-12-31 PROCEDURE — 80048 BASIC METABOLIC PNL TOTAL CA: CPT

## 2019-12-31 PROCEDURE — 36415 COLL VENOUS BLD VENIPUNCTURE: CPT

## 2019-12-31 PROCEDURE — 700111 HCHG RX REV CODE 636 W/ 250 OVERRIDE (IP): Performed by: PHYSICIAN ASSISTANT

## 2019-12-31 PROCEDURE — 700102 HCHG RX REV CODE 250 W/ 637 OVERRIDE(OP): Performed by: PHYSICIAN ASSISTANT

## 2019-12-31 PROCEDURE — 85027 COMPLETE CBC AUTOMATED: CPT

## 2019-12-31 RX ORDER — OMEPRAZOLE 20 MG/1
20 CAPSULE, DELAYED RELEASE ORAL DAILY
Qty: 30 CAP | Refills: 2 | Status: SHIPPED | OUTPATIENT
Start: 2019-12-31 | End: 2020-10-29

## 2019-12-31 RX ADMIN — OXYCODONE HYDROCHLORIDE 10 MG: 5 TABLET ORAL at 03:19

## 2019-12-31 RX ADMIN — LEVOTHYROXINE SODIUM 100 MCG: 100 TABLET ORAL at 07:56

## 2019-12-31 RX ADMIN — OXYCODONE HYDROCHLORIDE 10 MG: 5 TABLET ORAL at 08:00

## 2019-12-31 RX ADMIN — SERTRALINE HYDROCHLORIDE 50 MG: 50 TABLET ORAL at 07:57

## 2019-12-31 RX ADMIN — ENOXAPARIN SODIUM 40 MG: 100 INJECTION SUBCUTANEOUS at 09:11

## 2019-12-31 RX ADMIN — FAMOTIDINE 20 MG: 10 INJECTION INTRAVENOUS at 04:39

## 2019-12-31 RX ADMIN — SODIUM CHLORIDE, POTASSIUM CHLORIDE, SODIUM LACTATE AND CALCIUM CHLORIDE: 600; 310; 30; 20 INJECTION, SOLUTION INTRAVENOUS at 03:19

## 2019-12-31 RX ADMIN — METOPROLOL TARTRATE 50 MG: 50 TABLET, FILM COATED ORAL at 04:39

## 2019-12-31 RX ADMIN — SERTRALINE HYDROCHLORIDE 100 MG: 100 TABLET ORAL at 07:56

## 2019-12-31 RX ADMIN — ACETAMINOPHEN 1000 MG: 10 INJECTION, SOLUTION INTRAVENOUS at 04:39

## 2019-12-31 ASSESSMENT — ENCOUNTER SYMPTOMS
HEARTBURN: 0
CHILLS: 0
NAUSEA: 0
FEVER: 0
ABDOMINAL PAIN: 1
VOMITING: 0
SHORTNESS OF BREATH: 0

## 2019-12-31 NOTE — OP REPORT
Operative Report    Date: 12/30/2019    Surgeon: John Ganser M.D.    Assistant: Harshad Hardin PA-C    Anesthesia: Dr. Batista    Preoperative Diagnosis: Morbid Obesity, hypertension, hypothyroidism, GERD, asthma, depression    Postoperative Diagnosis: Same    Procedure Performed: Laparoscopic Vertical Sleeve Gastrectomy    Indications: The patient is suffering from morbid obesity and it's sequelae with a body mass index of 54 kg/m2. They have failed dietary attempts at weight loss and have been fully counseled about risks and alternatives to sleeve gastrectomy and wish to proceed.    Findings: No hiatal hernia    DESCRIPTION OF PROCEDURE: The patient was identified and general anesthetic   administered. Her abdomen was prepped and draped in the usual sterile   fashion. Local anesthesia of 0.5% Marcaine with epinephrine was injected   prior to making skin incision. Small incision was made to left midline in low  epigastric region and the Veress needle passed. The abdomen was insufflated   with carbon dioxide without incident and a 5-mm blunt trocar and 5-mm   30-degree scope inserted. The Mere liver retractor was passed through a   small subxiphoid incision and used to elevate the lateral segment of liver and was held with the robotic arm. Right upper quadrant 12 mm, left upper quadrant 15 mm and left lateral subcostal 5 mm trocars were placed. Inspection of the hiatus showed no evidence of a hiatal hernia. The fat pad was rotated off the gastroesophageal junction to help expose this area as well. The omentum was then taken down off the greater curve of the stomach using the Harmonic scalpel.     A 40-Kazakh bougie was then passed by anesthesia and laid along the lesser curve of the stomach. The Jericho 60 power stapler using a green load was then placed about 4-5 cm proximal to the pylorus and positioned adjacent to the bougie and fired. The sleeve was completed with sequential firing of the stapler using green  and gold loads. A small cuff of stomach was left adjacent to the esophagus. The distal stomach was removed through the 15 mm trocar site.     The staple line was then oversewn with a 3-0 absorbable V-Loc suture using the endostitch deviceincorporating the omentum along the way. The bougie was removed and the sleeve maintained good orientation with no torsion or kinks. The abdomen was   irrigated and hemostasis assured. The trocars were removed. The abdomen   deflated, and incisions closed with Vicryl. Sterile dressings were applied.   The patient returned to recovery room in stable condition.        ____________________________________  JOHN H. GANSER, MD John Ganser, M.D., F.A.C.S.  [facility]  [facility] [place]  [phone]

## 2019-12-31 NOTE — OR NURSING
Patient remains in Afib rate 120-130, B/P 183/100. Dr Batista aware. Diltiazem 25mg IVP ordered and administered. Patient AAOx4, states pain is tolerable, surgical incisions CDI. Family updated on status.  Will continue monitoring closely.

## 2019-12-31 NOTE — DISCHARGE INSTRUCTIONS
Discharge Instructions    Discharged to home by car with relative. Discharged via wheelchair, hospital escort: Yes.  Special equipment needed: Not Applicable    Be sure to schedule a follow-up appointment with your primary care doctor or any specialists as instructed.     Discharge Plan:   Influenza Vaccine Indication: Not indicated: Previously immunized this influenza season and > 8 years of age    I understand that a diet low in cholesterol, fat, and sodium is recommended for good health. Unless I have been given specific instructions below for another diet, I accept this instruction as my diet prescription.   Other diet: gastric bypass    Special Instructions: None    · Is patient discharged on Warfarin / Coumadin?   No     Depression / Suicide Risk    As you are discharged from this [facility] facility, it is important to learn how to keep safe from harming yourself.    Recognize the warning signs:  · Abrupt changes in personality, positive or negative- including increase in energy   · Giving away possessions  · Change in eating patterns- significant weight changes-  positive or negative  · Change in sleeping patterns- unable to sleep or sleeping all the time   · Unwillingness or inability to communicate  · Depression  · Unusual sadness, discouragement and loneliness  · Talk of wanting to die  · Neglect of personal appearance   · Rebelliousness- reckless behavior  · Withdrawal from people/activities they love  · Confusion- inability to concentrate     If you or a loved one observes any of these behaviors or has concerns about self-harm, here's what you can do:  · Talk about it- your feelings and reasons for harming yourself  · Remove any means that you might use to hurt yourself (examples: pills, rope, extension cords, firearm)  · Get professional help from the community (Mental Health, Substance Abuse, psychological counseling)  · Do not be alone:Call your Safe Contact- someone whom you trust who will be  there for you.  · Call your local CRISIS HOTLINE 325-1920 or 043-477-9853  · Call your local Children's Mobile Crisis Response Team [place] (424) 676-0589 or www.[employer]  · Call the toll free National Suicide Prevention Hotlines   · National Suicide Prevention Lifeline 454-755-DIVY (3977)  · [facility] Line Network 800-SUICIDE (288-4109)    Gastric Bypass Surgery, Care After  Refer to this sheet in the next few weeks. These discharge instructions provide you with general information on caring for yourself after you leave the hospital. Your caregiver may also give you specific instructions. Your treatment has been planned according to the most current medical practices available, but unavoidable complications sometimes occur. If you have any problems or questions after discharge, call your caregiver.  HOME CARE INSTRUCTIONS   Activity  · Take frequent walks throughout the day. This will help to prevent blood clots. Do not sit for longer than 45 minutes to 1 hour while awake for 4 to 6 weeks after surgery.  · Continue to do coughing and deep breathing exercises once you get home. This will help to prevent pneumonia.  · Do not do strenuous activities, such as heavy lifting, pushing, or pulling, until after your follow-up visit with your caregiver. Do not lift anything heavier than 10 lb (4.5 kg).  · Talk with your caregiver about when you may return to work and your exercise routine.  · Do not drive while taking prescription pain medicine.  Nutrition  · It is very important that you drink at least 80 oz (2,400 mL) of fluid a day.  · You should stay on a liquid diet until your follow-up visit with your caregiver. Keep sugar-free, liquid items on hand, including:  ¨ Tea: hot or cold. Drink only decaffeinated for the first month.  ¨ Broths: beef, chicken, vegetable.  ¨ Others: water, sugar-free frozen ice pops, flavored water, gelatin (after 1 week).  · Do not consume caffeine for 1 month. Large amounts  of caffeine can cause dehydration.  · A dietician may also give you specific instructions.  · Follow your caregiver's recommendations about vitamins and protein requirements after surgery.  Hygiene  · You may shower and wash your hair 2 days after surgery. Pat incisions dry. Do not rub incisions with a washcloth or towel.  · Follow your caregiver's recommendations about baths and pools following surgery.  Pain control  · If a prescription medicine was given, follow your caregiver's directions.  · You may feel some gas pain caused by the carbon dioxide used to inflate your abdomen during surgery. This pain can be felt in your chest, shoulder, back, or abdominal area. Moving around often is advised.  Incision care  · You may have 4 or more small incisions. They are closed with skin adhesive strips. Skin adhesive strips can get wet and will fall off on their own. Check your incisions and surrounding area daily for any redness, swelling, discoloration, fluid (drainage), or bleeding. Dark red, dried blood may appear under these coverings. This is normal.  · If you have a drain, it will be removed at your follow-up visit or before you leave the hospital.  · If your drain is left in, follow your caregiver's instructions on drain care.  · If your drain is taken out, keep a clean, dry bandage over the drain site.  SEEK MEDICAL CARE IF:   · You develop persistent nausea and vomiting.  · You have pain and discomfort with swallowing.  · You have pain, swelling, or warmth in the lower extremities.  · You have an oral temperature above 102° F (38.9° C).  · You develop chills.  · Your incision sites look red, swollen, or have drainage.  · Your stool is black, tarry, or maroon in color.  · You are lightheaded when standing.  · You notice a bruise getting larger.  · You have any questions or concerns.  SEEK IMMEDIATE MEDICAL CARE IF:   · You have chest pain.  · You have severe calf pain or pain not relieved by medicine.  · You  develop shortness of breath or difficulty breathing.  · There is bright red blood coming from the drain.  · You feel confused.  · You have slurred speech.  · You suddenly feel weak.  MAKE SURE YOU:   · Understand these instructions.  · Will watch your condition.  · Will get help right away if you are not doing well or get worse.     This information is not intended to replace advice given to you by your health care provider. Make sure you discuss any questions you have with your health care provider.     Document Released: 08/01/2005 Document Revised: 01/08/2016 Document Reviewed: 05/10/2011  [employer] Patient Education ©2016 [employer]     Doctor's Instructions:  Gastric bypass clears today, advance diet as tolerated to gastric bypass full liquids on morning of POD#2.  Incentive spirometry every hour while awake, continue at home.  Up ad deepak.  Ok to Shower over Tegaderms; remove Tegaderms on 1/03/20 .  No baths or soaks x 14 days.  No driving x 4-5 days.  No lifting > 15 lbs until after post-op appt.  D/C home when alert, comfortable, ambulatory, and tolerating PO well.  Begin PPI at home (all home med's larger in size than eraser head should be crushed or changed to liquid form x 2-3 weeks, while on liquid diet).  Hold MVI/supplements until after postop appointment.  F/U with Dr. Ganser ~ 1-2 weeks.

## 2019-12-31 NOTE — DISCHARGE INSTR - OTHER INFO
Gastric bypass clears today, advance diet as tolerated to gastric bypass full liquids on morning of POD#2.  Incentive spirometry Q hr while awake, continue at home.  Up ad deepak.  Ok to Shower over Tegaderms today; remove Tegaderms on  1/03/20.  No baths or soaks x 14 days.  No driving x 4-5 days.  No lifting > 15 lbs until after post-op appt.  D/C home when alert, comfortable, ambulatory, and tolerating PO well.  Pt Counseled re: I.S., diet, activity, home med's, continued use of incentive spirometer at home,  and wound care.  Begin PPI at home (all home med's larger in size than eraser head should be crushed or changed to liquid form x 2-3 weeks, while on liquid diet).  Hold Lasix for 2-3 days or until pt able to tolerate > 64 oz of fluid intake daily, whichever comes first.  Hold MVI/supplements until after postop appointment.  F/U with Dr. Ganser ~ 1-2 weeks.

## 2019-12-31 NOTE — ANESTHESIA POSTPROCEDURE EVALUATION
Patient: Alfonso Posada    Procedure Summary     Date:  12/30/19 Room / Location:  [facility] 09 / SURGERY [facility]    Anesthesia Start:  1505 Anesthesia Stop:  1621    Procedure:  GASTRECTOMY, SLEEVE, LAPAROSCOPIC (N/A Abdomen) Diagnosis:  (MORBID OBESITY)    Surgeon:  John H Ganser, M.D. Responsible Provider:  Jared Batista M.D.    Anesthesia Type:  general ASA Status:  3          Final Anesthesia Type: general  Last vitals  BP   Blood Pressure: (!) 181/88    Temp   36.6 °C (97.8 °F)    Pulse   Pulse: 79   Resp   (!) 22    SpO2   96 %      Anesthesia Post Evaluation    Patient location during evaluation: PACU  Patient participation: complete - patient participated  Level of consciousness: awake and alert  Pain score: 3    Airway patency: patent  Anesthetic complications: no  Cardiovascular status: hemodynamically stable  Respiratory status: acceptable  Hydration status: euvolemic    PONV: none           Nurse Pain Score: 8 (NPRS)

## 2019-12-31 NOTE — PROGRESS NOTES
Report received from Ade PACU RN  Assumed care of patient at 2030.    Pt is A&O x4.  Pain reported at 10/10. Medicated pt per MAR.   Nausea present. Medicated pt per MAR.   Tolerating sips of clear liquids   Surgical lap sites x 5 to abd. Dressings CDI.   + Urine output  - BM since surgery    - Flatus since surgery   Up x 2 assist with generalized weakness   SCD's in place and on   Family at bedside until 2120  Bed in lowest position and locked.  Bed alarm refused despite education on fall risk  Pt resting comfortably now.  Review plan of care with patient  Call light within reach  Hourly rounds in place  All needs met at this time

## 2019-12-31 NOTE — ANESTHESIA TIME REPORT
Anesthesia Start and Stop Event Times     Date Time Event    12/30/2019 1505 Anesthesia Start     1506 Ready for Procedure     1621 Anesthesia Stop        Responsible Staff  12/30/19    Name Role Begin End    Jared Batista M.D. Anesth 1505 1621        Preop Diagnosis (Free Text):  Pre-op Diagnosis     MORBID OBESITY        Preop Diagnosis (Codes):    Post op Diagnosis  Morbid obesity (HCC)      Premium Reason  B. 1st Call    Comments:

## 2019-12-31 NOTE — CARE PLAN
Problem: Safety  Goal: Will remain free from injury  Outcome: PROGRESSING AS EXPECTED  Patient educated to call before mobilizing, call light and belongings within reach, bed locked and in lowest position.       Problem: Pain Management  Goal: Pain level will decrease to patient's comfort goal  Outcome: PROGRESSING AS EXPECTED  Patient educated on pain medications, availability, and alternatives.

## 2019-12-31 NOTE — PROGRESS NOTES
Bedside report received.  Assessment complete.  A&O x 4. Patient calls appropriately.  Patient mobilizes with stand by assist. Bed alarm n/a.   Patient has 7/10 pain. Medicated per mar.  Denies N&V. Tolerating gastric clears diet.  Surgical sites x 5 to abdomen with dressings CDI .  + void, + flatus, - BM.  Patient denies SOB.  SCD's off, patient sitting up for breakfast.  Patient eager to discharge home today.  Review plan with of care with patient. Call light and personal belongings with in reach. Hourly rounding in place. All needs met at this time.

## 2019-12-31 NOTE — DIETARY
NUTRITION SERVICES: BMI - Pt with BMI >40 (=Body mass index is 55.02 kg/m².), morbid obesity. Weight loss counseling not appropriate in acute care setting. Pt s/p lap sleeve gastrectomy, anticipate f/u planned with MD/RD post d/c.     RECOMMEND - Referral to outpatient nutrition services for weight management, or f/u with MD/RD after D/C.

## 2019-12-31 NOTE — PROGRESS NOTES
Discharge instructions discussed with patient. No further questions or concerns at this time. IV removed. Patient escorted downstairs via wheelchair.

## 2019-12-31 NOTE — PROGRESS NOTES
Surgical Progress Note    Author: Harshad Hardin Date & Time created: 2019   9:03 AM     Interval Events:  S/p Laparoscopic Vertical Sleeve Gastrectomy -POD#1, doing well; initially a-fib in PACU, rate normal now; fermin bariatric clears  well without dysphagia or N/V; ambulatory; using IS; pain controlled; wants to go home    Review of Systems   Constitutional: Negative for chills and fever.   Respiratory: Negative for shortness of breath.    Gastrointestinal: Positive for abdominal pain. Negative for heartburn, nausea and vomiting.   Skin: Negative for rash.     Hemodynamics:  Temp (24hrs), Av.7 °C (98.1 °F), Min:36.5 °C (97.7 °F), Max:37 °C (98.6 °F)  Temperature: 37 °C (98.6 °F)  Pulse  Av.5  Min: 69  Max: 150   Blood Pressure: 150/91     Respiratory:    Respiration: 16, Pulse Oximetry: 91 %, O2 Daily Delivery Respiratory : Room Air with O2 Available     Work Of Breathing / Effort: Mild  RUL Breath Sounds: Diminished, RML Breath Sounds: Diminished, RLL Breath Sounds: Diminished, RYAN Breath Sounds: Diminished, LLL Breath Sounds: Diminished  Neuro:  GCS       Fluids:    Intake/Output Summary (Last 24 hours) at 2019 0903  Last data filed at 2019 0400  Gross per 24 hour   Intake 2330.33 ml   Output 670 ml   Net 1660.33 ml     Weight: (!) 145.4 kg (320 lb 8.8 oz)  Current Diet Order   Procedures   • Diet Order Clear Liquid (no carbonation, no sugar, no straws)     Physical Exam  Vitals signs and nursing note reviewed.   Constitutional:       General: She is not in acute distress.     Appearance: She is obese.   HENT:      Mouth/Throat:      Mouth: Mucous membranes are moist.   Cardiovascular:      Rate and Rhythm: Normal rate.   Pulmonary:      Effort: Pulmonary effort is normal. No respiratory distress.   Abdominal:      Palpations: Abdomen is soft.      Comments: Tegs c/d/i   Skin:     General: Skin is warm and dry.   Neurological:      Mental Status: She is alert and oriented to person,  place, and time.   Psychiatric:         Mood and Affect: Mood normal.       Labs:  Recent Results (from the past 24 hour(s))   Histology Request    Collection Time: 12/30/19  6:22 PM   Result Value Ref Range    Pathology Request Sent to Histo    CBC without Differential (blood)    Collection Time: 12/31/19  4:18 AM   Result Value Ref Range    WBC 6.5 4.8 - 10.8 K/uL    RBC 4.65 4.20 - 5.40 M/uL    Hemoglobin 13.9 12.0 - 16.0 g/dL    Hematocrit 43.6 37.0 - 47.0 %    MCV 93.8 81.4 - 97.8 fL    MCH 29.9 27.0 - 33.0 pg    MCHC 31.9 (L) 33.6 - 35.0 g/dL    RDW 49.2 35.9 - 50.0 fL    Platelet Count 227 164 - 446 K/uL    MPV 10.2 9.0 - 12.9 fL   Basic Metabolic Panel (BMP)    Collection Time: 12/31/19  4:18 AM   Result Value Ref Range    Sodium 143 135 - 145 mmol/L    Potassium 4.2 3.6 - 5.5 mmol/L    Chloride 108 96 - 112 mmol/L    Co2 27 20 - 33 mmol/L    Glucose 114 (H) 65 - 99 mg/dL    Bun 10 8 - 22 mg/dL    Creatinine 0.45 (L) 0.50 - 1.40 mg/dL    Calcium 8.6 8.5 - 10.5 mg/dL    Anion Gap 8.0 0.0 - 11.9   ESTIMATED GFR    Collection Time: 12/31/19  4:18 AM   Result Value Ref Range    GFR If African American >60 >60 mL/min/1.73 m 2    GFR If Non African American >60 >60 mL/min/1.73 m 2     Medical Decision Making, by Problem:  There are no active hospital problems to display for this patient.    Plan:  D/c IVF, saline lock.  Gastric bypass clears today, advance diet as tolerated to gastric bypass full liquids on morning of POD#2.  Incentive spirometry Q hr while awake, continue at home.  Up ad deepak.  Ok to Shower over Tegaderms; remove Tegaderms on 1/03/20 .  No baths or soaks x 14 days.  No driving x 4-5 days.  No lifting > 15 lbs until after post-op appt.  D/C home when alert, comfortable, ambulatory, and tolerating PO well.  Pt Counseled re: I.S., diet, activity, home med's, continued use of incentive spirometer at home,  and wound care.  Begin PPI at home (all home med's larger in size than eraser head should be  crushed or changed to liquid form x 2-3 weeks, while on liquid diet).  Hold MVI/supplements until after postop appointment.  F/U with Dr. Ganser ~ 1-2 weeks.       Quality Measures:  Quality-Core Measures   Reviewed items::  Labs reviewed and Medications reviewed  Hicks catheter::  No Hicks  DVT prophylaxis pharmacological::  Enoxaparin (Lovenox)  DVT prophylaxis - mechanical:  SCDs  Ulcer Prophylaxis::  Yes      Discussed patient condition with Patient and Dr. Ganser

## 2019-12-31 NOTE — OR NURSING
"Patient arrival to PACU at 1618, AAOx4 c/o nausea, abdominal pain (\"soreness and gas pain\") and anxiety. PRN pain oral and IV, antiemetic and antianxiety medication administered (see MAR). Pain trending down from 9/10 to 6/10. Nausea improved.    Patient hypertensive -200 (see flowsheet), labetalol 5mg PRN IV given x3. In NSR 80 initially post op. At 1747 patient went into Afib with RVR, , pt remained hypertensive. Dr Batista aware, at bedside. Additional 10mg IV labetalol administered, patient performing vagal maneuvers, HR now 105, Afib, normotensive. Will continue to monitor closely.     Abdominal incisions x5 dressing CDI, ice applied. Patient passing gas.     Family updated on patient status and plan of care.       "

## 2019-12-31 NOTE — CARE PLAN
Problem: Knowledge Deficit  Goal: Knowledge of disease process/condition, treatment plan, diagnostic tests, and medications will improve  Outcome: PROGRESSING AS EXPECTED  Goal: Knowledge of the prescribed therapeutic regimen will improve  Outcome: PROGRESSING AS EXPECTED  Intervention: Discuss information regarding therpeutic regimen and document in education  Note:   Pt was educated on POC, MAR, shift routine and nursing education R/T Dx. Questions were encouraged and answered. Family had multiple questions which were answered with pt's permission. Pt verbalized understanding of all teaching.           Problem: Pain Management  Goal: Pain level will decrease to patient's comfort goal  Outcome: PROGRESSING SLOWER THAN EXPECTED  Intervention: Follow pain managment plan developed in collaboration with patient and Interdisciplinary Team  Note:   Pt was educated on 0-10 pain scale, non-pharm methods of pain relief and MAR. Pt was medicated per MAR.      Problem: Psychosocial Needs:  Goal: Level of anxiety will decrease  Outcome: PROGRESSING SLOWER THAN EXPECTED  Intervention: Identify and develop with patient strategies to cope with anxiety triggers  Note:   Pt describes anxiety R/T pain and medication. Pt was educated on MAR and non-pharm methods of coping. Therapeutic communication and listening were provided. Pt was medicated per MAR. Pt verbalized understanding of all teaching.

## 2020-01-02 ENCOUNTER — TELEPHONE (OUTPATIENT)
Dept: ENDOCRINOLOGY | Facility: MEDICAL CENTER | Age: 65
End: 2020-01-02

## 2020-01-02 DIAGNOSIS — E89.0 HYPOTHYROIDISM, POSTSURGICAL: ICD-10-CM

## 2020-01-02 NOTE — TELEPHONE ENCOUNTER
Telephone conversation with patient    Patient reports that her surgery went very well.  She is home convalescing.  She is a little bit vague about this but she thinks she might of had a brief episode of atrial fibrillation postop but it did not last after a Valsalva.  Heart rate is normal and regular at the moment.  She is just taking levothyroxine 100 mcg only.  She will continue on that.  I would like to see her in a month and recheck her thyroid levels and adjust her dose.    Kamran Hyman M.D.

## 2020-01-10 DIAGNOSIS — G89.29 BILATERAL CHRONIC KNEE PAIN: ICD-10-CM

## 2020-01-10 DIAGNOSIS — M25.561 BILATERAL CHRONIC KNEE PAIN: ICD-10-CM

## 2020-01-10 DIAGNOSIS — M25.562 BILATERAL CHRONIC KNEE PAIN: ICD-10-CM

## 2020-01-10 RX ORDER — OXYCODONE AND ACETAMINOPHEN 10; 325 MG/1; MG/1
1 TABLET ORAL EVERY 8 HOURS PRN
Qty: 90 TAB | Refills: 0 | Status: SHIPPED | OUTPATIENT
Start: 2020-01-10 | End: 2020-01-24 | Stop reason: SDUPTHER

## 2020-01-24 ENCOUNTER — OFFICE VISIT (OUTPATIENT)
Dept: MEDICAL GROUP | Facility: MEDICAL CENTER | Age: 65
End: 2020-01-24
Payer: COMMERCIAL

## 2020-01-24 VITALS
SYSTOLIC BLOOD PRESSURE: 122 MMHG | RESPIRATION RATE: 16 BRPM | TEMPERATURE: 96.8 F | HEIGHT: 64 IN | HEART RATE: 66 BPM | DIASTOLIC BLOOD PRESSURE: 68 MMHG | OXYGEN SATURATION: 96 % | WEIGHT: 293 LBS | BODY MASS INDEX: 50.02 KG/M2

## 2020-01-24 DIAGNOSIS — E66.01 MORBID OBESITY WITH BMI OF 50.0-59.9, ADULT (HCC): ICD-10-CM

## 2020-01-24 DIAGNOSIS — M25.561 BILATERAL CHRONIC KNEE PAIN: ICD-10-CM

## 2020-01-24 DIAGNOSIS — G89.29 BILATERAL CHRONIC KNEE PAIN: ICD-10-CM

## 2020-01-24 DIAGNOSIS — M25.562 BILATERAL CHRONIC KNEE PAIN: ICD-10-CM

## 2020-01-24 DIAGNOSIS — F41.9 ANXIETY: ICD-10-CM

## 2020-01-24 DIAGNOSIS — Z79.01 CHRONIC ANTICOAGULATION: ICD-10-CM

## 2020-01-24 DIAGNOSIS — K21.9 GASTROESOPHAGEAL REFLUX DISEASE WITHOUT ESOPHAGITIS: ICD-10-CM

## 2020-01-24 DIAGNOSIS — R21 RASH: ICD-10-CM

## 2020-01-24 PROCEDURE — 99214 OFFICE O/P EST MOD 30 MIN: CPT | Performed by: PHYSICIAN ASSISTANT

## 2020-01-24 RX ORDER — CLOTRIMAZOLE 1 %
CREAM (GRAM) TOPICAL
Qty: 1 TUBE | Refills: 0 | Status: SHIPPED | OUTPATIENT
Start: 2020-01-24 | End: 2020-08-17 | Stop reason: SDUPTHER

## 2020-01-24 RX ORDER — OXYCODONE AND ACETAMINOPHEN 10; 325 MG/1; MG/1
1 TABLET ORAL EVERY 8 HOURS PRN
Qty: 90 TAB | Refills: 0 | Status: SHIPPED | OUTPATIENT
Start: 2020-02-10 | End: 2020-01-24 | Stop reason: SDUPTHER

## 2020-01-24 RX ORDER — OXYCODONE AND ACETAMINOPHEN 10; 325 MG/1; MG/1
1 TABLET ORAL EVERY 8 HOURS PRN
Qty: 90 TAB | Refills: 0 | Status: SHIPPED | OUTPATIENT
Start: 2020-04-10 | End: 2020-03-10 | Stop reason: SDUPTHER

## 2020-01-24 RX ORDER — ALPRAZOLAM 0.5 MG/1
TABLET ORAL
Qty: 60 TAB | Refills: 0 | Status: SHIPPED | OUTPATIENT
Start: 2020-01-24 | End: 2020-01-24 | Stop reason: SDUPTHER

## 2020-01-24 RX ORDER — TRIAMCINOLONE ACETONIDE 5 MG/G
CREAM TOPICAL
Qty: 60 G | Refills: 0 | Status: SHIPPED
Start: 2020-01-24 | End: 2020-08-04

## 2020-01-24 RX ORDER — ALPRAZOLAM 0.5 MG/1
TABLET ORAL
Qty: 60 TAB | Refills: 2 | Status: SHIPPED | OUTPATIENT
Start: 2020-01-24 | End: 2020-02-23

## 2020-01-24 RX ORDER — OXYCODONE AND ACETAMINOPHEN 10; 325 MG/1; MG/1
1 TABLET ORAL EVERY 8 HOURS PRN
Qty: 90 TAB | Refills: 0 | Status: SHIPPED | OUTPATIENT
Start: 2020-02-23 | End: 2020-01-24 | Stop reason: SDUPTHER

## 2020-01-24 RX ORDER — OXYCODONE AND ACETAMINOPHEN 10; 325 MG/1; MG/1
1 TABLET ORAL EVERY 8 HOURS PRN
Qty: 90 TAB | Refills: 0 | Status: SHIPPED | OUTPATIENT
Start: 2020-03-24 | End: 2020-01-24 | Stop reason: SDUPTHER

## 2020-01-24 RX ORDER — OXYCODONE AND ACETAMINOPHEN 10; 325 MG/1; MG/1
1 TABLET ORAL EVERY 8 HOURS PRN
Qty: 90 TAB | Refills: 0 | Status: SHIPPED | OUTPATIENT
Start: 2020-03-11 | End: 2020-01-24 | Stop reason: SDUPTHER

## 2020-01-24 RX ORDER — OXYCODONE AND ACETAMINOPHEN 10; 325 MG/1; MG/1
1 TABLET ORAL EVERY 8 HOURS PRN
Qty: 90 TAB | Refills: 0 | Status: SHIPPED | OUTPATIENT
Start: 2020-01-24 | End: 2020-01-24 | Stop reason: SDUPTHER

## 2020-01-24 NOTE — ASSESSMENT & PLAN NOTE
Chronic condition secondary to atrial fibrillation.  She would like to find a medication that is cheaper than Eliquis.  Does not want to be on Coumadin.  No side effects on Eliquis.

## 2020-01-24 NOTE — ASSESSMENT & PLAN NOTE
This is a 64-year-old female who is doing well after her gastric sleeve.  Performed on the 30th.  Today she weighed 300 pounds at her follow-up appointment.  Plans to continue to eat healthier and lose weight.

## 2020-01-24 NOTE — PROGRESS NOTES
Subjective:   Alfonso Posada is a 64 y.o. female here today for follow-up gastric sleeve secondary to morbid obesity, anxiety, chronic bilateral knee pain and chronic anticoagulation.    Morbid obesity with BMI of 50.0-59.9, adult (HCC)  This is a 64-year-old female who is doing well after her gastric sleeve.  Performed on the 30th.  Today she weighed 300 pounds at her follow-up appointment.  Plans to continue to eat healthier and lose weight.    Anxiety  Chronic condition.  Takes Zoloft daily.  Also takes Xanax 0.5 mg 1 tablet twice a day.  Medication is effective.  She is requiring a refill.    Bilateral chronic knee pain  Chronic condition.  She is also requesting a renewal of her oxycodone.  Takes 1 tablet every 8 hours.  90 tablets a month.  Medication has been effective.  [facility]    Chronic anticoagulation  Chronic condition secondary to atrial fibrillation.  She would like to find a medication that is cheaper than Eliquis.  Does not want to be on Coumadin.  No side effects on Eliquis.       Current medicines (including changes today)  Current Outpatient Medications   Medication Sig Dispense Refill   • ALPRAZolam (XANAX) 0.5 MG Tab TAKE ONE TABLET BY MOUTH TWICE A DAY 30 DAYS 60 Tab 2   • triamcinolone acetonide (KENALOG) 0.5 % Cream Apply twice daily sparingly to areas of concern. 60 g 0   • clotrimazole (LOTRIMIN) 1 % Cream Apply twice daily to foot areas of rash. 1 Tube 0   • [START ON 4/10/2020] oxyCODONE-acetaminophen (PERCOCET-10)  MG Tab Take 1 Tab by mouth every 8 hours as needed for Severe Pain for up to 30 days. 90 Tab 0   • omeprazole (PRILOSEC) 20 MG delayed-release capsule Take 1 Cap by mouth every day. Open capsule and pour contents in a liquid for 1-2 weeks postop 30 Cap 2   • ALPRAZolam (XANAX) 0.5 MG Tab Take 0.5 mg by mouth 2 Times a Day.     • fluticasone (FLONASE) 50 MCG/ACT nasal spray Spray 1 Spray in nose as  needed. Indications: Signs and Symptoms of Nose Diseases     • losartan (COZAAR) 50 MG Tab Take 50 mg by mouth every day.     • metoprolol (LOPRESSOR) 50 MG Tab Take 50 mg by mouth 2 times a day.     • montelukast (SINGULAIR) 10 MG Tab Take 10 mg by mouth every day.     • omeprazole (PRILOSEC) 40 MG delayed-release capsule Take 40 mg by mouth every day.     • ondansetron (ZOFRAN ODT) 4 MG TABLET DISPERSIBLE Take 4 mg by mouth every 6 hours as needed for Nausea.     • diphenhydrAMINE (BENADRYL) 25 MG Tab Take 25 mg by mouth every 6 hours as needed. Indications: Hayfever     • levothyroxine (SYNTHROID) 100 MCG Tab Take 100 mcg by mouth Every morning on an empty stomach.     • triamcinolone acetonide (KENALOG) 0.1 % Cream Apply 1 Application to affected area(s) as needed (Apply's to right foot).     • sertraline (ZOLOFT) 100 MG Tab Take 100 mg by mouth every day.     • sertraline (ZOLOFT) 50 MG Tab Take 50 mg by mouth every morning. Pt also has an RX for 50MG, pt takes total of 150MG QDAY     • irbesartan (AVAPRO) 150 MG Tab Take 1 Tab by mouth every day. 90 Tab 1   • apixaban (ELIQUIS) 5mg Tab Take 1 Tab by mouth 2 Times a Day. 180 Tab 1   • atorvastatin (LIPITOR) 40 MG Tab Take 40 mg by mouth every evening.     • MAGNESIUM OXIDE PO Take 3 Tabs by mouth every 48 hours. Indications: (MAG-7)     • cetirizine (ZYRTEC) 10 MG Tab Take 10 mg by mouth every evening.     • acetaminophen (TYLENOL) 500 MG Tab Take 1,000 mg by mouth 3 times a day as needed for Moderate Pain.       No current facility-administered medications for this visit.      She  has a past medical history of Anesthesia, Anxiety, Anxiety disorder, Arthritis, Asthma, Atrial fibrillation (HCC) (12/2018), Bartholin cyst, Cancer (HCC), Chronic knee pain, Depression, Graves disease, Heart burn, Heart murmur, Hemorrhagic disorder (HCC), High cholesterol, Hyperlipidemia, Hypertension, Hypothyroidism, postsurgical (03/2019), Infectious disease (2017), Morbid obesity  "(HCC), Postherpetic neuralgia, Psychiatric disorder, SVT (supraventricular tachycardia) (HCC), and Uterine cancer (HCC).    Social History and Family History were reviewed and updated.    ROS   No chest pain, no shortness of breath, no abdominal pain and all other systems were reviewed and are negative.       Objective:     /68 (BP Location: Left arm, Patient Position: Sitting, BP Cuff Size: Adult)   Pulse 66   Temp 36 °C (96.8 °F) (Temporal)   Resp 16   Ht 1.626 m (5' 4\")   Wt (!) 136.2 kg (300 lb 4.8 oz)   SpO2 96%  Body mass index is 51.55 kg/m².   Physical Exam:  Constitutional: Alert, no distress.  Skin: Warm, dry, good turgor, no rashes in visible areas.  Eye: Equal, round and reactive, conjunctiva clear, lids normal.  ENMT: Lips without lesions, good dentition, oropharynx clear.  Neck: Trachea midline, no masses.   Lymph: No cervical or supraclavicular lymphadenopathy  Respiratory: Unlabored respiratory effort, lungs clear to auscultation, no wheezes, no ronchi.  Cardiovascular: Normal S1, S2, no murmur, no edema.  Abdomen: Soft, non-tender, no masses.  Psych: Alert and oriented x3, normal affect and mood.        Assessment and Plan:   The following treatment plan was discussed    1. Morbid obesity with BMI of 50.0-59.9, adult (HCC)  Chronic condition.  20 pound weight loss.  She stated that she would lose much more before next visit.  Also will try to order a shower chair.  Follow with bariatric.  Continue dietary plan.  - Patient identified as having weight management issue.  Appropriate orders and counseling given.  - [facility] Shower Chair    2. Bilateral chronic knee pain  Chronic condition.  Stable.   reviewed.  Renewed Percocet as directed.  Appointment made for 3 months.  - [facility] Shower Chair  - oxyCODONE-acetaminophen (PERCOCET-10)  MG Tab; Take 1 Tab by mouth every 8 hours as needed for Severe Pain for up to 30 days.  Dispense: 90 Tab; Refill: 0    3. Anxiety  On a condition.  Stable.  " Understands black box warning taking benzos and opiates.  Renewed Xanax 0.5 mg twice a day.  Provided a 90-day supply.  - ALPRAZolam (XANAX) 0.5 MG Tab; TAKE ONE TABLET BY MOUTH TWICE A DAY 30 DAYS  Dispense: 60 Tab; Refill: 2    5. Rash  Acute, new onset condition.  Provided Lotrimin for the right foot as well as Kenalog for the neck and the foot if not improving.  - triamcinolone acetonide (KENALOG) 0.5 % Cream; Apply twice daily sparingly to areas of concern.  Dispense: 60 g; Refill: 0  - clotrimazole (LOTRIMIN) 1 % Cream; Apply twice daily to foot areas of rash.  Dispense: 1 Tube; Refill: 0    6. Chronic anticoagulation  Chronic conditions secondary to atrial fibrillation.  Discuss that warfarin is the cheapest but she does not want to start it.  There is Xarelto but Eliquis is cheaper.  Decide to continue Eliquis.      Followup: Return in about 3 months (around 4/24/2020).    Please note that this dictation was created using voice recognition software. I have made every reasonable attempt to correct obvious errors, but I expect that there are errors of grammar and possibly content that I did not discover before finalizing the note.

## 2020-01-24 NOTE — ASSESSMENT & PLAN NOTE
Chronic condition.  She is also requesting a renewal of her oxycodone.  Takes 1 tablet every 8 hours.  90 tablets a month.  Medication has been effective.  [facility]

## 2020-01-24 NOTE — ASSESSMENT & PLAN NOTE
Chronic condition.  Takes Zoloft daily.  Also takes Xanax 0.5 mg 1 tablet twice a day.  Medication is effective.  She is requiring a refill.

## 2020-02-05 ENCOUNTER — HOSPITAL ENCOUNTER (OUTPATIENT)
Dept: LAB | Facility: MEDICAL CENTER | Age: 65
End: 2020-02-05
Attending: INTERNAL MEDICINE
Payer: COMMERCIAL

## 2020-02-05 DIAGNOSIS — E89.0 HYPOTHYROIDISM, POSTSURGICAL: ICD-10-CM

## 2020-02-05 LAB
T4 FREE SERPL-MCNC: 1.51 NG/DL (ref 0.53–1.43)
TSH SERPL DL<=0.005 MIU/L-ACNC: 0.01 UIU/ML (ref 0.38–5.33)

## 2020-02-05 PROCEDURE — 84439 ASSAY OF FREE THYROXINE: CPT

## 2020-02-05 PROCEDURE — 84443 ASSAY THYROID STIM HORMONE: CPT

## 2020-02-05 PROCEDURE — 36415 COLL VENOUS BLD VENIPUNCTURE: CPT

## 2020-02-06 ENCOUNTER — TELEPHONE (OUTPATIENT)
Dept: ENDOCRINOLOGY | Facility: MEDICAL CENTER | Age: 65
End: 2020-02-06

## 2020-02-06 ENCOUNTER — TELEPHONE (OUTPATIENT)
Dept: MEDICAL GROUP | Facility: MEDICAL CENTER | Age: 65
End: 2020-02-06

## 2020-02-06 DIAGNOSIS — E89.0 HYPOTHYROIDISM, POSTSURGICAL: ICD-10-CM

## 2020-02-06 NOTE — TELEPHONE ENCOUNTER
Phone Number Called: [phone] (home)       Call outcome: Spoke to patient regarding message below.    Message: Spoke with patient she had a stomach surgery on Dec 30th. Could be absorbing quicker. She is currently taking a multivitamin but doesn't know if that has Biotin.   She does take brand name Synthroid.

## 2020-02-06 NOTE — TELEPHONE ENCOUNTER
1. Name: Alfonso Posada      Call Back Number: [phone] (home)    Patient approves a detailed voicemail message: N\A    Pt Alfonso called in and said she needed to talk to Sim to tell him that the shower chair is not correct. If he can send a new prescription for a Shower Bench with Back. That her insurance told her to have him send new one and it would be covered advised will send request. She said she needed a call back when its ordered.     Thanks

## 2020-02-07 DIAGNOSIS — E66.01 MORBID OBESITY WITH BMI OF 50.0-59.9, ADULT (HCC): ICD-10-CM

## 2020-02-07 DIAGNOSIS — Z74.09 LIMITED MOBILITY: ICD-10-CM

## 2020-02-07 DIAGNOSIS — Z09 S/P ABDOMINAL SURGERY, FOLLOW-UP EXAM: ICD-10-CM

## 2020-02-07 NOTE — TELEPHONE ENCOUNTER
Telephone conversation with patient    I had her look at her multiple vitamin and it does contain biotin 1500 mcg and her shake that she drinks every morning has biotin in it also.  I think her current thyroid levels are perhaps falsely skewed.  TSH is down to 0.01 and free T4 is up to 1.5.  She does not feel at all thyrotoxic taking Synthroid 100 mcg/day.  We will not change the dose on this basis.  We will repeat her thyroid levels and she will not take biotin the day before or the day of a blood draw.    Kamran Hyman M.D.

## 2020-02-12 ENCOUNTER — TELEPHONE (OUTPATIENT)
Dept: ENDOCRINOLOGY | Facility: MEDICAL CENTER | Age: 65
End: 2020-02-12

## 2020-02-12 ENCOUNTER — HOSPITAL ENCOUNTER (OUTPATIENT)
Dept: LAB | Facility: MEDICAL CENTER | Age: 65
End: 2020-02-12
Attending: INTERNAL MEDICINE
Payer: COMMERCIAL

## 2020-02-12 ENCOUNTER — OFFICE VISIT (OUTPATIENT)
Dept: CARDIOLOGY | Facility: PHYSICIAN GROUP | Age: 65
End: 2020-02-12
Payer: COMMERCIAL

## 2020-02-12 VITALS
HEART RATE: 60 BPM | OXYGEN SATURATION: 96 % | DIASTOLIC BLOOD PRESSURE: 70 MMHG | WEIGHT: 293 LBS | BODY MASS INDEX: 50.02 KG/M2 | SYSTOLIC BLOOD PRESSURE: 130 MMHG | HEIGHT: 64 IN

## 2020-02-12 DIAGNOSIS — E89.0 HYPOTHYROIDISM, POSTSURGICAL: ICD-10-CM

## 2020-02-12 DIAGNOSIS — E66.01 MORBID OBESITY WITH BMI OF 50.0-59.9, ADULT (HCC): ICD-10-CM

## 2020-02-12 DIAGNOSIS — M25.561 BILATERAL CHRONIC KNEE PAIN: ICD-10-CM

## 2020-02-12 DIAGNOSIS — G89.29 BILATERAL CHRONIC KNEE PAIN: ICD-10-CM

## 2020-02-12 DIAGNOSIS — I10 ESSENTIAL HYPERTENSION: ICD-10-CM

## 2020-02-12 DIAGNOSIS — Z79.01 CHRONIC ANTICOAGULATION: ICD-10-CM

## 2020-02-12 DIAGNOSIS — I48.0 PAROXYSMAL ATRIAL FIBRILLATION (HCC): ICD-10-CM

## 2020-02-12 DIAGNOSIS — E78.5 HYPERLIPIDEMIA, UNSPECIFIED HYPERLIPIDEMIA TYPE: ICD-10-CM

## 2020-02-12 DIAGNOSIS — K21.9 GASTROESOPHAGEAL REFLUX DISEASE WITHOUT ESOPHAGITIS: ICD-10-CM

## 2020-02-12 DIAGNOSIS — M25.562 BILATERAL CHRONIC KNEE PAIN: ICD-10-CM

## 2020-02-12 DIAGNOSIS — I47.10 SVT (SUPRAVENTRICULAR TACHYCARDIA) (HCC): ICD-10-CM

## 2020-02-12 LAB
T3FREE SERPL-MCNC: 3.26 PG/ML (ref 2.4–4.2)
T4 FREE SERPL-MCNC: 1.35 NG/DL (ref 0.53–1.43)
TSH SERPL DL<=0.005 MIU/L-ACNC: 0.03 UIU/ML (ref 0.38–5.33)

## 2020-02-12 PROCEDURE — 84443 ASSAY THYROID STIM HORMONE: CPT

## 2020-02-12 PROCEDURE — 84439 ASSAY OF FREE THYROXINE: CPT

## 2020-02-12 PROCEDURE — 99214 OFFICE O/P EST MOD 30 MIN: CPT | Performed by: NURSE PRACTITIONER

## 2020-02-12 PROCEDURE — 36415 COLL VENOUS BLD VENIPUNCTURE: CPT

## 2020-02-12 PROCEDURE — 84481 FREE ASSAY (FT-3): CPT

## 2020-02-12 ASSESSMENT — ENCOUNTER SYMPTOMS
SHORTNESS OF BREATH: 0
CHILLS: 0
NAUSEA: 0
PALPITATIONS: 0
PND: 0
HEARTBURN: 0
MYALGIAS: 0
COUGH: 0
ORTHOPNEA: 0
LOSS OF CONSCIOUSNESS: 0
ABDOMINAL PAIN: 0
HEADACHES: 0
DIZZINESS: 0
INSOMNIA: 0
FEVER: 0
BRUISES/BLEEDS EASILY: 0

## 2020-02-12 NOTE — PROGRESS NOTES
"Chief Complaint   Patient presents with   • Follow-Up     Gastric sleeve surgery on 12/30/2020   • Atrial Fibrillation   • Paroxysmal Supraventricular Tachycardia (PSVT)   • HTN (Controlled)   • Hyperlipidemia       Subjective:   Alfonso Posada is a 64 y.o. female who presents today for three month follow-up of paroxysmal atrial fibrillation, PSVT, anticoagulation, hypertension and hyperlipidemia.    Alfonso is a 64 year old female with history of paroxysmal atrial fibrillation in the setting of thyroid disease. She had a thyroidectomy at age 30, and then again (due to tissue regrowth) in March 2019; she is on Toprol XL and Eliquis for anticoagulation. She also has hypertension, hyperlipidemia, asthma, GERD and anxiety.  In late December 2019, she had gastric sleeve surgery by Dr. Ganser, which went well. She has lost 30+ pounds since then.    She is here today for follow-up. BP has been stable so far; she is on Losartan, but a \"lower dose\" and is not entirely sure. Generally, she is doing well. She did have some mild palpitations following surgery, but none since. No chest pain, pressure or discomfort; no shortness of breath, orthopnea or PND; no dizziness or syncope; no LE edema. She is also taking Zoloft, but is unsure of exact dose. She is having some hip pain, and will be seeing the orthopedist about possible injection.    Past Medical History:   Diagnosis Date   • Anesthesia     States hard to take deep breath afterwards   • Anxiety disorder    • Arthritis     Knees, arms, hips   • Asthma    • Atrial fibrillation (HCC) 12/2018    Echocardiogram with normal LV size, LVEF 55%. Normal RA and RV. Moderately dilated LA. Trace TR. RVSP 25-30mmHg.   • Bartholin cyst    • Chronic knee pain    • Depression    • GERD (gastroesophageal reflux disease)    • Graves disease    • Heart murmur    • Hemorrhagic disorder (HCC)     On Eliquis   • Hyperlipidemia    • Hypertension    • Hypothyroidism, postsurgical      Followed " by endocrinology   • Infectious disease 2017    Shingles   • Morbid obesity (HCC) 2019    Status post gastric sleeve surgery by Dr. Ganser.   • Postherpetic neuralgia    • Psychiatric disorder     Anxiety/depression   • SVT (supraventricular tachycardia) (HCC)    • Uterine cancer (HCC)     Treated     Past Surgical History:   Procedure Laterality Date   • PB LAP, JANAY RESTRICT PROC, LONGITUDINAL GAS* N/A 2019    Procedure: GASTRECTOMY, SLEEVE, LAPAROSCOPIC;  Surgeon: John H Ganser, M.D.;  Location: SURGERY [facility];  Service: General   • THYROIDECTOMY  3/27/2019    Procedure: THYROIDECTOMY - COMPLETION;  Surgeon: Vincent Patel M.D.;  Location: SURGERY SAME DAY [facility];  Service: General   • THYROIDECTOMY TOTAL     • HYSTERECTOMY LAPAROSCOPY     • THYROIDECTOMY      still has parathyroid     History reviewed. No pertinent family history.  Social History     Socioeconomic History   • Marital status:      Spouse name: Not on file   • Number of children: Not on file   • Years of education: Not on file   • Highest education level: Not on file   Occupational History   • Not on file   Social Needs   • Financial resource strain: Not on file   • Food insecurity     Worry: Not on file     Inability: Not on file   • Transportation needs     Medical: Not on file     Non-medical: Not on file   Tobacco Use   • Smoking status: Former Smoker     Packs/day: 0.50     Start date: 3/4/2016     Last attempt to quit: 2018     Years since quittin.4   • Smokeless tobacco: Never Used   • Tobacco comment: 10 cigs   Substance and Sexual Activity   • Alcohol use: Not Currently     Alcohol/week: 1.8 - 3.0 oz     Types: 3 - 5 Glasses of wine per week   • Drug use: No   • Sexual activity: Not Currently     Partners: Male     Comment:  (Bill)   Lifestyle   • Physical activity     Days per week: Not on file     Minutes per session: Not on file   • Stress: Not on file   Relationships   • Social  connections     Talks on phone: Not on file     Gets together: Not on file     Attends [employer] service: Not on file     Active member of club or organization: Not on file     Attends meetings of clubs or organizations: Not on file     Relationship status: Not on file   • Intimate partner violence     Fear of current or ex partner: Not on file     Emotionally abused: Not on file     Physically abused: Not on file     Forced sexual activity: Not on file   Other Topics Concern   • Not on file   Social History Narrative    ** Merged History Encounter **          Allergies   Allergen Reactions   • Latex Itching and Swelling     Latex band aid   • Penicillins Hives, Rash and Itching     Itching & Rash     • Sulfa Drugs Itching     Itching feeling on leg, prickily/need-like sensation on skin.   • Other Drug      Steroid shot in knee headache upset stomach   • Other Environmental Runny Nose and Itching     Grass weeds etc     Outpatient Encounter Medications as of 2/12/2020   Medication Sig Dispense Refill   • ALPRAZolam (XANAX) 0.5 MG Tab TAKE ONE TABLET BY MOUTH TWICE A DAY 30 DAYS 60 Tab 2   • triamcinolone acetonide (KENALOG) 0.5 % Cream Apply twice daily sparingly to areas of concern. 60 g 0   • clotrimazole (LOTRIMIN) 1 % Cream Apply twice daily to foot areas of rash. 1 Tube 0   • [START ON 4/10/2020] oxyCODONE-acetaminophen (PERCOCET-10)  MG Tab Take 1 Tab by mouth every 8 hours as needed for Severe Pain for up to 30 days. 90 Tab 0   • omeprazole (PRILOSEC) 20 MG delayed-release capsule Take 1 Cap by mouth every day. Open capsule and pour contents in a liquid for 1-2 weeks postop 30 Cap 2   • fluticasone (FLONASE) 50 MCG/ACT nasal spray Spray 1 Spray in nose as needed. Indications: Signs and Symptoms of Nose Diseases     • metoprolol (LOPRESSOR) 50 MG Tab Take 50 mg by mouth 2 times a day.     • montelukast (SINGULAIR) 10 MG Tab Take 10 mg by mouth every day.     • omeprazole (PRILOSEC) 40 MG delayed-release  capsule Take 40 mg by mouth every day.     • ondansetron (ZOFRAN ODT) 4 MG TABLET DISPERSIBLE Take 4 mg by mouth every 6 hours as needed for Nausea.     • diphenhydrAMINE (BENADRYL) 25 MG Tab Take 25 mg by mouth every 6 hours as needed. Indications: Hayfever     • levothyroxine (SYNTHROID) 100 MCG Tab Take 100 mcg by mouth Every morning on an empty stomach.     • triamcinolone acetonide (KENALOG) 0.1 % Cream Apply 1 Application to affected area(s) as needed (Apply's to right foot).     • sertraline (ZOLOFT) 100 MG Tab Take 100 mg by mouth every day.     • apixaban (ELIQUIS) 5mg Tab Take 1 Tab by mouth 2 Times a Day. 180 Tab 1   • atorvastatin (LIPITOR) 40 MG Tab Take 40 mg by mouth every evening.     • MAGNESIUM OXIDE PO Take 3 Tabs by mouth every 48 hours. Indications: (MAG-7)     • cetirizine (ZYRTEC) 10 MG Tab Take 10 mg by mouth every evening.     • acetaminophen (TYLENOL) 500 MG Tab Take 1,000 mg by mouth 3 times a day as needed for Moderate Pain.     • losartan (COZAAR) 50 MG Tab Take 50 mg by mouth every day.     • [DISCONTINUED] ALPRAZolam (XANAX) 0.5 MG Tab Take 0.5 mg by mouth 2 Times a Day.     • [DISCONTINUED] sertraline (ZOLOFT) 50 MG Tab Take 50 mg by mouth every morning. Pt also has an RX for 50MG, pt takes total of 150MG QDAY     • [DISCONTINUED] irbesartan (AVAPRO) 150 MG Tab Take 1 Tab by mouth every day. 90 Tab 1     No facility-administered encounter medications on file as of 2/12/2020.      Review of Systems   Constitutional: Positive for malaise/fatigue. Negative for chills and fever.   HENT: Negative for congestion.    Respiratory: Negative for cough and shortness of breath.    Cardiovascular: Negative for chest pain, palpitations, orthopnea, leg swelling and PND.   Gastrointestinal: Negative for abdominal pain, heartburn and nausea.   Musculoskeletal: Positive for joint pain. Negative for myalgias.        Mostly in her hips.   Skin: Negative for rash.   Neurological: Negative for dizziness,  "loss of consciousness and headaches.   Endo/Heme/Allergies: Does not bruise/bleed easily.   Psychiatric/Behavioral: The patient does not have insomnia.         Objective:   /70 (BP Location: Left arm, Patient Position: Sitting)   Pulse 60   Ht 1.626 m (5' 4\")   Wt (!) 133.8 kg (295 lb)   LMP 03/20/2016   SpO2 96%   BMI 50.64 kg/m²     Physical Exam   Constitutional: She is oriented to person, place, and time. She appears well-developed and well-nourished.   BMI 50.64  Weight is down 30+ pounds since November 2019.  Ambulates with a cane today.   HENT:   Head: Normocephalic.   Eyes: EOM are normal.   Neck: Normal range of motion. Neck supple. No JVD present.   Cardiovascular: Normal rate, regular rhythm and normal heart sounds.   Pulmonary/Chest: Effort normal and breath sounds normal. No respiratory distress. She has no wheezes. She has no rales.   Abdominal: Soft. Bowel sounds are normal. She exhibits no distension. There is no abdominal tenderness.   Musculoskeletal: Normal range of motion.         General: No edema.   Neurological: She is alert and oriented to person, place, and time.   Skin: Skin is warm and dry. No rash noted.   Psychiatric: She has a normal mood and affect.     Lab Results   Component Value Date/Time    SODIUM 143 12/31/2019 04:18 AM    POTASSIUM 4.2 12/31/2019 04:18 AM    CHLORIDE 108 12/31/2019 04:18 AM    CO2 27 12/31/2019 04:18 AM    GLUCOSE 114 (H) 12/31/2019 04:18 AM    BUN 10 12/31/2019 04:18 AM    CREATININE 0.45 (L) 12/31/2019 04:18 AM     Lab Results   Component Value Date/Time    WBC 6.5 12/31/2019 04:18 AM    RBC 4.65 12/31/2019 04:18 AM    HEMOGLOBIN 13.9 12/31/2019 04:18 AM    HEMATOCRIT 43.6 12/31/2019 04:18 AM    MCV 93.8 12/31/2019 04:18 AM    MCH 29.9 12/31/2019 04:18 AM    MCHC 31.9 (L) 12/31/2019 04:18 AM    MPV 10.2 12/31/2019 04:18 AM      Lab Results   Component Value Date/Time    CHOLSTRLTOT 209 (H) 04/26/2018 12:02 PM     (H) 04/26/2018 12:02 PM    " HDL 75 04/26/2018 12:02 PM    TRIGLYCERIDE 96 04/26/2018 12:02 PM         Assessment:     1. Morbid obesity with BMI of 50.0-59.9, adult (HCC)     2. Paroxysmal atrial fibrillation (HCC)     3. SVT (supraventricular tachycardia) (HCC)     4. Chronic anticoagulation     5. Essential hypertension     6. Hyperlipidemia, unspecified hyperlipidemia type     7. Bilateral chronic knee pain     8. Gastroesophageal reflux disease without esophagitis     9. Hypothyroidism, postsurgical         Medical Decision Making:  Today's Assessment / Status / Plan:     1. Morbid obesity, status post gastric sleeve surgery in December 2019, weight is down 30+ pounds. She is congratulated on her effort. We will watch her BP and HR accordingly as she continues to lose weight, as we may have to titrate her medications.    2. Paroxysmal atrial fibrillation/PSVT in sinus rhythm today on Toprol.    3. Chronic anticoagulation with Eliquis. No bleeding problems.    4. Hypertension, treated and stable. To call us and let us know exact dose of Losartan.    5. Hyperlipidemia, treated with Lipitor. Will recheck lipids at next follow-up.    6. Bilateral knee pain and hip pain, followed by orthopedics.    7. GERD, treated and stable.    8. Hypothyroidism, followed by endocrinology. Results have indicated hyperthyroidism recently.    Same medications for now. Watch BP and HR at home; to FU with me in 3 months, and may decrease doses, if she continues to lose weight.    Collaborating MD: Juan

## 2020-02-12 NOTE — TELEPHONE ENCOUNTER
Patient just wants to make doctor aware that she got her labs done today and did not have any biotin supplements 2 days prior to test.

## 2020-02-14 ENCOUNTER — TELEPHONE (OUTPATIENT)
Dept: ENDOCRINOLOGY | Facility: MEDICAL CENTER | Age: 65
End: 2020-02-14

## 2020-02-14 NOTE — TELEPHONE ENCOUNTER
Phone Number Called: [phone]    Call outcome: Spoke to patient regarding message below.    Message: Spoke with patient to discuss lab results. I told patient Dr. Hyman said he believed thyroid leve might still be a bit high even though her numbers were in the normal range. He wanted her to continue with the same dose and to repeat labs in a month. Patient understood and had no other questions. She wanted Dr. Hyman to order labs so that she could have them done in a month.

## 2020-02-14 NOTE — TELEPHONE ENCOUNTER
----- Message from Kamran Hyman M.D. sent at 2/13/2020  7:03 PM PST -----  Please tell patient thyroid level might still be a little bit high even though the T4 and T3 are in the normal range.  As her weight comes down her thyroid requirement will come down also.  If she does not feel as though she has excessive thyroid effect we will leave the dose as is and repeat thyroid levels in 1 month.  If she disagrees let me know.

## 2020-02-17 DIAGNOSIS — E89.0 HYPOTHYROIDISM, POSTSURGICAL: ICD-10-CM

## 2020-02-19 RX ORDER — LOSARTAN POTASSIUM 50 MG/1
TABLET ORAL
Qty: 90 TAB | Refills: 2 | Status: SHIPPED | OUTPATIENT
Start: 2020-02-19 | End: 2020-08-19

## 2020-02-19 NOTE — TELEPHONE ENCOUNTER
Received request via: Pharmacy    Was the patient seen in the last year in this department? Yes    Patients next Appointment:  4/20/2020     Requested Prescriptions     Pending Prescriptions Disp Refills   • losartan (COZAAR) 50 MG Tab [Pharmacy Med Name: LOSARTAN POTASSIUM 50 MG TAB] 90 Tab 2     Sig: TAKE ONE TABLET BY MOUTH DAILY

## 2020-03-10 ENCOUNTER — OFFICE VISIT (OUTPATIENT)
Dept: MEDICAL GROUP | Facility: MEDICAL CENTER | Age: 65
End: 2020-03-10
Payer: COMMERCIAL

## 2020-03-10 VITALS
SYSTOLIC BLOOD PRESSURE: 118 MMHG | HEART RATE: 68 BPM | OXYGEN SATURATION: 93 % | RESPIRATION RATE: 16 BRPM | HEIGHT: 64 IN | WEIGHT: 285 LBS | TEMPERATURE: 98.2 F | BODY MASS INDEX: 48.65 KG/M2 | DIASTOLIC BLOOD PRESSURE: 64 MMHG

## 2020-03-10 DIAGNOSIS — J32.4 CHRONIC PANSINUSITIS: ICD-10-CM

## 2020-03-10 DIAGNOSIS — Z11.59 ENCOUNTER FOR HEPATITIS C SCREENING TEST FOR LOW RISK PATIENT: ICD-10-CM

## 2020-03-10 DIAGNOSIS — R73.03 PREDIABETES: ICD-10-CM

## 2020-03-10 DIAGNOSIS — M25.562 BILATERAL CHRONIC KNEE PAIN: ICD-10-CM

## 2020-03-10 DIAGNOSIS — R53.83 OTHER FATIGUE: ICD-10-CM

## 2020-03-10 DIAGNOSIS — G89.29 BILATERAL CHRONIC KNEE PAIN: ICD-10-CM

## 2020-03-10 DIAGNOSIS — E66.01 MORBID OBESITY WITH BMI OF 45.0-49.9, ADULT (HCC): ICD-10-CM

## 2020-03-10 DIAGNOSIS — M25.561 BILATERAL CHRONIC KNEE PAIN: ICD-10-CM

## 2020-03-10 DIAGNOSIS — M25.551 RIGHT HIP PAIN: ICD-10-CM

## 2020-03-10 PROCEDURE — 99214 OFFICE O/P EST MOD 30 MIN: CPT | Performed by: PHYSICIAN ASSISTANT

## 2020-03-10 RX ORDER — OXYCODONE AND ACETAMINOPHEN 10; 325 MG/1; MG/1
1 TABLET ORAL EVERY 8 HOURS PRN
Qty: 90 TAB | Refills: 0 | Status: SHIPPED | OUTPATIENT
Start: 2020-05-10 | End: 2020-03-10 | Stop reason: SDUPTHER

## 2020-03-10 RX ORDER — OXYCODONE AND ACETAMINOPHEN 10; 325 MG/1; MG/1
1 TABLET ORAL EVERY 8 HOURS PRN
Qty: 90 TAB | Refills: 0 | Status: SHIPPED | OUTPATIENT
Start: 2020-06-09 | End: 2020-07-01 | Stop reason: SDUPTHER

## 2020-03-10 RX ORDER — DOXYCYCLINE HYCLATE 100 MG
100 TABLET ORAL 2 TIMES DAILY
Qty: 20 TAB | Refills: 0 | Status: SHIPPED | OUTPATIENT
Start: 2020-03-10 | End: 2020-04-06 | Stop reason: SDUPTHER

## 2020-03-10 RX ORDER — OXYCODONE AND ACETAMINOPHEN 10; 325 MG/1; MG/1
1 TABLET ORAL EVERY 8 HOURS PRN
Qty: 90 TAB | Refills: 0 | Status: SHIPPED | OUTPATIENT
Start: 2020-04-10 | End: 2020-03-10 | Stop reason: SDUPTHER

## 2020-03-10 ASSESSMENT — FIBROSIS 4 INDEX: FIB4 SCORE: 1.38

## 2020-03-10 NOTE — ASSESSMENT & PLAN NOTE
Complains of fatigue over the past few weeks.  Possibly longer.  She does have a history of hypothyroidism.  Has labs ordered by her endocrinologist.  Like to check some vitamins as well as other labs.

## 2020-03-10 NOTE — ASSESSMENT & PLAN NOTE
Chronic condition.  Over the past few weeks been dealing with worsening sinus pressure and pain.  Usually maxillary and frontal sinuses.  The past she has been given multiple antibiotics which have been effective but her symptoms do improve.  She has a history of allergies and takes allergy medications.  No fevers.

## 2020-03-10 NOTE — ASSESSMENT & PLAN NOTE
Chronic condition.  She still taking oxycodone 10 mg every 8 hours.  States that her medication is due tomorrow.  She has some concerns about when the medication needs to be picked up but I advised her that I will only write 30 days at a time and whether or not the pharmacy gives her the medications up to them.  She is a month early to get refills from me but is requesting a 3-month supply at this time so she does not have to follow-up next month.

## 2020-03-10 NOTE — ASSESSMENT & PLAN NOTE
This is a 64-year-old female who has continued right hip.  Chronic condition.  She has had multiple injections in the hip performed by orthopedics have been unsuccessful.  Also 1 of the injections caused exacerbation of pain.  She states that she has had x-rays done by orthopedics that showed arthritis but pain is not arthritic.  Pain is worse when walking.  Pain does not radiate.

## 2020-03-18 RX ORDER — LEVOTHYROXINE SODIUM 100 MCG
TABLET ORAL
Qty: 30 TAB | Refills: 2 | Status: SHIPPED | OUTPATIENT
Start: 2020-03-18 | End: 2020-06-12

## 2020-03-18 RX ORDER — ATORVASTATIN CALCIUM 40 MG/1
TABLET, FILM COATED ORAL
Qty: 90 TAB | Refills: 1 | Status: SHIPPED | OUTPATIENT
Start: 2020-03-18 | End: 2020-09-15

## 2020-03-18 NOTE — TELEPHONE ENCOUNTER
Received request via: Pharmacy    Was the patient seen in the last year in this department? Yes    Does the patient have an active prescription (recently filled or refills available) for medication(s) requested? No     SYNTHROID 100 MCG Tab        Sig: TAKE ONE TABLET BY MOUTH EVERY MORNING ON AN EMPTY STOMACH

## 2020-04-06 DIAGNOSIS — J32.4 CHRONIC PANSINUSITIS: ICD-10-CM

## 2020-04-06 RX ORDER — DOXYCYCLINE HYCLATE 100 MG
100 TABLET ORAL 2 TIMES DAILY
Qty: 20 TAB | Refills: 0 | Status: SHIPPED | OUTPATIENT
Start: 2020-04-06 | End: 2020-04-16

## 2020-04-06 RX ORDER — MONTELUKAST SODIUM 10 MG/1
TABLET ORAL
Qty: 90 TAB | Refills: 0 | Status: SHIPPED | OUTPATIENT
Start: 2020-04-06 | End: 2020-06-17

## 2020-04-09 RX ORDER — POTASSIUM CHLORIDE 600 MG/1
8 TABLET, FILM COATED, EXTENDED RELEASE ORAL DAILY
Qty: 90 TAB | Refills: 1 | Status: SHIPPED | OUTPATIENT
Start: 2020-04-09 | End: 2020-10-18

## 2020-04-22 DIAGNOSIS — R11.0 NAUSEA: ICD-10-CM

## 2020-04-22 RX ORDER — ONDANSETRON 4 MG/1
TABLET, FILM COATED ORAL
Qty: 30 TAB | Refills: 4 | Status: SHIPPED
Start: 2020-04-22 | End: 2020-08-04

## 2020-04-22 RX ORDER — ONDANSETRON 4 MG/1
TABLET, ORALLY DISINTEGRATING ORAL
Qty: 30 TAB | Refills: 4 | Status: SHIPPED
Start: 2020-04-22 | End: 2020-08-04

## 2020-05-13 ENCOUNTER — APPOINTMENT (OUTPATIENT)
Dept: CARDIOLOGY | Facility: PHYSICIAN GROUP | Age: 65
End: 2020-05-13
Payer: COMMERCIAL

## 2020-05-28 NOTE — TELEPHONE ENCOUNTER
Received request via: Patient    Was the patient seen in the last year in this department? Yes    Patients next Appointment:  7/7/2020     Requested Prescriptions     Pending Prescriptions Disp Refills   • sertraline (ZOLOFT) 100 MG Tab 120 Tab 1     Sig: Take 0.5 Tabs by mouth 2 times a day.         Patient States Medication At 100mg is on Back order.    Donte Dawson, Med Ass't

## 2020-05-29 RX ORDER — SERTRALINE HYDROCHLORIDE 100 MG/1
50 TABLET, FILM COATED ORAL 2 TIMES DAILY
Qty: 120 TAB | Refills: 1 | Status: SHIPPED | OUTPATIENT
Start: 2020-05-29 | End: 2020-08-04 | Stop reason: SDUPTHER

## 2020-06-12 RX ORDER — LEVOTHYROXINE SODIUM 100 MCG
TABLET ORAL
Qty: 30 TAB | Refills: 1 | Status: SHIPPED | OUTPATIENT
Start: 2020-06-12 | End: 2020-08-19

## 2020-06-12 NOTE — TELEPHONE ENCOUNTER
Received request via: Patient    Was the patient seen in the last year in this department? Yes    Does the patient have an active prescription (recently filled or refills available) for medication(s) requested? No     SYNTHROID 100 MCG Tab

## 2020-06-17 RX ORDER — MONTELUKAST SODIUM 10 MG/1
TABLET ORAL
Qty: 90 TAB | Refills: 2 | Status: SHIPPED | OUTPATIENT
Start: 2020-06-17 | End: 2021-03-22

## 2020-06-17 NOTE — TELEPHONE ENCOUNTER
Received request via: Pharmacy    Was the patient seen in the last year in this department? Yes    Does the patient have an active prescription (recently filled or refills available) for medication(s) requested? No     Requested Prescriptions     Pending Prescriptions Disp Refills   • montelukast (SINGULAIR) 10 MG Tab [Pharmacy Med Name: MONTELUKAST SOD 10 MG TABLET] 90 Tab 0     Sig: TAKE ONE TABLET BY MOUTH DAILY

## 2020-06-25 RX ORDER — OMEPRAZOLE 40 MG/1
CAPSULE, DELAYED RELEASE ORAL
Qty: 90 CAP | Refills: 0 | Status: SHIPPED
Start: 2020-06-25 | End: 2020-08-04

## 2020-06-25 NOTE — TELEPHONE ENCOUNTER
Received request via: Pharmacy    Was the patient seen in the last year in this department? Yes    Patients next Appointment:  7/7/2020     Requested Prescriptions     Pending Prescriptions Disp Refills   • omeprazole (PRILOSEC) 40 MG delayed-release capsule [Pharmacy Med Name: OMEPRAZOLE DR 40 MG CAPSULE] 90 Cap 0     Sig: TAKE ONE CAPSULE BY MOUTH DAILY  *GENERIC FOR PRILOSEC*

## 2020-07-01 ENCOUNTER — OFFICE VISIT (OUTPATIENT)
Dept: MEDICAL GROUP | Facility: MEDICAL CENTER | Age: 65
End: 2020-07-01
Payer: COMMERCIAL

## 2020-07-01 VITALS
HEART RATE: 63 BPM | HEIGHT: 63 IN | TEMPERATURE: 97.7 F | RESPIRATION RATE: 14 BRPM | DIASTOLIC BLOOD PRESSURE: 78 MMHG | WEIGHT: 266.76 LBS | OXYGEN SATURATION: 97 % | SYSTOLIC BLOOD PRESSURE: 130 MMHG | BODY MASS INDEX: 47.27 KG/M2

## 2020-07-01 DIAGNOSIS — M25.561 BILATERAL CHRONIC KNEE PAIN: ICD-10-CM

## 2020-07-01 DIAGNOSIS — M25.551 RIGHT HIP PAIN: ICD-10-CM

## 2020-07-01 DIAGNOSIS — G89.29 BILATERAL CHRONIC KNEE PAIN: ICD-10-CM

## 2020-07-01 DIAGNOSIS — R21 RASH: ICD-10-CM

## 2020-07-01 DIAGNOSIS — M25.562 BILATERAL CHRONIC KNEE PAIN: ICD-10-CM

## 2020-07-01 DIAGNOSIS — J32.4 CHRONIC PANSINUSITIS: ICD-10-CM

## 2020-07-01 PROBLEM — L30.9 DERMATITIS: Status: ACTIVE | Noted: 2020-07-01

## 2020-07-01 PROCEDURE — 99214 OFFICE O/P EST MOD 30 MIN: CPT | Performed by: NURSE PRACTITIONER

## 2020-07-01 RX ORDER — DOXYCYCLINE HYCLATE 100 MG
100 TABLET ORAL 2 TIMES DAILY
Qty: 20 TAB | Refills: 0 | Status: SHIPPED
Start: 2020-07-01 | End: 2020-08-04

## 2020-07-01 RX ORDER — OXYCODONE AND ACETAMINOPHEN 10; 325 MG/1; MG/1
1 TABLET ORAL EVERY 8 HOURS PRN
Qty: 90 TAB | Refills: 0 | Status: SHIPPED | OUTPATIENT
Start: 2020-07-09 | End: 2020-07-07 | Stop reason: SDUPTHER

## 2020-07-01 RX ORDER — TRIAMCINOLONE ACETONIDE 1 MG/G
2-4 CREAM TOPICAL 2 TIMES DAILY
Qty: 1 TUBE | Refills: 0 | Status: SHIPPED | OUTPATIENT
Start: 2020-07-01 | End: 2023-02-17

## 2020-07-01 ASSESSMENT — FIBROSIS 4 INDEX: FIB4 SCORE: 1.38

## 2020-07-01 NOTE — ASSESSMENT & PLAN NOTE
Recurrent difficulty with pruritic rash on the left buttock, ongoing for quite some time.  She is questioning if there may be a topical medication she could use.  No change in hygiene products, no acute pain, no drainage

## 2020-07-01 NOTE — ASSESSMENT & PLAN NOTE
Patient states that she is had recurrent difficulty with sinusitis as well as seasonal allergies.  States that she is using Zyrtec, Singulair, Flonase but still having persistent congestion.  About 10 days ago her symptoms became worse and she developed pain and pressure in the bilateral face as well as headache, she is concerned that she has bacterial infection at this time.  No fever, chills, cough, vision change

## 2020-07-01 NOTE — PROGRESS NOTES
Subjective:     Chief Complaint   Patient presents with   • Other     skin spots on R cheek   • Other     req antibiotic/ sinus congestion, ears hurt at night   • Medication Refill     Alfonso Posada is a 64 y.o. female with multiple concerns today    Chronic pansinusitis  Patient states that she is had recurrent difficulty with sinusitis as well as seasonal allergies.  States that she is using Zyrtec, Singulair, Flonase but still having persistent congestion.  About 10 days ago her symptoms became worse and she developed pain and pressure in the bilateral face as well as headache, she is concerned that she has bacterial infection at this time.  No fever, chills, cough, vision change    Right hip pain  This is a chronic issue which has been followed by her primary care provider BRITTNEY Madison.  States that she has had multiple hip injections with limited success, she understands that her weight may be contributing and is actively working on losing weight.  She has lost 60 pounds since gastric sleeve placement in December.  At this time her pain is managed with oxycodone  mg 1 tab 3 times daily.  She is requesting refill today.  This is been managed by her PCP who is currently out of the office, her visit was rescheduled with myself.  I have reviewed her  report which shows her next prescription will be due to start 7/9/2020  She reports good pain relief with medication, denies any concerning side effects including mood change, sedation, constipation.  States that she does not drive or drink alcohol with medication.  I do see that she has an active prescription for alprazolam which is also managed by PCP.  Her UDS is up-to-date    Rash  Recurrent difficulty with pruritic rash on the left buttock, ongoing for quite some time.  She is questioning if there may be a topical medication she could use.  No change in hygiene products, no acute pain, no drainage  Additionally, she has a small red spot on the  right face which just appeared in the last few days, no associated pain.    Current medicines (including changes today)  Current Outpatient Medications   Medication Sig Dispense Refill   • doxycycline (VIBRAMYCIN) 100 MG Tab Take 1 Tab by mouth 2 times a day. 20 Tab 0   • triamcinolone acetonide (KENALOG) 0.1 % Cream Apply 2-4 g to affected area(s) 2 times a day. 1 Tube 0   • [START ON 7/9/2020] oxyCODONE-acetaminophen (PERCOCET-10)  MG Tab Take 1 Tab by mouth every 8 hours as needed for Severe Pain for up to 30 days. 90 Tab 0   • montelukast (SINGULAIR) 10 MG Tab TAKE ONE TABLET BY MOUTH DAILY 90 Tab 2   • SYNTHROID 100 MCG Tab TAKE ONE TABLET BY MOUTH EVERY MORNING ON AN EMPTY STOMACH 30 Tab 1   • sertraline (ZOLOFT) 100 MG Tab Take 0.5 Tabs by mouth 2 times a day. 120 Tab 1   • ondansetron (ZOFRAN) 4 MG Tab tablet TAKE ONE TABLET BY MOUTH EVERY 4 HOURS AS NEEDED FOR NAUSEA AND VOMITING 30 Tab 4   • ondansetron (ZOFRAN ODT) 4 MG TABLET DISPERSIBLE DISSOLVE ONE TABLET BY MOUTH EVERY 8 HOURS AS NEEDED FOR NAUSEA AND VOMITING 30 Tab 4   • potassium chloride (KLOR-CON) 8 MEQ tablet Take 1 Tab by mouth every day. 90 Tab 1   • atorvastatin (LIPITOR) 40 MG Tab TAKE ONE TABLET BY MOUTH DAILY 90 Tab 1   • losartan (COZAAR) 50 MG Tab TAKE ONE TABLET BY MOUTH DAILY 90 Tab 2   • clotrimazole (LOTRIMIN) 1 % Cream Apply twice daily to foot areas of rash. 1 Tube 0   • fluticasone (FLONASE) 50 MCG/ACT nasal spray Spray 1 Spray in nose as needed. Indications: Signs and Symptoms of Nose Diseases     • metoprolol (LOPRESSOR) 50 MG Tab Take 50 mg by mouth 2 times a day.     • diphenhydrAMINE (BENADRYL) 25 MG Tab Take 25 mg by mouth every 6 hours as needed. Indications: Hayfever     • apixaban (ELIQUIS) 5mg Tab Take 1 Tab by mouth 2 Times a Day. 180 Tab 1   • MAGNESIUM OXIDE PO Take 3 Tabs by mouth every 48 hours. Indications: (MAG-7)     • cetirizine (ZYRTEC) 10 MG Tab Take 10 mg by mouth every evening.     • acetaminophen  "(TYLENOL) 500 MG Tab Take 1,000 mg by mouth 3 times a day as needed for Moderate Pain.     • omeprazole (PRILOSEC) 40 MG delayed-release capsule TAKE ONE CAPSULE BY MOUTH DAILY  *GENERIC FOR PRILOSEC* 90 Cap 0   • triamcinolone acetonide (KENALOG) 0.5 % Cream Apply twice daily sparingly to areas of concern. (Patient not taking: Reported on 7/1/2020) 60 g 0   • omeprazole (PRILOSEC) 20 MG delayed-release capsule Take 1 Cap by mouth every day. Open capsule and pour contents in a liquid for 1-2 weeks postop 30 Cap 2   • triamcinolone acetonide (KENALOG) 0.1 % Cream Apply 1 Application to affected area(s) as needed (Apply's to right foot).       No current facility-administered medications for this visit.      She  has a past medical history of Anesthesia, Anxiety disorder, Arthritis, Asthma, Atrial fibrillation ([facility]) (12/2018), Bartholin cyst, Chronic knee pain, Depression, GERD (gastroesophageal reflux disease), Graves disease, Heart murmur, Hemorrhagic disorder ([facility]), Hyperlipidemia, Hypertension, Hypothyroidism, postsurgical ( ), Infectious disease (2017), Morbid obesity ([facility]) (12/2019), Postherpetic neuralgia, Psychiatric disorder, SVT (supraventricular tachycardia) ([facility]), and Uterine cancer ([facility]).    ROS included above     Objective:     /78 (BP Location: Other (Comment), Patient Position: Sitting, BP Cuff Size: Adult)   Pulse 63   Temp 36.5 °C (97.7 °F) (Temporal)   Resp 14   Ht 1.6 m (5' 3\")   Wt 121 kg (266 lb 12.1 oz)   SpO2 97%  Body mass index is 47.25 kg/m².     Physical Exam:  General: Alert, oriented in no acute distress.  Eye contact is good, speech is normal, affect calm  HEENT: Bilateral TMs with clear effusion, good landmarks, no erythema.  Nares with clear mucus.  Tenderness over bilateral maxillary and frontal sinuses.  No tonsillar cervical lymphadenopathy  Lungs: clear to auscultation bilaterally, normal effort, no wheeze/ rhonchi/ rales.  CV: regular rate and rhythm, S1, S2, no " murmur  Ext: Left buttock with patch of erythema and scaling, no lesions or drainage.  No edema, color normal, vascularity normal, temperature normal    Assessment and Plan:   The following treatment plan was discussed  1. Right hip pain   chronic issue currently managed on opiates prescribed by her PCP.  Her provider is currently out of the office and she was unable to get an appointment by the time she is due for refill.  I will go ahead and provide her with 1 month but have stressed that she needs to follow-up with her PCP for further prescriptions.   report reviewed, UDS up-to-date.  Risks of opiate prescriptions reviewed  oxyCODONE-acetaminophen (PERCOCET-10)  MG Tab   2. Bilateral chronic knee pain  oxyCODONE-acetaminophen (PERCOCET-10)  MG Tab   3. Chronic pansinusitis   acute worsening of congestion, pain in the face, headache about 10 days ago.  Will cover for potential bacterial component with doxycycline, encouraged to continue with Flonase, Zyrtec.  Follow-up if not improving  doxycycline (VIBRAMYCIN) 100 MG Tab   4. Rash   dry erythematous patch on the left buttock.  Skin care reviewed, will try:  triamcinolone acetonide (KENALOG) 0.1 % Cream       Followup: 1 month with primary care provider       Please note that this dictation was created using voice recognition software. I have worked with consultants from the vendor as well as technical experts from [employer] to optimize the interface. I have made every reasonable attempt to correct obvious errors, but I expect that there are errors of grammar and possibly content that I did not discover before finalizing the note.

## 2020-07-01 NOTE — ASSESSMENT & PLAN NOTE
This is a chronic issue which has been followed by her primary care provider BRITTNEY Madison.  States that she has had multiple hip injections with limited success, she understands that her weight may be contributing and is actively working on losing weight.  She has lost 60 pounds since gastric sleeve placement in December.  At this time her pain is managed with oxycodone  mg 1 tab 3 times daily.  She is requesting refill today.  This is been managed by her PCP who is currently out of the office, her visit was rescheduled with myself.  I have reviewed her  report which shows her next prescription will be due to start 7/9/2020  She reports good pain relief with medication, denies any concerning side effects including mood change, sedation, constipation.  States that she does not drive or drink alcohol with medication.  I do see that she has an active prescription for alprazolam which is also managed by PCP.  Her UDS is up-to-date

## 2020-07-07 DIAGNOSIS — M25.551 RIGHT HIP PAIN: ICD-10-CM

## 2020-07-07 DIAGNOSIS — M25.561 BILATERAL CHRONIC KNEE PAIN: ICD-10-CM

## 2020-07-07 DIAGNOSIS — G89.29 BILATERAL CHRONIC KNEE PAIN: ICD-10-CM

## 2020-07-07 DIAGNOSIS — M25.562 BILATERAL CHRONIC KNEE PAIN: ICD-10-CM

## 2020-07-07 RX ORDER — OXYCODONE AND ACETAMINOPHEN 10; 325 MG/1; MG/1
1 TABLET ORAL EVERY 8 HOURS PRN
Qty: 90 TAB | Refills: 0 | Status: SHIPPED | OUTPATIENT
Start: 2020-07-09 | End: 2020-07-07 | Stop reason: SDUPTHER

## 2020-07-07 RX ORDER — OXYCODONE AND ACETAMINOPHEN 10; 325 MG/1; MG/1
1 TABLET ORAL EVERY 8 HOURS PRN
Qty: 90 TAB | Refills: 0 | Status: SHIPPED | OUTPATIENT
Start: 2020-07-07 | End: 2020-08-04 | Stop reason: SDUPTHER

## 2020-07-07 NOTE — TELEPHONE ENCOUNTER
Received request via: Pharmacy    Was the patient seen in the last year in this department? Yes    Does the patient have an active prescription (recently filled or refills available) for medication(s) requested? Yes. Pharmacy is requesting to change start date to 7/7/20 and not 7/9/20. Pharmacy stated they can not do 7/9/20

## 2020-07-20 DIAGNOSIS — F41.9 ANXIETY: ICD-10-CM

## 2020-07-20 RX ORDER — ALPRAZOLAM 0.5 MG/1
TABLET ORAL
Qty: 60 TAB | Refills: 0 | Status: SHIPPED | OUTPATIENT
Start: 2020-07-20 | End: 2020-08-04 | Stop reason: SDUPTHER

## 2020-07-20 NOTE — TELEPHONE ENCOUNTER
Received request via: Pharmacy    Was the patient seen in the last year in this department? Yes    Patients next Appointment:  8/4/2020     Requested Prescriptions     Pending Prescriptions Disp Refills   • ALPRAZolam (XANAX) 0.5 MG Tab [Pharmacy Med Name: ALPRAZolam 0.5 MG TABLET] 60 Tab 0     Sig: TAKE ONE TABLET BY MOUTH TWICE A DAY AS NEEDED FOR ANXIETY FOR UP TO 30 DAYS - XANAX

## 2020-07-31 ENCOUNTER — HOSPITAL ENCOUNTER (OUTPATIENT)
Facility: MEDICAL CENTER | Age: 65
End: 2020-07-31
Attending: INTERNAL MEDICINE
Payer: COMMERCIAL

## 2020-07-31 ENCOUNTER — HOSPITAL ENCOUNTER (OUTPATIENT)
Dept: LAB | Facility: MEDICAL CENTER | Age: 65
End: 2020-07-31
Attending: PHYSICIAN ASSISTANT
Payer: COMMERCIAL

## 2020-07-31 DIAGNOSIS — R73.03 PREDIABETES: ICD-10-CM

## 2020-07-31 DIAGNOSIS — R53.83 OTHER FATIGUE: ICD-10-CM

## 2020-07-31 DIAGNOSIS — E89.0 HYPOTHYROIDISM, POSTSURGICAL: ICD-10-CM

## 2020-07-31 DIAGNOSIS — Z11.59 ENCOUNTER FOR HEPATITIS C SCREENING TEST FOR LOW RISK PATIENT: ICD-10-CM

## 2020-07-31 LAB
25(OH)D3 SERPL-MCNC: 24 NG/ML (ref 30–100)
ALBUMIN SERPL BCP-MCNC: 4.1 G/DL (ref 3.2–4.9)
ALBUMIN/GLOB SERPL: 1.6 G/DL
ALP SERPL-CCNC: 89 U/L (ref 30–99)
ALT SERPL-CCNC: 20 U/L (ref 2–50)
ANION GAP SERPL CALC-SCNC: 16 MMOL/L (ref 7–16)
AST SERPL-CCNC: 20 U/L (ref 12–45)
BASOPHILS # BLD AUTO: 0.2 % (ref 0–1.8)
BASOPHILS # BLD: 0.02 K/UL (ref 0–0.12)
BILIRUB SERPL-MCNC: 0.4 MG/DL (ref 0.1–1.5)
BUN SERPL-MCNC: 20 MG/DL (ref 8–22)
CALCIUM SERPL-MCNC: 8.8 MG/DL (ref 8.5–10.5)
CHLORIDE SERPL-SCNC: 106 MMOL/L (ref 96–112)
CO2 SERPL-SCNC: 20 MMOL/L (ref 20–33)
CREAT SERPL-MCNC: 0.58 MG/DL (ref 0.5–1.4)
EOSINOPHIL # BLD AUTO: 0.02 K/UL (ref 0–0.51)
EOSINOPHIL NFR BLD: 0.2 % (ref 0–6.9)
ERYTHROCYTE [DISTWIDTH] IN BLOOD BY AUTOMATED COUNT: 53.3 FL (ref 35.9–50)
GLOBULIN SER CALC-MCNC: 2.6 G/DL (ref 1.9–3.5)
GLUCOSE SERPL-MCNC: 106 MG/DL (ref 65–99)
HCT VFR BLD AUTO: 48.6 % (ref 37–47)
HCV AB SER QL: NORMAL
HGB BLD-MCNC: 15.7 G/DL (ref 12–16)
IMM GRANULOCYTES # BLD AUTO: 0.03 K/UL (ref 0–0.11)
IMM GRANULOCYTES NFR BLD AUTO: 0.3 % (ref 0–0.9)
LYMPHOCYTES # BLD AUTO: 2.15 K/UL (ref 1–4.8)
LYMPHOCYTES NFR BLD: 19.3 % (ref 22–41)
MCH RBC QN AUTO: 31.7 PG (ref 27–33)
MCHC RBC AUTO-ENTMCNC: 32.3 G/DL (ref 33.6–35)
MCV RBC AUTO: 98.2 FL (ref 81.4–97.8)
MONOCYTES # BLD AUTO: 0.8 K/UL (ref 0–0.85)
MONOCYTES NFR BLD AUTO: 7.2 % (ref 0–13.4)
NEUTROPHILS # BLD AUTO: 8.12 K/UL (ref 2–7.15)
NEUTROPHILS NFR BLD: 72.8 % (ref 44–72)
NRBC # BLD AUTO: 0 K/UL
NRBC BLD-RTO: 0 /100 WBC
PLATELET # BLD AUTO: 298 K/UL (ref 164–446)
PMV BLD AUTO: 11.1 FL (ref 9–12.9)
POTASSIUM SERPL-SCNC: 3.9 MMOL/L (ref 3.6–5.5)
PROT SERPL-MCNC: 6.7 G/DL (ref 6–8.2)
RBC # BLD AUTO: 4.95 M/UL (ref 4.2–5.4)
SODIUM SERPL-SCNC: 142 MMOL/L (ref 135–145)
T4 FREE SERPL-MCNC: 1.58 NG/DL (ref 0.93–1.7)
TSH SERPL DL<=0.005 MIU/L-ACNC: 0.66 UIU/ML (ref 0.38–5.33)
TSH SERPL DL<=0.005 MIU/L-ACNC: 0.68 UIU/ML (ref 0.38–5.33)
VIT B12 SERPL-MCNC: 491 PG/ML (ref 211–911)
WBC # BLD AUTO: 11.1 K/UL (ref 4.8–10.8)

## 2020-07-31 PROCEDURE — 82306 VITAMIN D 25 HYDROXY: CPT

## 2020-07-31 PROCEDURE — 84443 ASSAY THYROID STIM HORMONE: CPT

## 2020-07-31 PROCEDURE — 82607 VITAMIN B-12: CPT

## 2020-07-31 PROCEDURE — 84439 ASSAY OF FREE THYROXINE: CPT

## 2020-07-31 PROCEDURE — 83036 HEMOGLOBIN GLYCOSYLATED A1C: CPT

## 2020-07-31 PROCEDURE — 80053 COMPREHEN METABOLIC PANEL: CPT

## 2020-07-31 PROCEDURE — 84443 ASSAY THYROID STIM HORMONE: CPT | Mod: 91

## 2020-07-31 PROCEDURE — 86803 HEPATITIS C AB TEST: CPT

## 2020-07-31 PROCEDURE — 85025 COMPLETE CBC W/AUTO DIFF WBC: CPT

## 2020-07-31 PROCEDURE — 36415 COLL VENOUS BLD VENIPUNCTURE: CPT

## 2020-08-01 LAB
EST. AVERAGE GLUCOSE BLD GHB EST-MCNC: 111 MG/DL
HBA1C MFR BLD: 5.5 % (ref 0–5.6)

## 2020-08-04 ENCOUNTER — OFFICE VISIT (OUTPATIENT)
Dept: MEDICAL GROUP | Facility: MEDICAL CENTER | Age: 65
End: 2020-08-04
Payer: COMMERCIAL

## 2020-08-04 VITALS
TEMPERATURE: 97.7 F | BODY MASS INDEX: 46.48 KG/M2 | HEART RATE: 60 BPM | RESPIRATION RATE: 16 BRPM | WEIGHT: 262.35 LBS | SYSTOLIC BLOOD PRESSURE: 126 MMHG | DIASTOLIC BLOOD PRESSURE: 64 MMHG | OXYGEN SATURATION: 95 % | HEIGHT: 63 IN

## 2020-08-04 DIAGNOSIS — E66.01 MORBID OBESITY WITH BMI OF 45.0-49.9, ADULT (HCC): ICD-10-CM

## 2020-08-04 DIAGNOSIS — F32.A DEPRESSION, UNSPECIFIED DEPRESSION TYPE: ICD-10-CM

## 2020-08-04 DIAGNOSIS — K21.9 GASTROESOPHAGEAL REFLUX DISEASE WITHOUT ESOPHAGITIS: ICD-10-CM

## 2020-08-04 DIAGNOSIS — M25.551 RIGHT HIP PAIN: ICD-10-CM

## 2020-08-04 DIAGNOSIS — J32.4 CHRONIC PANSINUSITIS: ICD-10-CM

## 2020-08-04 DIAGNOSIS — Z12.31 ENCOUNTER FOR SCREENING MAMMOGRAM FOR BREAST CANCER: ICD-10-CM

## 2020-08-04 DIAGNOSIS — M25.562 BILATERAL CHRONIC KNEE PAIN: ICD-10-CM

## 2020-08-04 DIAGNOSIS — G89.29 BILATERAL CHRONIC KNEE PAIN: ICD-10-CM

## 2020-08-04 DIAGNOSIS — M25.561 BILATERAL CHRONIC KNEE PAIN: ICD-10-CM

## 2020-08-04 DIAGNOSIS — F41.9 ANXIETY: ICD-10-CM

## 2020-08-04 DIAGNOSIS — R11.2 NAUSEA AND VOMITING, INTRACTABILITY OF VOMITING NOT SPECIFIED, UNSPECIFIED VOMITING TYPE: ICD-10-CM

## 2020-08-04 PROBLEM — R73.03 PREDIABETES: Status: RESOLVED | Noted: 2018-11-15 | Resolved: 2020-08-04

## 2020-08-04 PROBLEM — R53.83 OTHER FATIGUE: Status: RESOLVED | Noted: 2020-03-10 | Resolved: 2020-08-04

## 2020-08-04 PROCEDURE — 99214 OFFICE O/P EST MOD 30 MIN: CPT | Performed by: PHYSICIAN ASSISTANT

## 2020-08-04 RX ORDER — ALPRAZOLAM 0.5 MG/1
TABLET ORAL
Qty: 60 TAB | Refills: 2 | Status: SHIPPED | OUTPATIENT
Start: 2020-08-04 | End: 2020-09-03

## 2020-08-04 RX ORDER — LOSARTAN POTASSIUM 25 MG/1
TABLET ORAL
COMMUNITY
Start: 2020-07-20 | End: 2020-08-04

## 2020-08-04 RX ORDER — FUROSEMIDE 40 MG/1
TABLET ORAL
COMMUNITY
Start: 2020-06-11 | End: 2021-10-05 | Stop reason: SDUPTHER

## 2020-08-04 RX ORDER — LIDOCAINE 50 MG/G
PATCH TOPICAL
COMMUNITY
Start: 2020-06-19 | End: 2020-08-04

## 2020-08-04 RX ORDER — OXYCODONE AND ACETAMINOPHEN 10; 325 MG/1; MG/1
1 TABLET ORAL EVERY 8 HOURS PRN
Qty: 90 TAB | Refills: 0 | Status: SHIPPED | OUTPATIENT
Start: 2020-08-05 | End: 2020-08-04 | Stop reason: SDUPTHER

## 2020-08-04 RX ORDER — OXYCODONE AND ACETAMINOPHEN 10; 325 MG/1; MG/1
1 TABLET ORAL EVERY 8 HOURS PRN
Qty: 90 TAB | Refills: 0 | Status: SHIPPED | OUTPATIENT
Start: 2020-09-04 | End: 2020-08-04 | Stop reason: SDUPTHER

## 2020-08-04 RX ORDER — OXYCODONE AND ACETAMINOPHEN 10; 325 MG/1; MG/1
1 TABLET ORAL EVERY 8 HOURS PRN
Qty: 90 TAB | Refills: 0 | Status: SHIPPED | OUTPATIENT
Start: 2020-10-04 | End: 2020-10-29 | Stop reason: SDUPTHER

## 2020-08-04 RX ORDER — SERTRALINE HYDROCHLORIDE 25 MG/1
TABLET, FILM COATED ORAL
COMMUNITY
Start: 2020-07-26 | End: 2020-08-04

## 2020-08-04 RX ORDER — ONDANSETRON 4 MG/1
TABLET, ORALLY DISINTEGRATING ORAL
Qty: 30 TAB | Refills: 4 | Status: SHIPPED | OUTPATIENT
Start: 2020-08-04 | End: 2020-10-29 | Stop reason: SDUPTHER

## 2020-08-04 RX ORDER — AZITHROMYCIN 250 MG/1
TABLET, FILM COATED ORAL
Qty: 6 TAB | Refills: 0 | Status: SHIPPED
Start: 2020-08-04 | End: 2020-08-19

## 2020-08-04 ASSESSMENT — FIBROSIS 4 INDEX: FIB4 SCORE: 0.96

## 2020-08-04 NOTE — ASSESSMENT & PLAN NOTE
Status post bariatric surgery.  BMI currently in the 46 range.  She has lost over 20 pounds since her surgery.  Feels good and expect to lose more weight.

## 2020-08-04 NOTE — ASSESSMENT & PLAN NOTE
This is a 64-year-old female here today to follow-up on her health.  Chronic history of intermittent sinusitis.  Requesting a Z-Sam today.  Takes allergy medication but has recently had worsening headaches.  Denies any fevers.  No coughing.  No known sick contacts.

## 2020-08-04 NOTE — ASSESSMENT & PLAN NOTE
Chronic condition.  Uses dispersible Zofran as needed.  Maybe takes a half a tablet every other day.  Also has reflux which is chronic.  Takes a PPI daily.  Requesting a refill of Zofran.

## 2020-08-04 NOTE — ASSESSMENT & PLAN NOTE
Chronic condition.  Taking Xanax 0.5 mg twice a day.  Medication has been effective.  Also is taking Zoloft for depression and anxiety.  Currently on 50 mg daily.

## 2020-08-04 NOTE — ASSESSMENT & PLAN NOTE
Chronic bilateral knee pain.  Also has other joint pain.  Takes oxycodone as needed.  Medications effective.  Requesting a refill today.

## 2020-08-04 NOTE — PROGRESS NOTES
Subjective:   Alfonso Posada is a 64 y.o. female here today for chronic pansinusitis, chronic bilateral knee pain, nausea vomiting, anxiety, depression and morbid obesity.    Chronic pansinusitis  This is a 64-year-old female here today to follow-up on her health.  Chronic history of intermittent sinusitis.  Requesting a Z-Sam today.  Takes allergy medication but has recently had worsening headaches.  Denies any fevers.  No coughing.  No known sick contacts.    Bilateral chronic knee pain  Chronic bilateral knee pain.  Also has other joint pain.  Takes oxycodone as needed.  Medications effective.  Requesting a refill today.    Nausea & vomiting  Chronic condition.  Uses dispersible Zofran as needed.  Maybe takes a half a tablet every other day.  Also has reflux which is chronic.  Takes a PPI daily.  Requesting a refill of Zofran.    Anxiety  Chronic condition.  Taking Xanax 0.5 mg twice a day.  Medication has been effective.  Also is taking Zoloft for depression and anxiety.  Currently on 50 mg daily.    Morbid obesity with BMI of 45.0-49.9, adult ([facility])  Status post bariatric surgery.  BMI currently in the 46 range.  She has lost over 20 pounds since her surgery.  Feels good and expect to lose more weight.       Current medicines (including changes today)  Current Outpatient Medications   Medication Sig Dispense Refill   • furosemide (LASIX) 40 MG Tab      • azithromycin (ZITHROMAX) 250 MG Tab Take two tablets day one then one tablet day 2-5. 6 Tab 0   • [START ON 10/4/2020] oxyCODONE-acetaminophen (PERCOCET-10)  MG Tab Take 1 Tab by mouth every 8 hours as needed for Severe Pain for up to 30 days. 90 Tab 0   • ALPRAZolam (XANAX) 0.5 MG Tab TAKE ONE TABLET BY MOUTH TWICE A DAY AS NEEDED FOR ANXIETY FOR UP TO 30 DAYS - XANAX 60 Tab 2   • ondansetron (ZOFRAN ODT) 4 MG TABLET DISPERSIBLE DISSOLVE ONE TABLET BY MOUTH EVERY 8 HOURS AS NEEDED FOR NAUSEA AND VOMITING 30 Tab 4   • sertraline (ZOLOFT) 50 MG Tab Take  "1 Tab by mouth every day. 90 Tab 1   • montelukast (SINGULAIR) 10 MG Tab TAKE ONE TABLET BY MOUTH DAILY 90 Tab 2   • atorvastatin (LIPITOR) 40 MG Tab TAKE ONE TABLET BY MOUTH DAILY 90 Tab 1   • losartan (COZAAR) 50 MG Tab TAKE ONE TABLET BY MOUTH DAILY 90 Tab 2   • clotrimazole (LOTRIMIN) 1 % Cream Apply twice daily to foot areas of rash. 1 Tube 0   • omeprazole (PRILOSEC) 20 MG delayed-release capsule Take 1 Cap by mouth every day. Open capsule and pour contents in a liquid for 1-2 weeks postop 30 Cap 2   • metoprolol (LOPRESSOR) 50 MG Tab Take 50 mg by mouth 2 times a day.     • apixaban (ELIQUIS) 5mg Tab Take 1 Tab by mouth 2 Times a Day. 180 Tab 1   • cetirizine (ZYRTEC) 10 MG Tab Take 10 mg by mouth every evening.     • triamcinolone acetonide (KENALOG) 0.1 % Cream Apply 2-4 g to affected area(s) 2 times a day. 1 Tube 0   • SYNTHROID 100 MCG Tab TAKE ONE TABLET BY MOUTH EVERY MORNING ON AN EMPTY STOMACH 30 Tab 1   • potassium chloride (KLOR-CON) 8 MEQ tablet Take 1 Tab by mouth every day. 90 Tab 1     No current facility-administered medications for this visit.      She  has a past medical history of Anesthesia, Anxiety disorder, Arthritis, Asthma, Atrial fibrillation ([facility]) (12/2018), Bartholin cyst, Chronic knee pain, Depression, GERD (gastroesophageal reflux disease), Graves disease, Heart murmur, Hemorrhagic disorder ([facility]), Hyperlipidemia, Hypertension, Hypothyroidism, postsurgical ( ), Infectious disease (2017), Morbid obesity ([facility]) (12/2019), Postherpetic neuralgia, Psychiatric disorder, SVT (supraventricular tachycardia) ([facility]), and Uterine cancer ([facility]).    Social History and Family History were reviewed and updated.    ROS   No chest pain, no shortness of breath, no abdominal pain and all other systems were reviewed and are negative.       Objective:     /64   Pulse 60   Temp 36.5 °C (97.7 °F) (Temporal)   Resp 16   Ht 1.6 m (5' 3\")   Wt 119 kg (262 lb 5.6 oz)   SpO2 95%  Body mass index is " 46.47 kg/m².   Physical Exam:  Constitutional: Alert, no distress.  Skin: Warm, dry, good turgor, no rashes in visible areas.  Eye: Equal, round and reactive, conjunctiva clear, lids normal.  ENMT: Lips without lesions, good dentition, oropharynx clear.  Neck: Trachea midline, no masses.   Lymph: No cervical or supraclavicular lymphadenopathy  Respiratory: Unlabored respiratory effort, lungs appear clear, no wheezes.  Cardiovascular: Regular rate and rhythm.  Psych: Alert and oriented x3, normal affect and mood.        Assessment and Plan:   The following treatment plan was discussed    1. Chronic pansinusitis  Chronic condition.  Recent exacerbation.  Prescribed azithromycin as directed.  Continue allergy medications.  - azithromycin (ZITHROMAX) 250 MG Tab; Take two tablets day one then one tablet day 2-5.  Dispense: 6 Tab; Refill: 0    2. Bilateral chronic knee pain  Chronic condition.  Stable.   reviewed.  Renewed Percocet and provided a 3-month supply.  Morphine milliequivalents currently at 40.  - oxyCODONE-acetaminophen (PERCOCET-10)  MG Tab; Take 1 Tab by mouth every 8 hours as needed for Severe Pain for up to 30 days.  Dispense: 90 Tab; Refill: 0    3. Right hip pain  Chronic condition.  Stable.  Renew oxycodone as directed.  - oxyCODONE-acetaminophen (PERCOCET-10)  MG Tab; Take 1 Tab by mouth every 8 hours as needed for Severe Pain for up to 30 days.  Dispense: 90 Tab; Refill: 0    4. Anxiety  Chronic condition.  Renewed Xanax as directed.   reviewed.  - ALPRAZolam (XANAX) 0.5 MG Tab; TAKE ONE TABLET BY MOUTH TWICE A DAY AS NEEDED FOR ANXIETY FOR UP TO 30 DAYS - XANAX  Dispense: 60 Tab; Refill: 2    5. Gastroesophageal reflux disease without esophagitis  Chronic condition.  Continue PPI daily.    6. Nausea and vomiting, intractability of vomiting not specified, unspecified vomiting type  Chronic condition likely secondary to exacerbation of GERD.  Renewed Zofran dispersible tablets as  directed.  - ondansetron (ZOFRAN ODT) 4 MG TABLET DISPERSIBLE; DISSOLVE ONE TABLET BY MOUTH EVERY 8 HOURS AS NEEDED FOR NAUSEA AND VOMITING  Dispense: 30 Tab; Refill: 4    7. Depression, unspecified depression type  Chronic condition.  Stable.  Renewed sertraline 50 mg daily.  - sertraline (ZOLOFT) 50 MG Tab; Take 1 Tab by mouth every day.  Dispense: 90 Tab; Refill: 1    8. Encounter for screening mammogram for breast cancer  Order mammogram.  Contact imaging.  - MA-SCREENING MAMMO BILAT W/TOMOSYNTHESIS W/CAD; Future    9. Morbid obesity with BMI of 45.0-49.9, adult (HCC)  Chronic condition.  Improvement status post bariatric surgery.  Continue dietary changes.      Followup: Return in about 3 months (around 11/4/2020), or if symptoms worsen or fail to improve.    Please note that this dictation was created using voice recognition software. I have made every reasonable attempt to correct obvious errors, but I expect that there are errors of grammar and possibly content that I did not discover before finalizing the note.

## 2020-08-05 ENCOUNTER — APPOINTMENT (OUTPATIENT)
Dept: CARDIOLOGY | Facility: PHYSICIAN GROUP | Age: 65
End: 2020-08-05
Payer: COMMERCIAL

## 2020-08-17 DIAGNOSIS — R21 RASH: ICD-10-CM

## 2020-08-17 RX ORDER — CLOTRIMAZOLE 1 %
CREAM (GRAM) TOPICAL
Qty: 60 G | Refills: 1 | Status: SHIPPED
Start: 2020-08-17 | End: 2020-10-29

## 2020-08-18 DIAGNOSIS — E89.0 HYPOTHYROIDISM, POSTSURGICAL: ICD-10-CM

## 2020-08-19 ENCOUNTER — OFFICE VISIT (OUTPATIENT)
Dept: CARDIOLOGY | Facility: PHYSICIAN GROUP | Age: 65
End: 2020-08-19
Payer: COMMERCIAL

## 2020-08-19 VITALS
OXYGEN SATURATION: 94 % | HEIGHT: 64 IN | DIASTOLIC BLOOD PRESSURE: 70 MMHG | HEART RATE: 54 BPM | BODY MASS INDEX: 46.1 KG/M2 | WEIGHT: 270 LBS | SYSTOLIC BLOOD PRESSURE: 130 MMHG

## 2020-08-19 DIAGNOSIS — I48.0 PAROXYSMAL ATRIAL FIBRILLATION (HCC): ICD-10-CM

## 2020-08-19 DIAGNOSIS — E89.0 HYPOTHYROIDISM, POSTSURGICAL: ICD-10-CM

## 2020-08-19 DIAGNOSIS — F32.A DEPRESSION, UNSPECIFIED DEPRESSION TYPE: ICD-10-CM

## 2020-08-19 DIAGNOSIS — I47.10 SVT (SUPRAVENTRICULAR TACHYCARDIA) (HCC): ICD-10-CM

## 2020-08-19 DIAGNOSIS — I10 ESSENTIAL HYPERTENSION: ICD-10-CM

## 2020-08-19 DIAGNOSIS — E78.5 HYPERLIPIDEMIA, UNSPECIFIED HYPERLIPIDEMIA TYPE: ICD-10-CM

## 2020-08-19 DIAGNOSIS — Z79.01 CHRONIC ANTICOAGULATION: ICD-10-CM

## 2020-08-19 PROCEDURE — 99214 OFFICE O/P EST MOD 30 MIN: CPT | Performed by: NURSE PRACTITIONER

## 2020-08-19 RX ORDER — LEVOTHYROXINE SODIUM 100 MCG
TABLET ORAL
Qty: 30 TAB | Refills: 0 | Status: SHIPPED | OUTPATIENT
Start: 2020-08-19 | End: 2020-08-24

## 2020-08-19 RX ORDER — LOSARTAN POTASSIUM 50 MG/1
50 TABLET ORAL DAILY
Qty: 90 TAB | Refills: 3
Start: 2020-08-19 | End: 2020-11-05

## 2020-08-19 RX ORDER — LIDOCAINE 50 MG/G
PATCH TOPICAL
COMMUNITY
Start: 2020-08-09 | End: 2021-07-26

## 2020-08-19 ASSESSMENT — ENCOUNTER SYMPTOMS
CHILLS: 0
FEVER: 0
HEARTBURN: 0
BRUISES/BLEEDS EASILY: 0
SHORTNESS OF BREATH: 0
COUGH: 0
LOSS OF CONSCIOUSNESS: 0
PND: 0
DIZZINESS: 0
HEADACHES: 0
MYALGIAS: 0
ORTHOPNEA: 0
INSOMNIA: 0
PALPITATIONS: 0
ABDOMINAL PAIN: 0
NAUSEA: 0

## 2020-08-19 ASSESSMENT — FIBROSIS 4 INDEX: FIB4 SCORE: 0.96

## 2020-08-19 NOTE — PROGRESS NOTES
Chief Complaint   Patient presents with   • Follow-Up   • Atrial Fibrillation   • Anticoagulation   • HTN (Controlled)   • Hyperlipidemia       Subjective:   Alfonso Posada is a 64 y.o. female who presents today for six month follow-up of paroxysmal atrial fibrillation, anticoagulation, hypertension and hyperlipidemia.    Alfonso is a 64 year old female with history of paroxysmal atrial fibrillation in the setting of thyroid disease. She had a thyroidectomy at age 30, and then again (due to tissue regrowth) in March 2019; she is on Toprol XL and Eliquis for anticoagulation. She also has hypertension, hyperlipidemia, asthma, GERD and anxiety.  In late December 2019, she had gastric sleeve surgery by Dr. Ganser, and she has lost a total of 50-60+ pounds, although that has slowed down recently.     She is here today for six month follow-up. On 7/19/2020, she was seen the ER at [facility] for palpitations recently, and found to be in SVT, treated with Adenosine; Metoprolol was increased to 75mg BID, which is helping a lot. Losartan was lowered from 100mg to 50mg once daily, and BP is stable.  She has been feeling much better.  No chest pain, pressure or discomfort; no shortness of breath, orthopnea or PND; no dizziness or syncope; no LE edema. She is trying to exercise, but it has been really too hot lately. She sleeps fairly well.    Past Medical History:   Diagnosis Date   • Anesthesia     States hard to take deep breath afterwards   • Anxiety disorder    • Arthritis     Knees, arms, hips   • Asthma    • Atrial fibrillation (HCC) 12/2018    Echocardiogram with normal LV size, LVEF 55%. Normal RA and RV. Moderately dilated LA. Trace TR. RVSP 25-30mmHg.   • Bartholin cyst    • Chronic knee pain    • Depression    • GERD (gastroesophageal reflux disease)    • Graves disease    • Heart murmur    • Hemorrhagic disorder (HCC)     On Eliquis   • Hyperlipidemia    • Hypertension    • Hypothyroidism, postsurgical      Followed by  endocrinology   • Infectious disease 2017    Shingles   • Morbid obesity (HCC) 2019    Status post gastric sleeve surgery by Dr. Ganser.   • Postherpetic neuralgia    • Psychiatric disorder     Anxiety/depression   • SVT (supraventricular tachycardia) (HCC)    • Uterine cancer (HCC)     Treated     Past Surgical History:   Procedure Laterality Date   • PB LAP, JANAY RESTRICT PROC, LONGITUDINAL GAS* N/A 2019    Procedure: GASTRECTOMY, SLEEVE, LAPAROSCOPIC;  Surgeon: John H Ganser, M.D.;  Location: SURGERY [facility];  Service: General   • THYROIDECTOMY  3/27/2019    Procedure: THYROIDECTOMY - COMPLETION;  Surgeon: Vincent Patel M.D.;  Location: SURGERY SAME DAY [facility];  Service: General   • THYROIDECTOMY TOTAL     • HYSTERECTOMY LAPAROSCOPY     • THYROIDECTOMY      still has parathyroid     History reviewed. No pertinent family history.  Social History     Socioeconomic History   • Marital status:      Spouse name: Not on file   • Number of children: Not on file   • Years of education: Not on file   • Highest education level: Not on file   Occupational History   • Not on file   Social Needs   • Financial resource strain: Not on file   • Food insecurity     Worry: Not on file     Inability: Not on file   • Transportation needs     Medical: Not on file     Non-medical: Not on file   Tobacco Use   • Smoking status: Former Smoker     Packs/day: 0.50     Start date: 3/4/2016     Quit date: 2018     Years since quittin.9   • Smokeless tobacco: Never Used   • Tobacco comment: 10 cigs   Substance and Sexual Activity   • Alcohol use: Not Currently     Alcohol/week: 1.8 - 3.0 oz     Types: 3 - 5 Glasses of wine per week   • Drug use: No   • Sexual activity: Not Currently     Partners: Male     Comment:  (Bill)   Lifestyle   • Physical activity     Days per week: Not on file     Minutes per session: Not on file   • Stress: Not on file   Relationships   • Social connections      Talks on phone: Not on file     Gets together: Not on file     Attends Jewish service: Not on file     Active member of club or organization: Not on file     Attends meetings of clubs or organizations: Not on file     Relationship status: Not on file   • Intimate partner violence     Fear of current or ex partner: Not on file     Emotionally abused: Not on file     Physically abused: Not on file     Forced sexual activity: Not on file   Other Topics Concern   • Not on file   Social History Narrative    ** Merged History Encounter **          Allergies   Allergen Reactions   • Latex Itching and Swelling     Latex band aid   • Penicillins Hives, Rash and Itching     Itching & Rash     • Sulfa Drugs Itching     Itching feeling on leg, prickily/need-like sensation on skin.   • Other Drug      Steroid shot in knee headache upset stomach   • Other Environmental Runny Nose and Itching     Grass weeds etc     Outpatient Encounter Medications as of 8/19/2020   Medication Sig Dispense Refill   • SYNTHROID 100 MCG Tab TAKE ONE TABLET BY MOUTH EVERY MORNING ON AN EMPTY STOMACH 30 Tab 0   • lidocaine (LIDODERM) 5 % Patch      • losartan (COZAAR) 50 MG Tab Take 1 Tab by mouth every day. TAKE ONE TABLET BY MOUTH DAILY 90 Tab 3   • clotrimazole (LOTRIMIN) 1 % Cream Apply twice daily to rash. 60 g 1   • furosemide (LASIX) 40 MG Tab      • [START ON 10/4/2020] oxyCODONE-acetaminophen (PERCOCET-10)  MG Tab Take 1 Tab by mouth every 8 hours as needed for Severe Pain for up to 30 days. 90 Tab 0   • ALPRAZolam (XANAX) 0.5 MG Tab TAKE ONE TABLET BY MOUTH TWICE A DAY AS NEEDED FOR ANXIETY FOR UP TO 30 DAYS - XANAX 60 Tab 2   • ondansetron (ZOFRAN ODT) 4 MG TABLET DISPERSIBLE DISSOLVE ONE TABLET BY MOUTH EVERY 8 HOURS AS NEEDED FOR NAUSEA AND VOMITING 30 Tab 4   • sertraline (ZOLOFT) 50 MG Tab Take 1 Tab by mouth every day. 90 Tab 1   • triamcinolone acetonide (KENALOG) 0.1 % Cream Apply 2-4 g to affected area(s) 2 times a day. 1  "Tube 0   • montelukast (SINGULAIR) 10 MG Tab TAKE ONE TABLET BY MOUTH DAILY 90 Tab 2   • potassium chloride (KLOR-CON) 8 MEQ tablet Take 1 Tab by mouth every day. 90 Tab 1   • atorvastatin (LIPITOR) 40 MG Tab TAKE ONE TABLET BY MOUTH DAILY 90 Tab 1   • omeprazole (PRILOSEC) 20 MG delayed-release capsule Take 1 Cap by mouth every day. Open capsule and pour contents in a liquid for 1-2 weeks postop 30 Cap 2   • metoprolol (LOPRESSOR) 50 MG Tab Take 75 mg by mouth 2 times a day.     • apixaban (ELIQUIS) 5mg Tab Take 1 Tab by mouth 2 Times a Day. 180 Tab 1   • cetirizine (ZYRTEC) 10 MG Tab Take 10 mg by mouth every evening.     • [DISCONTINUED] azithromycin (ZITHROMAX) 250 MG Tab Take two tablets day one then one tablet day 2-5. 6 Tab 0   • [DISCONTINUED] losartan (COZAAR) 50 MG Tab TAKE ONE TABLET BY MOUTH DAILY (Patient taking differently: 50 mg.) 90 Tab 2     No facility-administered encounter medications on file as of 8/19/2020.      Review of Systems   Constitutional: Positive for malaise/fatigue. Negative for chills and fever.   HENT: Negative for congestion.    Respiratory: Negative for cough and shortness of breath.    Cardiovascular: Negative for chest pain, palpitations, orthopnea, leg swelling and PND.   Gastrointestinal: Negative for abdominal pain, heartburn and nausea.   Musculoskeletal: Positive for joint pain. Negative for myalgias.        Mostly in her hips.   Skin: Negative for rash.   Neurological: Negative for dizziness, loss of consciousness and headaches.   Endo/Heme/Allergies: Does not bruise/bleed easily.   Psychiatric/Behavioral: The patient does not have insomnia.         Objective:   /70 (BP Location: Left arm, Patient Position: Sitting)   Pulse (!) 54   Ht 1.626 m (5' 4\")   Wt 122.5 kg (270 lb)   LMP 03/20/2016   SpO2 94%   BMI 46.35 kg/m²     Physical Exam   Constitutional: She is oriented to person, place, and time. She appears well-developed and well-nourished.   BMI 46.35 " (weight is down)   HENT:   Head: Normocephalic.   Eyes: EOM are normal.   Neck: Normal range of motion. Neck supple. No JVD present.   Cardiovascular: Normal rate, regular rhythm and normal heart sounds.   Pulmonary/Chest: Effort normal and breath sounds normal. No respiratory distress. She has no wheezes. She has no rales.   Abdominal: Soft. Bowel sounds are normal. She exhibits no distension. There is no abdominal tenderness.   Musculoskeletal: Normal range of motion.         General: No edema.   Neurological: She is alert and oriented to person, place, and time.   Skin: Skin is warm and dry. No rash noted.   Psychiatric: She has a normal mood and affect.     Component      Latest Ref Rng & Units 7/31/2020 7/31/2020 7/31/2020 7/31/2020           1:26 PM  1:26 PM  1:26 PM  1:36 PM   Glycohemoglobin      0.0 - 5.6 %   5.5    Estim. Avg Glu      mg/dL   111    25-Hydroxy   Vitamin D 25      30 - 100 ng/mL 24 (L)      Vitamin B12 -True Cobalamin      211 - 911 pg/mL  491     TSH      0.380 - 5.330 uIU/mL    0.680     Reviewed hospital notes of July 2020    LABS AS OF 7/19/2020:  Potassium 3.6  Glucose 114  BUN 13  Creatinine 0.59    ALT 17  AST 21  TSH 1.05  Free T4 1.08  Magnesium 2.2  CBC normal/stable    Lab Results   Component Value Date/Time    WBC 11.1 (H) 07/31/2020 01:26 PM    RBC 4.95 07/31/2020 01:26 PM    HEMOGLOBIN 15.7 07/31/2020 01:26 PM    HEMATOCRIT 48.6 (H) 07/31/2020 01:26 PM    MCV 98.2 (H) 07/31/2020 01:26 PM    MCH 31.7 07/31/2020 01:26 PM    MCHC 32.3 (L) 07/31/2020 01:26 PM    MPV 11.1 07/31/2020 01:26 PM      Lab Results   Component Value Date/Time    SODIUM 142 07/31/2020 01:26 PM    POTASSIUM 3.9 07/31/2020 01:26 PM    CHLORIDE 106 07/31/2020 01:26 PM    CO2 20 07/31/2020 01:26 PM    GLUCOSE 106 (H) 07/31/2020 01:26 PM    BUN 20 07/31/2020 01:26 PM    CREATININE 0.58 07/31/2020 01:26 PM     Lab Results   Component Value Date/Time    ASTSGOT 20 07/31/2020 01:26 PM    ALTSGPT 20  07/31/2020 01:26 PM      CONCLUSIONS OF ECHOCARDIOGRAM OF 12/1/2018:  TDS - Supine, Body Habitus.  No prior study is available for comparison.   Normal left ventricular chamber size.  Left ventricular systolic function is low normal.  Left ventricular ejection fraction is visually estimated to be 55%.  Moderately dilated left atrium.  Trace tricuspid regurgitation.  Estimated right ventricular systolic pressure  is 25-30 mmHg.      Assessment:     1. Paroxysmal atrial fibrillation (HCC)     2. SVT (supraventricular tachycardia) (HCC)     3. Chronic anticoagulation     4. Essential hypertension  losartan (COZAAR) 50 MG Tab   5. Hyperlipidemia, unspecified hyperlipidemia type     6. Depression, unspecified depression type     7. Hypothyroidism, postsurgical         Medical Decision Making:  Today's Assessment / Status / Plan:     1. Paroxysmal atrial fibrillation, with recent ER visit for SVT, improved on Metoprolol 75mg BID; can also use additional 25mg PRN palpitations.    2. Chronic anticoagulation with Eliquis. No bleeding problems.    3. Hypertension, treated with Losartan. Continue 50mg dose for now. If she continues to lose weight, we have to titrate down further.    4. Hyperlipidemia, treated with Lipitor.    5. Depression, treated with Zoloft, dose recently lowered, stable.    6. Hypothyroidism, treated. Recent TSH was normal.    She is doing well. Same medications and doses for now. Continue with diet and exercise. FU with me in 6 months, sooner if clinical condition changes.    Collaborating MD: Juan

## 2020-08-24 DIAGNOSIS — E89.0 HYPOTHYROIDISM, POSTSURGICAL: ICD-10-CM

## 2020-08-24 RX ORDER — LEVOTHYROXINE SODIUM 100 MCG
TABLET ORAL
Qty: 30 TAB | Refills: 0 | Status: SHIPPED | OUTPATIENT
Start: 2020-08-24 | End: 2020-10-29 | Stop reason: SDUPTHER

## 2020-09-03 DIAGNOSIS — I48.20 CHRONIC ATRIAL FIBRILLATION (HCC): ICD-10-CM

## 2020-09-03 DIAGNOSIS — I10 ESSENTIAL HYPERTENSION: ICD-10-CM

## 2020-09-03 RX ORDER — METOPROLOL TARTRATE 50 MG/1
75 TABLET, FILM COATED ORAL 2 TIMES DAILY
Qty: 60 TAB | Refills: 6 | Status: SHIPPED | OUTPATIENT
Start: 2020-09-03 | End: 2021-02-22

## 2020-09-11 DIAGNOSIS — B35.4 TINEA CORPORIS: ICD-10-CM

## 2020-09-11 DIAGNOSIS — J32.4 CHRONIC PANSINUSITIS: ICD-10-CM

## 2020-09-11 RX ORDER — AZITHROMYCIN 250 MG/1
TABLET, FILM COATED ORAL
Qty: 6 TAB | Refills: 0 | Status: SHIPPED | OUTPATIENT
Start: 2020-09-11 | End: 2020-11-16 | Stop reason: SDUPTHER

## 2020-09-15 RX ORDER — ATORVASTATIN CALCIUM 40 MG/1
TABLET, FILM COATED ORAL
Qty: 90 TAB | Refills: 0 | Status: SHIPPED | OUTPATIENT
Start: 2020-09-15 | End: 2021-04-02

## 2020-09-15 NOTE — TELEPHONE ENCOUNTER
Received request via: Pharmacy    Was the patient seen in the last year in this department? Yes    Patients next Appointment:  10/27/2020     Requested Prescriptions     Pending Prescriptions Disp Refills   • atorvastatin (LIPITOR) 40 MG Tab [Pharmacy Med Name: ATORVASTATIN 40 MG TABLET, UU] 90 Tab 0     Sig: TAKE ONE TABLET BY MOUTH DAILY LIPITOR

## 2020-10-12 ENCOUNTER — APPOINTMENT (OUTPATIENT)
Dept: MEDICAL GROUP | Facility: MEDICAL CENTER | Age: 65
End: 2020-10-12
Payer: COMMERCIAL

## 2020-10-18 RX ORDER — OMEPRAZOLE 40 MG/1
CAPSULE, DELAYED RELEASE ORAL
Qty: 90 CAP | Refills: 0 | Status: SHIPPED | OUTPATIENT
Start: 2020-10-18 | End: 2021-01-03

## 2020-10-18 RX ORDER — POTASSIUM CHLORIDE 600 MG/1
TABLET, FILM COATED, EXTENDED RELEASE ORAL
Qty: 90 TAB | Refills: 0 | Status: SHIPPED | OUTPATIENT
Start: 2020-10-18 | End: 2021-10-05 | Stop reason: SDUPTHER

## 2020-10-29 ENCOUNTER — OFFICE VISIT (OUTPATIENT)
Dept: MEDICAL GROUP | Facility: MEDICAL CENTER | Age: 65
End: 2020-10-29
Payer: COMMERCIAL

## 2020-10-29 VITALS
SYSTOLIC BLOOD PRESSURE: 132 MMHG | DIASTOLIC BLOOD PRESSURE: 74 MMHG | WEIGHT: 258 LBS | HEART RATE: 76 BPM | TEMPERATURE: 98.5 F | HEIGHT: 64 IN | RESPIRATION RATE: 16 BRPM | OXYGEN SATURATION: 93 % | BODY MASS INDEX: 44.05 KG/M2

## 2020-10-29 DIAGNOSIS — K21.9 GASTROESOPHAGEAL REFLUX DISEASE WITHOUT ESOPHAGITIS: ICD-10-CM

## 2020-10-29 DIAGNOSIS — E89.0 HYPOTHYROIDISM, POSTSURGICAL: ICD-10-CM

## 2020-10-29 DIAGNOSIS — R11.2 NAUSEA AND VOMITING, INTRACTABILITY OF VOMITING NOT SPECIFIED, UNSPECIFIED VOMITING TYPE: ICD-10-CM

## 2020-10-29 DIAGNOSIS — M25.561 BILATERAL CHRONIC KNEE PAIN: ICD-10-CM

## 2020-10-29 DIAGNOSIS — M25.551 RIGHT HIP PAIN: ICD-10-CM

## 2020-10-29 DIAGNOSIS — G89.29 BILATERAL CHRONIC KNEE PAIN: ICD-10-CM

## 2020-10-29 DIAGNOSIS — M25.562 BILATERAL CHRONIC KNEE PAIN: ICD-10-CM

## 2020-10-29 DIAGNOSIS — F41.9 ANXIETY: ICD-10-CM

## 2020-10-29 PROCEDURE — 99215 OFFICE O/P EST HI 40 MIN: CPT | Performed by: PHYSICIAN ASSISTANT

## 2020-10-29 RX ORDER — CICLOPIROX 80 MG/ML
SOLUTION TOPICAL
COMMUNITY
Start: 2020-09-25 | End: 2022-08-22

## 2020-10-29 RX ORDER — KETOCONAZOLE 20 MG/G
CREAM TOPICAL
COMMUNITY
Start: 2020-09-25

## 2020-10-29 RX ORDER — OXYCODONE AND ACETAMINOPHEN 10; 325 MG/1; MG/1
1 TABLET ORAL EVERY 8 HOURS PRN
Qty: 90 TAB | Refills: 0 | Status: SHIPPED
Start: 2020-10-29 | End: 2020-11-24

## 2020-10-29 RX ORDER — ALPRAZOLAM 0.5 MG/1
TABLET ORAL
COMMUNITY
Start: 2020-10-22 | End: 2020-11-22

## 2020-10-29 RX ORDER — LOSARTAN POTASSIUM 25 MG/1
TABLET ORAL
COMMUNITY
Start: 2020-08-24 | End: 2021-01-04

## 2020-10-29 RX ORDER — LEVOTHYROXINE SODIUM 100 MCG
TABLET ORAL
Qty: 90 TAB | Refills: 1 | Status: SHIPPED | OUTPATIENT
Start: 2020-10-29 | End: 2021-03-22

## 2020-10-29 RX ORDER — OXYCODONE AND ACETAMINOPHEN 10; 325 MG/1; MG/1
1 TABLET ORAL EVERY 8 HOURS PRN
Qty: 90 TAB | Refills: 0 | Status: SHIPPED
Start: 2020-11-28 | End: 2020-11-24

## 2020-10-29 RX ORDER — ONDANSETRON 4 MG/1
TABLET, ORALLY DISINTEGRATING ORAL
Qty: 30 TAB | Refills: 4 | Status: SHIPPED | OUTPATIENT
Start: 2020-10-29 | End: 2021-02-04

## 2020-10-29 RX ORDER — OXYCODONE AND ACETAMINOPHEN 10; 325 MG/1; MG/1
1 TABLET ORAL EVERY 8 HOURS PRN
Qty: 90 TAB | Refills: 0 | Status: SHIPPED | OUTPATIENT
Start: 2020-12-28 | End: 2020-11-24

## 2020-10-29 ASSESSMENT — FIBROSIS 4 INDEX: FIB4 SCORE: 0.96

## 2020-10-29 NOTE — PROGRESS NOTES
Subjective:   Alfonso Posada is a 64 y.o. female here today for bilateral knee pain, right hip pain, hypothyroidism, GERD and anxiety.    Right hip pain  Also has chronic right hip pain after an injection into the right hip performed by a PA at the [facility].  She had a couple injections into that site.  Since then has had chronic right hip pain that does flare almost on a daily basis which will require her to take an extra her dose of hydrocodone but she will take a little bit from an existing tablet.  Today she saw Dr. Peck.  He has ordered MRI of the right hip.  X-rays showed and area of darkness and it was asked if she had any injury to that hip.  She states no.    Bilateral chronic knee pain  This is a pleasant 64-year-old female here today to discuss her chronic pain.  Chronic bilateral knee pain.  Is currently followed by Dr. Peck.  States that her knees bother her all the time.  She may have surgery in the near future.  Currently she is taking oxycodone 10 mg 3 times daily.  Medication does help for the knee pain.    Hypothyroidism, postsurgical  Chronic condition.  Requesting refill today of Synthroid.  Currently on 100 mcg daily.  Her endocrinologist requested an appointment.  She states that she has contacted endocrinology but has yet to receive a call.  It has been at least a week.  Last thyroid value was done in July.  In normal range.    Nausea & vomiting  Chronic, intermittent condition.  She states that Zofran dispersible tablet is very effective.  Requesting a refill.    Gastroesophageal reflux disease without esophagitis  Chronic condition.  Currently taking omeprazole in the morning.  Has had 2 issues at nighttime with reflux.  Most of the time she will have a small snack prior to bedtime.    Anxiety  Chronic condition.  Stable.  Complains of dreaming bizarre dreams at nighttime.  Typically she will dream nightly.  Dreams have changed over the past few weeks.  She is wondering  if it is secondary to Zoloft.  Has been on Zoloft for several years.  In the past we have dropped down her medication.       Current medicines (including changes today)  Current Outpatient Medications   Medication Sig Dispense Refill   • [START ON 12/28/2020] oxyCODONE-acetaminophen (PERCOCET-10)  MG Tab Take 1 Tab by mouth every 8 hours as needed for Severe Pain for up to 30 days. 90 Tab 0   • [START ON 11/28/2020] oxyCODONE-acetaminophen (PERCOCET-10)  MG Tab Take 1 Tab by mouth every 8 hours as needed for Severe Pain for up to 30 days. 90 Tab 0   • oxyCODONE-acetaminophen (PERCOCET-10)  MG Tab Take 1 Tab by mouth every 8 hours as needed for Severe Pain for up to 30 days. 90 Tab 0   • SYNTHROID 100 MCG Tab TAKE ONE TABLET BY MOUTH EVERY MORNING ON AN EMPTY STOMACH 90 Tab 1   • ondansetron (ZOFRAN ODT) 4 MG TABLET DISPERSIBLE DISSOLVE ONE TABLET BY MOUTH EVERY 8 HOURS AS NEEDED FOR NAUSEA AND VOMITING 30 Tab 4   • ALPRAZolam (XANAX) 0.5 MG Tab      • losartan (COZAAR) 25 MG Tab      • ciclopirox (PENLAC) 8 % solution      • ketoconazole (NIZORAL) 2 % Cream      • omeprazole (PRILOSEC) 40 MG delayed-release capsule TAKE ONE CAPSULE BY MOUTH DAILY  *GENERIC FOR PRILOSEC* 90 Cap 0   • potassium chloride (KLOR-CON) 8 MEQ tablet TAKE ONE TABLET BY MOUTH DAILY *KLOR-CON* 90 Tab 0   • atorvastatin (LIPITOR) 40 MG Tab TAKE ONE TABLET BY MOUTH DAILY LIPITOR 90 Tab 0   • azithromycin (ZITHROMAX) 250 MG Tab Take two tablets day one then one tablet day 2-5. 6 Tab 0   • metoprolol (LOPRESSOR) 50 MG Tab Take 1.5 Tabs by mouth 2 times a day. 60 Tab 6   • lidocaine (LIDODERM) 5 % Patch      • losartan (COZAAR) 50 MG Tab Take 1 Tab by mouth every day. TAKE ONE TABLET BY MOUTH DAILY 90 Tab 3   • furosemide (LASIX) 40 MG Tab      • sertraline (ZOLOFT) 50 MG Tab Take 1 Tab by mouth every day. 90 Tab 1   • triamcinolone acetonide (KENALOG) 0.1 % Cream Apply 2-4 g to affected area(s) 2 times a day. 1 Tube 0   •  "montelukast (SINGULAIR) 10 MG Tab TAKE ONE TABLET BY MOUTH DAILY 90 Tab 2   • apixaban (ELIQUIS) 5mg Tab Take 1 Tab by mouth 2 Times a Day. 180 Tab 1   • cetirizine (ZYRTEC) 10 MG Tab Take 10 mg by mouth every evening.       No current facility-administered medications for this visit.      She  has a past medical history of Anesthesia, Anxiety disorder, Arthritis, Asthma, Atrial fibrillation (HCC) (12/2018), Bartholin cyst, Chronic knee pain, Depression, GERD (gastroesophageal reflux disease), Graves disease, Heart murmur, Hemorrhagic disorder ([facility]), Hyperlipidemia, Hypertension, Hypothyroidism, postsurgical ( ), Infectious disease (2017), Morbid obesity ([facility]) (12/2019), Postherpetic neuralgia, Psychiatric disorder, SVT (supraventricular tachycardia) ([facility]), and Uterine cancer ([facility]).    Social History and Family History were reviewed and updated.    ROS   No chest pain, no shortness of breath, no abdominal pain and all other systems were reviewed and are negative.       Objective:     /74   Pulse 76   Temp 36.9 °C (98.5 °F) (Temporal)   Resp 16   Ht 1.626 m (5' 4\")   Wt 117 kg (258 lb)   SpO2 93%  Body mass index is 44.29 kg/m².   Physical Exam:  Constitutional: Alert, no distress.  Skin: Warm, dry, good turgor, no rashes in visible areas.  Eye: Equal, round and reactive, conjunctiva clear, lids normal.  ENMT: Lips without lesions, good dentition, oropharynx clear.  Neck: Trachea midline, no masses.   Lymph: No cervical or supraclavicular lymphadenopathy  Respiratory: Unlabored respiratory effort, lungs appear clear, no wheezes.  Cardiovascular: Regular rate and rhythm.  Psych: Alert and oriented x3, normal affect and mood.        Assessment and Plan:   The following treatment plan was discussed    1. Bilateral chronic knee pain  Chronic condition.  Stable.   reviewed.  Medication appropriate.  Renewed oxycodone acetaminophen  mg tablets.  90 tablets total with a 3-month supply.  Separate monthly " refills.  Also asked pharmacy to refill medication on the first.  Alfonso states that she contacted them today and was told medication can be filled on the first.  - oxyCODONE-acetaminophen (PERCOCET-10)  MG Tab; Take 1 Tab by mouth every 8 hours as needed for Severe Pain for up to 30 days.  Dispense: 90 Tab; Refill: 0  - oxyCODONE-acetaminophen (PERCOCET-10)  MG Tab; Take 1 Tab by mouth every 8 hours as needed for Severe Pain for up to 30 days.  Dispense: 90 Tab; Refill: 0  - oxyCODONE-acetaminophen (PERCOCET-10)  MG Tab; Take 1 Tab by mouth every 8 hours as needed for Severe Pain for up to 30 days.  Dispense: 90 Tab; Refill: 0    2. Right hip pain  Chronic condition.  Status post injection into the right hip.  Renewed oxycodone.  Follow with Dr. Peck.  Follow with MRI.  Unknown cause.  - oxyCODONE-acetaminophen (PERCOCET-10)  MG Tab; Take 1 Tab by mouth every 8 hours as needed for Severe Pain for up to 30 days.  Dispense: 90 Tab; Refill: 0  - oxyCODONE-acetaminophen (PERCOCET-10)  MG Tab; Take 1 Tab by mouth every 8 hours as needed for Severe Pain for up to 30 days.  Dispense: 90 Tab; Refill: 0  - oxyCODONE-acetaminophen (PERCOCET-10)  MG Tab; Take 1 Tab by mouth every 8 hours as needed for Severe Pain for up to 30 days.  Dispense: 90 Tab; Refill: 0    3. Hypothyroidism, postsurgical  Chronic condition.  Controlled.  Renewed Synthroid 100 mcg daily.  Follow-up with endocrinology and hopefully they will contact her for an appointment.  - SYNTHROID 100 MCG Tab; TAKE ONE TABLET BY MOUTH EVERY MORNING ON AN EMPTY STOMACH  Dispense: 90 Tab; Refill: 1    4. Anxiety  Chronic condition.  Stable.  Continue Zoloft as directed.  Unknown why she is having abnormal dreaming.  Advised she could cut down on the medication if she would like.  Possibly 25 mg daily.    5. Nausea and vomiting, intractability of vomiting not specified, unspecified vomiting type  Chronic condition.  Renewed Zofran as  directed.  Symptoms may be secondary to exacerbation of GERD.  - ondansetron (ZOFRAN ODT) 4 MG TABLET DISPERSIBLE; DISSOLVE ONE TABLET BY MOUTH EVERY 8 HOURS AS NEEDED FOR NAUSEA AND VOMITING  Dispense: 30 Tab; Refill: 4    6. Gastroesophageal reflux disease without esophagitis  Chronic condition.  Stable.  Advised if she continues with her nighttime reflux symptoms is change her dose of omeprazole to half hour prior to lunch or even dinner.    Also discussed vaccinations.  Advised to contact her insurance regarding Shingrix.  Discussed receiving Prevnar 13 when she is 65.  Year after that we will provide her Pneumovax 23.  Discussed as well senior [employer] versus Medicare.  She will contact a senior [employer] representative.  Provided information.    Patient was seen for 40 minutes face to face of which > 50% of appointment time was spent on counseling and coordination of care regarding the above.      Followup: Return in about 3 months (around 1/29/2021), or if symptoms worsen or fail to improve.    Please note that this dictation was created using voice recognition software. I have made every reasonable attempt to correct obvious errors, but I expect that there are errors of grammar and possibly content that I did not discover before finalizing the note.

## 2020-10-29 NOTE — ASSESSMENT & PLAN NOTE
Chronic condition.  Stable.  Complains of dreaming bizarre dreams at nighttime.  Typically she will dream nightly.  Dreams have changed over the past few weeks.  She is wondering if it is secondary to Zoloft.  Has been on Zoloft for several years.  In the past we have dropped down her medication.

## 2020-10-29 NOTE — ASSESSMENT & PLAN NOTE
Chronic condition.  Currently taking omeprazole in the morning.  Has had 2 issues at nighttime with reflux.  Most of the time she will have a small snack prior to bedtime.

## 2020-10-29 NOTE — ASSESSMENT & PLAN NOTE
Also has chronic right hip pain after an injection into the right hip performed by a PA at the [place] [facility].  She had a couple injections into that site.  Since then has had chronic right hip pain that does flare almost on a daily basis which will require her to take an extra her dose of hydrocodone but she will take a little bit from an existing tablet.  Today she saw Dr. Peck.  He has ordered MRI of the right hip.  X-rays showed and area of darkness and it was asked if she had any injury to that hip.  She states no.

## 2020-10-29 NOTE — ASSESSMENT & PLAN NOTE
Chronic, intermittent condition.  She states that Zofran dispersible tablet is very effective.  Requesting a refill.

## 2020-10-29 NOTE — ASSESSMENT & PLAN NOTE
This is a pleasant 64-year-old female here today to discuss her chronic pain.  Chronic bilateral knee pain.  Is currently followed by Dr. Peck.  States that her knees bother her all the time.  She may have surgery in the near future.  Currently she is taking oxycodone 10 mg 3 times daily.  Medication does help for the knee pain.

## 2020-10-29 NOTE — ASSESSMENT & PLAN NOTE
Chronic condition.  Requesting refill today of Synthroid.  Currently on 100 mcg daily.  Her endocrinologist requested an appointment.  She states that she has contacted endocrinology but has yet to receive a call.  It has been at least a week.  Last thyroid value was done in July.  In normal range.

## 2020-11-16 DIAGNOSIS — J32.4 CHRONIC PANSINUSITIS: ICD-10-CM

## 2020-11-16 RX ORDER — AZITHROMYCIN 250 MG/1
TABLET, FILM COATED ORAL
Qty: 6 TAB | Refills: 0 | Status: SHIPPED | OUTPATIENT
Start: 2020-11-16 | End: 2021-01-04 | Stop reason: SDUPTHER

## 2020-11-22 DIAGNOSIS — F41.9 ANXIETY: ICD-10-CM

## 2020-11-22 RX ORDER — ALPRAZOLAM 0.5 MG/1
TABLET ORAL
Qty: 60 TAB | Refills: 1 | Status: SHIPPED | OUTPATIENT
Start: 2020-11-22 | End: 2021-01-25 | Stop reason: SDUPTHER

## 2020-11-24 DIAGNOSIS — M25.561 BILATERAL CHRONIC KNEE PAIN: ICD-10-CM

## 2020-11-24 DIAGNOSIS — G89.29 BILATERAL CHRONIC KNEE PAIN: ICD-10-CM

## 2020-11-24 DIAGNOSIS — M25.551 RIGHT HIP PAIN: ICD-10-CM

## 2020-11-24 DIAGNOSIS — M25.562 BILATERAL CHRONIC KNEE PAIN: ICD-10-CM

## 2020-11-24 RX ORDER — OXYCODONE AND ACETAMINOPHEN 10; 325 MG/1; MG/1
1 TABLET ORAL EVERY 6 HOURS PRN
Qty: 98 TAB | Refills: 0 | Status: SHIPPED | OUTPATIENT
Start: 2020-11-27 | End: 2021-01-12 | Stop reason: SDUPTHER

## 2020-11-30 ENCOUNTER — APPOINTMENT (OUTPATIENT)
Dept: MEDICAL GROUP | Facility: MEDICAL CENTER | Age: 65
End: 2020-11-30
Payer: MEDICARE

## 2020-12-06 DIAGNOSIS — Z79.01 CHRONIC ANTICOAGULATION: ICD-10-CM

## 2020-12-06 RX ORDER — APIXABAN 5 MG/1
TABLET, FILM COATED ORAL
Qty: 60 TAB | Refills: 0 | Status: SHIPPED | OUTPATIENT
Start: 2020-12-06 | End: 2021-01-03

## 2021-01-02 DIAGNOSIS — Z79.01 CHRONIC ANTICOAGULATION: ICD-10-CM

## 2021-01-03 RX ORDER — APIXABAN 5 MG/1
TABLET, FILM COATED ORAL
Qty: 60 TAB | Refills: 0 | Status: SHIPPED | OUTPATIENT
Start: 2021-01-03 | End: 2021-02-08

## 2021-01-04 ENCOUNTER — OFFICE VISIT (OUTPATIENT)
Dept: MEDICAL GROUP | Facility: MEDICAL CENTER | Age: 66
End: 2021-01-04
Payer: MEDICARE

## 2021-01-04 VITALS
HEART RATE: 76 BPM | SYSTOLIC BLOOD PRESSURE: 102 MMHG | DIASTOLIC BLOOD PRESSURE: 62 MMHG | WEIGHT: 258 LBS | BODY MASS INDEX: 42.99 KG/M2 | TEMPERATURE: 98.1 F | OXYGEN SATURATION: 93 % | HEIGHT: 65 IN

## 2021-01-04 DIAGNOSIS — I48.0 PAROXYSMAL ATRIAL FIBRILLATION (HCC): ICD-10-CM

## 2021-01-04 DIAGNOSIS — E66.01 MORBID OBESITY WITH BMI OF 40.0-44.9, ADULT (HCC): ICD-10-CM

## 2021-01-04 DIAGNOSIS — M25.551 RIGHT HIP PAIN: ICD-10-CM

## 2021-01-04 DIAGNOSIS — Z23 NEED FOR VACCINATION WITH 13-POLYVALENT PNEUMOCOCCAL CONJUGATE VACCINE: ICD-10-CM

## 2021-01-04 DIAGNOSIS — J32.4 CHRONIC PANSINUSITIS: ICD-10-CM

## 2021-01-04 PROBLEM — I27.20 MILD PULMONARY HYPERTENSION (HCC): Status: RESOLVED | Noted: 2018-12-02 | Resolved: 2021-01-04

## 2021-01-04 PROCEDURE — 90471 IMMUNIZATION ADMIN: CPT | Performed by: PHYSICIAN ASSISTANT

## 2021-01-04 PROCEDURE — 99214 OFFICE O/P EST MOD 30 MIN: CPT | Mod: 25 | Performed by: PHYSICIAN ASSISTANT

## 2021-01-04 PROCEDURE — 90670 PCV13 VACCINE IM: CPT | Performed by: PHYSICIAN ASSISTANT

## 2021-01-04 RX ORDER — AZITHROMYCIN 250 MG/1
TABLET, FILM COATED ORAL
Qty: 6 TAB | Refills: 1 | Status: SHIPPED
Start: 2021-01-04 | End: 2021-02-17

## 2021-01-04 ASSESSMENT — FIBROSIS 4 INDEX: FIB4 SCORE: 0.98

## 2021-01-05 NOTE — ASSESSMENT & PLAN NOTE
Chronic condition.  Stable.  Requesting a refill of azithromycin.  Medication has been effective in the past to help with her sinus congestion drainage and sinus pain and pressure.

## 2021-01-05 NOTE — ASSESSMENT & PLAN NOTE
This is a pleasant 65-year-old female here today to discuss her chronic right hip pain.  Pain has been persistent.  Severe.  MRI through Dr. Peck's office showed extensive edema of the hip area.  Moderate to severe degenerative changes.  She does not want to follow-up with Dr. Peck any longer.  She has an appointment soon with Dr. Briscoe at [facility].  She states that she is going to be running out of the oxycodone.  Was provided 90 tablets recently instead of 98.  It was an old prescription that was prescribed to her by her pharmacy.  She will likely run out by next week.  She is concerned about pain because she will wake up at nighttime with the pain of the right hip.

## 2021-01-05 NOTE — ASSESSMENT & PLAN NOTE
Chronic condition.  Stable.  Taking Eliquis 5 mg daily.  Does have bruising of her upper arms but nothing significant.

## 2021-01-05 NOTE — ASSESSMENT & PLAN NOTE
Chronic condition.  Status post surgery.  Unfortunately she has not been active nor she drinking the shakes like she was but her weight is stable.

## 2021-01-05 NOTE — PROGRESS NOTES
Subjective:   Alfonso Posada is a 65 y.o. female here today for right hip pain, chronic pansinusitis, paroxysmal atrial fibrillation and morbid obesity.    Right hip pain  This is a pleasant 65-year-old female here today to discuss her chronic right hip pain.  Pain has been persistent.  Severe.  MRI through Dr. Peck's office showed extensive edema of the hip area.  Moderate to severe degenerative changes.  She does not want to follow-up with Dr. Peck any longer.  She has an appointment soon with Dr. Briscoe at [facility].  She states that she is going to be running out of the oxycodone.  Was provided 90 tablets recently instead of 98.  It was an old prescription that was prescribed to her by her pharmacy.  She will likely run out by next week.  She is concerned about pain because she will wake up at nighttime with the pain of the right hip.    Chronic pansinusitis  Chronic condition.  Stable.  Requesting a refill of azithromycin.  Medication has been effective in the past to help with her sinus congestion drainage and sinus pain and pressure.    Paroxysmal atrial fibrillation (HCC)  Chronic condition.  Stable.  Taking Eliquis 5 mg daily.  Does have bruising of her upper arms but nothing significant.    Morbid obesity with BMI of 40.0-44.9, adult (HCC)  Chronic condition.  Status post surgery.  Unfortunately she has not been active nor she drinking the shakes like she was but her weight is stable.       Current medicines (including changes today)  Current Outpatient Medications   Medication Sig Dispense Refill   • azithromycin (ZITHROMAX) 250 MG Tab Take two tablets day one then one tablet day 2-5. 6 Tab 1   • omeprazole (PRILOSEC) 40 MG delayed-release capsule TAKE ONE CAPSULE BY MOUTH DAILY - PRILOSEC. 90 Cap 3   • ELIQUIS 5 MG Tab TAKE ONE TABLET BY MOUTH TWICE A DAY 60 Tab 0   • losartan (COZAAR) 50 MG Tab TAKE ONE TABLET BY MOUTH DAILY 90 Tab 1   • ciclopirox (PENLAC) 8 % solution      • ketoconazole  "(NIZORAL) 2 % Cream      • SYNTHROID 100 MCG Tab TAKE ONE TABLET BY MOUTH EVERY MORNING ON AN EMPTY STOMACH 90 Tab 1   • potassium chloride (KLOR-CON) 8 MEQ tablet TAKE ONE TABLET BY MOUTH DAILY *KLOR-CON* 90 Tab 0   • atorvastatin (LIPITOR) 40 MG Tab TAKE ONE TABLET BY MOUTH DAILY LIPITOR 90 Tab 0   • metoprolol (LOPRESSOR) 50 MG Tab Take 1.5 Tabs by mouth 2 times a day. 60 Tab 6   • lidocaine (LIDODERM) 5 % Patch      • furosemide (LASIX) 40 MG Tab      • sertraline (ZOLOFT) 50 MG Tab Take 1 Tab by mouth every day. 90 Tab 1   • triamcinolone acetonide (KENALOG) 0.1 % Cream Apply 2-4 g to affected area(s) 2 times a day. 1 Tube 0   • montelukast (SINGULAIR) 10 MG Tab TAKE ONE TABLET BY MOUTH DAILY 90 Tab 2   • cetirizine (ZYRTEC) 10 MG Tab Take 10 mg by mouth every evening.       No current facility-administered medications for this visit.      She  has a past medical history of Anesthesia, Anxiety disorder, Arthritis, Asthma, Atrial fibrillation (HCC) (12/2018), Bartholin cyst, Chronic knee pain, Depression, GERD (gastroesophageal reflux disease), Graves disease, Heart murmur, Hemorrhagic disorder ([facility]), Hyperlipidemia, Hypertension, Hypothyroidism, postsurgical ( ), Infectious disease (2017), Morbid obesity ([facility]) (12/2019), Postherpetic neuralgia, Psychiatric disorder, SVT (supraventricular tachycardia) ([facility]), and Uterine cancer (Former[facility]).    Social History and Family History were reviewed and updated.    ROS   No chest pain, no shortness of breath, no abdominal pain and all other systems were reviewed and are negative.       Objective:     /62   Pulse 76   Temp 36.7 °C (98.1 °F) (Temporal)   Ht 1.651 m (5' 5\")   Wt 117 kg (258 lb)   SpO2 93%  Body mass index is 42.93 kg/m².   Physical Exam:  Constitutional: Alert, no distress.  Skin: Warm, dry, good turgor, no rashes in visible areas.  Eye: Equal, round and reactive, conjunctiva clear, lids normal.  ENMT: Lips without lesions, good dentition, oropharynx " clear.  Neck: Trachea midline, no masses.   Lymph: No cervical or supraclavicular lymphadenopathy  Respiratory: Unlabored respiratory effort, lungs appear clear, no wheezes.  Cardiovascular: Regular rate rhythm.  Psych: Alert and oriented x3, normal affect and mood.        Assessment and Plan:   The following treatment plan was discussed    1. Right hip pain  Chronic condition.  Reviewed MRI results.  Follow-up with Melany rowe and Dr. Briscoe.  Advised to contact me next week regarding her Percocet prescription.  We will update it next week.    2. Chronic pansinusitis  Chronic condition.  Recent exacerbation.  Renewed azithromycin as directed.  - azithromycin (ZITHROMAX) 250 MG Tab; Take two tablets day one then one tablet day 2-5.  Dispense: 6 Tab; Refill: 1    3. Paroxysmal atrial fibrillation (HCC)  Chronic condition.  Stable.  Continue Eliquis.    4. Morbid obesity with BMI of 40.0-44.9, adult (HCC)  Chronic condition.  Stable.  We will continue to monitor.    5. Need for vaccination with 13-polyvalent pneumococcal conjugate vaccine  Administer without complaints.  Discussed in 1 year needing pneumococcal 23.  - Prevnar 13 PCV-13    Patient was seen for 30 minutes face to face of which > 50% of appointment time was spent on counseling and coordination of care regarding the above.    Followup: Return in about 2 months (around 3/4/2021), or if symptoms worsen or fail to improve.    Please note that this dictation was created using voice recognition software. I have made every reasonable attempt to correct obvious errors, but I expect that there are errors of grammar and possibly content that I did not discover before finalizing the note.

## 2021-01-12 ENCOUNTER — PATIENT MESSAGE (OUTPATIENT)
Dept: MEDICAL GROUP | Facility: MEDICAL CENTER | Age: 66
End: 2021-01-12

## 2021-01-12 DIAGNOSIS — M25.561 BILATERAL CHRONIC KNEE PAIN: ICD-10-CM

## 2021-01-12 DIAGNOSIS — M25.562 BILATERAL CHRONIC KNEE PAIN: ICD-10-CM

## 2021-01-12 DIAGNOSIS — M25.551 RIGHT HIP PAIN: ICD-10-CM

## 2021-01-12 DIAGNOSIS — G89.29 BILATERAL CHRONIC KNEE PAIN: ICD-10-CM

## 2021-01-12 RX ORDER — OXYCODONE AND ACETAMINOPHEN 10; 325 MG/1; MG/1
1 TABLET ORAL EVERY 6 HOURS PRN
Qty: 110 TAB | Refills: 0 | Status: SHIPPED | OUTPATIENT
Start: 2021-01-12 | End: 2021-02-10 | Stop reason: SDUPTHER

## 2021-01-12 RX ORDER — OXYCODONE AND ACETAMINOPHEN 10; 325 MG/1; MG/1
1 TABLET ORAL EVERY 6 HOURS PRN
Qty: 100 TAB | Refills: 0 | Status: SHIPPED | OUTPATIENT
Start: 2021-01-12 | End: 2021-01-12 | Stop reason: SDUPTHER

## 2021-01-18 ENCOUNTER — HOSPITAL ENCOUNTER (OUTPATIENT)
Dept: RADIOLOGY | Facility: MEDICAL CENTER | Age: 66
End: 2021-01-18
Payer: MEDICARE

## 2021-01-25 DIAGNOSIS — F41.9 ANXIETY: ICD-10-CM

## 2021-01-25 RX ORDER — ALPRAZOLAM 0.5 MG/1
TABLET ORAL
Qty: 60 TAB | Refills: 1 | Status: SHIPPED | OUTPATIENT
Start: 2021-01-25 | End: 2021-03-22

## 2021-02-04 DIAGNOSIS — R11.2 NAUSEA AND VOMITING, INTRACTABILITY OF VOMITING NOT SPECIFIED, UNSPECIFIED VOMITING TYPE: ICD-10-CM

## 2021-02-04 RX ORDER — ONDANSETRON 4 MG/1
TABLET, ORALLY DISINTEGRATING ORAL
Qty: 30 TAB | Refills: 3 | Status: SHIPPED | OUTPATIENT
Start: 2021-02-04 | End: 2021-04-02

## 2021-02-08 DIAGNOSIS — Z79.01 CHRONIC ANTICOAGULATION: ICD-10-CM

## 2021-02-08 DIAGNOSIS — F32.A DEPRESSION, UNSPECIFIED DEPRESSION TYPE: ICD-10-CM

## 2021-02-08 RX ORDER — APIXABAN 5 MG/1
TABLET, FILM COATED ORAL
Qty: 60 TAB | Refills: 0 | Status: SHIPPED | OUTPATIENT
Start: 2021-02-08 | End: 2021-03-09

## 2021-02-08 NOTE — TELEPHONE ENCOUNTER
Received request via: Pharmacy    Was the patient seen in the last year in this department? Yes    Patients next Appointment:  3/9/2021     Requested Prescriptions     Pending Prescriptions Disp Refills   • ELIQUIS 5 MG Tab [Pharmacy Med Name: ELIQUIS 5 MG TABLET] 60 Tab 0     Sig: TAKE ONE TABLET BY MOUTH TWICE A DAY   • sertraline (ZOLOFT) 50 MG Tab [Pharmacy Med Name: SERTRALINE HCL 50 MG TABLET] 90 Tab 0     Sig: TAKE ONE TABLET BY MOUTH DAILY--ZOLOFT

## 2021-02-10 DIAGNOSIS — M25.551 RIGHT HIP PAIN: ICD-10-CM

## 2021-02-10 DIAGNOSIS — M25.562 BILATERAL CHRONIC KNEE PAIN: ICD-10-CM

## 2021-02-10 DIAGNOSIS — M25.561 BILATERAL CHRONIC KNEE PAIN: ICD-10-CM

## 2021-02-10 DIAGNOSIS — E89.0 HYPOTHYROIDISM, POSTSURGICAL: ICD-10-CM

## 2021-02-10 DIAGNOSIS — G89.29 BILATERAL CHRONIC KNEE PAIN: ICD-10-CM

## 2021-02-10 RX ORDER — OXYCODONE AND ACETAMINOPHEN 10; 325 MG/1; MG/1
1 TABLET ORAL EVERY 6 HOURS PRN
Qty: 115 TABLET | Refills: 0 | Status: SHIPPED | OUTPATIENT
Start: 2021-02-10 | End: 2021-03-09 | Stop reason: SDUPTHER

## 2021-02-16 DIAGNOSIS — R79.89 ABNORMAL CBC: ICD-10-CM

## 2021-02-16 DIAGNOSIS — I48.0 PAROXYSMAL ATRIAL FIBRILLATION (HCC): ICD-10-CM

## 2021-02-16 DIAGNOSIS — E89.0 HYPOTHYROIDISM, POSTSURGICAL: ICD-10-CM

## 2021-02-16 DIAGNOSIS — E78.5 HYPERLIPIDEMIA, UNSPECIFIED HYPERLIPIDEMIA TYPE: ICD-10-CM

## 2021-02-16 DIAGNOSIS — Z90.3 S/P GASTRECTOMY: ICD-10-CM

## 2021-02-17 ENCOUNTER — OFFICE VISIT (OUTPATIENT)
Dept: CARDIOLOGY | Facility: PHYSICIAN GROUP | Age: 66
End: 2021-02-17
Payer: MEDICARE

## 2021-02-17 ENCOUNTER — ANCILLARY PROCEDURE (OUTPATIENT)
Dept: CARDIOLOGY | Facility: IMAGING CENTER | Age: 66
End: 2021-02-17
Attending: NURSE PRACTITIONER
Payer: MEDICARE

## 2021-02-17 ENCOUNTER — HOSPITAL ENCOUNTER (OUTPATIENT)
Dept: LAB | Facility: MEDICAL CENTER | Age: 66
End: 2021-02-17
Attending: PHYSICIAN ASSISTANT
Payer: MEDICARE

## 2021-02-17 ENCOUNTER — TELEPHONE (OUTPATIENT)
Dept: CARDIOLOGY | Facility: MEDICAL CENTER | Age: 66
End: 2021-02-17

## 2021-02-17 VITALS
OXYGEN SATURATION: 97 % | HEIGHT: 65 IN | BODY MASS INDEX: 42.99 KG/M2 | DIASTOLIC BLOOD PRESSURE: 60 MMHG | WEIGHT: 258 LBS | HEART RATE: 70 BPM | SYSTOLIC BLOOD PRESSURE: 122 MMHG

## 2021-02-17 DIAGNOSIS — I48.0 PAROXYSMAL ATRIAL FIBRILLATION (HCC): ICD-10-CM

## 2021-02-17 DIAGNOSIS — M25.551 RIGHT HIP PAIN: ICD-10-CM

## 2021-02-17 DIAGNOSIS — E89.0 HYPOTHYROIDISM, POSTSURGICAL: ICD-10-CM

## 2021-02-17 DIAGNOSIS — I10 ESSENTIAL HYPERTENSION: ICD-10-CM

## 2021-02-17 DIAGNOSIS — E78.5 HYPERLIPIDEMIA, UNSPECIFIED HYPERLIPIDEMIA TYPE: ICD-10-CM

## 2021-02-17 DIAGNOSIS — Z79.01 CHRONIC ANTICOAGULATION: ICD-10-CM

## 2021-02-17 DIAGNOSIS — R79.89 ABNORMAL CBC: ICD-10-CM

## 2021-02-17 PROBLEM — R11.2 NAUSEA & VOMITING: Status: RESOLVED | Noted: 2020-08-04 | Resolved: 2021-02-17

## 2021-02-17 PROBLEM — R21 RASH: Status: RESOLVED | Noted: 2020-01-24 | Resolved: 2021-02-17

## 2021-02-17 LAB
ALBUMIN SERPL BCP-MCNC: 3.6 G/DL (ref 3.2–4.9)
ALBUMIN/GLOB SERPL: 0.9 G/DL
ALP SERPL-CCNC: 119 U/L (ref 30–99)
ALT SERPL-CCNC: 8 U/L (ref 2–50)
ANION GAP SERPL CALC-SCNC: 10 MMOL/L (ref 7–16)
AST SERPL-CCNC: 16 U/L (ref 12–45)
BASOPHILS # BLD AUTO: 0.7 % (ref 0–1.8)
BASOPHILS # BLD: 0.07 K/UL (ref 0–0.12)
BILIRUB SERPL-MCNC: 0.3 MG/DL (ref 0.1–1.5)
BUN SERPL-MCNC: 9 MG/DL (ref 8–22)
CALCIUM SERPL-MCNC: 9.3 MG/DL (ref 8.5–10.5)
CHLORIDE SERPL-SCNC: 103 MMOL/L (ref 96–112)
CHOLEST SERPL-MCNC: 182 MG/DL (ref 100–199)
CO2 SERPL-SCNC: 27 MMOL/L (ref 20–33)
CREAT SERPL-MCNC: 0.38 MG/DL (ref 0.5–1.4)
EOSINOPHIL # BLD AUTO: 0.36 K/UL (ref 0–0.51)
EOSINOPHIL NFR BLD: 3.7 % (ref 0–6.9)
ERYTHROCYTE [DISTWIDTH] IN BLOOD BY AUTOMATED COUNT: 48 FL (ref 35.9–50)
GLOBULIN SER CALC-MCNC: 3.9 G/DL (ref 1.9–3.5)
GLUCOSE SERPL-MCNC: 79 MG/DL (ref 65–99)
HCT VFR BLD AUTO: 45.5 % (ref 37–47)
HDLC SERPL-MCNC: 56 MG/DL
HGB BLD-MCNC: 14.1 G/DL (ref 12–16)
IMM GRANULOCYTES # BLD AUTO: 0.04 K/UL (ref 0–0.11)
IMM GRANULOCYTES NFR BLD AUTO: 0.4 % (ref 0–0.9)
IRON SERPL-MCNC: 55 UG/DL (ref 40–170)
LDLC SERPL CALC-MCNC: 102 MG/DL
LV EJECT FRACT  99904: 55
LV EJECT FRACT MOD 2C 99903: 54.72
LV EJECT FRACT MOD 4C 99902: 57.14
LV EJECT FRACT MOD BP 99901: 57.91
LYMPHOCYTES # BLD AUTO: 2.41 K/UL (ref 1–4.8)
LYMPHOCYTES NFR BLD: 24.5 % (ref 22–41)
MCH RBC QN AUTO: 29.2 PG (ref 27–33)
MCHC RBC AUTO-ENTMCNC: 31 G/DL (ref 33.6–35)
MCV RBC AUTO: 94.2 FL (ref 81.4–97.8)
MONOCYTES # BLD AUTO: 0.84 K/UL (ref 0–0.85)
MONOCYTES NFR BLD AUTO: 8.5 % (ref 0–13.4)
NEUTROPHILS # BLD AUTO: 6.13 K/UL (ref 2–7.15)
NEUTROPHILS NFR BLD: 62.2 % (ref 44–72)
NRBC # BLD AUTO: 0 K/UL
NRBC BLD-RTO: 0 /100 WBC
PLATELET # BLD AUTO: 368 K/UL (ref 164–446)
PMV BLD AUTO: 10 FL (ref 9–12.9)
POTASSIUM SERPL-SCNC: 4.4 MMOL/L (ref 3.6–5.5)
PROT SERPL-MCNC: 7.5 G/DL (ref 6–8.2)
RBC # BLD AUTO: 4.83 M/UL (ref 4.2–5.4)
SODIUM SERPL-SCNC: 140 MMOL/L (ref 135–145)
T4 FREE SERPL-MCNC: 1.37 NG/DL (ref 0.93–1.7)
TRIGL SERPL-MCNC: 118 MG/DL (ref 0–149)
TSH SERPL DL<=0.005 MIU/L-ACNC: 0.74 UIU/ML (ref 0.38–5.33)
WBC # BLD AUTO: 9.9 K/UL (ref 4.8–10.8)

## 2021-02-17 PROCEDURE — 84439 ASSAY OF FREE THYROXINE: CPT

## 2021-02-17 PROCEDURE — 99214 OFFICE O/P EST MOD 30 MIN: CPT | Performed by: NURSE PRACTITIONER

## 2021-02-17 PROCEDURE — 85025 COMPLETE CBC W/AUTO DIFF WBC: CPT

## 2021-02-17 PROCEDURE — 80061 LIPID PANEL: CPT

## 2021-02-17 PROCEDURE — 36415 COLL VENOUS BLD VENIPUNCTURE: CPT

## 2021-02-17 PROCEDURE — 83540 ASSAY OF IRON: CPT

## 2021-02-17 PROCEDURE — 93306 TTE W/DOPPLER COMPLETE: CPT | Performed by: INTERNAL MEDICINE

## 2021-02-17 PROCEDURE — 84443 ASSAY THYROID STIM HORMONE: CPT

## 2021-02-17 PROCEDURE — 80053 COMPREHEN METABOLIC PANEL: CPT

## 2021-02-17 ASSESSMENT — ENCOUNTER SYMPTOMS
HEADACHES: 0
CHILLS: 0
HEARTBURN: 0
INSOMNIA: 0
PALPITATIONS: 0
BRUISES/BLEEDS EASILY: 0
FEVER: 0
DIZZINESS: 0
COUGH: 0
PND: 0
SHORTNESS OF BREATH: 0
ABDOMINAL PAIN: 0
LOSS OF CONSCIOUSNESS: 0
NAUSEA: 0
MYALGIAS: 0
ORTHOPNEA: 0

## 2021-02-17 ASSESSMENT — FIBROSIS 4 INDEX: FIB4 SCORE: 0.98

## 2021-02-17 NOTE — TELEPHONE ENCOUNTER
Patient being seen in clinic today by Stacie CRUM for surgical clearance. We have not received a clearance yet for this patient. She states she is having hip surgery with Dr. Briscoe.    Called Melany Fortune to have clearance faxed to us.

## 2021-02-17 NOTE — PROGRESS NOTES
Chief Complaint   Patient presents with   • Preoperative CV Eval   • Atrial Fibrillation   • Anticoagulation   • HTN (Controlled)   • Hyperlipidemia       Subjective:   Alfonso Posada is a 65 y.o. female who presents today for cardiac pre-operative clearance for right hip surgery.    Alfonso is a 65 year old female with history of paroxysmal atrial fibrillation in the setting of thyroid disease. She had a thyroidectomy at age 30, and then again (due to tissue regrowth) in March 2019; she is on Toprol XL and Eliquis for anticoagulation. She also has hypertension, hyperlipidemia, asthma, GERD and anxiety.  In late December 2019, she had gastric sleeve surgery by Dr. Ganser. She was last seen by me in August 2020.    She is here today for cardiac pre-operative clearance for right hip surgery with Dr. Briscoe. She has a lot of hip pain, and is ready for surgery. From a cardiac standpoint, she has been stable: BP is good, and no symptomatic palpitations. No chest pain, pressure or discomfort; no shortness of breath, orthopnea or PND; no dizziness or syncope; no LE edema. She is not able to exercise, due to hip pain; her weight loss has slowed down due to this.    Past Medical History:   Diagnosis Date   • Anesthesia     States hard to take deep breath afterwards   • Anxiety disorder    • Arthritis     Knees, arms, hips   • Asthma    • Atrial fibrillation (HCC) 12/2018    Echocardiogram with normal LV size, LVEF 55%. Normal RA and RV. Moderately dilated LA. Trace TR. RVSP 25-30mmHg.   • Bartholin cyst    • Chronic knee pain    • Depression    • GERD (gastroesophageal reflux disease)    • Graves disease    • Heart murmur    • Hemorrhagic disorder (HCC)     On Eliquis   • Hyperlipidemia    • Hypertension    • Hypothyroidism, postsurgical      Followed by endocrinology   • Infectious disease 2017    Shingles   • Morbid obesity (HCC) 12/2019    Status post gastric sleeve surgery by Dr. Ganser.   • Postherpetic neuralgia     • Psychiatric disorder     Anxiety/depression   • SVT (supraventricular tachycardia) (HCC)    • Uterine cancer (HCC)     Treated     Past Surgical History:   Procedure Laterality Date   • PB LAP, JANAY RESTRICT PROC, LONGITUDINAL GAS* N/A 2019    Procedure: GASTRECTOMY, SLEEVE, LAPAROSCOPIC;  Surgeon: John H Ganser, M.D.;  Location: SURGERY [facility];  Service: General   • THYROIDECTOMY  3/27/2019    Procedure: THYROIDECTOMY - COMPLETION;  Surgeon: Vincent Patel M.D.;  Location: SURGERY SAME DAY [facility];  Service: General   • THYROIDECTOMY TOTAL     • HYSTERECTOMY LAPAROSCOPY     • THYROIDECTOMY      still has parathyroid     History reviewed. No pertinent family history.  Social History     Socioeconomic History   • Marital status:      Spouse name: Not on file   • Number of children: Not on file   • Years of education: Not on file   • Highest education level: Not on file   Occupational History   • Not on file   Tobacco Use   • Smoking status: Former Smoker     Packs/day: 0.50     Start date: 3/4/2016     Quit date: 2018     Years since quittin.4   • Smokeless tobacco: Never Used   • Tobacco comment: 10 cigs   Substance and Sexual Activity   • Alcohol use: Not Currently     Alcohol/week: 1.8 - 3.0 oz     Types: 3 - 5 Glasses of wine per week   • Drug use: No   • Sexual activity: Not Currently     Partners: Male     Comment:  (Bill)   Other Topics Concern   • Not on file   Social History Narrative    ** Merged History Encounter **          Social Determinants of Health     Financial Resource Strain:    • Difficulty of Paying Living Expenses:    Food Insecurity:    • Worried About Running Out of Food in the Last Year:    • Ran Out of Food in the Last Year:    Transportation Needs:    • Lack of Transportation (Medical):    • Lack of Transportation (Non-Medical):    Physical Activity:    • Days of Exercise per Week:    • Minutes of Exercise per Session:    Stress:    •  Feeling of Stress :    Social Connections:    • Frequency of Communication with Friends and Family:    • Frequency of Social Gatherings with Friends and Family:    • Attends [employer] Services:    • Active Member of Clubs or Organizations:    • Attends Club or Organization Meetings:    • Marital Status:    Intimate Partner Violence:    • Fear of Current or Ex-Partner:    • Emotionally Abused:    • Physically Abused:    • Sexually Abused:      Allergies   Allergen Reactions   • Latex Itching and Swelling     Latex band aid   • Penicillins Hives, Rash and Itching     Itching & Rash     • Sulfa Drugs Itching     Itching feeling on leg, prickily/need-like sensation on skin.   • Other Drug      Steroid shot in knee headache upset stomach   • Other Environmental Runny Nose and Itching     Grass weeds etc     Outpatient Encounter Medications as of 2/17/2021   Medication Sig Dispense Refill   • oxyCODONE-acetaminophen (PERCOCET-10)  MG Tab Take 1 tablet by mouth every 6 hours as needed for Severe Pain for up to 30 days. Please update Rx to 115 tablets. 115 tablet 0   • ELIQUIS 5 MG Tab TAKE ONE TABLET BY MOUTH TWICE A DAY 60 Tab 0   • sertraline (ZOLOFT) 50 MG Tab TAKE ONE TABLET BY MOUTH DAILY--ZOLOFT 90 Tab 0   • ondansetron (ZOFRAN ODT) 4 MG TABLET DISPERSIBLE DISSOLVE ONE TABLET BY MOUTH EVERY 8 HOURS AS NEEDED FOR NAUSEA AND VOMITING 30 Tab 3   • ALPRAZolam (XANAX) 0.5 MG Tab TAKE ONE TABLET BY MOUTH TWICE A DAY AS NEEDED FOR ANXIETY. 60 Tab 1   • omeprazole (PRILOSEC) 40 MG delayed-release capsule TAKE ONE CAPSULE BY MOUTH DAILY - PRILOSEC. 90 Cap 3   • losartan (COZAAR) 50 MG Tab TAKE ONE TABLET BY MOUTH DAILY 90 Tab 1   • ciclopirox (PENLAC) 8 % solution      • ketoconazole (NIZORAL) 2 % Cream      • SYNTHROID 100 MCG Tab TAKE ONE TABLET BY MOUTH EVERY MORNING ON AN EMPTY STOMACH 90 Tab 1   • potassium chloride (KLOR-CON) 8 MEQ tablet TAKE ONE TABLET BY MOUTH DAILY *KLOR-CON* 90 Tab 0   • atorvastatin  "(LIPITOR) 40 MG Tab TAKE ONE TABLET BY MOUTH DAILY LIPITOR 90 Tab 0   • metoprolol (LOPRESSOR) 50 MG Tab Take 1.5 Tabs by mouth 2 times a day. 60 Tab 6   • lidocaine (LIDODERM) 5 % Patch      • furosemide (LASIX) 40 MG Tab      • triamcinolone acetonide (KENALOG) 0.1 % Cream Apply 2-4 g to affected area(s) 2 times a day. 1 Tube 0   • montelukast (SINGULAIR) 10 MG Tab TAKE ONE TABLET BY MOUTH DAILY 90 Tab 2   • cetirizine (ZYRTEC) 10 MG Tab Take 10 mg by mouth every evening.     • [DISCONTINUED] azithromycin (ZITHROMAX) 250 MG Tab Take two tablets day one then one tablet day 2-5. (Patient not taking: Reported on 2/17/2021) 6 Tab 1     No facility-administered encounter medications on file as of 2/17/2021.     Review of Systems   Constitutional: Positive for malaise/fatigue. Negative for chills and fever.   HENT: Negative for congestion.    Respiratory: Negative for cough and shortness of breath.    Cardiovascular: Negative for chest pain, palpitations, orthopnea, leg swelling and PND.   Gastrointestinal: Negative for abdominal pain, heartburn and nausea.   Musculoskeletal: Positive for joint pain. Negative for myalgias.        Mostly in her hips.   Skin: Negative for rash.   Neurological: Negative for dizziness, loss of consciousness and headaches.   Endo/Heme/Allergies: Does not bruise/bleed easily.   Psychiatric/Behavioral: The patient does not have insomnia.         Objective:   /60 (BP Location: Left arm, Patient Position: Sitting, BP Cuff Size: Adult)   Pulse 70   Ht 1.651 m (5' 5\")   Wt 117 kg (258 lb)   LMP 03/20/2016   SpO2 97%   BMI 42.93 kg/m²     Physical Exam   Constitutional: She is oriented to person, place, and time. She appears well-developed and well-nourished.   BMI 42.93 (weight is down)  In a wheelchair today   HENT:   Head: Normocephalic.   Eyes: EOM are normal.   Neck: No JVD present.   Cardiovascular: Normal rate, regular rhythm and normal heart sounds.   Pulmonary/Chest: Effort " normal and breath sounds normal. No respiratory distress. She has no wheezes. She has no rales.   Abdominal: Soft. Bowel sounds are normal. She exhibits no distension. There is no abdominal tenderness.   Musculoskeletal:         General: No edema. Normal range of motion.      Cervical back: Normal range of motion and neck supple.   Neurological: She is alert and oriented to person, place, and time.   Skin: Skin is warm and dry. No rash noted.   Psychiatric: She has a normal mood and affect.     EKG ordered and interpreted by me today reveals sinus rhythm at 70bpm.    CONCLUSIONS OF ECHOCARDIOGRAM OF 2/17/2021:  Very technically challenging study with limited information.  Contrast not available in [facility].  Left ventricular ejection fraction is visually estimated to be 55%.  Wall motion is difficult to assess with the limitations of image   quality, appears grossly normal.  No significant valve or Doppler abnormality.  Compared to prior study 12/01/2018, no obvious change.    CONCLUSIONS OF ECHOCARDIOGRAM OF 12/1/2018:  TDS - Supine, Body Habitus.  No prior study is available for comparison.   Normal left ventricular chamber size.  Left ventricular systolic function is low normal.  Left ventricular ejection fraction is visually estimated to be 55%.  Moderately dilated left atrium.  Trace tricuspid regurgitation.  Estimated right ventricular systolic pressure  is 25-30 mmHg.    Lab Results   Component Value Date/Time    CHOLSTRLTOT 182 02/17/2021 01:23 PM     (H) 02/17/2021 01:23 PM    HDL 56 02/17/2021 01:23 PM    TRIGLYCERIDE 118 02/17/2021 01:23 PM       Lab Results   Component Value Date/Time    SODIUM 140 02/17/2021 01:23 PM    POTASSIUM 4.4 02/17/2021 01:23 PM    CHLORIDE 103 02/17/2021 01:23 PM    CO2 27 02/17/2021 01:23 PM    GLUCOSE 79 02/17/2021 01:23 PM    BUN 9 02/17/2021 01:23 PM    CREATININE 0.38 (L) 02/17/2021 01:23 PM     Lab Results   Component Value Date/Time    ALKPHOSPHAT 119  (H) 02/17/2021 01:23 PM    ASTSGOT 16 02/17/2021 01:23 PM    ALTSGPT 8 02/17/2021 01:23 PM    TBILIRUBIN 0.3 02/17/2021 01:23 PM        Component      Latest Ref Rng & Units 7/31/2020           1:36 PM   TSH      0.380 - 5.330 uIU/mL 0.680     Lab Results   Component Value Date/Time    SODIUM 142 07/31/2020 01:26 PM    POTASSIUM 3.9 07/31/2020 01:26 PM    CHLORIDE 106 07/31/2020 01:26 PM    CO2 20 07/31/2020 01:26 PM    GLUCOSE 106 (H) 07/31/2020 01:26 PM    BUN 20 07/31/2020 01:26 PM    CREATININE 0.58 07/31/2020 01:26 PM     Lab Results   Component Value Date/Time    WBC 11.1 (H) 07/31/2020 01:26 PM    RBC 4.95 07/31/2020 01:26 PM    HEMOGLOBIN 15.7 07/31/2020 01:26 PM    HEMATOCRIT 48.6 (H) 07/31/2020 01:26 PM    MCV 98.2 (H) 07/31/2020 01:26 PM    MCH 31.7 07/31/2020 01:26 PM    MCHC 32.3 (L) 07/31/2020 01:26 PM    MPV 11.1 07/31/2020 01:26 PM         Lab Results   Component Value Date/Time    CHOLSTRLTOT 209 (H) 04/26/2018 12:02 PM     (H) 04/26/2018 12:02 PM    HDL 75 04/26/2018 12:02 PM    TRIGLYCERIDE 96 04/26/2018 12:02 PM         Assessment:     1. Right hip pain     2. Paroxysmal atrial fibrillation (HCC)     3. Chronic anticoagulation     4. Essential hypertension     5. Hyperlipidemia, unspecified hyperlipidemia type     6. Hypothyroidism, postsurgical         Medical Decision Making:  Today's Assessment / Status / Plan:     1. Right hip pain, with surgery pending. She is NOT high risk and can proceed with surgery. She can STOP her Eliquis 2 days prior to the procedure, and then resume it after the procedure.    2. Paroxysmal atrial fibrillation, in sinus rhythm on Metoprolol 75mg BID.     3. Chronic anticoagulation with Eliquis. No bleeding problems.    4. Hypertension, treated with Losartan and BB, stable. BP is good today.    5. Hyperlipidemia, treated with Lipitor. She is getting repeat labs done today.    6. Hypothyroidism, on replacement therapy. To recheck TSH today.    Same medications  for now. As above, she can proceed with surgery, stopping her Eliquis two days prior to surgery.    FU with me in 6 months, sooner if clinical condition changes.

## 2021-02-18 NOTE — TELEPHONE ENCOUNTER
SOUTH Peres R.N. Katie,     I saw this lady in [facility] yesterday. She had an echo done also. Please let her know that echo looks good/normal, and she CAN proceed with surgery. She should stop her Eliquis 2 days prior to the procedure, and then resume it afterwards.     You can fax my note over the surgeon - Dr. Briscoe. It's all in my plan.     Thanks, Stacie

## 2021-02-19 DIAGNOSIS — J32.4 CHRONIC PANSINUSITIS: ICD-10-CM

## 2021-02-19 DIAGNOSIS — I10 ESSENTIAL HYPERTENSION: ICD-10-CM

## 2021-02-19 DIAGNOSIS — I48.20 CHRONIC ATRIAL FIBRILLATION (HCC): ICD-10-CM

## 2021-02-19 RX ORDER — AZITHROMYCIN 250 MG/1
TABLET, FILM COATED ORAL
Qty: 6 TABLET | Refills: 1 | Status: SHIPPED | OUTPATIENT
Start: 2021-02-19 | End: 2021-03-29 | Stop reason: SDUPTHER

## 2021-02-22 RX ORDER — METOPROLOL TARTRATE 50 MG/1
TABLET, FILM COATED ORAL
Qty: 60 TABLET | Refills: 5 | Status: SHIPPED | OUTPATIENT
Start: 2021-02-22 | End: 2021-07-01

## 2021-03-04 ENCOUNTER — TELEPHONE (OUTPATIENT)
Dept: MEDICAL GROUP | Facility: MEDICAL CENTER | Age: 66
End: 2021-03-04

## 2021-03-04 NOTE — TELEPHONE ENCOUNTER
ESTABLISHED PATIENT PRE-VISIT PLANNING     Patient was NOT contacted to complete PVP.     Note: Patient will not be contacted if there is no indication to call.     1.  Reviewed notes from the last few office visits within the medical group: Yes    2.  If any orders were placed at last visit or intended to be done for this visit (i.e. 6 mos follow-up), do we have Results/Consult Notes?         •  Labs - Labs were not ordered at last office visit.  Note: If patient appointment is for lab review and patient did not complete labs, check with provider if OK to reschedule patient until labs completed.       •  Imaging - Imaging was not ordered at last office visit.       •  Referrals - No referrals were ordered at last office visit.    3. Is this appointment scheduled as a Hospital Follow-Up? No    4.  Immunizations were updated in Epic using Reconcile Outside Information activity? Yes    5.  Patient is due for the following Health Maintenance Topics:   Health Maintenance Due   Topic Date Due   • Annual Wellness Visit  Never done   • IMM DTaP/Tdap/Td Vaccine (1 - Tdap) Never done   • MAMMOGRAM  Never done   • COLONOSCOPY  Never done   • URINE DRUG SCREEN  09/24/2020   • BONE DENSITY  Never done   • IMM ZOSTER VACCINES (2 of 2) 01/01/2021   • PAP SMEAR  02/13/2021

## 2021-03-09 ENCOUNTER — APPOINTMENT (OUTPATIENT)
Dept: MEDICAL GROUP | Facility: MEDICAL CENTER | Age: 66
End: 2021-03-09
Payer: MEDICARE

## 2021-03-09 DIAGNOSIS — M25.561 BILATERAL CHRONIC KNEE PAIN: ICD-10-CM

## 2021-03-09 DIAGNOSIS — G89.29 BILATERAL CHRONIC KNEE PAIN: ICD-10-CM

## 2021-03-09 DIAGNOSIS — M25.551 RIGHT HIP PAIN: ICD-10-CM

## 2021-03-09 DIAGNOSIS — M25.562 BILATERAL CHRONIC KNEE PAIN: ICD-10-CM

## 2021-03-09 RX ORDER — OXYCODONE AND ACETAMINOPHEN 10; 325 MG/1; MG/1
1 TABLET ORAL EVERY 6 HOURS PRN
Qty: 115 TABLET | Refills: 0 | Status: SHIPPED | OUTPATIENT
Start: 2021-03-09 | End: 2021-03-09 | Stop reason: SDUPTHER

## 2021-03-09 RX ORDER — OXYCODONE AND ACETAMINOPHEN 10; 325 MG/1; MG/1
1 TABLET ORAL EVERY 6 HOURS PRN
Qty: 115 TABLET | Refills: 0 | Status: CANCELLED | OUTPATIENT
Start: 2021-03-09 | End: 2021-04-08

## 2021-03-09 RX ORDER — OXYCODONE AND ACETAMINOPHEN 10; 325 MG/1; MG/1
1 TABLET ORAL EVERY 6 HOURS PRN
Qty: 115 TABLET | Refills: 0 | Status: SHIPPED | OUTPATIENT
Start: 2021-05-08 | End: 2021-05-04 | Stop reason: SDUPTHER

## 2021-03-09 RX ORDER — OXYCODONE AND ACETAMINOPHEN 10; 325 MG/1; MG/1
1 TABLET ORAL EVERY 6 HOURS PRN
Qty: 115 TABLET | Refills: 0 | Status: SHIPPED | OUTPATIENT
Start: 2021-04-08 | End: 2021-03-09 | Stop reason: SDUPTHER

## 2021-03-12 DIAGNOSIS — L65.9 ALOPECIA: ICD-10-CM

## 2021-03-22 DIAGNOSIS — E89.0 HYPOTHYROIDISM, POSTSURGICAL: ICD-10-CM

## 2021-03-22 RX ORDER — MONTELUKAST SODIUM 10 MG/1
TABLET ORAL
Qty: 90 TABLET | Refills: 1 | Status: SHIPPED | OUTPATIENT
Start: 2021-03-22 | End: 2021-09-13

## 2021-03-22 RX ORDER — LEVOTHYROXINE SODIUM 100 MCG
TABLET ORAL
Qty: 90 TABLET | Refills: 2 | Status: SHIPPED | OUTPATIENT
Start: 2021-03-22 | End: 2021-10-19 | Stop reason: SDUPTHER

## 2021-03-29 DIAGNOSIS — J32.4 CHRONIC PANSINUSITIS: ICD-10-CM

## 2021-03-29 RX ORDER — AZITHROMYCIN 250 MG/1
TABLET, FILM COATED ORAL
Qty: 6 TABLET | Refills: 1 | Status: SHIPPED
Start: 2021-03-29 | End: 2021-09-21

## 2021-04-02 DIAGNOSIS — R11.2 NAUSEA AND VOMITING, INTRACTABILITY OF VOMITING NOT SPECIFIED, UNSPECIFIED VOMITING TYPE: ICD-10-CM

## 2021-04-02 RX ORDER — ATORVASTATIN CALCIUM 40 MG/1
TABLET, FILM COATED ORAL
Qty: 90 TABLET | Refills: 0 | Status: SHIPPED | OUTPATIENT
Start: 2021-04-02 | End: 2021-10-05 | Stop reason: SDUPTHER

## 2021-04-02 RX ORDER — ONDANSETRON 4 MG/1
TABLET, ORALLY DISINTEGRATING ORAL
Qty: 30 TABLET | Refills: 2 | Status: SHIPPED | OUTPATIENT
Start: 2021-04-02 | End: 2021-05-25

## 2021-04-02 NOTE — TELEPHONE ENCOUNTER
Received request via: Pharmacy    Was the patient seen in the last year in this department? Yes    Patients next Appointment:  Visit date not found     Requested Prescriptions     Pending Prescriptions Disp Refills   • atorvastatin (LIPITOR) 40 MG Tab [Pharmacy Med Name: ATORVASTATIN 40 MG TABLET, UU] 90 tablet 0     Sig: TAKE ONE TABLET BY MOUTH DAILY   • ondansetron (ZOFRAN ODT) 4 MG TABLET DISPERSIBLE [Pharmacy Med Name: ONDANSETRON ODT 4 MG TABLET] 30 tablet 2     Sig: DISSOLVE ONE TABLET BY MOUTH EVERY 8 HOURS AS NEEDED FOR NAUSEA AND VOMITING

## 2021-04-05 DIAGNOSIS — J32.4 CHRONIC PANSINUSITIS: ICD-10-CM

## 2021-04-05 RX ORDER — FLUTICASONE PROPIONATE 50 MCG
SPRAY, SUSPENSION (ML) NASAL
Qty: 16 G | Refills: 1 | Status: SHIPPED | OUTPATIENT
Start: 2021-04-05 | End: 2023-01-27 | Stop reason: SDUPTHER

## 2021-04-05 RX ORDER — DOXYCYCLINE HYCLATE 100 MG
100 TABLET ORAL 2 TIMES DAILY
Qty: 20 TABLET | Refills: 0 | Status: SHIPPED | OUTPATIENT
Start: 2021-04-05 | End: 2021-04-15

## 2021-04-05 NOTE — TELEPHONE ENCOUNTER
Received request via: Pharmacy    Was the patient seen in the last year in this department? Yes    Patients next Appointment:  Visit date not found     Requested Prescriptions     Pending Prescriptions Disp Refills   • fluticasone (FLONASE) 50 MCG/ACT nasal spray [Pharmacy Med Name: FLUTICASONE PROP 50 MCG SPRAY] 16 g 1     Sig: SPRAY ONE SPRAY IN EACH NOSTRIL DAILY

## 2021-05-25 DIAGNOSIS — R11.2 NAUSEA AND VOMITING, INTRACTABILITY OF VOMITING NOT SPECIFIED, UNSPECIFIED VOMITING TYPE: ICD-10-CM

## 2021-05-25 RX ORDER — ONDANSETRON 4 MG/1
TABLET, ORALLY DISINTEGRATING ORAL
Qty: 30 TABLET | Refills: 1 | Status: SHIPPED | OUTPATIENT
Start: 2021-05-25 | End: 2021-06-25

## 2021-06-02 DIAGNOSIS — F41.9 ANXIETY: ICD-10-CM

## 2021-06-02 RX ORDER — ALPRAZOLAM 0.5 MG/1
TABLET ORAL
Qty: 60 TABLET | Refills: 0 | Status: SHIPPED | OUTPATIENT
Start: 2021-06-02 | End: 2021-06-29

## 2021-06-29 DIAGNOSIS — F41.9 ANXIETY: ICD-10-CM

## 2021-06-29 RX ORDER — ALPRAZOLAM 0.5 MG/1
TABLET ORAL
Qty: 60 TABLET | Refills: 2 | Status: SHIPPED | OUTPATIENT
Start: 2021-06-29 | End: 2021-07-29

## 2021-06-30 DIAGNOSIS — I10 ESSENTIAL HYPERTENSION: ICD-10-CM

## 2021-06-30 DIAGNOSIS — I48.20 CHRONIC ATRIAL FIBRILLATION (HCC): ICD-10-CM

## 2021-07-01 RX ORDER — METOPROLOL TARTRATE 50 MG/1
75 TABLET, FILM COATED ORAL 2 TIMES DAILY
Qty: 270 TABLET | Refills: 1 | Status: SHIPPED | OUTPATIENT
Start: 2021-07-01 | End: 2022-01-07 | Stop reason: SDUPTHER

## 2021-07-23 DIAGNOSIS — Z79.01 CHRONIC ANTICOAGULATION: ICD-10-CM

## 2021-07-23 RX ORDER — APIXABAN 5 MG/1
TABLET, FILM COATED ORAL
Qty: 60 TABLET | Refills: 2 | Status: SHIPPED | OUTPATIENT
Start: 2021-07-23 | End: 2021-11-29

## 2021-08-24 DIAGNOSIS — M25.562 BILATERAL CHRONIC KNEE PAIN: ICD-10-CM

## 2021-08-24 DIAGNOSIS — G89.29 BILATERAL CHRONIC KNEE PAIN: ICD-10-CM

## 2021-08-24 DIAGNOSIS — M25.561 BILATERAL CHRONIC KNEE PAIN: ICD-10-CM

## 2021-08-24 DIAGNOSIS — M25.551 RIGHT HIP PAIN: ICD-10-CM

## 2021-08-24 RX ORDER — OXYCODONE AND ACETAMINOPHEN 10; 325 MG/1; MG/1
1 TABLET ORAL EVERY 6 HOURS PRN
Qty: 115 TABLET | Refills: 0 | Status: SHIPPED | OUTPATIENT
Start: 2021-08-24 | End: 2021-09-21 | Stop reason: SDUPTHER

## 2021-09-13 RX ORDER — MONTELUKAST SODIUM 10 MG/1
TABLET ORAL
Qty: 90 TABLET | Refills: 1 | Status: SHIPPED | OUTPATIENT
Start: 2021-09-13 | End: 2022-03-14

## 2021-10-05 ENCOUNTER — OFFICE VISIT (OUTPATIENT)
Dept: MEDICAL GROUP | Facility: MEDICAL CENTER | Age: 66
End: 2021-10-05
Payer: MEDICARE

## 2021-10-05 VITALS
HEART RATE: 67 BPM | TEMPERATURE: 98.8 F | SYSTOLIC BLOOD PRESSURE: 100 MMHG | OXYGEN SATURATION: 94 % | DIASTOLIC BLOOD PRESSURE: 62 MMHG

## 2021-10-05 DIAGNOSIS — R11.2 NAUSEA AND VOMITING, INTRACTABILITY OF VOMITING NOT SPECIFIED, UNSPECIFIED VOMITING TYPE: ICD-10-CM

## 2021-10-05 DIAGNOSIS — F41.9 ANXIETY: ICD-10-CM

## 2021-10-05 PROCEDURE — 99213 OFFICE O/P EST LOW 20 MIN: CPT | Performed by: INTERNAL MEDICINE

## 2021-10-05 RX ORDER — FUROSEMIDE 40 MG/1
40 TABLET ORAL
Qty: 30 TABLET | Refills: 0 | Status: SHIPPED | OUTPATIENT
Start: 2021-10-05 | End: 2022-02-14

## 2021-10-05 RX ORDER — ONDANSETRON 4 MG/1
4 TABLET, ORALLY DISINTEGRATING ORAL 2 TIMES DAILY PRN
Qty: 60 TABLET | Refills: 0 | Status: SHIPPED | OUTPATIENT
Start: 2021-10-05 | End: 2021-10-19 | Stop reason: SDUPTHER

## 2021-10-05 RX ORDER — ATORVASTATIN CALCIUM 40 MG/1
TABLET, FILM COATED ORAL
Qty: 90 TABLET | Refills: 0 | Status: SHIPPED | OUTPATIENT
Start: 2021-10-05 | End: 2021-10-19 | Stop reason: SDUPTHER

## 2021-10-05 RX ORDER — POTASSIUM CHLORIDE 600 MG/1
TABLET, FILM COATED, EXTENDED RELEASE ORAL
Qty: 90 TABLET | Refills: 0 | Status: SHIPPED | OUTPATIENT
Start: 2021-10-05 | End: 2023-04-19

## 2021-10-05 RX ORDER — ALPRAZOLAM 0.5 MG/1
0.5 TABLET ORAL 2 TIMES DAILY PRN
Qty: 60 TABLET | Refills: 0 | Status: SHIPPED | OUTPATIENT
Start: 2021-10-05 | End: 2021-11-04 | Stop reason: SDUPTHER

## 2021-10-05 ASSESSMENT — PATIENT HEALTH QUESTIONNAIRE - PHQ9: CLINICAL INTERPRETATION OF PHQ2 SCORE: 0

## 2021-10-05 NOTE — PROGRESS NOTES
Subjective:     CC: Alprazolam refill.    HPI:  Alfonso is a pleasant 65 y.o. female who presents today for alprazolam refill.  Her primary care provider Sim Brownlee is currently out of office and patient is out over her alprazolam prescription.  Patient is also taking Percocet for her chronic hip and joint pain.    Problem   Anxiety    This is a chronic problem.  Patient is currently on alprazolam 0.5 mg BID as needed.  I have reviewed PDMP.  Her last refill of alprazolam was on September 1, 2021 60 tab for 30 days supply.            Current Outpatient Medications Ordered in Epic   Medication Sig Dispense Refill   • ALPRAZolam (XANAX) 0.5 MG Tab Take 1 Tablet by mouth 2 times a day as needed (for anxiety) for up to 30 days. 60 Tablet 0   • atorvastatin (LIPITOR) 40 MG Tab TAKE ONE TABLET BY MOUTH DAILY 90 Tablet 0   • furosemide (LASIX) 40 MG Tab Take 1 Tablet by mouth two times a week. 30 Tablet 0   • ondansetron (ZOFRAN ODT) 4 MG TABLET DISPERSIBLE Take 1 Tablet by mouth 2 times a day as needed for Nausea for up to 30 days. AS NEEDED FOR NAUSEA AND VOMITING 60 Tablet 0   • potassium chloride (KLOR-CON) 8 MEQ tablet TAKE ONE TABLET BY MOUTH DAILY *KLOR-CON* 90 Tablet 0   • oxyCODONE-acetaminophen (PERCOCET-10)  MG Tab Take 1 Tablet by mouth every 6 hours as needed for Severe Pain for up to 30 days. 115 Tablet 0   • montelukast (SINGULAIR) 10 MG Tab TAKE ONE TABLET BY MOUTH DAILY 90 Tablet 1   • lidocaine (LIDODERM) 5 % Patch APPLY 1 PATCH TO AFFECTED AREA FOR 12 HOURS IN A 24 HOUR PERIOD 7 Patch 5   • ELIQUIS 5 MG Tab TAKE ONE TABLET BY MOUTH TWICE A DAY 60 tablet 2   • metoprolol tartrate (LOPRESSOR) 50 MG Tab Take 1.5 Tablets by mouth 2 times a day. Please call [phone] to schedule follow up appointment for further refills. 270 tablet 1   • sertraline (ZOLOFT) 50 MG Tab TAKE ONE TABLET BY MOUTH DAILY 90 tablet 1   • losartan (COZAAR) 50 MG Tab TAKE ONE TABLET BY MOUTH DAILY 90 tablet 2   • fluticasone  (FLONASE) 50 MCG/ACT nasal spray SPRAY ONE SPRAY IN EACH NOSTRIL DAILY 16 g 1   • SYNTHROID 100 MCG Tab TAKE ONE TABLET BY MOUTH EVERY MORNING ON AN EMPTY STOMACH 90 tablet 2   • omeprazole (PRILOSEC) 40 MG delayed-release capsule TAKE ONE CAPSULE BY MOUTH DAILY - PRILOSEC. 90 Cap 3   • ciclopirox (PENLAC) 8 % solution      • ketoconazole (NIZORAL) 2 % Cream      • triamcinolone acetonide (KENALOG) 0.1 % Cream Apply 2-4 g to affected area(s) 2 times a day. 1 Tube 0   • cetirizine (ZYRTEC) 10 MG Tab Take 10 mg by mouth every evening.       No current Epic-ordered facility-administered medications on file.       Health Maintenance: She is due for mammogram, annual wellness visit, colonoscopy and multiple vaccines.  To be addressed at her PCP visit.    ROS:  Gen: no fevers/chills  Pulm: no sob  CV: no chest pain        Objective:     Exam:  /62 (BP Location: Left arm, Patient Position: Sitting, BP Cuff Size: Adult)   Pulse 67   Temp 37.1 °C (98.8 °F) (Temporal)   LMP 03/20/2016   SpO2 94%  There is no height or weight on file to calculate BMI.    Gen: Alert and oriented, No apparent distress.  Lungs: Normal effort.    Labs: Reviewed pain medication screen from 09/30/2019.    Assessment & Plan:   Alfonso is a pleasant 65 y.o. female with the following -     Problem List Items Addressed This Visit     Anxiety (Chronic)     This is a chronic condition, managed with alprazolam 0.5 mg twice daily as needed.      Controlled substance medication f/u:     The patient is currently taking controlled substance(s) for the following condition(s): Anxiety     Prior to the use of controlled substances, the patient tried with incomplete symptom control the following medications: none     The patient is taking the medication as prescribed.     Current medication regimen:  - Medication name: Alprazolam  - Medication dose: 0.5 mg  - Medication frequency: twice daily as needed  - Patient last took the above medication: today  -  Date of last refill: 09/01/2021 for #60 tabs for 30 day supply. Refills in recent history (last 6 months) only filled by prescribers at our office, filled at the same pharmacy under the same insurance coverage.      The patient reports their symptoms are well controlled on their current medication regimen.     [place] PDMP checked today: yes   Patient has a controlled substances contract verified within the last 1 year: no  Patient did not have a drug urine screen last year.  Patient last had confirmatory urine testing on the following date 09/30/2019 confirmation of prescribed medications.   Patient has had a relevant physical exam pertaining to the above diagnosis in the last 6 months. yes     Patient is a candidate for naloxone: .yes This is considered in patients with history of overdose, history of substance abuse, concurrent opioid/benzodiazepine use, and/or >50MME/day.      Naloxone offered and declined.    We reviewed the risks/benefits of being on the above controlled substance(s). While taking this medication, patient will comply with all rules as stipulated in the contract and follow up in the office at least every 3 months according to our office policy.    I am giving only 30 days refills since her primary care provider is not available right now.  And patient almost run out of her supply.    Instructed patient to make appointment in 4 weeks with Sim Brownlee to update pain contract, do urine drug screen and pain medication screening.    Instructed patient not to take alprazolam and Percocet together, do not drive or operate machinery while taking these medications.           Relevant Medications    ALPRAZolam (XANAX) 0.5 MG Tab      Other Visit Diagnoses     Nausea and vomiting, intractability of vomiting not specified, unspecified vomiting type        Relevant Medications    ondansetron (ZOFRAN ODT) 4 MG TABLET DISPERSIBLE        Unable to send electronic prescription, paper printout was provided to the  patient.    Patient was instructed to make a follow-up appointment in 4 weeks, to follow-up on her pain medication, antianxiety medication, renew her contract.  She is also due for her mammogram, colorectal screening and multiple vaccines.      Return in about 4 weeks (around 11/2/2021), or Follow-up on pain and anxiety medications, renew contract.    Please note that this dictation was created using voice recognition software. I have made every reasonable attempt to correct obvious errors, but I expect that there are errors of grammar and possibly content that I did not discover before finalizing the note.

## 2021-10-05 NOTE — ASSESSMENT & PLAN NOTE
This is a chronic condition, managed with alprazolam 0.5 mg twice daily as needed.      Controlled substance medication f/u:     The patient is currently taking controlled substance(s) for the following condition(s): Anxiety     Prior to the use of controlled substances, the patient tried with incomplete symptom control the following medications: none     The patient is taking the medication as prescribed.     Current medication regimen:  - Medication name: Alprazolam  - Medication dose: 0.5 mg  - Medication frequency: twice daily as needed  - Patient last took the above medication: today  - Date of last refill: 09/01/2021 for #60 tabs for 30 day supply. Refills in recent history (last 6 months) only filled by prescribers at our office, filled at the same pharmacy under the same insurance coverage.      The patient reports their symptoms are well controlled on their current medication regimen.     [place] PDMP checked today: yes   Patient has a controlled substances contract verified within the last 1 year: no  Patient did not have a drug urine screen last year.  Patient last had confirmatory urine testing on the following date 09/30/2019 confirmation of prescribed medications.   Patient has had a relevant physical exam pertaining to the above diagnosis in the last 6 months. yes     Patient is a candidate for naloxone: .yes This is considered in patients with history of overdose, history of substance abuse, concurrent opioid/benzodiazepine use, and/or >50MME/day.      Naloxone offered and declined.    We reviewed the risks/benefits of being on the above controlled substance(s). While taking this medication, patient will comply with all rules as stipulated in the contract and follow up in the office at least every 3 months according to our office policy.    I am giving only 30 days refill of Alprazolam since her PCP is not available right now.     Instructed patient to make appointment in 4 weeks with Sim Brownlee to  update pain contract, do urine drug screen and pain medication screening.    Instructed patient not to take alprazolam and Percocet together, do not drive or operate machinery while taking these medications.

## 2021-10-16 DIAGNOSIS — F32.A DEPRESSION, UNSPECIFIED DEPRESSION TYPE: ICD-10-CM

## 2021-10-19 ENCOUNTER — OFFICE VISIT (OUTPATIENT)
Dept: MEDICAL GROUP | Facility: MEDICAL CENTER | Age: 66
End: 2021-10-19
Payer: MEDICARE

## 2021-10-19 VITALS
DIASTOLIC BLOOD PRESSURE: 70 MMHG | WEIGHT: 273.59 LBS | OXYGEN SATURATION: 95 % | HEIGHT: 65 IN | SYSTOLIC BLOOD PRESSURE: 138 MMHG | HEART RATE: 84 BPM | TEMPERATURE: 99.4 F | BODY MASS INDEX: 45.58 KG/M2

## 2021-10-19 DIAGNOSIS — M25.551 RIGHT HIP PAIN: ICD-10-CM

## 2021-10-19 DIAGNOSIS — G89.29 BILATERAL CHRONIC KNEE PAIN: ICD-10-CM

## 2021-10-19 DIAGNOSIS — E89.0 HYPOTHYROIDISM, POSTSURGICAL: ICD-10-CM

## 2021-10-19 DIAGNOSIS — Z78.0 ASYMPTOMATIC POSTMENOPAUSAL STATUS: ICD-10-CM

## 2021-10-19 DIAGNOSIS — Z79.891 CHRONIC USE OF OPIATE FOR THERAPEUTIC PURPOSE: ICD-10-CM

## 2021-10-19 DIAGNOSIS — M25.561 BILATERAL CHRONIC KNEE PAIN: ICD-10-CM

## 2021-10-19 DIAGNOSIS — I10 ESSENTIAL HYPERTENSION: ICD-10-CM

## 2021-10-19 DIAGNOSIS — M25.562 BILATERAL CHRONIC KNEE PAIN: ICD-10-CM

## 2021-10-19 DIAGNOSIS — Z12.31 ENCOUNTER FOR SCREENING MAMMOGRAM FOR BREAST CANCER: ICD-10-CM

## 2021-10-19 DIAGNOSIS — R11.2 NAUSEA AND VOMITING, INTRACTABILITY OF VOMITING NOT SPECIFIED, UNSPECIFIED VOMITING TYPE: ICD-10-CM

## 2021-10-19 DIAGNOSIS — J32.4 CHRONIC PANSINUSITIS: ICD-10-CM

## 2021-10-19 DIAGNOSIS — E78.5 HYPERLIPIDEMIA, UNSPECIFIED HYPERLIPIDEMIA TYPE: ICD-10-CM

## 2021-10-19 DIAGNOSIS — K21.9 GASTROESOPHAGEAL REFLUX DISEASE WITHOUT ESOPHAGITIS: ICD-10-CM

## 2021-10-19 DIAGNOSIS — F41.9 ANXIETY: ICD-10-CM

## 2021-10-19 DIAGNOSIS — Z23 INFLUENZA VACCINE NEEDED: ICD-10-CM

## 2021-10-19 PROBLEM — R79.89 ABNORMAL CBC: Status: RESOLVED | Noted: 2021-02-16 | Resolved: 2021-10-19

## 2021-10-19 PROBLEM — S63.219A SUBLUXATION OF MCP JOINT: Status: ACTIVE | Noted: 2021-10-19

## 2021-10-19 PROCEDURE — 90662 IIV NO PRSV INCREASED AG IM: CPT | Performed by: PHYSICIAN ASSISTANT

## 2021-10-19 PROCEDURE — 99214 OFFICE O/P EST MOD 30 MIN: CPT | Mod: 25 | Performed by: PHYSICIAN ASSISTANT

## 2021-10-19 PROCEDURE — G0008 ADMIN INFLUENZA VIRUS VAC: HCPCS | Performed by: PHYSICIAN ASSISTANT

## 2021-10-19 RX ORDER — LOSARTAN POTASSIUM 50 MG/1
TABLET ORAL
Qty: 90 TABLET | Refills: 3 | Status: SHIPPED | OUTPATIENT
Start: 2021-10-19 | End: 2023-01-16

## 2021-10-19 RX ORDER — LIDOCAINE 50 MG/G
PATCH TOPICAL
Qty: 30 PATCH | Refills: 11 | Status: SHIPPED | OUTPATIENT
Start: 2021-10-19 | End: 2022-12-02

## 2021-10-19 RX ORDER — ONDANSETRON 4 MG/1
4 TABLET, ORALLY DISINTEGRATING ORAL 2 TIMES DAILY PRN
Qty: 60 TABLET | Refills: 5 | Status: SHIPPED | OUTPATIENT
Start: 2021-10-19 | End: 2021-11-18

## 2021-10-19 RX ORDER — LEVOTHYROXINE SODIUM 100 MCG
100 TABLET ORAL
Qty: 90 TABLET | Refills: 3 | Status: SHIPPED | OUTPATIENT
Start: 2021-10-19 | End: 2022-10-21

## 2021-10-19 RX ORDER — OXYCODONE AND ACETAMINOPHEN 10; 325 MG/1; MG/1
1 TABLET ORAL EVERY 6 HOURS PRN
Qty: 115 TABLET | Refills: 0 | Status: SHIPPED | OUTPATIENT
Start: 2021-11-18 | End: 2022-02-09 | Stop reason: SDUPTHER

## 2021-10-19 RX ORDER — OXYCODONE AND ACETAMINOPHEN 10; 325 MG/1; MG/1
1 TABLET ORAL EVERY 6 HOURS PRN
Qty: 115 TABLET | Refills: 0 | Status: SHIPPED | OUTPATIENT
Start: 2021-10-19 | End: 2021-11-18

## 2021-10-19 RX ORDER — OXYCODONE AND ACETAMINOPHEN 10; 325 MG/1; MG/1
1 TABLET ORAL EVERY 6 HOURS PRN
Qty: 115 TABLET | Refills: 0 | Status: SHIPPED | OUTPATIENT
Start: 2021-12-18 | End: 2021-12-14 | Stop reason: SDUPTHER

## 2021-10-19 RX ORDER — AZITHROMYCIN 250 MG/1
TABLET, FILM COATED ORAL
Qty: 6 TABLET | Refills: 1 | Status: SHIPPED | OUTPATIENT
Start: 2021-10-19 | End: 2022-01-09

## 2021-10-19 RX ORDER — OMEPRAZOLE 40 MG/1
CAPSULE, DELAYED RELEASE ORAL
Qty: 90 CAPSULE | Refills: 3 | Status: SHIPPED | OUTPATIENT
Start: 2021-10-19 | End: 2021-11-04 | Stop reason: SDUPTHER

## 2021-10-19 RX ORDER — ATORVASTATIN CALCIUM 40 MG/1
TABLET, FILM COATED ORAL
Qty: 90 TABLET | Refills: 3 | Status: SHIPPED | OUTPATIENT
Start: 2021-10-19

## 2021-10-19 ASSESSMENT — FIBROSIS 4 INDEX: FIB4 SCORE: 1

## 2021-10-20 ENCOUNTER — APPOINTMENT (OUTPATIENT)
Dept: MEDICAL GROUP | Facility: MEDICAL CENTER | Age: 66
End: 2021-10-20
Payer: MEDICARE

## 2021-10-20 NOTE — ASSESSMENT & PLAN NOTE
Chronic condition.  Currently on Synthroid 100 mcg daily.  Last TSH was in normal range which was done in February.

## 2021-10-20 NOTE — ASSESSMENT & PLAN NOTE
This is a pleasant 65-year-old female here today to discuss her health.  Chronic history of right hip pain.  Now followed by Dr. Chery from [facility].  Patient into the hip which was effective.  It did wear off and she still dealing with right hip pain and takes oxycodone daily for symptoms.  She will be having a total hip replacement in time.  First though she needs to have liposuction to remove the fat surrounding her waist.  That would be done in time.

## 2021-10-20 NOTE — PROGRESS NOTES
Subjective:   Alfonso Posada is a 65 y.o. female here today for chronic pain of her right hip and other chronic medical conditions.    Right hip pain  This is a pleasant 65-year-old female here today to discuss her health.  Chronic history of right hip pain.  Now followed by Dr. Chery from [facility].  Patient into the hip which was effective.  It did wear off and she still dealing with right hip pain and takes oxycodone daily for symptoms.  She will be having a total hip replacement in time.  First though she needs to have liposuction to remove the fat surrounding her waist.  That would be done in time.    Hypothyroidism, postsurgical  Chronic condition.  Currently on Synthroid 100 mcg daily.  Last TSH was in normal range which was done in February.       Current medicines (including changes today)  Current Outpatient Medications   Medication Sig Dispense Refill   • [START ON 12/18/2021] oxyCODONE-acetaminophen (PERCOCET-10)  MG Tab Take 1 Tablet by mouth every 6 hours as needed for Severe Pain for up to 30 days. 115 Tablet 0   • [START ON 11/18/2021] oxyCODONE-acetaminophen (PERCOCET-10)  MG Tab Take 1 Tablet by mouth every 6 hours as needed for Severe Pain for up to 30 days. 115 Tablet 0   • oxyCODONE-acetaminophen (PERCOCET-10)  MG Tab Take 1 Tablet by mouth every 6 hours as needed for Severe Pain for up to 30 days. 115 Tablet 0   • lidocaine (LIDODERM) 5 % Patch APPLY 1 PATCH TO AFFECTED AREA FOR 12 HOURS IN A 24 HOUR PERIOD 30 Patch 11   • azithromycin (ZITHROMAX) 250 MG Tab Take two tabs day one then one tab day 2-5. 6 Tablet 1   • ondansetron (ZOFRAN ODT) 4 MG TABLET DISPERSIBLE Take 1 Tablet by mouth 2 times a day as needed for Nausea for up to 30 days. AS NEEDED FOR NAUSEA AND VOMITING 60 Tablet 5   • atorvastatin (LIPITOR) 40 MG Tab TAKE ONE TABLET BY MOUTH DAILY 90 Tablet 3   • omeprazole (PRILOSEC) 40 MG delayed-release capsule TAKE ONE CAPSULE BY MOUTH DAILY - PRILOSEC. 90  Capsule 3   • SYNTHROID 100 MCG Tab Take 1 Tablet by mouth every morning on an empty stomach. ON EMPTY STOMACH 90 Tablet 3   • losartan (COZAAR) 50 MG Tab TAKE ONE TABLET BY MOUTH DAILY 90 Tablet 3   • sertraline (ZOLOFT) 50 MG Tab TAKE ONE TABLET BY MOUTH DAILY 90 Tablet 1   • ALPRAZolam (XANAX) 0.5 MG Tab Take 1 Tablet by mouth 2 times a day as needed (for anxiety) for up to 30 days. 60 Tablet 0   • furosemide (LASIX) 40 MG Tab Take 1 Tablet by mouth two times a week. 30 Tablet 0   • potassium chloride (KLOR-CON) 8 MEQ tablet TAKE ONE TABLET BY MOUTH DAILY *KLOR-CON* 90 Tablet 0   • montelukast (SINGULAIR) 10 MG Tab TAKE ONE TABLET BY MOUTH DAILY 90 Tablet 1   • ELIQUIS 5 MG Tab TAKE ONE TABLET BY MOUTH TWICE A DAY 60 tablet 2   • metoprolol tartrate (LOPRESSOR) 50 MG Tab Take 1.5 Tablets by mouth 2 times a day. Please call [phone] to schedule follow up appointment for further refills. 270 tablet 1   • fluticasone (FLONASE) 50 MCG/ACT nasal spray SPRAY ONE SPRAY IN EACH NOSTRIL DAILY 16 g 1   • ciclopirox (PENLAC) 8 % solution      • ketoconazole (NIZORAL) 2 % Cream      • triamcinolone acetonide (KENALOG) 0.1 % Cream Apply 2-4 g to affected area(s) 2 times a day. 1 Tube 0   • cetirizine (ZYRTEC) 10 MG Tab Take 10 mg by mouth every evening.       No current facility-administered medications for this visit.     She  has a past medical history of Anesthesia, Anxiety disorder, Arthritis, Asthma, Atrial fibrillation (HCC) (02/2021), Bartholin cyst, Chronic knee pain, Depression, GERD (gastroesophageal reflux disease), Graves disease, Heart murmur, Hemorrhagic disorder (HCC), Hyperlipidemia, Hypertension, Hypothyroidism, postsurgical ( ), Infectious disease (2017), Morbid obesity (HCC) (12/2019), Postherpetic neuralgia, Psychiatric disorder, SVT (supraventricular tachycardia) ([facility]), and Uterine cancer ([facility]).    Social History and Family History were reviewed and updated.    ROS   No chest pain, no shortness of  "breath, no abdominal pain and all other systems were reviewed and are negative.       Objective:     /70 (BP Location: Right arm, Patient Position: Sitting, BP Cuff Size: Adult)   Pulse 84   Temp 37.4 °C (99.4 °F) (Temporal)   Ht 1.651 m (5' 5\")   Wt 124 kg (273 lb 9.5 oz)   SpO2 95%  Body mass index is 45.53 kg/m².   Physical Exam:  Constitutional: Alert, no distress.  Skin: Warm, dry, good turgor, no rashes in visible areas.  Eye: Equal, round and reactive, conjunctiva clear, lids normal.  ENMT: Lips without lesions, good dentition, oropharynx clear.  Neck: Trachea midline, no masses.   Lymph: No cervical or supraclavicular lymphadenopathy  Respiratory: Unlabored respiratory effort, lungs appear clear, no wheezes.  Cardiovascular: Regular rate and rhythm.  Psych: Alert and oriented x3, normal affect and mood.        Assessment and Plan:   The following treatment plan was discussed    1. Right hip pain  Chronic condition.  Stable.   reviewed.  Renewed oxycodone as directed.  Provided a 90-day supply.  Also provided a prescription for lidocaine 5% patches.  Hopefully she will soon be cleared after liposuction to have her total hip replaced.  She had a cardiac preop work-up back in February.  Advised that she may need to have another 1 as its been over 6 months.  - oxyCODONE-acetaminophen (PERCOCET-10)  MG Tab; Take 1 Tablet by mouth every 6 hours as needed for Severe Pain for up to 30 days.  Dispense: 115 Tablet; Refill: 0  - oxyCODONE-acetaminophen (PERCOCET-10)  MG Tab; Take 1 Tablet by mouth every 6 hours as needed for Severe Pain for up to 30 days.  Dispense: 115 Tablet; Refill: 0  - oxyCODONE-acetaminophen (PERCOCET-10)  MG Tab; Take 1 Tablet by mouth every 6 hours as needed for Severe Pain for up to 30 days.  Dispense: 115 Tablet; Refill: 0  - lidocaine (LIDODERM) 5 % Patch; APPLY 1 PATCH TO AFFECTED AREA FOR 12 HOURS IN A 24 HOUR PERIOD  Dispense: 30 Patch; Refill: 11    2. " Chronic use of opiate for therapeutic purpose  Chronic use.  Reviewed controlled substance agreement.  It was signed and returned.  - Controlled Substance Treatment Agreement    3. Hypothyroidism, postsurgical  Chronic condition.  Status unknown.  Order labs.  Renewed Synthroid as directed.  - TSH WITH REFLEX TO FT4; Future  - SYNTHROID 100 MCG Tab; Take 1 Tablet by mouth every morning on an empty stomach. ON EMPTY STOMACH  Dispense: 90 Tablet; Refill: 3    4. Essential hypertension  Chronic condition.  Stable.  Continue losartan as directed.  Order labs.  - Comp Metabolic Panel; Future    5. Hyperlipidemia, unspecified hyperlipidemia type  Chronic condition.  Continue statin as directed.  Order cholesterol profile.  - Lipid Profile; Future    6. Chronic pansinusitis  Chronic condition.  Patient requesting antibiotic for pansinusitis.  Has used azithromycin with good results.  Requesting a refill.  - azithromycin (ZITHROMAX) 250 MG Tab; Take two tabs day one then one tab day 2-5.  Dispense: 6 Tablet; Refill: 1    7. Asymptomatic postmenopausal status  DEXA bone scan ordered.  Asymptomatic.  Discussed importance of screening.  - DS-BONE DENSITY STUDY (DEXA); Future    8. Influenza vaccine needed  Influenza vaccination provided.  Reviewed her booster vaccination which is not due until December.  - INFLUENZA VACCINE, HIGH DOSE (65+ ONLY)    9. Encounter for screening mammogram for breast cancer  Mammogram ordered.  Screening.  - MA-SCREENING MAMMO BILAT W/TOMOSYNTHESIS W/CAD; Future      Followup: Return in about 3 months (around 1/19/2022), or if symptoms worsen or fail to improve.    Please note that this dictation was created using voice recognition software. I have made every reasonable attempt to correct obvious errors, but I expect that there are errors of grammar and possibly content that I did not discover before finalizing the note.

## 2021-11-04 DIAGNOSIS — E66.9 EXCESSIVE SUBCUTANEOUS FAT: ICD-10-CM

## 2021-11-04 DIAGNOSIS — E66.01 MORBID OBESITY WITH BMI OF 40.0-44.9, ADULT (HCC): ICD-10-CM

## 2021-11-04 DIAGNOSIS — R11.2 NAUSEA AND VOMITING, INTRACTABILITY OF VOMITING NOT SPECIFIED, UNSPECIFIED VOMITING TYPE: ICD-10-CM

## 2021-11-04 DIAGNOSIS — B96.89 BACTERIAL SKIN INFECTION: ICD-10-CM

## 2021-11-04 DIAGNOSIS — F41.9 ANXIETY: ICD-10-CM

## 2021-11-04 DIAGNOSIS — K21.9 GASTROESOPHAGEAL REFLUX DISEASE WITHOUT ESOPHAGITIS: ICD-10-CM

## 2021-11-04 DIAGNOSIS — L08.9 BACTERIAL SKIN INFECTION: ICD-10-CM

## 2021-11-04 RX ORDER — OMEPRAZOLE 40 MG/1
CAPSULE, DELAYED RELEASE ORAL
Qty: 90 CAPSULE | Refills: 3 | Status: SHIPPED | OUTPATIENT
Start: 2021-11-04 | End: 2022-02-02

## 2021-11-04 RX ORDER — ALPRAZOLAM 0.5 MG/1
0.5 TABLET ORAL 2 TIMES DAILY PRN
Qty: 60 TABLET | Refills: 1 | Status: SHIPPED | OUTPATIENT
Start: 2021-11-04 | End: 2022-01-11 | Stop reason: SDUPTHER

## 2021-11-29 DIAGNOSIS — Z79.01 CHRONIC ANTICOAGULATION: ICD-10-CM

## 2021-11-29 RX ORDER — APIXABAN 5 MG/1
TABLET, FILM COATED ORAL
Qty: 60 TABLET | Refills: 2 | Status: SHIPPED | OUTPATIENT
Start: 2021-11-29 | End: 2022-03-08

## 2021-11-30 NOTE — TELEPHONE ENCOUNTER
Received request via: Pharmacy    Was the patient seen in the last year in this department? Yes    Does the patient have an active prescription (recently filled or refills available) for medication(s) requested? No    Requested Prescriptions     Pending Prescriptions Disp Refills   • ELIQUIS 5 MG Tab [Pharmacy Med Name: ELIQUIS 5 MG TABLET] 60 Tablet 2     Sig: TAKE ONE TABLET BY MOUTH TWICE A DAY

## 2021-12-13 ENCOUNTER — TELEPHONE (OUTPATIENT)
Dept: MEDICAL GROUP | Facility: MEDICAL CENTER | Age: 66
End: 2021-12-13

## 2021-12-13 NOTE — TELEPHONE ENCOUNTER
VOICEMAIL  1. Caller Name: Tatiana                       Call Back Number: [phone]    2. Message: Dr. Reeves reviewed the referral that was placed and has determined he is unable to fulfill it.

## 2021-12-14 DIAGNOSIS — G89.29 BILATERAL CHRONIC KNEE PAIN: ICD-10-CM

## 2021-12-14 DIAGNOSIS — M25.561 BILATERAL CHRONIC KNEE PAIN: ICD-10-CM

## 2021-12-14 DIAGNOSIS — M25.562 BILATERAL CHRONIC KNEE PAIN: ICD-10-CM

## 2021-12-14 DIAGNOSIS — M25.551 RIGHT HIP PAIN: ICD-10-CM

## 2021-12-14 RX ORDER — OXYCODONE AND ACETAMINOPHEN 10; 325 MG/1; MG/1
1 TABLET ORAL EVERY 6 HOURS PRN
Qty: 115 TABLET | Refills: 0 | Status: SHIPPED | OUTPATIENT
Start: 2021-12-16 | End: 2022-01-11 | Stop reason: SDUPTHER

## 2022-01-07 DIAGNOSIS — I10 ESSENTIAL HYPERTENSION: ICD-10-CM

## 2022-01-07 DIAGNOSIS — R11.2 NAUSEA AND VOMITING, INTRACTABILITY OF VOMITING NOT SPECIFIED, UNSPECIFIED VOMITING TYPE: ICD-10-CM

## 2022-01-07 DIAGNOSIS — J32.4 CHRONIC PANSINUSITIS: ICD-10-CM

## 2022-01-07 DIAGNOSIS — I48.20 CHRONIC ATRIAL FIBRILLATION (HCC): ICD-10-CM

## 2022-01-07 RX ORDER — METOPROLOL TARTRATE 50 MG/1
75 TABLET, FILM COATED ORAL 2 TIMES DAILY
Qty: 270 TABLET | Refills: 1 | Status: SHIPPED | OUTPATIENT
Start: 2022-01-07 | End: 2022-01-07 | Stop reason: SDUPTHER

## 2022-01-07 RX ORDER — METOPROLOL TARTRATE 50 MG/1
75 TABLET, FILM COATED ORAL 2 TIMES DAILY
Qty: 270 TABLET | Refills: 1 | Status: SHIPPED | OUTPATIENT
Start: 2022-01-07 | End: 2023-02-21

## 2022-01-07 RX ORDER — ONDANSETRON 4 MG/1
4 TABLET, ORALLY DISINTEGRATING ORAL 2 TIMES DAILY PRN
Qty: 60 TABLET | Refills: 3 | Status: SHIPPED | OUTPATIENT
Start: 2022-01-07 | End: 2022-01-12 | Stop reason: SDUPTHER

## 2022-01-09 RX ORDER — AZITHROMYCIN 250 MG/1
TABLET, FILM COATED ORAL
Qty: 6 TABLET | Refills: 1 | Status: SHIPPED | OUTPATIENT
Start: 2022-01-09 | End: 2022-04-15

## 2022-01-11 DIAGNOSIS — M25.551 RIGHT HIP PAIN: ICD-10-CM

## 2022-01-11 DIAGNOSIS — M25.561 BILATERAL CHRONIC KNEE PAIN: ICD-10-CM

## 2022-01-11 DIAGNOSIS — F41.9 ANXIETY: ICD-10-CM

## 2022-01-11 DIAGNOSIS — M25.562 BILATERAL CHRONIC KNEE PAIN: ICD-10-CM

## 2022-01-11 DIAGNOSIS — G89.29 BILATERAL CHRONIC KNEE PAIN: ICD-10-CM

## 2022-01-11 RX ORDER — OXYCODONE AND ACETAMINOPHEN 10; 325 MG/1; MG/1
1 TABLET ORAL EVERY 6 HOURS PRN
Qty: 115 TABLET | Refills: 0 | Status: SHIPPED | OUTPATIENT
Start: 2022-01-13 | End: 2022-02-09 | Stop reason: SDUPTHER

## 2022-01-11 RX ORDER — ALPRAZOLAM 0.5 MG/1
0.5 TABLET ORAL 2 TIMES DAILY PRN
Qty: 60 TABLET | Refills: 1 | Status: SHIPPED | OUTPATIENT
Start: 2022-01-11 | End: 2022-03-14

## 2022-01-12 DIAGNOSIS — R11.2 NAUSEA AND VOMITING, INTRACTABILITY OF VOMITING NOT SPECIFIED, UNSPECIFIED VOMITING TYPE: ICD-10-CM

## 2022-01-12 RX ORDER — ONDANSETRON 4 MG/1
4 TABLET, ORALLY DISINTEGRATING ORAL 2 TIMES DAILY PRN
Qty: 60 TABLET | Refills: 3 | Status: SHIPPED | OUTPATIENT
Start: 2022-01-12 | End: 2022-11-21 | Stop reason: SDUPTHER

## 2022-01-19 ENCOUNTER — TELEPHONE (OUTPATIENT)
Dept: MEDICAL GROUP | Facility: MEDICAL CENTER | Age: 67
End: 2022-01-19

## 2022-01-19 NOTE — TELEPHONE ENCOUNTER
Phone Number Called: [phone] (home)       Call outcome: Left detailed message for patient. Informed to call back with any additional questions.    Message: LVM to re victor manuel missed appt THR

## 2022-02-09 ENCOUNTER — OFFICE VISIT (OUTPATIENT)
Dept: MEDICAL GROUP | Facility: MEDICAL CENTER | Age: 67
End: 2022-02-09
Payer: MEDICARE

## 2022-02-09 VITALS
BODY MASS INDEX: 46.1 KG/M2 | TEMPERATURE: 99.2 F | OXYGEN SATURATION: 95 % | SYSTOLIC BLOOD PRESSURE: 110 MMHG | HEART RATE: 79 BPM | HEIGHT: 65 IN | WEIGHT: 276.68 LBS | DIASTOLIC BLOOD PRESSURE: 60 MMHG

## 2022-02-09 DIAGNOSIS — E66.01 MORBID OBESITY WITH BMI OF 40.0-44.9, ADULT (HCC): ICD-10-CM

## 2022-02-09 DIAGNOSIS — I47.10 SVT (SUPRAVENTRICULAR TACHYCARDIA) (HCC): ICD-10-CM

## 2022-02-09 DIAGNOSIS — M25.551 RIGHT HIP PAIN: ICD-10-CM

## 2022-02-09 DIAGNOSIS — E89.0 HYPOTHYROIDISM, POSTSURGICAL: ICD-10-CM

## 2022-02-09 DIAGNOSIS — Z12.31 ENCOUNTER FOR SCREENING MAMMOGRAM FOR BREAST CANCER: ICD-10-CM

## 2022-02-09 DIAGNOSIS — F41.9 ANXIETY: Chronic | ICD-10-CM

## 2022-02-09 DIAGNOSIS — I10 ESSENTIAL HYPERTENSION: ICD-10-CM

## 2022-02-09 DIAGNOSIS — E78.5 HYPERLIPIDEMIA, UNSPECIFIED HYPERLIPIDEMIA TYPE: ICD-10-CM

## 2022-02-09 DIAGNOSIS — M25.561 BILATERAL CHRONIC KNEE PAIN: ICD-10-CM

## 2022-02-09 DIAGNOSIS — Z79.891 CHRONIC USE OF OPIATE FOR THERAPEUTIC PURPOSE: ICD-10-CM

## 2022-02-09 DIAGNOSIS — M25.562 BILATERAL CHRONIC KNEE PAIN: ICD-10-CM

## 2022-02-09 DIAGNOSIS — G89.29 BILATERAL CHRONIC KNEE PAIN: ICD-10-CM

## 2022-02-09 PROBLEM — S63.219A SUBLUXATION OF MCP JOINT: Status: RESOLVED | Noted: 2021-10-19 | Resolved: 2022-02-09

## 2022-02-09 PROCEDURE — 99214 OFFICE O/P EST MOD 30 MIN: CPT | Performed by: PHYSICIAN ASSISTANT

## 2022-02-09 RX ORDER — OXYCODONE AND ACETAMINOPHEN 10; 325 MG/1; MG/1
1 TABLET ORAL EVERY 6 HOURS PRN
Qty: 115 TABLET | Refills: 0 | Status: SHIPPED | OUTPATIENT
Start: 2022-02-09 | End: 2022-04-15 | Stop reason: SDUPTHER

## 2022-02-09 RX ORDER — OXYCODONE AND ACETAMINOPHEN 10; 325 MG/1; MG/1
1 TABLET ORAL EVERY 6 HOURS PRN
Qty: 115 TABLET | Refills: 0 | Status: SHIPPED
Start: 2022-04-10 | End: 2022-04-15

## 2022-02-09 RX ORDER — OXYCODONE AND ACETAMINOPHEN 10; 325 MG/1; MG/1
1 TABLET ORAL EVERY 6 HOURS PRN
Qty: 115 TABLET | Refills: 0 | Status: SHIPPED | OUTPATIENT
Start: 2022-03-11 | End: 2022-04-15 | Stop reason: SDUPTHER

## 2022-02-09 RX ORDER — OXYCODONE AND ACETAMINOPHEN 10; 325 MG/1; MG/1
1 TABLET ORAL EVERY 6 HOURS PRN
Qty: 115 TABLET | Refills: 0 | Status: SHIPPED | OUTPATIENT
Start: 2022-02-09 | End: 2022-02-09 | Stop reason: SDUPTHER

## 2022-02-09 ASSESSMENT — FIBROSIS 4 INDEX: FIB4 SCORE: 1.014544512137220361
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
## 2022-02-09 NOTE — ASSESSMENT & PLAN NOTE
Currently still following Dr. Briscoe.  She is awaiting for injections into her joints.  Also needs a renewal today of oxycodone.  Is due for refill tomorrow.

## 2022-02-09 NOTE — ASSESSMENT & PLAN NOTE
Chronic condition.  She is on blood pressure medication which is predominantly for her atrial fibrillation, metoprolol.  Also on Eliquis.  She does not need renewals.
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
## 2022-02-14 RX ORDER — FUROSEMIDE 40 MG/1
TABLET ORAL
Qty: 24 TABLET | Refills: 1 | Status: SHIPPED | OUTPATIENT
Start: 2022-02-14 | End: 2022-04-15 | Stop reason: SDUPTHER

## 2022-02-15 NOTE — TELEPHONE ENCOUNTER
Received request via: Pharmacy    Was the patient seen in the last year in this department? Yes    Does the patient have an active prescription (recently filled or refills available) for medication(s) requested? No     Requested Prescriptions     Pending Prescriptions Disp Refills   • furosemide (LASIX) 40 MG Tab [Pharmacy Med Name: FUROSEMIDE 40 MG TABLET] 24 Tablet      Sig: TAKE ONE TABLET BY MOUTH TWICE WEEKLY

## 2022-03-11 DIAGNOSIS — J30.9 ALLERGIC RHINITIS, UNSPECIFIED SEASONALITY, UNSPECIFIED TRIGGER: ICD-10-CM

## 2022-03-11 DIAGNOSIS — F32.A DEPRESSION, UNSPECIFIED DEPRESSION TYPE: ICD-10-CM

## 2022-03-11 DIAGNOSIS — F41.9 ANXIETY: ICD-10-CM

## 2022-03-14 RX ORDER — ALPRAZOLAM 0.5 MG/1
TABLET ORAL
Qty: 60 TABLET | Refills: 1 | Status: SHIPPED | OUTPATIENT
Start: 2022-03-14 | End: 2022-04-15 | Stop reason: SDUPTHER

## 2022-03-14 RX ORDER — MONTELUKAST SODIUM 10 MG/1
TABLET ORAL
Qty: 90 TABLET | Refills: 1 | Status: SHIPPED | OUTPATIENT
Start: 2022-03-14 | End: 2022-10-06

## 2022-04-14 DIAGNOSIS — J32.4 CHRONIC PANSINUSITIS: ICD-10-CM

## 2022-04-15 ENCOUNTER — OFFICE VISIT (OUTPATIENT)
Dept: MEDICAL GROUP | Facility: MEDICAL CENTER | Age: 67
End: 2022-04-15
Payer: MEDICARE

## 2022-04-15 VITALS
WEIGHT: 268.4 LBS | HEART RATE: 62 BPM | BODY MASS INDEX: 44.72 KG/M2 | HEIGHT: 65 IN | TEMPERATURE: 98.9 F | OXYGEN SATURATION: 96 %

## 2022-04-15 DIAGNOSIS — M25.562 BILATERAL CHRONIC KNEE PAIN: ICD-10-CM

## 2022-04-15 DIAGNOSIS — F32.A DEPRESSION, UNSPECIFIED DEPRESSION TYPE: ICD-10-CM

## 2022-04-15 DIAGNOSIS — F41.9 ANXIETY: ICD-10-CM

## 2022-04-15 DIAGNOSIS — E78.5 HYPERLIPIDEMIA, UNSPECIFIED HYPERLIPIDEMIA TYPE: ICD-10-CM

## 2022-04-15 DIAGNOSIS — I10 ESSENTIAL HYPERTENSION: ICD-10-CM

## 2022-04-15 DIAGNOSIS — M25.551 RIGHT HIP PAIN: ICD-10-CM

## 2022-04-15 DIAGNOSIS — E89.0 HYPOTHYROIDISM, POSTSURGICAL: ICD-10-CM

## 2022-04-15 DIAGNOSIS — M25.561 BILATERAL CHRONIC KNEE PAIN: ICD-10-CM

## 2022-04-15 DIAGNOSIS — E66.01 MORBID OBESITY WITH BMI OF 40.0-44.9, ADULT (HCC): ICD-10-CM

## 2022-04-15 DIAGNOSIS — G89.29 BILATERAL CHRONIC KNEE PAIN: ICD-10-CM

## 2022-04-15 DIAGNOSIS — R60.0 LOCALIZED EDEMA: ICD-10-CM

## 2022-04-15 DIAGNOSIS — Z78.0 ASYMPTOMATIC POSTMENOPAUSAL STATUS: ICD-10-CM

## 2022-04-15 DIAGNOSIS — S80.11XA LEG HEMATOMA, RIGHT, INITIAL ENCOUNTER: ICD-10-CM

## 2022-04-15 PROCEDURE — 99214 OFFICE O/P EST MOD 30 MIN: CPT | Performed by: PHYSICIAN ASSISTANT

## 2022-04-15 RX ORDER — OXYCODONE AND ACETAMINOPHEN 10; 325 MG/1; MG/1
1 TABLET ORAL EVERY 6 HOURS PRN
Qty: 115 TABLET | Refills: 0 | Status: SHIPPED | OUTPATIENT
Start: 2022-04-15 | End: 2022-08-22 | Stop reason: SDUPTHER

## 2022-04-15 RX ORDER — ALPRAZOLAM 0.5 MG/1
TABLET ORAL
Qty: 60 TABLET | Refills: 2 | Status: SHIPPED | OUTPATIENT
Start: 2022-04-15 | End: 2022-05-20 | Stop reason: SDUPTHER

## 2022-04-15 RX ORDER — SERTRALINE HYDROCHLORIDE 100 MG/1
100 TABLET, FILM COATED ORAL DAILY
Qty: 90 TABLET | Refills: 1 | Status: SHIPPED | OUTPATIENT
Start: 2022-04-15 | End: 2022-11-07

## 2022-04-15 RX ORDER — FUROSEMIDE 40 MG/1
TABLET ORAL
Qty: 30 TABLET | Refills: 1 | Status: SHIPPED | OUTPATIENT
Start: 2022-04-15 | End: 2023-04-05

## 2022-04-15 RX ORDER — AZITHROMYCIN 250 MG/1
TABLET, FILM COATED ORAL
Qty: 6 TABLET | Refills: 1 | Status: SHIPPED
Start: 2022-04-15 | End: 2022-08-22

## 2022-04-15 RX ORDER — OXYCODONE AND ACETAMINOPHEN 10; 325 MG/1; MG/1
1 TABLET ORAL EVERY 6 HOURS PRN
Qty: 115 TABLET | Refills: 0 | Status: SHIPPED | OUTPATIENT
Start: 2022-04-15 | End: 2022-07-29 | Stop reason: SDUPTHER

## 2022-04-15 ASSESSMENT — FIBROSIS 4 INDEX: FIB4 SCORE: 1.014544512137220361
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
## 2022-04-15 NOTE — ASSESSMENT & PLAN NOTE
Chronic condition.  She has been unable to perform her labs through [facility] as the facility in [place] closed.  Plans to take the next lab orders into [facility].

## 2022-04-15 NOTE — ASSESSMENT & PLAN NOTE
Chronic history of intermittent lower extremity edema.  She does have a prescription in the past which she would take furosemide at 40 mg as needed.  May take it once or twice a week.  Does take potassium on a daily basis.  She is requesting a refill of furosemide.
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
## 2022-04-15 NOTE — ASSESSMENT & PLAN NOTE
Chronic condition.  Blood pressure recently has been well controlled.  When she was evaluated by EMS her blood pressure was low.  No recent runs of SVT.  She is currently on losartan and metoprolol.  Today she would like her blood pressure evaluated with a wrist cuff.

## 2022-04-15 NOTE — ASSESSMENT & PLAN NOTE
This is a pleasant 66-year-old female here today to follow-up on her health.  She recently fell at home.  Fell down and a hard piece of metal whacked across her right leg.  She contacted 911.  EMR came to her home and evaluated her.  Everything checked out.  She does still have a swollen lower right leg.  It has not affected her walking.

## 2022-05-09 ENCOUNTER — TELEPHONE (OUTPATIENT)
Dept: MEDICAL GROUP | Facility: MEDICAL CENTER | Age: 67
End: 2022-05-09

## 2022-05-09 NOTE — TELEPHONE ENCOUNTER
Phone Number Called: [phone] (home)       Call outcome: Left detailed message for patient. Informed to call back with any additional questions.    Message: LVM to re victor manuel missed appt THR

## 2022-05-19 ENCOUNTER — PATIENT MESSAGE (OUTPATIENT)
Dept: MEDICAL GROUP | Facility: MEDICAL CENTER | Age: 67
End: 2022-05-19
Payer: MEDICARE

## 2022-05-19 RX ORDER — ALPRAZOLAM 0.5 MG/1
0.5 TABLET ORAL NIGHTLY PRN
COMMUNITY
End: 2022-08-22

## 2022-05-20 DIAGNOSIS — F41.9 ANXIETY: ICD-10-CM

## 2022-05-20 RX ORDER — ALPRAZOLAM 0.5 MG/1
TABLET ORAL
Qty: 60 TABLET | Refills: 2 | Status: SHIPPED | OUTPATIENT
Start: 2022-05-20 | End: 2022-08-22 | Stop reason: SDUPTHER

## 2022-06-26 NOTE — ASSESSMENT & PLAN NOTE
Complains of a 3-week history of sinus congestion drainage.  Maxillary sinus tenderness.  She has been battling it with over-the-counter medications.  Also using Afrin.  No improvement.  Requesting antibiotic.  
This is a 63-year-old female presents today to discuss her history of thyroid masses.  She is currently taking methimazole at 5 mg.  Believes medication is causing her to gain weight as well as lose her hair.  She had a thyroid study with a 5-hour uptake that showed the following:    Impression       1.  Large cold defect associated with the 4.2 cm right lower pole nodule    2.  Approximate 2 cm cold nodule in the upper pole the right lobe    3.  Uptake only within the lower pole of the left lobe and on ultrasound there are no discrete nodule within the left lobe    4.  Five-hour uptake is upper limits normal     Her endocrinologist has referred her to see Dr. Patel for possible thyroidectomy.  She has not been contacted but is concerned about the procedure and how it would to have effects on her potential bariatric surgery.  She also has is a concerns regarding the surgery.  
Was seen recently at the hospital for an episode of tachycardia.  It did resolve.  She was discharged.  During her ER visit her blood pressure was low.  She was told to take half the dose of losartan.  She is currently taking 50 mg daily.  Requesting a refill.  
no

## 2022-07-28 ENCOUNTER — PATIENT MESSAGE (OUTPATIENT)
Dept: MEDICAL GROUP | Facility: MEDICAL CENTER | Age: 67
End: 2022-07-28
Payer: MEDICARE

## 2022-07-28 DIAGNOSIS — M25.562 BILATERAL CHRONIC KNEE PAIN: ICD-10-CM

## 2022-07-28 DIAGNOSIS — G89.29 BILATERAL CHRONIC KNEE PAIN: ICD-10-CM

## 2022-07-28 DIAGNOSIS — M25.561 BILATERAL CHRONIC KNEE PAIN: ICD-10-CM

## 2022-07-28 DIAGNOSIS — M25.551 RIGHT HIP PAIN: ICD-10-CM

## 2022-07-29 RX ORDER — OXYCODONE AND ACETAMINOPHEN 10; 325 MG/1; MG/1
1 TABLET ORAL EVERY 6 HOURS PRN
Qty: 115 TABLET | Refills: 0 | Status: SHIPPED | OUTPATIENT
Start: 2022-07-31 | End: 2022-08-22 | Stop reason: SDUPTHER

## 2022-08-01 ENCOUNTER — APPOINTMENT (OUTPATIENT)
Dept: MEDICAL GROUP | Facility: MEDICAL CENTER | Age: 67
End: 2022-08-01
Payer: MEDICARE

## 2022-08-22 ENCOUNTER — OFFICE VISIT (OUTPATIENT)
Dept: MEDICAL GROUP | Facility: MEDICAL CENTER | Age: 67
End: 2022-08-22
Payer: MEDICARE

## 2022-08-22 VITALS
SYSTOLIC BLOOD PRESSURE: 130 MMHG | DIASTOLIC BLOOD PRESSURE: 80 MMHG | HEART RATE: 61 BPM | HEIGHT: 65 IN | BODY MASS INDEX: 45.32 KG/M2 | OXYGEN SATURATION: 97 % | WEIGHT: 272 LBS | TEMPERATURE: 98.5 F

## 2022-08-22 DIAGNOSIS — J30.9 ALLERGIC RHINITIS, UNSPECIFIED SEASONALITY, UNSPECIFIED TRIGGER: ICD-10-CM

## 2022-08-22 DIAGNOSIS — M25.561 BILATERAL CHRONIC KNEE PAIN: ICD-10-CM

## 2022-08-22 DIAGNOSIS — M25.562 BILATERAL CHRONIC KNEE PAIN: ICD-10-CM

## 2022-08-22 DIAGNOSIS — F32.A DEPRESSION, UNSPECIFIED DEPRESSION TYPE: ICD-10-CM

## 2022-08-22 DIAGNOSIS — K21.9 GASTROESOPHAGEAL REFLUX DISEASE WITHOUT ESOPHAGITIS: ICD-10-CM

## 2022-08-22 DIAGNOSIS — Z12.31 ENCOUNTER FOR SCREENING MAMMOGRAM FOR BREAST CANCER: ICD-10-CM

## 2022-08-22 DIAGNOSIS — I10 ESSENTIAL HYPERTENSION: ICD-10-CM

## 2022-08-22 DIAGNOSIS — G89.29 CHRONIC PAIN IN RIGHT SHOULDER: ICD-10-CM

## 2022-08-22 DIAGNOSIS — G89.29 BILATERAL CHRONIC KNEE PAIN: ICD-10-CM

## 2022-08-22 DIAGNOSIS — R60.0 LOCALIZED EDEMA: ICD-10-CM

## 2022-08-22 DIAGNOSIS — E78.5 HYPERLIPIDEMIA, UNSPECIFIED HYPERLIPIDEMIA TYPE: ICD-10-CM

## 2022-08-22 DIAGNOSIS — I48.0 PAROXYSMAL ATRIAL FIBRILLATION (HCC): ICD-10-CM

## 2022-08-22 DIAGNOSIS — J32.4 CHRONIC PANSINUSITIS: ICD-10-CM

## 2022-08-22 DIAGNOSIS — Z79.891 CHRONIC USE OF OPIATE FOR THERAPEUTIC PURPOSE: ICD-10-CM

## 2022-08-22 DIAGNOSIS — Z23 NEED FOR 23-POLYVALENT PNEUMOCOCCAL POLYSACCHARIDE VACCINE: ICD-10-CM

## 2022-08-22 DIAGNOSIS — M25.511 CHRONIC PAIN IN RIGHT SHOULDER: ICD-10-CM

## 2022-08-22 DIAGNOSIS — E66.01 MORBID OBESITY WITH BMI OF 40.0-44.9, ADULT (HCC): ICD-10-CM

## 2022-08-22 DIAGNOSIS — B02.29 POSTHERPETIC NEURALGIA: ICD-10-CM

## 2022-08-22 DIAGNOSIS — M25.551 RIGHT HIP PAIN: ICD-10-CM

## 2022-08-22 DIAGNOSIS — Z00.00 MEDICARE ANNUAL WELLNESS VISIT, INITIAL: ICD-10-CM

## 2022-08-22 DIAGNOSIS — Z79.01 CHRONIC ANTICOAGULATION: ICD-10-CM

## 2022-08-22 DIAGNOSIS — F41.9 ANXIETY: Chronic | ICD-10-CM

## 2022-08-22 DIAGNOSIS — I47.10 SVT (SUPRAVENTRICULAR TACHYCARDIA) (HCC): ICD-10-CM

## 2022-08-22 DIAGNOSIS — J45.20 MILD INTERMITTENT ASTHMA WITHOUT COMPLICATION: ICD-10-CM

## 2022-08-22 DIAGNOSIS — E89.0 HYPOTHYROIDISM, POSTSURGICAL: ICD-10-CM

## 2022-08-22 PROBLEM — F51.01 PRIMARY INSOMNIA: Status: RESOLVED | Noted: 2018-01-19 | Resolved: 2022-08-22

## 2022-08-22 PROBLEM — L30.9 DERMATITIS: Status: RESOLVED | Noted: 2020-07-01 | Resolved: 2022-08-22

## 2022-08-22 PROBLEM — J30.2 SEASONAL ALLERGIES: Status: RESOLVED | Noted: 2018-10-21 | Resolved: 2022-08-22

## 2022-08-22 PROBLEM — S80.11XA LEG HEMATOMA, RIGHT, INITIAL ENCOUNTER: Status: RESOLVED | Noted: 2022-04-15 | Resolved: 2022-08-22

## 2022-08-22 PROBLEM — M19.90 ARTHRITIS: Status: RESOLVED | Noted: 2017-02-03 | Resolved: 2022-08-22

## 2022-08-22 PROCEDURE — 90732 PPSV23 VACC 2 YRS+ SUBQ/IM: CPT | Performed by: PHYSICIAN ASSISTANT

## 2022-08-22 PROCEDURE — G0438 PPPS, INITIAL VISIT: HCPCS | Performed by: PHYSICIAN ASSISTANT

## 2022-08-22 PROCEDURE — G0009 ADMIN PNEUMOCOCCAL VACCINE: HCPCS | Performed by: PHYSICIAN ASSISTANT

## 2022-08-22 PROCEDURE — 8041 PR SCP AHA: Performed by: PHYSICIAN ASSISTANT

## 2022-08-22 RX ORDER — ALPRAZOLAM 0.5 MG/1
TABLET ORAL
Qty: 60 TABLET | Refills: 2 | Status: SHIPPED | OUTPATIENT
Start: 2022-08-22 | End: 2022-11-21 | Stop reason: SDUPTHER

## 2022-08-22 RX ORDER — OXYCODONE AND ACETAMINOPHEN 10; 325 MG/1; MG/1
1 TABLET ORAL EVERY 6 HOURS PRN
Qty: 115 TABLET | Refills: 0 | Status: SHIPPED | OUTPATIENT
Start: 2022-09-24 | End: 2022-10-21 | Stop reason: SDUPTHER

## 2022-08-22 RX ORDER — OXYCODONE AND ACETAMINOPHEN 10; 325 MG/1; MG/1
1 TABLET ORAL EVERY 6 HOURS PRN
Qty: 115 TABLET | Refills: 0 | Status: SHIPPED | OUTPATIENT
Start: 2022-08-25 | End: 2022-09-22 | Stop reason: SDUPTHER

## 2022-08-22 RX ORDER — OMEPRAZOLE 40 MG/1
40 CAPSULE, DELAYED RELEASE ORAL DAILY
Qty: 90 CAPSULE | Refills: 1 | Status: SHIPPED | OUTPATIENT
Start: 2022-08-22 | End: 2023-01-27 | Stop reason: SDUPTHER

## 2022-08-22 RX ORDER — AZITHROMYCIN 250 MG/1
TABLET, FILM COATED ORAL
Qty: 6 TABLET | Refills: 1 | Status: SHIPPED | OUTPATIENT
Start: 2022-08-22 | End: 2022-11-21 | Stop reason: SDUPTHER

## 2022-08-22 RX ORDER — OXYCODONE AND ACETAMINOPHEN 10; 325 MG/1; MG/1
1 TABLET ORAL EVERY 6 HOURS PRN
Qty: 115 TABLET | Refills: 0 | Status: SHIPPED | OUTPATIENT
Start: 2022-10-24 | End: 2022-11-23

## 2022-08-22 RX ORDER — SERTRALINE HYDROCHLORIDE 100 MG/1
100 TABLET, FILM COATED ORAL DAILY
Qty: 90 TABLET | Refills: 1 | Status: CANCELLED | OUTPATIENT
Start: 2022-08-22 | End: 2022-11-20

## 2022-08-22 ASSESSMENT — PATIENT HEALTH QUESTIONNAIRE - PHQ9: CLINICAL INTERPRETATION OF PHQ2 SCORE: 0

## 2022-08-22 ASSESSMENT — FIBROSIS 4 INDEX: FIB4 SCORE: 1.014544512137220361

## 2022-08-22 ASSESSMENT — ENCOUNTER SYMPTOMS: GENERAL WELL-BEING: GOOD

## 2022-08-22 ASSESSMENT — ACTIVITIES OF DAILY LIVING (ADL): BATHING_REQUIRES_ASSISTANCE: 0

## 2022-08-22 NOTE — PROGRESS NOTES
Chief Complaint   Patient presents with    Annual Wellness Visit       HPI:  This is a pleasant 66-year-old female here today for an annual physical.  History of intermittent SVT.  On metoprolol.  No recent flares.  Chronic history of right hip pain as well as bilateral knee pain.  Follows with orthopedics and Dr. Briscoe.  Requesting an updated prescription today for Norco.  Is getting some injections into the knees and some of the injections are not effective.  Also has a history of postherpetic neuralgia and will take Zofran as needed for nausea vomiting which affect her when the neuralgia flares.  History of paroxysmal atrial fibrillation.  On Eliquis daily.  Chronic history of morbid obesity.  Trying to lose weight through PT.  Sleeve performed in 2019.  No significant weight loss.  History of asthma.  No recent flares.  Chronic history of intermittent localized edema.  Will take Lasix as needed.  Chronic history of postsurgical hypergonadism.  On Synthroid 100 mcg daily.  Chronic condition of hyperlipidemia.  On atorvastatin daily.  I had ordered labs previously but they have not yet been performed.  Chronic history of depression but is only taking sertraline at 50 mg daily.  Her sister is currently living with her.  Unfortunately her sister's boyfriend was recently diagnosed with liver cancer secondary to hepatitis C.  History of hypertension taking her BP medications as directed.  History of reflux and requesting refill of day of omeprazole.  Also has chronic shoulder pain which she just deals with.  Chronic history of anxiety.  Takes Xanax as needed.  Takes it twice a day.  Needs a renewal.  Lastly chronic history of allergic rhinitis.  Uses an over-the-counter antihistamine daily as well as takes Singulair daily.  She is requesting a renewal of azithromycin which she will take intermittently for chronic pansinusitis.  She is due for mammogram.  She is also due for pneumonia vaccination today.    Patient  Active Problem List    Diagnosis Date Noted    Medicare annual wellness visit, initial 08/22/2022    Localized edema 04/15/2022    Morbid obesity with BMI of 40.0-44.9, adult ([facility]) 03/10/2020    Chronic anticoagulation 11/20/2019    Right hip pain 11/05/2019    Chronic pansinusitis 11/05/2019    Depression 07/16/2019    Hypothyroidism, postsurgical 04/16/2019    S/P thyroidectomy 04/11/2019    SVT (supraventricular tachycardia) ([facility]) 10/21/2018    Paroxysmal atrial fibrillation ([facility]) 10/01/2018    Hyperlipidemia 04/17/2018    Mild intermittent asthma without complication 01/19/2018    Chronic use of opiate for therapeutic purpose 01/19/2018    Chronic pain in right shoulder 06/02/2017    Gastroesophageal reflux disease without esophagitis 03/03/2017    Anxiety 02/03/2017    Postherpetic neuralgia 02/03/2017    Bilateral chronic knee pain 02/03/2017    Allergic rhinitis 02/03/2017    Essential hypertension 02/03/2017       Current Outpatient Medications   Medication Sig Dispense Refill    [START ON 8/25/2022] oxyCODONE-acetaminophen (PERCOCET-10)  MG Tab Take 1 Tablet by mouth every 6 hours as needed for Severe Pain for up to 30 days. 115 Tablet 0    [START ON 9/24/2022] oxyCODONE-acetaminophen (PERCOCET-10)  MG Tab Take 1 Tablet by mouth every 6 hours as needed for Severe Pain for up to 30 days. 115 Tablet 0    [START ON 10/24/2022] oxyCODONE-acetaminophen (PERCOCET-10)  MG Tab Take 1 Tablet by mouth every 6 hours as needed for Severe Pain for up to 30 days. 115 Tablet 0    ALPRAZolam (XANAX) 0.5 MG Tab TAKE ONE TABLET BY MOUTH TWICE A DAY AS NEEDED FOR ANIXETY - 30 DAYS 60 Tablet 2    sertraline (ZOLOFT) 50 MG Tab Take 50 mg by mouth every day.      omeprazole (PRILOSEC) 40 MG delayed-release capsule Take 1 Capsule by mouth every day. 90 Capsule 1    azithromycin (ZITHROMAX) 250 MG Tab As directed. 6 Tablet 1    montelukast (SINGULAIR) 10 MG Tab TAKE ONE TABLET BY MOUTH DAILY 90 Tablet 1     ELIQUIS 5 MG Tab TAKE ONE TABLET BY MOUTH TWICE A  Tablet 1    metoprolol tartrate (LOPRESSOR) 50 MG Tab Take 1.5 Tablets by mouth 2 times a day. 270 Tablet 1    atorvastatin (LIPITOR) 40 MG Tab TAKE ONE TABLET BY MOUTH DAILY 90 Tablet 3    SYNTHROID 100 MCG Tab Take 1 Tablet by mouth every morning on an empty stomach. ON EMPTY STOMACH 90 Tablet 3    losartan (COZAAR) 50 MG Tab TAKE ONE TABLET BY MOUTH DAILY 90 Tablet 3    potassium chloride (KLOR-CON) 8 MEQ tablet TAKE ONE TABLET BY MOUTH DAILY *KLOR-CON* 90 Tablet 0    fluticasone (FLONASE) 50 MCG/ACT nasal spray SPRAY ONE SPRAY IN EACH NOSTRIL DAILY 16 g 1    ketoconazole (NIZORAL) 2 % Cream       triamcinolone acetonide (KENALOG) 0.1 % Cream Apply 2-4 g to affected area(s) 2 times a day. 1 Tube 0    cetirizine (ZYRTEC) 10 MG Tab Take 10 mg by mouth every evening.       No current facility-administered medications for this visit.          Current supplements as per medication list.     Allergies: Latex, Other environmental, Penicillins, and Sulfa drugs    Current social contact/activities: Spend time with sister.     She  reports that she quit smoking about 3 years ago. She started smoking about 6 years ago. She smoked an average of .5 packs per day. She has never used smokeless tobacco. She reports that she does not currently use alcohol after a past usage of about 1.8 - 3.0 oz per week. She reports that she does not use drugs.  Counseling given: Not Answered  Tobacco comments: 10 cigs      DPA/Advanced Directive:  Patient does not have an Advanced Directive.  A packet and workshop information was given on Advanced Directives.    ROS:    Gait: Uses a walker  Ostomy: No  Other tubes: No  Amputations: No  Chronic oxygen use: No  Last eye exam: 2018  Wears hearing aids: No   : Denies any urinary leakage during the last 6 months    Screening:    Depression Screening  Little interest or pleasure in doing things?  0 - not at all  Feeling down, depressed , or  hopeless? 0 - not at all  Patient Health Questionnaire Score: 0     If depressive symptoms identified deferred to follow up visit unless specifically addressed in assessment and plan.    Interpretation of PHQ-9 Total Score   Score Severity   1-4 No Depression   5-9 Mild Depression   10-14 Moderate Depression   15-19 Moderately Severe Depression   20-27 Severe Depression    Screening for Cognitive Impairment  Three Minute Recall (daughter, heaven, mountain) 3/3    Bandar clock face with all 12 numbers and set the hands to show 10 past 11.  Yes    Cognitive concerns identified deferred for follow up unless specifically addressed in assessment and plan.    Fall Risk Assessment  Has the patient had two or more falls in the last year or any fall with injury in the last year?  No    Safety Assessment  Throw rugs on floor.  No  Handrails on all stairs.  No  Good lighting in all hallways.  Yes  Difficulty hearing.  No  Patient counseled about all safety risks that were identified.    Functional Assessment ADLs  Are there any barriers preventing you from cooking for yourself or meeting nutritional needs?  No.    Are there any barriers preventing you from driving safely or obtaining transportation?  No.    Are there any barriers preventing you from using a telephone or calling for help?  No.    Are there any barriers preventing you from shopping?  No.    Are there any barriers preventing you from taking care of your own finances?  No.    Are there any barriers preventing you from managing your medications?  No.    Are there any barriers preventing you from showering, bathing or dressing yourself?  No.    Are you currently engaging in any exercise or physical activity?  Yes.  Physical therapy x 2 a week   What is your perception of your health?  Good.      Health Maintenance Summary            Ordered - BONE DENSITY (Every 5 Years) Ordered on 4/15/2022      No completion history exists for this topic.              Overdue - IMM  HEP B VACCINE (1 of 3 - 3-dose series) Overdue - never done      No completion history exists for this topic.              Overdue - IMM DTaP/Tdap/Td Vaccine (1 - Tdap) Overdue - never done      No completion history exists for this topic.              Ordered - MAMMOGRAM (Yearly) Ordered on 8/22/2022      No completion history exists for this topic.              Overdue - IMM ZOSTER VACCINES (2 of 2) Overdue since 1/1/2021 11/06/2020  Imm Admin: Zoster Vaccine Recombinant (RZV) (SHINGRIX)             Overdue - COVID-19 Vaccine (3 - Booster for Moderna series) Overdue since 12/23/2021 07/23/2021  Imm Admin: MODERNA SARS-COV-2 VACCINE (12+)     06/27/2021  Imm Admin: MODERNA SARS-COV-2 VACCINE (12+)             Postponed - COLORECTAL CANCER SCREENING (COLONOSCOPY - Every 10 Years) Postponed until 2/22/2023      No completion history exists for this topic.              IMM INFLUENZA (1) Next due on 9/1/2022      10/19/2021  Imm Admin: Influenza Vaccine Adult HD     10/04/2020  Imm Admin: Influenza Vaccine Quad Recombinant     09/30/2019  Imm Admin: Influenza Vaccine Quad Inj (Pf)     09/04/2018  Imm Admin: Influenza Vaccine Quad Inj (Pf)     10/01/2017  Imm Admin: Influenza Vaccine Quad Inj (Preserved)     Only the first 5 history entries have been loaded, but more history exists.            URINE DRUG SCREEN (Every 360 Days) Next due on 2/4/2023 02/09/2022  Reason not specified     09/30/2019  Pain Management Screen             HEPATITIS C SCREENING  Completed      07/31/2020  HEP C VIRUS ANTIBODY             Annual Wellness Visit  Completed      08/22/2022  Prob Dx: Medicare annual wellness visit, initial     08/22/2022  Visit Dx: Medicare annual wellness visit, initial             IMM PNEUMOCOCCAL VACCINE: 65+ Years (Series Information) Completed      08/22/2022  Imm Admin: Pneumococcal polysaccharide vaccine (PPSV-23)     01/04/2021  Imm Admin: Pneumococcal Conjugate Vaccine (Prevnar/PCV-13)              IMM MENINGOCOCCAL ACWY VACCINE (Series Information) Aged Out      No completion history exists for this topic.              Discontinued - PAP SMEAR  Discontinued      02/13/2018  Done                   Patient Care Team:  Sim Brownlee P.A.-C. as PCP - General (Family Medicine)  Marilia Kang M.D. as Consulting Physician (Endocrinology)  Sim Brownlee P.A.-C. (Family Medicine)  John H Ganser, M.D. as Consulting Physician (Surgery)  Vincent Patel M.D. (Surgery)        Social History     Tobacco Use    Smoking status: Former     Packs/day: 0.50     Types: Cigarettes     Start date: 3/4/2016     Quit date: 9/9/2018     Years since quitting: 3.9    Smokeless tobacco: Never    Tobacco comments:     10 cigs   Vaping Use    Vaping Use: Former   Substance Use Topics    Alcohol use: Not Currently     Alcohol/week: 1.8 - 3.0 oz     Types: 3 - 5 Glasses of wine per week    Drug use: No     History reviewed. No pertinent family history.  She  has a past medical history of Anesthesia, Anxiety disorder, Arthritis, Asthma, Atrial fibrillation (HCC) (02/2021), Bartholin cyst, Chronic knee pain, Depression, GERD (gastroesophageal reflux disease), Graves disease, Heart murmur, Hemorrhagic disorder ([facility]), Hyperlipidemia, Hypertension, Hypothyroidism, postsurgical ( ), Infectious disease (2017), Morbid obesity (HCC) (12/2019), Postherpetic neuralgia, Psychiatric disorder, SVT (supraventricular tachycardia) ([facility]), and Uterine cancer ([facility]).   Past Surgical History:   Procedure Laterality Date    DC LAP, JANAY RESTRICT PROC, LONGITUDINAL GAS* N/A 12/30/2019    Procedure: GASTRECTOMY, SLEEVE, LAPAROSCOPIC;  Surgeon: John H Ganser, M.D.;  Location: SURGERY [facility];  Service: General    THYROIDECTOMY  3/27/2019    Procedure: THYROIDECTOMY - COMPLETION;  Surgeon: Vincent Patel M.D.;  Location: SURGERY SAME DAY [facility];  Service: General    THYROIDECTOMY TOTAL  2005    HYSTERECTOMY LAPAROSCOPY       "THYROIDECTOMY      still has parathyroid       Exam:   /80 (BP Location: Right arm, Patient Position: Sitting, BP Cuff Size: Adult)   Pulse 61   Temp 36.9 °C (98.5 °F) (Temporal)   Ht 1.651 m (5' 5\")   Wt 123 kg (272 lb)   SpO2 97%  Body mass index is 45.26 kg/m².    Hearing good.    Dentition fair  Alert, oriented in no acute distress.  Eye contact is good, speech goal directed, affect calm    Assessment and Plan. The following treatment and monitoring plan is recommended:    1. Bilateral chronic knee pain  - oxyCODONE-acetaminophen (PERCOCET-10)  MG Tab; Take 1 Tablet by mouth every 6 hours as needed for Severe Pain for up to 30 days.  Dispense: 115 Tablet; Refill: 0  - oxyCODONE-acetaminophen (PERCOCET-10)  MG Tab; Take 1 Tablet by mouth every 6 hours as needed for Severe Pain for up to 30 days.  Dispense: 115 Tablet; Refill: 0  - oxyCODONE-acetaminophen (PERCOCET-10)  MG Tab; Take 1 Tablet by mouth every 6 hours as needed for Severe Pain for up to 30 days.  Dispense: 115 Tablet; Refill: 0    2. Right hip pain  - oxyCODONE-acetaminophen (PERCOCET-10)  MG Tab; Take 1 Tablet by mouth every 6 hours as needed for Severe Pain for up to 30 days.  Dispense: 115 Tablet; Refill: 0  - oxyCODONE-acetaminophen (PERCOCET-10)  MG Tab; Take 1 Tablet by mouth every 6 hours as needed for Severe Pain for up to 30 days.  Dispense: 115 Tablet; Refill: 0  - oxyCODONE-acetaminophen (PERCOCET-10)  MG Tab; Take 1 Tablet by mouth every 6 hours as needed for Severe Pain for up to 30 days.  Dispense: 115 Tablet; Refill: 0    3. Encounter for screening mammogram for breast cancer  - MA-SCREENING MAMMO BILAT W/TOMOSYNTHESIS W/CAD; Future    4. Medicare annual wellness visit, initial    5. Allergic rhinitis, unspecified seasonality, unspecified trigger    6. Anxiety  - ALPRAZolam (XANAX) 0.5 MG Tab; TAKE ONE TABLET BY MOUTH TWICE A DAY AS NEEDED FOR ANIXETY - 30 DAYS  Dispense: 60 Tablet; Refill: " 2    7. Chronic anticoagulation    8. Chronic pain in right shoulder    9. Chronic pansinusitis  - azithromycin (ZITHROMAX) 250 MG Tab; As directed.  Dispense: 6 Tablet; Refill: 1    10. Depression, unspecified depression type    11. Essential hypertension    12. Gastroesophageal reflux disease without esophagitis  - omeprazole (PRILOSEC) 40 MG delayed-release capsule; Take 1 Capsule by mouth every day.  Dispense: 90 Capsule; Refill: 1    13. Hyperlipidemia, unspecified hyperlipidemia type    14. Hypothyroidism, postsurgical    15. Localized edema    16. Mild intermittent asthma without complication    17. Morbid obesity with BMI of 40.0-44.9, adult ([facility])    18. Paroxysmal atrial fibrillation ([facility])    19. Postherpetic neuralgia    20. SVT (supraventricular tachycardia) ([facility])    21. Need for 23-polyvalent pneumococcal polysaccharide vaccine  - PneumoVax PPV23 =>3yo    22. Chronic use of opiate for therapeutic purpose    Other orders  - sertraline (ZOLOFT) 50 MG Tab; Take 50 mg by mouth every day.      Services suggested: No services needed at this time  Health Care Screening: Age-appropriate preventive services recommended by USPTF and ACIP covered by Medicare were discussed today. Services ordered if indicated and agreed upon by the patient.  Referrals offered: Community-based lifestyle interventions to reduce health risks and promote self-management and wellness, fall prevention, nutrition, physical activity, tobacco-use cessation, weight loss, and mental health services as per orders if indicated.    Discussion today about general wellness and lifestyle habits:    Prevent falls and reduce trip hazards; Cautioned about securing or removing rugs.  Have a working fire alarm and carbon monoxide detector;   Engage in regular physical activity and social activities     Follow-up: Return in about 3 months (around 11/22/2022), or if symptoms worsen or fail to improve. Annual Health Assessment Questions:    1.  Are you  currently engaging in any exercise or physical activity? Yes    2.  How would you describe your mood or emotional well-being today? fair    3.  Have you had any falls in the last year? No    4.  Have you noticed any problems with your balance or had difficulty walking? No    5.  In the last six months have you experienced any leakage of urine? No    6. DPA/Advanced Directive: Patient does not have an Advanced Directive.  A packet and workshop information was given on Advanced Directives.

## 2022-08-22 NOTE — ASSESSMENT & PLAN NOTE
This is a pleasant 66-year-old female here today for an annual physical.  History of intermittent SVT.  On metoprolol.  No recent flares.  Chronic history of right hip pain as well as bilateral knee pain.  Follows with orthopedics and Dr. Briscoe.  Requesting an updated prescription today for Norco.  Is getting some injections into the knees and some of the injections are not effective.  Also has a history of postherpetic neuralgia and will take Zofran as needed for nausea vomiting which affect her when the neuralgia flares.  History of paroxysmal atrial fibrillation.  On Eliquis daily.  Chronic history of morbid obesity.  Trying to lose weight through PT.  Sleeve performed in 2019.  No significant weight loss.  History of asthma.  No recent flares.  Chronic history of intermittent localized edema.  Will take Lasix as needed.  Chronic history of postsurgical hypergonadism.  On Synthroid 100 mcg daily.  Chronic condition of hyperlipidemia.  On atorvastatin daily.  I had ordered labs previously but they have not yet been performed.  Chronic history of depression but is only taking sertraline at 50 mg daily.  Her sister is currently living with her.  Unfortunately her sister's boyfriend was recently diagnosed with liver cancer secondary to hepatitis C.  History of hypertension taking her BP medications as directed.  History of reflux and requesting refill of day of omeprazole.  Also has chronic shoulder pain which she just deals with.  Chronic history of anxiety.  Takes Xanax as needed.  Takes it twice a day.  Needs a renewal.  Lastly chronic history of allergic rhinitis.  Uses an over-the-counter antihistamine daily as well as takes Singulair daily.  She is requesting a renewal of azithromycin which she will take intermittently for chronic pansinusitis.  She is due for mammogram.  She is also due for pneumonia vaccination today.

## 2022-09-22 DIAGNOSIS — M25.551 RIGHT HIP PAIN: ICD-10-CM

## 2022-09-22 DIAGNOSIS — M25.561 BILATERAL CHRONIC KNEE PAIN: ICD-10-CM

## 2022-09-22 DIAGNOSIS — M25.562 BILATERAL CHRONIC KNEE PAIN: ICD-10-CM

## 2022-09-22 DIAGNOSIS — G89.29 BILATERAL CHRONIC KNEE PAIN: ICD-10-CM

## 2022-09-23 RX ORDER — OXYCODONE AND ACETAMINOPHEN 10; 325 MG/1; MG/1
1 TABLET ORAL EVERY 6 HOURS PRN
Qty: 115 TABLET | Refills: 0 | Status: SHIPPED | OUTPATIENT
Start: 2022-09-23 | End: 2022-11-21 | Stop reason: SDUPTHER

## 2022-09-23 NOTE — TELEPHONE ENCOUNTER
Spoke to pt and pharmacy about getting chaparro refill for oxyCODONE-acetaminophen (PERCOCET-10)  MG Tab will be out of town tomorrow pharmacy needs new script to reflect release date     Requested Prescriptions     Pending Prescriptions Disp Refills    oxyCODONE-acetaminophen (PERCOCET-10)  MG Tab 115 Tablet 0     Sig: Take 1 Tablet by mouth every 6 hours as needed for Severe Pain for up to 30 days.

## 2022-10-05 DIAGNOSIS — Z79.01 CHRONIC ANTICOAGULATION: ICD-10-CM

## 2022-10-05 RX ORDER — APIXABAN 5 MG/1
TABLET, FILM COATED ORAL
Qty: 180 TABLET | Refills: 1 | Status: SHIPPED | OUTPATIENT
Start: 2022-10-05 | End: 2023-07-26 | Stop reason: SDUPTHER

## 2022-10-06 DIAGNOSIS — J30.9 ALLERGIC RHINITIS, UNSPECIFIED SEASONALITY, UNSPECIFIED TRIGGER: ICD-10-CM

## 2022-10-06 RX ORDER — MONTELUKAST SODIUM 10 MG/1
TABLET ORAL
Qty: 90 TABLET | Refills: 0 | Status: SHIPPED | OUTPATIENT
Start: 2022-10-06 | End: 2023-01-27 | Stop reason: SDUPTHER

## 2022-10-20 DIAGNOSIS — E89.0 HYPOTHYROIDISM, POSTSURGICAL: ICD-10-CM

## 2022-10-21 RX ORDER — LEVOTHYROXINE SODIUM 100 MCG
TABLET ORAL
Qty: 90 TABLET | Refills: 3 | Status: SHIPPED | OUTPATIENT
Start: 2022-10-21 | End: 2023-10-16

## 2022-11-05 DIAGNOSIS — F32.A DEPRESSION, UNSPECIFIED DEPRESSION TYPE: ICD-10-CM

## 2022-11-07 RX ORDER — SERTRALINE HYDROCHLORIDE 100 MG/1
TABLET, FILM COATED ORAL
Qty: 90 TABLET | Refills: 1 | Status: SHIPPED | OUTPATIENT
Start: 2022-11-07

## 2022-11-21 ENCOUNTER — OFFICE VISIT (OUTPATIENT)
Dept: MEDICAL GROUP | Facility: MEDICAL CENTER | Age: 67
End: 2022-11-21
Payer: MEDICARE

## 2022-11-21 VITALS
SYSTOLIC BLOOD PRESSURE: 138 MMHG | HEIGHT: 65 IN | BODY MASS INDEX: 45.8 KG/M2 | HEART RATE: 69 BPM | OXYGEN SATURATION: 96 % | WEIGHT: 274.91 LBS | TEMPERATURE: 98 F | DIASTOLIC BLOOD PRESSURE: 70 MMHG

## 2022-11-21 DIAGNOSIS — F41.9 ANXIETY: Chronic | ICD-10-CM

## 2022-11-21 DIAGNOSIS — J32.4 CHRONIC PANSINUSITIS: ICD-10-CM

## 2022-11-21 DIAGNOSIS — Z12.31 ENCOUNTER FOR SCREENING MAMMOGRAM FOR BREAST CANCER: ICD-10-CM

## 2022-11-21 DIAGNOSIS — R11.2 NAUSEA AND VOMITING: ICD-10-CM

## 2022-11-21 DIAGNOSIS — M25.551 RIGHT HIP PAIN: ICD-10-CM

## 2022-11-21 DIAGNOSIS — M25.561 BILATERAL CHRONIC KNEE PAIN: ICD-10-CM

## 2022-11-21 DIAGNOSIS — M25.562 BILATERAL CHRONIC KNEE PAIN: ICD-10-CM

## 2022-11-21 DIAGNOSIS — G89.29 BILATERAL CHRONIC KNEE PAIN: ICD-10-CM

## 2022-11-21 DIAGNOSIS — Z23 NEED FOR IMMUNIZATION AGAINST INFLUENZA: ICD-10-CM

## 2022-11-21 PROCEDURE — G0008 ADMIN INFLUENZA VIRUS VAC: HCPCS | Performed by: PHYSICIAN ASSISTANT

## 2022-11-21 PROCEDURE — 99214 OFFICE O/P EST MOD 30 MIN: CPT | Mod: 25 | Performed by: PHYSICIAN ASSISTANT

## 2022-11-21 PROCEDURE — 90662 IIV NO PRSV INCREASED AG IM: CPT | Performed by: PHYSICIAN ASSISTANT

## 2022-11-21 RX ORDER — OXYCODONE AND ACETAMINOPHEN 10; 325 MG/1; MG/1
1 TABLET ORAL EVERY 6 HOURS PRN
Qty: 115 TABLET | Refills: 0 | Status: SHIPPED | OUTPATIENT
Start: 2023-01-20 | End: 2023-02-17 | Stop reason: SDUPTHER

## 2022-11-21 RX ORDER — AZITHROMYCIN 250 MG/1
TABLET, FILM COATED ORAL
Qty: 6 TABLET | Refills: 1 | Status: SHIPPED | OUTPATIENT
Start: 2022-11-21 | End: 2023-01-27 | Stop reason: SDUPTHER

## 2022-11-21 RX ORDER — OXYCODONE AND ACETAMINOPHEN 10; 325 MG/1; MG/1
1 TABLET ORAL EVERY 6 HOURS PRN
Qty: 115 TABLET | Refills: 0 | Status: SHIPPED | OUTPATIENT
Start: 2022-11-21 | End: 2023-02-17 | Stop reason: SDUPTHER

## 2022-11-21 RX ORDER — ONDANSETRON 4 MG/1
4 TABLET, ORALLY DISINTEGRATING ORAL 2 TIMES DAILY PRN
Qty: 60 TABLET | Refills: 3 | Status: SHIPPED | OUTPATIENT
Start: 2022-11-21 | End: 2023-02-17 | Stop reason: SDUPTHER

## 2022-11-21 RX ORDER — OXYCODONE AND ACETAMINOPHEN 10; 325 MG/1; MG/1
1 TABLET ORAL EVERY 6 HOURS PRN
Qty: 115 TABLET | Refills: 0 | Status: SHIPPED | OUTPATIENT
Start: 2022-12-21 | End: 2023-02-17 | Stop reason: SDUPTHER

## 2022-11-21 RX ORDER — ALPRAZOLAM 0.5 MG/1
TABLET ORAL
Qty: 45 TABLET | Refills: 2 | Status: SHIPPED
Start: 2022-11-21 | End: 2023-02-17

## 2022-11-21 ASSESSMENT — FIBROSIS 4 INDEX: FIB4 SCORE: 0.84

## 2022-11-21 NOTE — PROGRESS NOTES
Subjective:   Alfonso Posada is a 67 y.o. female here today for chronic sinusitis, chronic pain of her hip and bilateral knees and anxiety.    Chronic pansinusitis  This is a pleasant 67-year-old female who is again afflicted by a sinus infection.  Complains of pansinusitis.  Associated sinus congestion and drainage.  Requesting an antibiotic today.    Right hip pain  Chronic condition.  Recently received injection into the right hip.  Also deals with bilateral knee pain.  No she needs to lose further weight.  Does have a recumbent bike.  She is requesting a refill today of oxycodone.    Anxiety  Chronic condition.  Requesting a refill today of Xanax.  She has been taking the medication since she was a teenager.  I told her today I needed to wean her off of alprazolam given the conditions currently provided by [facility].       Current medicines (including changes today)  Current Outpatient Medications   Medication Sig Dispense Refill    azithromycin (ZITHROMAX) 250 MG Tab As directed. 6 Tablet 1    ondansetron (ZOFRAN ODT) 4 MG TABLET DISPERSIBLE Take 1 Tablet by mouth 2 times a day as needed for Nausea/Vomiting for up to 30 days. AS NEEDED FOR NAUSEA AND VOMITING 60 Tablet 3    [START ON 1/20/2023] oxyCODONE-acetaminophen (PERCOCET-10)  MG Tab Take 1 Tablet by mouth every 6 hours as needed for Severe Pain for up to 30 days. 115 Tablet 0    ALPRAZolam (XANAX) 0.5 MG Tab TAKE ONE TABLET BY MOUTH TWICE A DAY AS NEEDED FOR ANIXETY - 30 DAYS 45 Tablet 2    [START ON 12/21/2022] oxyCODONE-acetaminophen (PERCOCET-10)  MG Tab Take 1 Tablet by mouth every 6 hours as needed for Severe Pain for up to 30 days. 115 Tablet 0    oxyCODONE-acetaminophen (PERCOCET-10)  MG Tab Take 1 Tablet by mouth every 6 hours as needed for Severe Pain for up to 30 days. 115 Tablet 0    montelukast (SINGULAIR) 10 MG Tab TAKE ONE TABLET BY MOUTH DAILY 90 Tablet 0    sertraline (ZOLOFT) 100 MG Tab TAKE ONE TABLET BY MOUTH DAILY  90 Tablet 1    SYNTHROID 100 MCG Tab TAKE ONE TABLET BY MOUTH EVERY MORNING ON AN EMPTY STOMACH 90 Tablet 3    ELIQUIS 5 MG Tab TAKE ONE TABLET BY MOUTH TWICE A  Tablet 1    oxyCODONE-acetaminophen (PERCOCET-10)  MG Tab Take 1 Tablet by mouth every 6 hours as needed for Severe Pain for up to 30 days. 115 Tablet 0    sertraline (ZOLOFT) 50 MG Tab Take 50 mg by mouth every day.      omeprazole (PRILOSEC) 40 MG delayed-release capsule Take 1 Capsule by mouth every day. 90 Capsule 1    metoprolol tartrate (LOPRESSOR) 50 MG Tab Take 1.5 Tablets by mouth 2 times a day. 270 Tablet 1    atorvastatin (LIPITOR) 40 MG Tab TAKE ONE TABLET BY MOUTH DAILY 90 Tablet 3    losartan (COZAAR) 50 MG Tab TAKE ONE TABLET BY MOUTH DAILY 90 Tablet 3    potassium chloride (KLOR-CON) 8 MEQ tablet TAKE ONE TABLET BY MOUTH DAILY *KLOR-CON* 90 Tablet 0    fluticasone (FLONASE) 50 MCG/ACT nasal spray SPRAY ONE SPRAY IN EACH NOSTRIL DAILY 16 g 1    ketoconazole (NIZORAL) 2 % Cream       triamcinolone acetonide (KENALOG) 0.1 % Cream Apply 2-4 g to affected area(s) 2 times a day. 1 Tube 0    cetirizine (ZYRTEC) 10 MG Tab Take 10 mg by mouth every evening.       No current facility-administered medications for this visit.     She  has a past medical history of Anesthesia, Anxiety disorder, Arthritis, Asthma, Atrial fibrillation (HCC) (02/2021), Bartholin cyst, Chronic knee pain, Depression, GERD (gastroesophageal reflux disease), Graves disease, Heart murmur, Hemorrhagic disorder ([facility]), Hyperlipidemia, Hypertension, Hypothyroidism, postsurgical ( ), Infectious disease (2017), Morbid obesity ([facility]) (12/2019), Postherpetic neuralgia, Psychiatric disorder, SVT (supraventricular tachycardia) ([facility]), and Uterine cancer ([facility]).    Social History and Family History were reviewed and updated.    ROS   No chest pain, no shortness of breath, no abdominal pain and all other systems were reviewed and are negative.       Objective:     /70 (BP  "Location: Left arm, Patient Position: Sitting, BP Cuff Size: Large adult)   Pulse 69   Temp 36.7 °C (98 °F) (Temporal)   Ht 1.651 m (5' 5\")   Wt 125 kg (274 lb 14.6 oz)   SpO2 96%  Body mass index is 45.75 kg/m².   Physical Exam:  Constitutional: Alert, no distress.  Skin: Warm, dry, good turgor, no rashes in visible areas.  Eye: Equal, round and reactive, conjunctiva clear, lids normal.  ENMT: Lips without lesions, good dentition, oropharynx clear.  Neck: Trachea midline, no masses.   Lymph: No cervical or supraclavicular lymphadenopathy  Respiratory: Unlabored respiratory effort, lungs appear clear.  Psych: Alert and oriented x3, normal affect and mood.        Assessment and Plan:   The following treatment plan was discussed    1. Chronic pansinusitis  Chronic condition but recent recurrence.  Renewed azithromycin as directed.  - azithromycin (ZITHROMAX) 250 MG Tab; As directed.  Dispense: 6 Tablet; Refill: 1    2. Nausea and vomiting  Chronic chronic condition.   reviewed.  Renewed oxycodone as directed.  Condition.  Stable.  Renewed ondansetron as directed.  - ondansetron (ZOFRAN ODT) 4 MG TABLET DISPERSIBLE; Take 1 Tablet by mouth 2 times a day as needed for Nausea/Vomiting for up to 30 days. AS NEEDED FOR NAUSEA AND VOMITING  Dispense: 60 Tablet; Refill: 3    3. Bilateral chronic knee pain  Provided a 90-day supply with separate 3-day refills.  Continue to follow with orthopedics.  May have to decrease dose in the future weaned off of the benzo use.  - oxyCODONE-acetaminophen (PERCOCET-10)  MG Tab; Take 1 Tablet by mouth every 6 hours as needed for Severe Pain for up to 30 days.  Dispense: 115 Tablet; Refill: 0  - oxyCODONE-acetaminophen (PERCOCET-10)  MG Tab; Take 1 Tablet by mouth every 6 hours as needed for Severe Pain for up to 30 days.  Dispense: 115 Tablet; Refill: 0  - oxyCODONE-acetaminophen (PERCOCET-10)  MG Tab; Take 1 Tablet by mouth every 6 hours as needed for Severe Pain " for up to 30 days.  Dispense: 115 Tablet; Refill: 0    4. Right hip pain  Chronic condition.  Stable.  Renew Percocet as directed.   reviewed.  Controlled substance agreement and urine drug screen are updated.  - oxyCODONE-acetaminophen (PERCOCET-10)  MG Tab; Take 1 Tablet by mouth every 6 hours as needed for Severe Pain for up to 30 days.  Dispense: 115 Tablet; Refill: 0  - oxyCODONE-acetaminophen (PERCOCET-10)  MG Tab; Take 1 Tablet by mouth every 6 hours as needed for Severe Pain for up to 30 days.  Dispense: 115 Tablet; Refill: 0  - oxyCODONE-acetaminophen (PERCOCET-10)  MG Tab; Take 1 Tablet by mouth every 6 hours as needed for Severe Pain for up to 30 days.  Dispense: 115 Tablet; Refill: 0    5. Anxiety  Chronic condition.  Discussed eventually weaning off of alprazolam.  We will provide order of the dose today.  Renew medication for the next 90 days.  We will continue to reduce the dosing.  Continue to take sertraline as directed.  May need to follow-up with psychiatry in the future.  - ALPRAZolam (XANAX) 0.5 MG Tab; TAKE ONE TABLET BY MOUTH TWICE A DAY AS NEEDED FOR ANIXETY - 30 DAYS  Dispense: 45 Tablet; Refill: 2    6. Encounter for screening mammogram for breast cancer  Mammogram ordered.  Screening.  - MA-SCREENING MAMMO BILAT W/TOMOSYNTHESIS W/CAD; Future    7. Need for immunization against influenza  Administered without complaints.  - INFLUENZA VACCINE, HIGH DOSE (65+ ONLY)    Reviewed her recent labs.      Followup: Return in about 3 months (around 2/21/2023), or if symptoms worsen or fail to improve.    Please note that this dictation was created using voice recognition software. I have made every reasonable attempt to correct obvious errors, but I expect that there are errors of grammar and possibly content that I did not discover before finalizing the note.

## 2022-11-21 NOTE — ASSESSMENT & PLAN NOTE
Chronic condition.  Requesting a refill today of Xanax.  She has been taking the medication since she was a teenager.  I told her today I needed to wean her off of alprazolam given the conditions currently provided by [facility].

## 2022-11-21 NOTE — ASSESSMENT & PLAN NOTE
This is a pleasant 67-year-old female who is again afflicted by a sinus infection.  Complains of pansinusitis.  Associated sinus congestion and drainage.  Requesting an antibiotic today.

## 2022-11-21 NOTE — ASSESSMENT & PLAN NOTE
Chronic condition.  Recently received injection into the right hip.  Also deals with bilateral knee pain.  No she needs to lose further weight.  Does have a recumbent bike.  She is requesting a refill today of oxycodone.

## 2022-12-01 DIAGNOSIS — M25.551 RIGHT HIP PAIN: ICD-10-CM

## 2022-12-02 RX ORDER — LIDOCAINE 50 MG/G
PATCH TOPICAL
Qty: 30 PATCH | Refills: 11 | Status: SHIPPED | OUTPATIENT
Start: 2022-12-02 | End: 2024-01-12

## 2023-01-27 DIAGNOSIS — J32.4 CHRONIC PANSINUSITIS: ICD-10-CM

## 2023-01-27 DIAGNOSIS — K21.9 GASTROESOPHAGEAL REFLUX DISEASE WITHOUT ESOPHAGITIS: ICD-10-CM

## 2023-01-27 DIAGNOSIS — J30.9 ALLERGIC RHINITIS, UNSPECIFIED SEASONALITY, UNSPECIFIED TRIGGER: ICD-10-CM

## 2023-01-27 RX ORDER — OMEPRAZOLE 40 MG/1
40 CAPSULE, DELAYED RELEASE ORAL DAILY
Qty: 90 CAPSULE | Refills: 3 | Status: SHIPPED | OUTPATIENT
Start: 2023-01-27 | End: 2023-12-18

## 2023-01-27 RX ORDER — MONTELUKAST SODIUM 10 MG/1
10 TABLET ORAL DAILY
Qty: 90 TABLET | Refills: 3 | Status: SHIPPED | OUTPATIENT
Start: 2023-01-27 | End: 2024-01-12

## 2023-01-27 RX ORDER — FLUTICASONE PROPIONATE 50 MCG
SPRAY, SUSPENSION (ML) NASAL
Qty: 16 G | Refills: 1 | Status: SHIPPED | OUTPATIENT
Start: 2023-01-27 | End: 2023-09-25

## 2023-01-27 RX ORDER — AZITHROMYCIN 250 MG/1
TABLET, FILM COATED ORAL
Qty: 6 TABLET | Refills: 1 | Status: SHIPPED | OUTPATIENT
Start: 2023-01-27 | End: 2023-05-09 | Stop reason: SDUPTHER

## 2023-01-27 NOTE — TELEPHONE ENCOUNTER
Received request via: Patient called    Was the patient seen in the last year in this department? Yes    Does the patient have an active prescription (recently filled or refills available) for medication(s) requested? No    Does the patient have [facility] and need 100 day supply (blood pressure, diabetes and cholesterol meds only)? Patient does not have SCP    Pt is also requesting steroid for sinus infection.

## 2023-02-17 ENCOUNTER — OFFICE VISIT (OUTPATIENT)
Dept: MEDICAL GROUP | Facility: MEDICAL CENTER | Age: 68
End: 2023-02-17
Payer: MEDICARE

## 2023-02-17 VITALS — BODY MASS INDEX: 44.95 KG/M2 | WEIGHT: 269.8 LBS | HEIGHT: 65 IN

## 2023-02-17 DIAGNOSIS — Z79.891 CHRONIC USE OF OPIATE FOR THERAPEUTIC PURPOSE: ICD-10-CM

## 2023-02-17 DIAGNOSIS — E66.01 MORBID OBESITY DUE TO EXCESS CALORIES (HCC): ICD-10-CM

## 2023-02-17 DIAGNOSIS — I47.10 SVT (SUPRAVENTRICULAR TACHYCARDIA) (HCC): ICD-10-CM

## 2023-02-17 DIAGNOSIS — I48.0 PAROXYSMAL ATRIAL FIBRILLATION (HCC): ICD-10-CM

## 2023-02-17 DIAGNOSIS — R21 RASH: ICD-10-CM

## 2023-02-17 DIAGNOSIS — M25.562 BILATERAL CHRONIC KNEE PAIN: ICD-10-CM

## 2023-02-17 DIAGNOSIS — K21.9 GASTROESOPHAGEAL REFLUX DISEASE WITHOUT ESOPHAGITIS: ICD-10-CM

## 2023-02-17 DIAGNOSIS — F41.9 ANXIETY: Chronic | ICD-10-CM

## 2023-02-17 DIAGNOSIS — M25.551 RIGHT HIP PAIN: ICD-10-CM

## 2023-02-17 DIAGNOSIS — M25.561 BILATERAL CHRONIC KNEE PAIN: ICD-10-CM

## 2023-02-17 DIAGNOSIS — R11.2 NAUSEA AND VOMITING: ICD-10-CM

## 2023-02-17 DIAGNOSIS — G89.29 BILATERAL CHRONIC KNEE PAIN: ICD-10-CM

## 2023-02-17 PROCEDURE — 99214 OFFICE O/P EST MOD 30 MIN: CPT | Performed by: PHYSICIAN ASSISTANT

## 2023-02-17 RX ORDER — OXYCODONE AND ACETAMINOPHEN 10; 325 MG/1; MG/1
1 TABLET ORAL EVERY 6 HOURS PRN
Qty: 115 TABLET | Refills: 0 | Status: SHIPPED | OUTPATIENT
Start: 2023-03-20 | End: 2023-04-19

## 2023-02-17 RX ORDER — ONDANSETRON 4 MG/1
4 TABLET, ORALLY DISINTEGRATING ORAL 2 TIMES DAILY PRN
Qty: 60 TABLET | Refills: 3 | Status: SHIPPED
Start: 2023-02-17 | End: 2023-07-26

## 2023-02-17 RX ORDER — OXYCODONE AND ACETAMINOPHEN 10; 325 MG/1; MG/1
1 TABLET ORAL EVERY 6 HOURS PRN
Qty: 115 TABLET | Refills: 0 | Status: SHIPPED | OUTPATIENT
Start: 2023-02-18 | End: 2023-03-20

## 2023-02-17 RX ORDER — CLOBETASOL PROPIONATE 0.5 MG/G
1 CREAM TOPICAL 2 TIMES DAILY
Qty: 60 G | Refills: 1 | Status: SHIPPED | OUTPATIENT
Start: 2023-02-17

## 2023-02-17 RX ORDER — OXYCODONE AND ACETAMINOPHEN 10; 325 MG/1; MG/1
1 TABLET ORAL EVERY 6 HOURS PRN
Qty: 115 TABLET | Refills: 0 | Status: SHIPPED | OUTPATIENT
Start: 2023-04-19 | End: 2023-04-14 | Stop reason: SDUPTHER

## 2023-02-17 RX ORDER — ALPRAZOLAM 0.5 MG/1
TABLET ORAL
Qty: 30 TABLET | Refills: 2 | Status: SHIPPED | OUTPATIENT
Start: 2023-02-17 | End: 2023-03-19

## 2023-02-17 ASSESSMENT — PATIENT HEALTH QUESTIONNAIRE - PHQ9: CLINICAL INTERPRETATION OF PHQ2 SCORE: 0

## 2023-02-17 ASSESSMENT — FIBROSIS 4 INDEX: FIB4 SCORE: 1.06

## 2023-02-17 NOTE — ASSESSMENT & PLAN NOTE
Chronic condition.  We will continue to wean off of the Xanax.  Also she is requesting a renewal of sertraline.  Have to monitor her anxiety.  She is currently taking a low-dose of sertraline.  Will take three quarters of a 50 mg tablet daily.

## 2023-02-17 NOTE — PROGRESS NOTES
Subjective:   Alfonso Posada is a 67 y.o. female here today for chronic back pain and anxiety.    Bilateral chronic knee pain  This is a pleasant 67-year-old female who is here today to discuss her chronic pain secondary to osteoarthritis in the knees and hips.  She follows with [facility].  Sees Dr. Chery and Dr. Kelly.  I provide her oxycodone.  She is currently on 10 mg which she receives a supply of 115 tablets a month.  Morphine milliequivalents is almost at 60.  We discussed that because of the morphine milliequivalents I will not be able to provide her such a high dose.  She is willing to follow with pain management and likely to see Dr. Kelly.  She also is on anxiety medication.  She receives 45 tablets of Xanax monthly.  She is willing to wean off the medication.  She understands my strict requirements to meet [facility]'s policies.  It appears that she is due for a signing of the controlled subs agreement as well as urine drug screen.    Anxiety  Chronic condition.  We will continue to wean off of the Xanax.  Also she is requesting a renewal of sertraline.  Have to monitor her anxiety.  She is currently taking a low-dose of sertraline.  Will take three quarters of a 50 mg tablet daily.       Current medicines (including changes today)  Current Outpatient Medications   Medication Sig Dispense Refill    [START ON 2/18/2023] oxyCODONE-acetaminophen (PERCOCET-10)  MG Tab Take 1 Tablet by mouth every 6 hours as needed for Severe Pain for up to 30 days. 115 Tablet 0    [START ON 4/19/2023] oxyCODONE-acetaminophen (PERCOCET-10)  MG Tab Take 1 Tablet by mouth every 6 hours as needed for Severe Pain for up to 30 days. 115 Tablet 0    [START ON 3/20/2023] oxyCODONE-acetaminophen (PERCOCET-10)  MG Tab Take 1 Tablet by mouth every 6 hours as needed for Severe Pain for up to 30 days. 115 Tablet 0    clobetasol (TEMOVATE) 0.05 % Cream Apply 1 Application topically 2 times a day. 60 g 1     ALPRAZolam (XANAX) 0.5 MG Tab TAKE ONE TABLET BY MOUTH TWICE A DAY AS NEEDED FOR ANIXETY - 30 DAYS 30 Tablet 2    ondansetron (ZOFRAN ODT) 4 MG TABLET DISPERSIBLE Take 1 Tablet by mouth 2 times a day as needed for Nausea/Vomiting for up to 30 days. AS NEEDED FOR NAUSEA AND VOMITING 60 Tablet 3    sertraline (ZOLOFT) 50 MG Tab Take 1 Tablet by mouth every day for 90 days. 90 Tablet 3    fluticasone (FLONASE) 50 MCG/ACT nasal spray SPRAY ONE SPRAY IN EACH NOSTRIL DAILY 16 g 1    azithromycin (ZITHROMAX) 250 MG Tab As directed. 6 Tablet 1    montelukast (SINGULAIR) 10 MG Tab Take 1 Tablet by mouth every day. 90 Tablet 3    omeprazole (PRILOSEC) 40 MG delayed-release capsule Take 1 Capsule by mouth every day. 90 Capsule 3    losartan (COZAAR) 50 MG Tab TAKE ONE TABLET BY MOUTH DAILY 90 Tablet 1    lidocaine (LIDODERM) 5 % Patch APPLY 1 PATCH TO AFFECTED AREA FOR 12 HOURS IN A 24 HOUR PERIOD 30 Patch 11    sertraline (ZOLOFT) 100 MG Tab TAKE ONE TABLET BY MOUTH DAILY 90 Tablet 1    SYNTHROID 100 MCG Tab TAKE ONE TABLET BY MOUTH EVERY MORNING ON AN EMPTY STOMACH 90 Tablet 3    ELIQUIS 5 MG Tab TAKE ONE TABLET BY MOUTH TWICE A  Tablet 1    metoprolol tartrate (LOPRESSOR) 50 MG Tab Take 1.5 Tablets by mouth 2 times a day. 270 Tablet 1    atorvastatin (LIPITOR) 40 MG Tab TAKE ONE TABLET BY MOUTH DAILY 90 Tablet 3    potassium chloride (KLOR-CON) 8 MEQ tablet TAKE ONE TABLET BY MOUTH DAILY *KLOR-CON* 90 Tablet 0    ketoconazole (NIZORAL) 2 % Cream       cetirizine (ZYRTEC) 10 MG Tab Take 10 mg by mouth every evening.       No current facility-administered medications for this visit.     She  has a past medical history of Anesthesia, Anxiety disorder, Arthritis, Asthma, Atrial fibrillation (HCC) (02/2021), Bartholin cyst, Chronic knee pain, Depression, GERD (gastroesophageal reflux disease), Graves disease, Heart murmur, Hemorrhagic disorder ([facility]), Hyperlipidemia, Hypertension, Hypothyroidism, postsurgical ( ),  "Infectious disease (2017), Morbid obesity (HCC) (12/2019), Postherpetic neuralgia, Psychiatric disorder, SVT (supraventricular tachycardia) (HCC), and Uterine cancer (HCC).    Social History and Family History were reviewed and updated.    ROS   No chest pain, no shortness of breath, no abdominal pain and all other systems were reviewed and are negative.       Objective:     Ht 1.651 m (5' 5\")   Wt 122 kg (269 lb 12.8 oz)  Body mass index is 44.9 kg/m².   Physical Exam:  Constitutional: Alert, no distress.  Skin: Warm, dry, good turgor, no rashes in visible areas.  Eye: Equal, round and reactive, conjunctiva clear, lids normal.  ENMT: Lips without lesions, good dentition, oropharynx clear.  Neck: Trachea midline, no masses.   Lymph: No cervical or supraclavicular lymphadenopathy  Respiratory: Unlabored respiratory effort, lungs appear clear.  Psych: Alert and oriented x3, normal affect and mood.        Assessment and Plan:   The following treatment plan was discussed    1. Bilateral chronic knee pain  Chronic condition.  Stable.  Continue to follow with [facility].  Renewed Percocet as directed.  Provided a prescription refill request to be filled tomorrow.  Also referred her to [facility] for pain management.  I will no longer be able to provide her the current dosing given the morphine milliequivalents.  She understands that I must only supply 40 morphine milliequivalents and no more.  Currently almost at 60.  - Referral to Pain Management  - oxyCODONE-acetaminophen (PERCOCET-10)  MG Tab; Take 1 Tablet by mouth every 6 hours as needed for Severe Pain for up to 30 days.  Dispense: 115 Tablet; Refill: 0  - oxyCODONE-acetaminophen (PERCOCET-10)  MG Tab; Take 1 Tablet by mouth every 6 hours as needed for Severe Pain for up to 30 days.  Dispense: 115 Tablet; Refill: 0  - oxyCODONE-acetaminophen (PERCOCET-10)  MG Tab; Take 1 Tablet by mouth every 6 hours as needed for Severe Pain for up to 30 " days.  Dispense: 115 Tablet; Refill: 0    2. Chronic use of opiate for therapeutic purpose  Chronic use.  Urine drug screen performed today.  Controlled subs agreement was signed today.  - PAIN MANAGEMENT [facility], UR; Future  - Referral to Pain Management  - oxyCODONE-acetaminophen (PERCOCET-10)  MG Tab; Take 1 Tablet by mouth every 6 hours as needed for Severe Pain for up to 30 days.  Dispense: 115 Tablet; Refill: 0  - Controlled Substance Treatment Agreement    3. Anxiety  Chronic condition.  She has agreed to wean off of alprazolam.  We will go further down on the dose and provider 30 tablets for the month with 2 refills.  Last prescription was for 45 tablets.  Also renewed sertraline.  She is currently taking a low-dose of 50 mg which she will take three quarters of the tablet.  - ALPRAZolam (XANAX) 0.5 MG Tab; TAKE ONE TABLET BY MOUTH TWICE A DAY AS NEEDED FOR ANIXETY - 30 DAYS  Dispense: 30 Tablet; Refill: 2  - sertraline (ZOLOFT) 50 MG Tab; Take 1 Tablet by mouth every day for 90 days.  Dispense: 90 Tablet; Refill: 3    4. SVT (supraventricular tachycardia) (HCC)  Chronic condition.  Stable.  Continue to follow with cardiology.    5. Paroxysmal atrial fibrillation (HCC)  Chronic condition.  Stable.  Continue to follow with cardiology.  Take Eliquis as directed.    6. Morbid obesity due to excess calories (HCC)  Chronic condition.  We will continue to monitor.  - Patient identified as having weight management issue.  Appropriate orders and counseling given.    7. Body mass index 40.0-44.9, adult (HCC)  Chronic condition.  Stable.  We will continue to monitor.  - Patient identified as having weight management issue.  Appropriate orders and counseling given.    8. Rash  Chronic condition.  Symptoms almost appear to be psoriatic plaque.  Provided clobetasol as directed.  - clobetasol (TEMOVATE) 0.05 % Cream; Apply 1 Application topically 2 times a day.  Dispense: 60 g; Refill: 1    9. Nausea and  vomiting  Chronic condition.  Unknown why Zofran is not fully covered.  She will speak to pharmacy and contact us.  - ondansetron (ZOFRAN ODT) 4 MG TABLET DISPERSIBLE; Take 1 Tablet by mouth 2 times a day as needed for Nausea/Vomiting for up to 30 days. AS NEEDED FOR NAUSEA AND VOMITING  Dispense: 60 Tablet; Refill: 3    10. Gastroesophageal reflux disease without esophagitis  Chronic condition.  Stable.  Renewed Zofran as directed.  - ondansetron (ZOFRAN ODT) 4 MG TABLET DISPERSIBLE; Take 1 Tablet by mouth 2 times a day as needed for Nausea/Vomiting for up to 30 days. AS NEEDED FOR NAUSEA AND VOMITING  Dispense: 60 Tablet; Refill: 3        Followup: Return in about 3 months (around 5/17/2023), or if symptoms worsen or fail to improve.    Please note that this dictation was created using voice recognition software. I have made every reasonable attempt to correct obvious errors, but I expect that there are errors of grammar and possibly content that I did not discover before finalizing the note.

## 2023-02-17 NOTE — ASSESSMENT & PLAN NOTE
This is a pleasant 67-year-old female who is here today to discuss her chronic pain secondary to osteoarthritis in the knees and hips.  She follows with [facility].  Sees Dr. Chery and Dr. Kelly.  I provide her oxycodone.  She is currently on 10 mg which she receives a supply of 115 tablets a month.  Morphine milliequivalents is almost at 60.  We discussed that because of the morphine milliequivalents I will not be able to provide her such a high dose.  She is willing to follow with pain management and likely to see Dr. Kelly.  She also is on anxiety medication.  She receives 45 tablets of Xanax monthly.  She is willing to wean off the medication.  She understands my strict requirements to meet [facility] policies.  It appears that she is due for a signing of the controlled subs agreement as well as urine drug screen.

## 2023-02-18 DIAGNOSIS — I10 ESSENTIAL HYPERTENSION: ICD-10-CM

## 2023-02-18 DIAGNOSIS — I48.20 CHRONIC ATRIAL FIBRILLATION (HCC): ICD-10-CM

## 2023-02-21 RX ORDER — METOPROLOL TARTRATE 50 MG/1
TABLET, FILM COATED ORAL
Qty: 270 TABLET | Refills: 1 | Status: SHIPPED | OUTPATIENT
Start: 2023-02-21 | End: 2023-08-18

## 2023-04-05 DIAGNOSIS — R60.0 LOCALIZED EDEMA: ICD-10-CM

## 2023-04-05 RX ORDER — FUROSEMIDE 40 MG/1
TABLET ORAL
Qty: 24 TABLET | Refills: 1 | Status: SHIPPED | OUTPATIENT
Start: 2023-04-05

## 2023-04-14 DIAGNOSIS — M25.551 RIGHT HIP PAIN: ICD-10-CM

## 2023-04-14 DIAGNOSIS — M25.562 BILATERAL CHRONIC KNEE PAIN: ICD-10-CM

## 2023-04-14 DIAGNOSIS — M25.561 BILATERAL CHRONIC KNEE PAIN: ICD-10-CM

## 2023-04-14 DIAGNOSIS — G89.29 BILATERAL CHRONIC KNEE PAIN: ICD-10-CM

## 2023-04-14 RX ORDER — OXYCODONE AND ACETAMINOPHEN 10; 325 MG/1; MG/1
1 TABLET ORAL EVERY 6 HOURS PRN
Qty: 115 TABLET | Refills: 0 | Status: SHIPPED | OUTPATIENT
Start: 2023-04-17 | End: 2023-05-17

## 2023-04-14 RX ORDER — OXYCODONE AND ACETAMINOPHEN 10; 325 MG/1; MG/1
1 TABLET ORAL EVERY 6 HOURS PRN
Qty: 115 TABLET | Refills: 0 | Status: CANCELLED | OUTPATIENT
Start: 2023-04-17 | End: 2023-05-17

## 2023-04-14 NOTE — TELEPHONE ENCOUNTER
Received request via: Patient called    Was the patient seen in the last year in this department? Yes    Does the patient have an active prescription (recently filled or refills available) for medication(s) requested? No    [facility] have [facility] and need 100 day supply (blood pressure, diabetes and cholesterol meds only)? Patient does not have SCP     Pleas contact pt once refilled early.

## 2023-05-09 DIAGNOSIS — J32.4 CHRONIC PANSINUSITIS: ICD-10-CM

## 2023-05-09 RX ORDER — AZITHROMYCIN 250 MG/1
TABLET, FILM COATED ORAL
Qty: 6 TABLET | Refills: 1 | Status: SHIPPED | OUTPATIENT
Start: 2023-05-09 | End: 2023-09-08 | Stop reason: SDUPTHER

## 2023-05-24 DIAGNOSIS — R21 RASH: ICD-10-CM

## 2023-05-24 RX ORDER — TRIAMCINOLONE ACETONIDE 1 MG/G
1 CREAM TOPICAL 2 TIMES DAILY
Qty: 80 G | Refills: 1 | Status: SHIPPED | OUTPATIENT
Start: 2023-05-24

## 2023-07-09 DIAGNOSIS — I10 ESSENTIAL HYPERTENSION: ICD-10-CM

## 2023-07-10 RX ORDER — LOSARTAN POTASSIUM 50 MG/1
TABLET ORAL
Qty: 90 TABLET | Refills: 1 | Status: SHIPPED | OUTPATIENT
Start: 2023-07-10 | End: 2024-01-03

## 2023-07-26 ENCOUNTER — TELEMEDICINE (OUTPATIENT)
Dept: MEDICAL GROUP | Facility: MEDICAL CENTER | Age: 68
End: 2023-07-26
Payer: MEDICARE

## 2023-07-26 VITALS — WEIGHT: 269 LBS | HEIGHT: 65 IN | BODY MASS INDEX: 44.82 KG/M2

## 2023-07-26 DIAGNOSIS — R21 RASH: ICD-10-CM

## 2023-07-26 DIAGNOSIS — Z12.12 SCREENING FOR COLORECTAL CANCER: ICD-10-CM

## 2023-07-26 DIAGNOSIS — I48.0 PAROXYSMAL ATRIAL FIBRILLATION (HCC): ICD-10-CM

## 2023-07-26 DIAGNOSIS — F32.A DEPRESSION, UNSPECIFIED DEPRESSION TYPE: ICD-10-CM

## 2023-07-26 DIAGNOSIS — Z79.01 CHRONIC ANTICOAGULATION: ICD-10-CM

## 2023-07-26 DIAGNOSIS — F51.01 PRIMARY INSOMNIA: ICD-10-CM

## 2023-07-26 DIAGNOSIS — B30.9 ACUTE VIRAL CONJUNCTIVITIS OF BOTH EYES: ICD-10-CM

## 2023-07-26 DIAGNOSIS — F41.9 ANXIETY: Chronic | ICD-10-CM

## 2023-07-26 DIAGNOSIS — Z12.11 SCREENING FOR COLORECTAL CANCER: ICD-10-CM

## 2023-07-26 DIAGNOSIS — R11.2 NAUSEA AND VOMITING: ICD-10-CM

## 2023-07-26 DIAGNOSIS — K21.9 GASTROESOPHAGEAL REFLUX DISEASE WITHOUT ESOPHAGITIS: ICD-10-CM

## 2023-07-26 PROCEDURE — 99214 OFFICE O/P EST MOD 30 MIN: CPT | Mod: 95 | Performed by: PHYSICIAN ASSISTANT

## 2023-07-26 RX ORDER — TRAZODONE HYDROCHLORIDE 50 MG/1
50 TABLET ORAL NIGHTLY
Qty: 30 TABLET | Refills: 3 | Status: SHIPPED | OUTPATIENT
Start: 2023-07-26 | End: 2023-11-27

## 2023-07-26 RX ORDER — CIPROFLOXACIN HYDROCHLORIDE 3.5 MG/ML
1 SOLUTION/ DROPS TOPICAL
Qty: 10 ML | Refills: 0 | Status: SHIPPED | OUTPATIENT
Start: 2023-07-26 | End: 2023-08-02

## 2023-07-26 RX ORDER — ONDANSETRON 4 MG/1
4 TABLET, ORALLY DISINTEGRATING ORAL 2 TIMES DAILY PRN
Qty: 60 TABLET | Refills: 3 | Status: SHIPPED | OUTPATIENT
Start: 2023-07-26 | End: 2023-12-08

## 2023-07-26 ASSESSMENT — FIBROSIS 4 INDEX: FIB4 SCORE: 1.06

## 2023-07-26 NOTE — PROGRESS NOTES
Subjective:   Alfonso Posada is a 67 y.o. female here today for atrial fibrillation and bilateral conjunctivitis.    This evaluation was conducted via Zoom using secure and encrypted videoconferencing technology. The patient was in their home in the [facility].    The patient's identity was confirmed and verbal consent was obtained for this virtual visit.      Paroxysmal atrial fibrillation (HCC)  This is a pleasant 67 year old female here today to discuss her health. Needs a renewal on her Eliquis.  Told Rx was recently denied. It wasn't.  I did renew it today when I got the renewal order.    Primary insomnia  Stopped Xanax completely.  Having issues with sleeping.  She is taking melatonin at up to 15 mg nightly if needed.  She would like to try something else.  Is following with pain management for her Norco prescription.    Acute viral conjunctivitis of both eyes  Has been dealing with bilateral red eyes as well as discharge.  Discharge is worse at night but occurs in the morning as well.    Rash  Recently developed a rash on her ankle.  I provided her couple steroid creams in the past.  Recently triamcinolone has been used with good effects but it appears the rash is spreading.  She is not able to show me the rash today.  She also purchase Eucerin recently.  She denies pain with the rash.  Does have itching.       Current medicines (including changes today)  Current Outpatient Medications   Medication Sig Dispense Refill    traZODone (DESYREL) 50 MG Tab Take 1 Tablet by mouth every evening. 30 Tablet 3    ciprofloxacin (CILOXIN) 0.3 % Solution Administer 1 Drop into both eyes every 2 hours for 7 days. 10 mL 0    ondansetron (ZOFRAN ODT) 4 MG TABLET DISPERSIBLE Take 1 Tablet by mouth 2 times a day as needed for Nausea/Vomiting for up to 30 days. AS NEEDED FOR NAUSEA AND VOMITING 60 Tablet 3    apixaban (ELIQUIS) 5mg Tab Take 1 Tablet by mouth 2 times a day. 180 Tablet 1    losartan (COZAAR) 50 MG Tab  TAKE ONE TABLET BY MOUTH DAILY 90 Tablet 1    triamcinolone acetonide (KENALOG) 0.1 % Cream Apply 1 Application topically 2 times a day. 80 g 1    azithromycin (ZITHROMAX) 250 MG Tab 2 tabs day 1 then 1 tablet day 2 through 5. 6 Tablet 1    potassium chloride (KLOR-CON) 8 MEQ tablet TAKE ONE TABLET BY MOUTH DAILY *KLOR CON* 90 Tablet 3    furosemide (LASIX) 40 MG Tab TAKE ONE TABLET BY MOUTH TWICE WEEKLY AS NEEDED 24 Tablet 1    metoprolol tartrate (LOPRESSOR) 50 MG Tab TAKE ONE AND A HALF TABLET BY MOUTH TWICE A  Tablet 1    clobetasol (TEMOVATE) 0.05 % Cream Apply 1 Application topically 2 times a day. 60 g 1    fluticasone (FLONASE) 50 MCG/ACT nasal spray SPRAY ONE SPRAY IN EACH NOSTRIL DAILY 16 g 1    montelukast (SINGULAIR) 10 MG Tab Take 1 Tablet by mouth every day. 90 Tablet 3    omeprazole (PRILOSEC) 40 MG delayed-release capsule Take 1 Capsule by mouth every day. 90 Capsule 3    lidocaine (LIDODERM) 5 % Patch APPLY 1 PATCH TO AFFECTED AREA FOR 12 HOURS IN A 24 HOUR PERIOD 30 Patch 11    sertraline (ZOLOFT) 100 MG Tab TAKE ONE TABLET BY MOUTH DAILY 90 Tablet 1    SYNTHROID 100 MCG Tab TAKE ONE TABLET BY MOUTH EVERY MORNING ON AN EMPTY STOMACH 90 Tablet 3    atorvastatin (LIPITOR) 40 MG Tab TAKE ONE TABLET BY MOUTH DAILY 90 Tablet 3    ketoconazole (NIZORAL) 2 % Cream       cetirizine (ZYRTEC) 10 MG Tab Take 10 mg by mouth every evening.       No current facility-administered medications for this visit.     She  has a past medical history of Anesthesia, Anxiety disorder, Arthritis, Asthma, Atrial fibrillation ([facility]) (02/2021), Bartholin cyst, Chronic knee pain, Depression, GERD (gastroesophageal reflux disease), Graves disease, Heart murmur, Hemorrhagic disorder ([facility]), Hyperlipidemia, Hypertension, Hypothyroidism, postsurgical ( ), Infectious disease (2017), Morbid obesity ([facility]) (12/2019), Postherpetic neuralgia, Psychiatric disorder, SVT (supraventricular tachycardia) ([facility]), and Uterine cancer  "(HCC).    Social History and Family History were reviewed and updated.    ROS   No chest pain, no shortness of breath, no abdominal pain and all other systems were reviewed and are negative.       Objective:     Ht 1.651 m (5' 5\")   Wt 122 kg (269 lb)  Body mass index is 44.76 kg/m².   Physical Exam:  Constitutional: Alert, no distress.  Psych: Alert and oriented x3, normal affect and mood.        Assessment and Plan:   The following treatment plan was discussed    1. Paroxysmal atrial fibrillation (HCC)  Chronic condition.  Stable.  Discussed renewing the medication early today.  Also told her I did not deny her medication.  Pharmacies like to fabricate stories to help the cause.    2. Anxiety  Chronic condition.  Stable.  Congratulated her on stopping Xanax.  We will continue sertraline at 50 mg daily.  She is cutting up to the 100 mg tablets.    3. Primary insomnia  New condition noted in chart but chronically she has had insomnia.  We will try trazodone as directed.  May go up to 100 mg if needed.  Discussed side effects.  Advised to stop melatonin with initial trazodone use.  - traZODone (DESYREL) 50 MG Tab; Take 1 Tablet by mouth every evening.  Dispense: 30 Tablet; Refill: 3    4. Acute viral conjunctivitis of both eyes  Acute, new onset condition.  Although sounds viral still having discharge in the day so provided ciprofloxacin drops.  Use as directed.  - ciprofloxacin (CILOXIN) 0.3 % Solution; Administer 1 Drop into both eyes every 2 hours for 7 days.  Dispense: 10 mL; Refill: 0    5. Rash  New condition noted in chart but chronic.  Unable to visualize the ankles today.  Continue steroid cream with Eucerin use.  Follow-up with concerns.  May need to see a dermatologist.    6. Screening for colorectal cancer  Ordered Cologuard.  - COLOGUARD (FIT DNA)      Followup: Return in about 6 months (around 1/26/2024), or if symptoms worsen or fail to improve.    Please note that this dictation was created using " voice recognition software. I have made every reasonable attempt to correct obvious errors, but I expect that there are errors of grammar and possibly content that I did not discover before finalizing the note.

## 2023-07-26 NOTE — ASSESSMENT & PLAN NOTE
Recently developed a rash on her ankle.  I provided her couple steroid creams in the past.  Recently triamcinolone has been used with good effects but it appears the rash is spreading.  She is not able to show me the rash today.  She also purchase Eucerin recently.  She denies pain with the rash.  Does have itching.

## 2023-07-26 NOTE — ASSESSMENT & PLAN NOTE
This is a pleasant 67 year old female here today to discuss her health. Needs a renewal on her Eliquis.  Told Rx was recently denied. It wasn't.  I did renew it today when I got the renewal order.

## 2023-07-26 NOTE — ASSESSMENT & PLAN NOTE
Stopped Xanax completely.  Having issues with sleeping.  She is taking melatonin at up to 15 mg nightly if needed.  She would like to try something else.  Is following with pain management for her Norco prescription.

## 2023-08-18 DIAGNOSIS — I10 ESSENTIAL HYPERTENSION: ICD-10-CM

## 2023-08-18 DIAGNOSIS — I48.20 CHRONIC ATRIAL FIBRILLATION (HCC): ICD-10-CM

## 2023-08-18 RX ORDER — METOPROLOL TARTRATE 50 MG/1
TABLET, FILM COATED ORAL
Qty: 135 TABLET | Refills: 2 | Status: SHIPPED | OUTPATIENT
Start: 2023-08-18 | End: 2023-12-29

## 2023-09-08 DIAGNOSIS — J32.4 CHRONIC PANSINUSITIS: ICD-10-CM

## 2023-09-08 RX ORDER — AZITHROMYCIN 250 MG/1
TABLET, FILM COATED ORAL
Qty: 6 TABLET | Refills: 1 | Status: SHIPPED | OUTPATIENT
Start: 2023-09-08

## 2023-09-26 ENCOUNTER — TELEPHONE (OUTPATIENT)
Dept: HEALTH INFORMATION MANAGEMENT | Facility: OTHER | Age: 68
End: 2023-09-26
Payer: MEDICARE

## 2023-11-26 DIAGNOSIS — F51.01 PRIMARY INSOMNIA: ICD-10-CM

## 2023-11-27 RX ORDER — TRAZODONE HYDROCHLORIDE 50 MG/1
50 TABLET ORAL EVERY EVENING
Qty: 90 TABLET | Refills: 3 | Status: SHIPPED | OUTPATIENT
Start: 2023-11-27 | End: 2024-11-21

## 2023-12-07 DIAGNOSIS — K21.9 GASTROESOPHAGEAL REFLUX DISEASE WITHOUT ESOPHAGITIS: ICD-10-CM

## 2023-12-07 DIAGNOSIS — R11.2 NAUSEA AND VOMITING: ICD-10-CM

## 2023-12-08 RX ORDER — ONDANSETRON 4 MG/1
TABLET, ORALLY DISINTEGRATING ORAL
Qty: 60 TABLET | Refills: 3 | Status: SHIPPED | OUTPATIENT
Start: 2023-12-08

## 2023-12-18 DIAGNOSIS — K21.9 GASTROESOPHAGEAL REFLUX DISEASE WITHOUT ESOPHAGITIS: ICD-10-CM

## 2023-12-18 RX ORDER — OMEPRAZOLE 40 MG/1
40 CAPSULE, DELAYED RELEASE ORAL DAILY
Qty: 90 CAPSULE | Refills: 3 | Status: SHIPPED | OUTPATIENT
Start: 2023-12-18

## 2023-12-29 DIAGNOSIS — I10 ESSENTIAL HYPERTENSION: ICD-10-CM

## 2023-12-29 DIAGNOSIS — I48.20 CHRONIC ATRIAL FIBRILLATION (HCC): ICD-10-CM

## 2023-12-29 RX ORDER — METOPROLOL TARTRATE 50 MG/1
75 TABLET, FILM COATED ORAL 2 TIMES DAILY
Qty: 135 TABLET | Refills: 0 | Status: SHIPPED | OUTPATIENT
Start: 2023-12-29 | End: 2024-02-05 | Stop reason: SDUPTHER

## 2024-01-03 DIAGNOSIS — I10 ESSENTIAL HYPERTENSION: ICD-10-CM

## 2024-01-03 RX ORDER — LOSARTAN POTASSIUM 50 MG/1
TABLET ORAL
Qty: 90 TABLET | Refills: 1 | Status: SHIPPED | OUTPATIENT
Start: 2024-01-03

## 2024-01-12 DIAGNOSIS — J30.9 ALLERGIC RHINITIS, UNSPECIFIED SEASONALITY, UNSPECIFIED TRIGGER: ICD-10-CM

## 2024-01-12 DIAGNOSIS — M25.551 RIGHT HIP PAIN: ICD-10-CM

## 2024-01-12 RX ORDER — LIDOCAINE 50 MG/G
PATCH TOPICAL
Qty: 30 PATCH | Refills: 11 | Status: SHIPPED | OUTPATIENT
Start: 2024-01-12

## 2024-01-12 RX ORDER — MONTELUKAST SODIUM 10 MG/1
10 TABLET ORAL DAILY
Qty: 90 TABLET | Refills: 3 | Status: SHIPPED | OUTPATIENT
Start: 2024-01-12

## 2024-01-16 ENCOUNTER — TELEPHONE (OUTPATIENT)
Dept: MEDICAL GROUP | Facility: MEDICAL CENTER | Age: 69
End: 2024-01-16
Payer: MEDICARE

## 2024-01-16 NOTE — TELEPHONE ENCOUNTER
DOCUMENTATION OF PAR STATUS:    1. Name of Medication & Dose: Lidocaine 5% patches     2. Name of Prescription Coverage Company & phone #: [email or web address]    3. Date Prior Auth Submitted: 1/16/24    4. What information was given to obtain insurance decision? Clinical office notes     5. Prior Auth Status? Pending    6. Patient Notified: n/a

## 2024-01-22 NOTE — TELEPHONE ENCOUNTER
FINAL PRIOR AUTHORIZATION STATUS:    1.  Name of Medication & Dose: Lidocaine 5% patches       2. Prior Auth Status: Approved through 1/1/24 - 1/15/25     3. Action Taken: Pharmacy Notified: yes Patient Notified: N\A

## 2024-02-05 DIAGNOSIS — J32.4 CHRONIC PANSINUSITIS: ICD-10-CM

## 2024-02-05 DIAGNOSIS — I10 ESSENTIAL HYPERTENSION: ICD-10-CM

## 2024-02-05 DIAGNOSIS — I48.20 CHRONIC ATRIAL FIBRILLATION (HCC): ICD-10-CM

## 2024-02-05 RX ORDER — AZITHROMYCIN 250 MG/1
TABLET, FILM COATED ORAL
Qty: 6 TABLET | Refills: 1 | Status: SHIPPED | OUTPATIENT
Start: 2024-02-05

## 2024-02-05 RX ORDER — METOPROLOL TARTRATE 50 MG/1
75 TABLET, FILM COATED ORAL 2 TIMES DAILY
Qty: 135 TABLET | Refills: 0 | Status: SHIPPED | OUTPATIENT
Start: 2024-02-05 | End: 2024-03-15

## 2024-02-05 NOTE — TELEPHONE ENCOUNTER
Received request via: Pharmacy    Was the patient seen in the last year in this department? Yes    Does the patient have an active prescription (recently filled or refills available) for medication(s) requested? No    [facility]    Does the patient have longterm Plus and need 100 day supply (blood pressure, diabetes and cholesterol meds only)? Patient does not have SCP    She is also req a zpack for sinus infection.

## 2024-02-16 DIAGNOSIS — F41.9 ANXIETY: Chronic | ICD-10-CM

## 2024-02-16 NOTE — TELEPHONE ENCOUNTER
Received request via: Pharmacy    Was the patient seen in the last year in this department? Yes    Does the patient have an active prescription (recently filled or refills available) for medication(s) requested? No    [facility]    Does the patient have residential Plus and need 100 day supply (blood pressure, diabetes and cholesterol meds only)? Patient does not have SCP

## 2024-03-01 NOTE — MR AVS SNAPSHOT
"        Alfonso Posada   2017 7:00 AM   Office Visit   MRN: [id number]    Department:  End[facility]inology [facility]   Dept Phone:  [phone]    Description:  Female : 1955   Provider:  Marilia Kang M.D.           Reason for Visit     New Patient Hyperparathyroid      Allergies as of 2017     Allergen Noted Reactions    Pcn [Penicillins] 2017       Hives on face     Sulfa Drugs 2017       \"some sulfa drugs- leg itchiness\"       You were diagnosed with     Postsurgical hypothyroidism   [244.0.ICD-9-CM]         Vital Signs     Blood Pressure Pulse Height Weight Body Mass Index Oxygen Saturation    124/72 mmHg 82 1.651 m (5' 5\") 149.868 kg (330 lb 6.4 oz) 54.98 kg/m2 94%    Smoking Status                   Light Tobacco Smoker           Basic Information     Date Of Birth Sex Race Ethnicity Preferred Language    1955 Female White Non- English      Your appointments     Mar 03, 2017 10:00 AM   Established Patient with Sim Brownlee PA-C   [facility]    [address]  [address]  [place] [address] [phone]           You will be receiving a confirmation call a few days before your appointment from our automated call confirmation system.            Aug 29, 2017 11:20 AM   Established Patient with Marilia Kang M.D.   [facility] ([facility]    [address]  [place] [address] [phone]           You will be receiving a confirmation call a few days before your appointment from our automated call confirmation system.              Problem List              ICD-10-CM Priority Class Noted - Resolved    Morbid obesity with BMI of 45.0-49.9, adult (HCC) E66.01, Z68.42   2/3/2017 - Present    Postoperative hypothyroidism E89.0   2/3/2017 - Present    Preventative health care Z00.00   2/3/2017 - Present    Anxiety F41.9   2/3/2017 - Present    History of shingles Z86.19   " Population Health Chart Review & Patient Outreach Details      Additional Pop Health Notes:      Left voice message         Updates Requested / Reviewed:      Updated Care Coordination Note, Care Everywhere, Care Team Updated, and Immunizations Reconciliation Completed or Queried: [place]         Health Maintenance Topics Overdue:    Health Maintenance Due   Topic Date Due    Hepatitis C Screening  Never done    Cervical Cancer Screening  Never done    Lipid Panel  Never done    HIV Screening  Never done    TETANUS VACCINE  Never done    Hemoglobin A1c (Diabetic Prevention Screening)  Never done    Colorectal Cancer Screening  Never done    Mammogram  06/25/2022    Influenza Vaccine (1) Never done    COVID-19 Vaccine (3 - 2023-24 season) 09/01/2023         Health Maintenance Topic(s) Outreach Outcomes & Actions Taken:    Breast Cancer Screening - Outreach Outcomes & Actions Taken  :      Cervical Cancer Screening - Outreach Outcomes & Actions Taken  :      Colorectal Cancer Screening - Outreach Outcomes & Actions Taken  :             2/3/2017 - Present    Bilateral chronic knee pain M25.561, M25.562, G89.29   2/3/2017 - Present    Arthritis M19.90   2/3/2017 - Present    Allergic rhinitis J30.9   2/3/2017 - Present    Essential hypertension I10   2/3/2017 - Present    Tobacco dependence F17.200   2/3/2017 - Present      Health Maintenance        Date Due Completion Dates    IMM DTaP/Tdap/Td Vaccine (1 - Tdap) 11/11/1974 ---    IMM PNEUMOCOCCAL 19-64 (ADULT) MEDIUM RISK SERIES (1 of 1 - PPSV23) 11/11/1974 ---    PAP SMEAR 11/11/1976 ---    MAMMOGRAM 11/11/1995 ---    COLONOSCOPY 11/11/2005 ---    IMM ZOSTER VACCINE 11/11/2015 ---    IMM INFLUENZA (1) 9/1/2016 ---            Current Immunizations     No immunizations on file.      Below and/or attached are the medications your provider expects you to take. Review all of your home medications and newly ordered medications with your provider and/or pharmacist. Follow medication instructions as directed by your provider and/or pharmacist. Please keep your medication list with you and share with your provider. Update the information when medications are discontinued, doses are changed, or new medications (including over-the-counter products) are added; and carry medication information at all times in the event of emergency situations     Allergies:  PCN - (reactions not documented)     SULFA DRUGS - (reactions not documented)               Medications  Valid as of: February 27, 2017 -  7:39 AM    Generic Name Brand Name Tablet Size Instructions for use    ALPRAZolam (Tab) XANAX 0.5 MG Take 0.5 mg by mouth 2 times a day as needed for Anxiety.        Atorvastatin Calcium (Tab) LIPITOR 40 MG Take 40 mg by mouth every evening.        Azithromycin (Tab) ZITHROMAX 250 MG Take 250-500 mg by mouth every day. 5 day course for sinus infection        Cyclobenzaprine HCl (Tab) FLEXERIL 5 MG Take 1-2 Tabs by mouth 3 times a day as needed.        Furosemide (Tab) LASIX 40 MG Take 40 mg by mouth every day.         Gabapentin (Cap) NEURONTIN 300 MG Take 600 mg by mouth 2 Times a Day.        Indomethacin (Cap) INDOCIN 25 MG Take 25 mg by mouth 3 times a day.        Levothyroxine Sodium (Tab) SYNTHROID 25 MCG Take 1 Tab by mouth Every morning on an empty stomach.        Losartan Potassium (Tab) COZAAR 25 MG Take 25 mg by mouth every day.        Metoprolol Tartrate (Tab) LOPRESSOR 50 MG Take 50 mg by mouth 2 times a day.        Montelukast Sodium (Tab) SINGULAIR 10 MG Take 10 mg by mouth every day.        Naproxen (Tab) NAPROSYN 500 MG Take 1 Tab by mouth 2 times a day, with meals.        Omeprazole (CAPSULE DELAYED RELEASE) PRILOSEC 40 MG Take 40 mg by mouth every day.        Ondansetron (TABLET DISPERSIBLE) ZOFRAN ODT 4 MG Take 4 mg by mouth every 6 hours as needed for Nausea/Vomiting.        Oxycodone-Acetaminophen (Tab) PERCOCET-10  MG Take 1-2 Tabs by mouth every 8 hours as needed for Severe Pain.        Potassium Chloride (Tab CR) KLOR-CON 8 MEQ Take 8 mEq by mouth every day.        Sertraline HCl (Tab) ZOLOFT 100 MG Take 100 mg by mouth every day.        .                 Medicines prescribed today were sent to:     [facility] [address] [place] [address] [id number] [place] [address] & [address] [place] [address] [address]    Phone: [phone] Fax: [phone]    Open 24 Hours?: No      Medication refill instructions:       If your prescription bottle indicates you have medication refills left, it is not necessary to call your provider’s office. Please contact your pharmacy and they will refill your medication.    If your prescription bottle indicates you do not have any refills left, you may request refills at any time through one of the following ways: The online [employer] system (except Urgent Care), by calling your provider’s office, or by asking your pharmacy to contact your provider’s office with a refill request. Medication refills are processed only during regular business  hours and may not be available until the next business day. Your provider may request additional information or to have a follow-up visit with you prior to refilling your medication.   *Please Note: Medication refills are assigned a new Rx number when refilled electronically. Your pharmacy may indicate that no refills were authorized even though a new prescription for the same medication is available at the pharmacy. Please request the medicine by name with the pharmacy before contacting your provider for a refill.        Your To Do List     Future Labs/Procedures Complete By Expires    US-SOFT TISSUES OF HEAD - NECK  As directed 2/27/2018    Standing Orders Interval Expires    FREE THYROXINE  Every 6 weeks until 2/27/2018 2/27/2018    TSH  Every 6 weeks until 2/27/2018 2/27/2018         [employer] Access Code: [email or web address]  Expires: 3/19/2017 10:11 AM    [employer]  A secure, online tool to manage your health information     [employer]® is a secure, online tool that connects you to your personalized health information from the privacy of your home -- day or night - making it very easy for you to manage your healthcare. Once the activation process is completed, you can even access your medical information using the [employer] bo, which is available for free in the Apple Bo store or Google Play store.     [employer] provides the following levels of access (as shown below):   My Chart Features   [facility] Doctor [facility]  Specialists [facility]  Urgent  Care Non-[facility]  Primary Care  Doctor   Email your healthcare team securely and privately 24/7 X X X    Manage appointments: schedule your next appointment; view details of past/upcoming appointments X      Request prescription refills. X      View recent personal medical records, including lab and immunizations X X X X   View health record, including health history, allergies, medications X X X X   Read reports about your outpatient visits, procedures,  consult and ER notes X X X X   See your discharge summary, which is a recap of your hospital and/or ER visit that includes your diagnosis, lab results, and care plan. X X       How to register for [employer]:  1. Go to  [email or web address].  2. Click on the Sign Up Now box, which takes you to the New Member Sign Up page. You will need to provide the following information:  a. Enter your [employer] Access Code exactly as it appears at the top of this page. (You will not need to use this code after you’ve completed the sign-up process. If you do not sign up before the expiration date, you must request a new code.)   b. Enter your date of birth.   c. Enter your home email address.   d. Click Submit, and follow the next screen’s instructions.  3. Create a [employer] ID. This will be your [employer] login ID and cannot be changed, so think of one that is secure and easy to remember.  4. Create a [employer] password. You can change your password at any time.  5. Enter your Password Reset Question and Answer. This can be used at a later time if you forget your password.   6. Enter your e-mail address. This allows you to receive e-mail notifications when new information is available in [employer].  7. Click Sign Up. You can now view your health information.    For assistance activating your [employer] account, call [phone]

## 2024-03-12 NOTE — TELEPHONE ENCOUNTER
Was the patient seen in the last year in this department? Yes    Does patient have an active prescription for medications requested? Yes    Received Request Via: Patient   4 = No assist / stand by assistance

## 2024-03-15 DIAGNOSIS — I10 ESSENTIAL HYPERTENSION: ICD-10-CM

## 2024-03-15 DIAGNOSIS — I48.20 CHRONIC ATRIAL FIBRILLATION (HCC): ICD-10-CM

## 2024-03-15 RX ORDER — METOPROLOL TARTRATE 50 MG/1
TABLET, FILM COATED ORAL
Qty: 135 TABLET | Refills: 0 | Status: SHIPPED | OUTPATIENT
Start: 2024-03-15

## 2024-04-07 DIAGNOSIS — R11.2 NAUSEA AND VOMITING: ICD-10-CM

## 2024-04-07 DIAGNOSIS — K21.9 GASTROESOPHAGEAL REFLUX DISEASE WITHOUT ESOPHAGITIS: ICD-10-CM

## 2024-04-08 RX ORDER — ONDANSETRON 4 MG/1
TABLET, ORALLY DISINTEGRATING ORAL
Qty: 60 TABLET | Refills: 3 | Status: SHIPPED | OUTPATIENT
Start: 2024-04-08

## 2024-04-22 DIAGNOSIS — I48.20 CHRONIC ATRIAL FIBRILLATION (HCC): ICD-10-CM

## 2024-04-22 DIAGNOSIS — I10 ESSENTIAL HYPERTENSION: ICD-10-CM

## 2024-04-22 RX ORDER — METOPROLOL TARTRATE 50 MG/1
75 TABLET, FILM COATED ORAL 2 TIMES DAILY
Qty: 135 TABLET | Refills: 0 | Status: SHIPPED | OUTPATIENT
Start: 2024-04-22

## 2024-05-24 DIAGNOSIS — I10 ESSENTIAL HYPERTENSION: ICD-10-CM

## 2024-05-24 DIAGNOSIS — I48.20 CHRONIC ATRIAL FIBRILLATION (HCC): ICD-10-CM

## 2024-05-24 DIAGNOSIS — K21.9 GASTROESOPHAGEAL REFLUX DISEASE WITHOUT ESOPHAGITIS: ICD-10-CM

## 2024-05-24 DIAGNOSIS — J32.4 CHRONIC PANSINUSITIS: ICD-10-CM

## 2024-05-24 RX ORDER — OMEPRAZOLE 40 MG/1
40 CAPSULE, DELAYED RELEASE ORAL DAILY
Qty: 90 CAPSULE | Refills: 3 | Status: SHIPPED | OUTPATIENT
Start: 2024-05-24

## 2024-05-24 RX ORDER — AZITHROMYCIN 250 MG/1
TABLET, FILM COATED ORAL
Qty: 6 TABLET | Refills: 1 | Status: SHIPPED | OUTPATIENT
Start: 2024-05-24

## 2024-05-24 RX ORDER — LOSARTAN POTASSIUM 50 MG/1
TABLET ORAL
Qty: 90 TABLET | Refills: 3 | Status: SHIPPED | OUTPATIENT
Start: 2024-05-24

## 2024-05-24 RX ORDER — METOPROLOL TARTRATE 50 MG/1
75 TABLET, FILM COATED ORAL 2 TIMES DAILY
Qty: 270 TABLET | Refills: 3 | Status: SHIPPED | OUTPATIENT
Start: 2024-05-24 | End: 2025-05-19

## 2024-12-26 NOTE — ED NOTES
Pt instructed to have someone to drive home after narcotic administration. Pt agreed on not driving after receiving narcotics. Pt medicated as directed by ERP.   Orthopedics Consult Note:  called 18:20  seen 18:34    This is a 66y/o RHD Female who presents to the ED today with c/o left wrist pain, and limited ROM s/p fall today. Pt was in [place] looking up to take a picture of a building and she tripped over curb falling back onto outstretched left hand. Pt denies any other injuries. Pt denies head trauma or LOC. Pt denies any numbness, tingling or parethesias.     Past Medical & Surgical History:  FH: myocardial infarction (Father)    FH: stroke      WRIST PAIN    43    SysAdmin_VisitLink        Allergies:  No Known Allergies      Vital Signs:  T(C): 36.9 (12-26-24 @ 17:48), Max: 36.9 (12-26-24 @ 17:48)  HR: 68 (12-26-24 @ 17:48) (68 - 68)  BP: 145/89 (12-26-24 @ 17:48) (145/89 - 145/89)  RR: 15 (12-26-24 @ 17:48) (15 - 15)  SpO2: 100% (12-26-24 @ 17:48) (100% - 100%)      65y  Blood Culture: Female  RADIOLOGY/EKG:    Imaging:  X-rays of the Left wrist and demonstrate an impacted distal radius fracture.    PE left wrist:  skin intact, no swelling, no ecchymosis, no erythema, normothermic. +TTP over DR. able to ROM wrist with some discomfort. Moving all fingers, sensation intact. Radial pulse 2+, cap refill <2secs.    Assessment:  65y Female with a left distal radius fracture     Procedure:  Closed reduction of left wrist fracture and application of sugar tong splint performed with residents after hematoma block with 10ccs of 1% lidocaine. Pt tolerated procedure well with improved pain, able to wiggle fingers, sensation intact, cap refill <2secs.    Plan:  Post-reduction of the left wrist demonstrate improved fracture alignment.  NWB LUE with sugar tong splint.  Keep splint clean, dry and intact.  Pain control.  Ice application.  LUE elevation.  Follow-up with Dr Stanford in the office within 1 week; call office for appointment.  Return to ED immediately if severe pain, severe swelling, numbness/tingling or fingers changing color.     Case discussed with Dr Stanford who agrees with plan.

## (undated) DEVICE — SODIUM CHL IRRIGATION 0.9% 1000ML (12EA/CA)

## (undated) DEVICE — MASK ANESTHESIA ADULT  - (100/CA)

## (undated) DEVICE — SET SUCTION/IRRIGATION WITH DISPOSABLE TIP (6/CA )PART #0250-070-520 IS A SUB

## (undated) DEVICE — SET EXTENSION WITH 2 PORTS (48EA/CA) ***PART #2C8610 IS A SUBSTITUTE*****

## (undated) DEVICE — TRAY SKIN SCRUB PVP WET (20EA/CA) PART #DYND70356 DISCONTINUED

## (undated) DEVICE — GLOVE BIOGEL SZ 7 SURGICAL PF LTX - (50PR/BX 4BX/CA)

## (undated) DEVICE — SUTURE 3-0 VICRYL PLUS SH - 8X 18 INCH (12/BX)

## (undated) DEVICE — ELECTRODE 850 FOAM ADHESIVE - HYDROGEL RADIOTRNSPRNT (50/PK)

## (undated) DEVICE — TROCAR Z THREAD12MM OPTICAL - NON BLADED (6/BX)

## (undated) DEVICE — TROCAR SEPARATOR 15MMZTHREAD - (6/BX)

## (undated) DEVICE — SPONGE GAUZESTER. 2X2 4-PL - (2/PK 50PK/BX 30BX/CS)

## (undated) DEVICE — SUTURE 2-0 SILK 12 X 18" (36PK/BX)"

## (undated) DEVICE — SUTURE 3-0 ENDO STITCH ABSORBALE 8 20CM (6EA/CA)"

## (undated) DEVICE — SHEAR HS FOCUS 9CM CVD - (6/BX)

## (undated) DEVICE — SENSOR SPO2 NEO LNCS ADHESIVE (20/BX) SEE USER NOTES

## (undated) DEVICE — CANNULA W/SEAL 5X100 Z-THRE - ADED KII (12/BX)

## (undated) DEVICE — TUBING CLEARLINK DUO-VENT - C-FLO (48EA/CA)

## (undated) DEVICE — SUT NABSB 4-0 P-3 18IN PRLN - (12/BX)

## (undated) DEVICE — NEEDLE INSFL 120MM 14GA VRRS - (20/BX)

## (undated) DEVICE — GOWN WARMING STANDARD FLEX - (30/CA)

## (undated) DEVICE — HEAD HOLDER JUNIOR/ADULT

## (undated) DEVICE — BAG, SPONGE COUNT 50600

## (undated) DEVICE — SUTURE 4-0 VICRYL PLUS FS-2 - 27 INCH (36/BX)

## (undated) DEVICE — LACTATED RINGERS INJ 1000 ML - (14EA/CA 60CA/PF)

## (undated) DEVICE — SUCTION INSTRUMENT YANKAUER BULBOUS TIP W/O VENT (50EA/CA)

## (undated) DEVICE — TROCAR 5X100 NON BLADED Z-TH - READ KII (6/BX)

## (undated) DEVICE — SET LEADWIRE 5 LEAD BEDSIDE DISPOSABLE ECG (1SET OF 5/EA)

## (undated) DEVICE — ELECTRODE DUAL RETURN W/ CORD - (50/PK)

## (undated) DEVICE — GLOVE SZ 7.5 BIOGEL PI MICRO - PF LF (50PR/BX)

## (undated) DEVICE — WATER IRRIGATION STERILE 1000ML (12EA/CA)

## (undated) DEVICE — HEMOSTAT ARISTA PWD 3 GRAM - (5/CA)

## (undated) DEVICE — KIT ROOM DECONTAMINATION

## (undated) DEVICE — CANISTER SUCTION 3000ML MECHANICAL FILTER AUTO SHUTOFF MEDI-VAC NONSTERILE LF DISP  (40EA/CA)

## (undated) DEVICE — RELOAD WITH GRIPPING SURGACE TECHNOLOGY GREEN 60MM (12EA/BX)

## (undated) DEVICE — GLOVE BIOGEL INDICATOR SZ 7SURGICAL PF LTX - (50/BX 4BX/CA)

## (undated) DEVICE — BOVIE BLADE COATED &INSULATED (50EA/PK)

## (undated) DEVICE — KIT ANESTHESIA W/CIRCUIT & 3/LT BAG W/FILTER (20EA/CA)

## (undated) DEVICE — TUBE CONNECTING SUCTION - CLEAR PLASTIC STERILE 72 IN (50EA/CA)

## (undated) DEVICE — DRESSING TRANSPARENT FILM TEGADERM 2.375 X 2.75"  (100EA/BX)"

## (undated) DEVICE — SUTURE GENERAL

## (undated) DEVICE — RELOAD WITH GRIPPING SURFACE TECHNOLOGY GOLD 60MM (12EA/BX)

## (undated) DEVICE — SUTURE 3-0 VICRYL PLUS RB-1 - 8 X 18 INCH (12/BX)

## (undated) DEVICE — PACK GASTRIC BANDING OR - (1/CA)

## (undated) DEVICE — SYRINGE SAFETY 3 ML 18 GA X 1 1/2 BLUNT LL (100/BX 8BX/CA)

## (undated) DEVICE — PROBE PRASS STAND STIMULATING (5EA/PK)

## (undated) DEVICE — SUTURE 3-0 VICRYL PLUS SH - 27 INCH (36/BX)

## (undated) DEVICE — FIBRILLAR SURGICEL 4X4 - 10/CA

## (undated) DEVICE — CATHETER IV 20 GA X 1-1/4 ---SURG.& SDS ONLY--- (50EA/BX)

## (undated) DEVICE — PROTECTOR ULNA NERVE - (36PR/CA)

## (undated) DEVICE — DRAPE SURGICAL U 77X120 - (10/CA)

## (undated) DEVICE — DRAPE MAGNETIC (INSTRA-MAG) - (30/CA)

## (undated) DEVICE — TUBING LAPAROSCOPIC PLUME DEVICE (10EA/CA)

## (undated) DEVICE — NEPTUNE 4 PORT MANIFOLD - (20/PK)

## (undated) DEVICE — DRAPE STRLE REG TOWEL 18X24 - (10/BX 4BX/CA)"

## (undated) DEVICE — ENDOSTITCH10MM SUTURING DEVIC - (3/CA)

## (undated) DEVICE — TUBE EMG NIM TRIVANTAGE 6MM (3EA/PK)

## (undated) DEVICE — KIT  I.V. START (100EA/CA)

## (undated) DEVICE — SPONGE XRAY 8X4 STERL. 12PL - (10EA/TY 80TY/CA)

## (undated) DEVICE — STAPLER POWERED 60MM (3EA/BX)

## (undated) DEVICE — HUMID-VENT HEAT AND MOISTURE EXCHANGE- (50/BX)

## (undated) DEVICE — SLEEVE, VASO, THIGH, MED

## (undated) DEVICE — ELECTRODE 5MM LHK LAPSCP STERILE DISP- MEGADYNE  (5/CA)

## (undated) DEVICE — SPONGE PEANUT - (5/PK 50PK/CA)

## (undated) DEVICE — CHLORAPREP 26 ML APPLICATOR - ORANGE TINT(25/CA)

## (undated) DEVICE — PACK MINOR BASIN - (2EA/CA)

## (undated) DEVICE — CANISTER SUCTION RIGID RED 1500CC (40EA/CA)

## (undated) DEVICE — GOWN SURGEONS LARGE - (32/CA)

## (undated) DEVICE — DERMABOND ADVANCED - (12EA/BX)

## (undated) DEVICE — CLIP MED INTNL HRZN TI ESCP - (25/BX)